# Patient Record
Sex: MALE | Race: OTHER | NOT HISPANIC OR LATINO | Employment: FULL TIME | URBAN - METROPOLITAN AREA
[De-identification: names, ages, dates, MRNs, and addresses within clinical notes are randomized per-mention and may not be internally consistent; named-entity substitution may affect disease eponyms.]

---
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
## 2022-01-13 ENCOUNTER — HOSPITAL ENCOUNTER (EMERGENCY)
Facility: HOSPITAL | Age: 22
Discharge: HOME | End: 2022-01-13
Attending: EMERGENCY MEDICINE | Admitting: EMERGENCY MEDICINE
Payer: COMMERCIAL

## 2022-01-13 VITALS
SYSTOLIC BLOOD PRESSURE: 136 MMHG | WEIGHT: 151.6 LBS | BODY MASS INDEX: 22.39 KG/M2 | OXYGEN SATURATION: 97 % | DIASTOLIC BLOOD PRESSURE: 84 MMHG | HEART RATE: 76 BPM | TEMPERATURE: 98.4 F | RESPIRATION RATE: 16 BRPM

## 2022-01-13 DIAGNOSIS — K64.9 HEMORRHOIDS: Primary | ICD-10-CM

## 2022-01-13 PROCEDURE — 99283 EMERGENCY DEPT VISIT LOW MDM: CPT

## 2022-01-13 PROCEDURE — 99284 EMERGENCY DEPT VISIT MOD MDM: CPT | Performed by: PHYSICIAN ASSISTANT

## 2022-01-13 PROCEDURE — 82272 OCCULT BLD FECES 1-3 TESTS: CPT

## 2022-01-13 RX ORDER — DOCUSATE SODIUM 100 MG/1
100 CAPSULE, LIQUID FILLED ORAL EVERY 12 HOURS
Qty: 10 CAPSULE | Refills: 0 | Status: SHIPPED | OUTPATIENT
Start: 2022-01-13

## 2022-01-13 NOTE — DISCHARGE INSTRUCTIONS
Call the doctor listed on her discharge instructions for follow-up    Please use the medications as directed on your discharge    Please return with any questions comments concerns or worsening symptoms

## 2022-01-13 NOTE — ED PROVIDER NOTES
History  Chief Complaint   Patient presents with    Rectal Pain     past 4 days has noted pain with passing BM, blood on tissue when wiping     This is a 26-year-old male patient who over the last 4 days his notice some blood on the toilet paper when he wipes and he states he has some discomfort when passing the BM  He denies diarrhea but states his stool is hard her no abdominal pain testicular pain penile pain or discharge  Nothing makes it better or worse  He is not dizzy there is no signs of acute blood loss  Denies any anal intercourse or trauma  No significant past medical history  He is nontoxic in no acute distress  Denies any fever chills headache blurred vision double vision cough congestion sore throat chest pain or shortness of breath  No nausea vomiting diarrhea abdominal pain  Nothing makes it better or worse she has tried nothing over-the-counter differential diagnosis includes not limited to hemorrhoid, fissure, GI bleed less likely          Prior to Admission Medications   Prescriptions Last Dose Informant Patient Reported? Taking?   azelastine (OPTIVAR) 0 05 % ophthalmic solution   No No   Sig: Administer 1 drop to both eyes 2 (two) times a day as needed (eye itching)   Patient not taking: Reported on 12/15/2021    diphenhydrAMINE (BENADRYL) 2 % cream   No No   Sig: Apply topically 3 (three) times a day as needed for itching   Patient not taking: Reported on 12/15/2021    lidocaine (XYLOCAINE) 5 % ointment   No No   Sig: Apply topically as needed for mild pain   Patient not taking: Reported on 12/15/2021    loratadine (CLARITIN) 10 mg tablet   No No   Sig: Take 1 tablet (10 mg total) by mouth daily   Patient not taking: Reported on 12/15/2021    sertraline (Zoloft) 50 mg tablet   No No   Sig: Take 1 tablet (50 mg total) by mouth daily For first 7 days, take 1/2 tablet (25mg)        Facility-Administered Medications: None       Past Medical History:   Diagnosis Date    No known health problems        Past Surgical History:   Procedure Laterality Date    NO PAST SURGERIES         History reviewed  No pertinent family history  I have reviewed and agree with the history as documented  E-Cigarette/Vaping    E-Cigarette Use Never User      E-Cigarette/Vaping Substances     Social History     Tobacco Use    Smoking status: Former Smoker     Types: Cigars    Smokeless tobacco: Never Used   Vaping Use    Vaping Use: Never used   Substance Use Topics    Alcohol use: No    Drug use: Not Currently     Types: Marijuana     Comment: once monthly        Review of Systems   Constitutional: Negative for diaphoresis, fatigue and fever  HENT: Negative for congestion, ear pain, nosebleeds and sore throat  Eyes: Negative for photophobia, pain, discharge and visual disturbance  Respiratory: Negative for cough, choking, chest tightness, shortness of breath and wheezing  Cardiovascular: Negative for chest pain and palpitations  Gastrointestinal: Positive for blood in stool  Negative for abdominal distention, abdominal pain, anal bleeding, constipation, diarrhea, nausea, rectal pain and vomiting  Genitourinary: Negative for dysuria, flank pain and frequency  Musculoskeletal: Negative for back pain, gait problem and joint swelling  Skin: Negative for color change and rash  Neurological: Negative for dizziness, syncope and headaches  Psychiatric/Behavioral: Negative for behavioral problems and confusion  The patient is not nervous/anxious  All other systems reviewed and are negative  Physical Exam  Physical Exam  Vitals and nursing note reviewed  Exam conducted with a chaperone present  Constitutional:       General: He is not in acute distress  Appearance: He is well-developed  He is not ill-appearing, toxic-appearing or diaphoretic  HENT:      Head: Normocephalic and atraumatic        Right Ear: Tympanic membrane, ear canal and external ear normal       Left Ear: Tympanic membrane, ear canal and external ear normal       Nose: Nose normal       Mouth/Throat:      Mouth: Mucous membranes are moist       Pharynx: Oropharynx is clear  No oropharyngeal exudate or posterior oropharyngeal erythema  Eyes:      General: No scleral icterus  Right eye: No discharge  Left eye: No discharge  Conjunctiva/sclera: Conjunctivae normal       Pupils: Pupils are equal, round, and reactive to light  Cardiovascular:      Rate and Rhythm: Normal rate and regular rhythm  Pulmonary:      Effort: Pulmonary effort is normal       Breath sounds: Normal breath sounds  Abdominal:      General: Bowel sounds are normal       Palpations: Abdomen is soft  Tenderness: There is no abdominal tenderness  Hernia: There is no hernia in the left inguinal area or right inguinal area  Genitourinary:     Penis: Normal        Testes: Normal       Epididymis:      Right: Normal       Left: Normal       Rectum: Guaiac result negative  External hemorrhoid present  No tenderness or anal fissure  Normal anal tone  Comments: Patient is very small hemorrhoid tissue at the 6 o'clock position  It is not thrombosed or inflamed at this time  Musculoskeletal:         General: Normal range of motion  Cervical back: Normal range of motion and neck supple  Right lower leg: No edema  Left lower leg: No edema  Lymphadenopathy:      Lower Body: No right inguinal adenopathy  No left inguinal adenopathy  Skin:     General: Skin is warm  Capillary Refill: Capillary refill takes less than 2 seconds  Neurological:      General: No focal deficit present  Mental Status: He is alert and oriented to person, place, and time  Mental status is at baseline     Psychiatric:         Mood and Affect: Mood normal          Behavior: Behavior normal          Vital Signs  ED Triage Vitals [01/13/22 0922]   Temperature Pulse Respirations Blood Pressure SpO2   98 4 °F (36 9 °C) 76 16 136/84 97 %      Temp Source Heart Rate Source Patient Position - Orthostatic VS BP Location FiO2 (%)   Oral Monitor Sitting Left arm --      Pain Score       No Pain           Vitals:    01/13/22 0922   BP: 136/84   Pulse: 76   Patient Position - Orthostatic VS: Sitting         Visual Acuity      ED Medications  Medications - No data to display    Diagnostic Studies  Results Reviewed     None                 No orders to display              Procedures  Procedures         ED Course                                             MDM    Disposition  Final diagnoses:   Hemorrhoids     Time reflects when diagnosis was documented in both MDM as applicable and the Disposition within this note     Time User Action Codes Description Comment    1/13/2022  9:52 AM Shivam Ellison Add [K64 9] Hemorrhoids       ED Disposition     ED Disposition Condition Date/Time Comment    Discharge Stable u Jan 13, 2022  9:52 AM Miller Gary discharge to home/self care              Follow-up Information     Follow up With Specialties Details Why Contact Info    Angelica Christine MD General Surgery Schedule an appointment as soon as possible for a visit   [address] [facility] [address] [facility]  [phone]            Discharge Medication List as of 1/13/2022  9:55 AM      START taking these medications    Details   docusate sodium (COLACE) 100 mg capsule Take 1 capsule (100 mg total) by mouth every 12 (twelve) hours, Starting Thu 1/13/2022, Normal      hydrocortisone-pramoxine (PROCTOFOAM-HC) 1-1 % FOAM rectal foam Insert 1 applicator into the rectum 2 (two) times a day, Starting Thu 1/13/2022, Normal         CONTINUE these medications which have NOT CHANGED    Details   azelastine (OPTIVAR) 0 05 % ophthalmic solution Administer 1 drop to both eyes 2 (two) times a day as needed (eye itching), Starting Wed 11/3/2021, Normal      diphenhydrAMINE (BENADRYL) 2 % cream Apply topically 3 (three) times a day as needed for itching, Starting Sun 10/17/2021, Print      lidocaine (XYLOCAINE) 5 % ointment Apply topically as needed for mild pain, Starting Wed 9/15/2021, Normal      loratadine (CLARITIN) 10 mg tablet Take 1 tablet (10 mg total) by mouth daily, Starting Wed 11/3/2021, Normal      sertraline (Zoloft) 50 mg tablet Take 1 tablet (50 mg total) by mouth daily For first 7 days, take 1/2 tablet (25mg)  , Starting Mon 12/20/2021, Normal             No discharge procedures on file      PDMP Review     None          ED Provider  Electronically Signed by           Sly Winston PA-C  01/13/22 0073

## 2022-01-17 ENCOUNTER — HOSPITAL ENCOUNTER (EMERGENCY)
Facility: HOSPITAL | Age: 22
Discharge: HOME | End: 2022-01-17
Attending: EMERGENCY MEDICINE
Payer: COMMERCIAL

## 2022-01-17 ENCOUNTER — PATIENT OUTREACH (OUTPATIENT)
Dept: FAMILY MEDICINE CLINIC | Facility: CLINIC | Age: 22
End: 2022-01-17

## 2022-01-17 VITALS
DIASTOLIC BLOOD PRESSURE: 61 MMHG | WEIGHT: 157.9 LBS | HEART RATE: 63 BPM | RESPIRATION RATE: 20 BRPM | TEMPERATURE: 97.8 F | OXYGEN SATURATION: 100 % | SYSTOLIC BLOOD PRESSURE: 113 MMHG | BODY MASS INDEX: 23.32 KG/M2

## 2022-01-17 DIAGNOSIS — M79.672 BILATERAL FOOT PAIN: Primary | ICD-10-CM

## 2022-01-17 DIAGNOSIS — M79.671 BILATERAL FOOT PAIN: Primary | ICD-10-CM

## 2022-01-17 PROCEDURE — 99282 EMERGENCY DEPT VISIT SF MDM: CPT | Performed by: PHYSICIAN ASSISTANT

## 2022-01-17 PROCEDURE — 99283 EMERGENCY DEPT VISIT LOW MDM: CPT
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
## 2022-01-17 NOTE — PROGRESS NOTES
GADIEL JAMIL received consult from Provider, Wilfrid Graham, regarding assist patient with [address] services  Per chart review, patient does not have insurance and utilize the ED often  GADIEL JAMIL placed call to patient to follow-up and further assess  No answer, phone is not accepting call at this time  GADIEL JAMIL placed call to patient's mother, no answer, left a details vm and ask for a return call  Will remains available and will continue to follow-up

## 2022-01-21 ENCOUNTER — PATIENT OUTREACH (OUTPATIENT)
Dept: FAMILY MEDICINE CLINIC | Facility: CLINIC | Age: 22
End: 2022-01-21

## 2022-01-21 NOTE — LETTER
[address] [facility] [address]  [phone]    Re: Unable to contact   1/21/2022       Dear Johnnie Rubio,    We tried to reach you by phone on multiples time and was unfortunately unable to reach you  It is important that you contact the [facility] as soon as possible at: [phone]      Sincerely,         [address]

## 2022-01-21 NOTE — PROGRESS NOTES
GADIEL JAMIL placed second call to patient to follow-up and further assess  No answer, phone is not accepting call at this time  GADIEL JAMIL placed call to patient's mother, no answer, left a details vm and ask for a return call  Sent unable to contact letter  Will continue closed this referral at this time due to lack of response but will remains available as needed

## 2022-03-22 ENCOUNTER — HOSPITAL ENCOUNTER (EMERGENCY)
Facility: HOSPITAL | Age: 22
Discharge: HOME | End: 2022-03-22
Attending: EMERGENCY MEDICINE | Admitting: EMERGENCY MEDICINE
Payer: COMMERCIAL

## 2022-03-22 VITALS
BODY MASS INDEX: 21.59 KG/M2 | SYSTOLIC BLOOD PRESSURE: 108 MMHG | HEART RATE: 75 BPM | DIASTOLIC BLOOD PRESSURE: 64 MMHG | WEIGHT: 145.8 LBS | HEIGHT: 69 IN | OXYGEN SATURATION: 100 % | RESPIRATION RATE: 18 BRPM | TEMPERATURE: 97.2 F

## 2022-03-22 DIAGNOSIS — N39.0 UTI (URINARY TRACT INFECTION): Primary | ICD-10-CM

## 2022-03-22 DIAGNOSIS — Z71.1 CONCERN ABOUT STD IN MALE WITHOUT DIAGNOSIS: ICD-10-CM

## 2022-03-22 LAB
BACTERIA UR QL AUTO: ABNORMAL /HPF
BILIRUB UR QL STRIP: NEGATIVE
CLARITY UR: ABNORMAL
COLOR UR: ABNORMAL
GLUCOSE UR STRIP-MCNC: NEGATIVE MG/DL
HGB UR QL STRIP.AUTO: NEGATIVE
KETONES UR STRIP-MCNC: ABNORMAL MG/DL
LEUKOCYTE ESTERASE UR QL STRIP: 100
MUCOUS THREADS UR QL AUTO: ABNORMAL
NITRITE UR QL STRIP: NEGATIVE
NON-SQ EPI CELLS URNS QL MICRO: ABNORMAL /HPF
PH UR STRIP.AUTO: 6 [PH]
PROT UR STRIP-MCNC: ABNORMAL MG/DL
RBC #/AREA URNS AUTO: ABNORMAL /HPF
SP GR UR STRIP.AUTO: 1.02 (ref 1–1.04)
UROBILINOGEN UA: 1 MG/DL
WBC #/AREA URNS AUTO: ABNORMAL /HPF

## 2022-03-22 PROCEDURE — 81003 URINALYSIS AUTO W/O SCOPE: CPT | Performed by: PHYSICIAN ASSISTANT

## 2022-03-22 PROCEDURE — 81001 URINALYSIS AUTO W/SCOPE: CPT | Performed by: PHYSICIAN ASSISTANT

## 2022-03-22 PROCEDURE — 87086 URINE CULTURE/COLONY COUNT: CPT | Performed by: PHYSICIAN ASSISTANT

## 2022-03-22 PROCEDURE — 99283 EMERGENCY DEPT VISIT LOW MDM: CPT

## 2022-03-22 PROCEDURE — 96372 THER/PROPH/DIAG INJ SC/IM: CPT

## 2022-03-22 PROCEDURE — 87491 CHLMYD TRACH DNA AMP PROBE: CPT | Performed by: PHYSICIAN ASSISTANT

## 2022-03-22 PROCEDURE — 99284 EMERGENCY DEPT VISIT MOD MDM: CPT | Performed by: PHYSICIAN ASSISTANT

## 2022-03-22 PROCEDURE — 87591 N.GONORRHOEAE DNA AMP PROB: CPT | Performed by: PHYSICIAN ASSISTANT

## 2022-03-22 RX ORDER — AZITHROMYCIN 250 MG/1
1000 TABLET, FILM COATED ORAL ONCE
Status: COMPLETED | OUTPATIENT
Start: 2022-03-22 | End: 2022-03-22

## 2022-03-22 RX ORDER — DOXYCYCLINE 100 MG/1
100 TABLET ORAL 2 TIMES DAILY
Qty: 20 TABLET | Refills: 0 | Status: SHIPPED | OUTPATIENT
Start: 2022-03-22 | End: 2022-04-01

## 2022-03-22 RX ADMIN — LIDOCAINE HYDROCHLORIDE 250 MG: 10 INJECTION, SOLUTION EPIDURAL; INFILTRATION; INTRACAUDAL at 12:56

## 2022-03-22 RX ADMIN — AZITHROMYCIN MONOHYDRATE 1000 MG: 250 TABLET ORAL at 12:55

## 2022-03-22 NOTE — ED PROVIDER NOTES
History  Chief Complaint   Patient presents with    Possible UTI     having burning sensation and drainage     Pt with burning with urine and penile d/c for several days       Difficulty Urinating  Presenting symptoms: dysuria and penile discharge    Relieved by:  Nothing  Worsened by:  Nothing  Ineffective treatments:  None tried  Associated symptoms: no abdominal pain    Risk factors: no bladder surgery        Prior to Admission Medications   Prescriptions Last Dose Informant Patient Reported? Taking?   azelastine (OPTIVAR) 0 05 % ophthalmic solution   No No   Sig: Administer 1 drop to both eyes 2 (two) times a day as needed (eye itching)   Patient not taking: Reported on 12/15/2021    diphenhydrAMINE (BENADRYL) 2 % cream   No No   Sig: Apply topically 3 (three) times a day as needed for itching   Patient not taking: Reported on 12/15/2021    docusate sodium (COLACE) 100 mg capsule   No No   Sig: Take 1 capsule (100 mg total) by mouth every 12 (twelve) hours   hydrocortisone-pramoxine (PROCTOFOAM-HC) 1-1 % FOAM rectal foam   No No   Sig: Insert 1 applicator into the rectum 2 (two) times a day   lidocaine (XYLOCAINE) 5 % ointment   No No   Sig: Apply topically as needed for mild pain   Patient not taking: Reported on 12/15/2021    loratadine (CLARITIN) 10 mg tablet   No No   Sig: Take 1 tablet (10 mg total) by mouth daily   Patient not taking: Reported on 12/15/2021    sertraline (Zoloft) 50 mg tablet   No No   Sig: Take 1 tablet (50 mg total) by mouth daily For first 7 days, take 1/2 tablet (25mg)  Facility-Administered Medications: None       Past Medical History:   Diagnosis Date    No known health problems        Past Surgical History:   Procedure Laterality Date    NO PAST SURGERIES         History reviewed  No pertinent family history  I have reviewed and agree with the history as documented      E-Cigarette/Vaping    E-Cigarette Use Never User      E-Cigarette/Vaping Substances     Social History Tobacco Use    Smoking status: Current Some Day Smoker     Types: Cigars    Smokeless tobacco: Never Used   Vaping Use    Vaping Use: Never used   Substance Use Topics    Alcohol use: Yes    Drug use: Yes     Types: Marijuana     Comment: once a day       Review of Systems   Constitutional: Negative  HENT: Negative  Eyes: Negative  Respiratory: Negative  Cardiovascular: Negative  Gastrointestinal: Negative  Negative for abdominal pain  Endocrine: Negative  Genitourinary: Positive for dysuria and penile discharge  Musculoskeletal: Negative  Skin: Negative  Allergic/Immunologic: Negative  Neurological: Negative  Hematological: Negative  Psychiatric/Behavioral: Negative  All other systems reviewed and are negative  Physical Exam  Physical Exam  Vitals and nursing note reviewed  Constitutional:       Appearance: Normal appearance  He is normal weight  HENT:      Head: Normocephalic and atraumatic  Right Ear: Tympanic membrane, ear canal and external ear normal       Left Ear: Tympanic membrane, ear canal and external ear normal       Nose: Nose normal       Mouth/Throat:      Mouth: Mucous membranes are moist       Pharynx: Oropharynx is clear  Eyes:      Extraocular Movements: Extraocular movements intact  Conjunctiva/sclera: Conjunctivae normal       Pupils: Pupils are equal, round, and reactive to light  Cardiovascular:      Rate and Rhythm: Normal rate and regular rhythm  Pulses: Normal pulses  Heart sounds: Normal heart sounds  Pulmonary:      Effort: Pulmonary effort is normal       Breath sounds: Normal breath sounds  Abdominal:      General: Abdomen is flat  Bowel sounds are normal       Palpations: Abdomen is soft  Musculoskeletal:         General: Normal range of motion  Cervical back: Normal range of motion and neck supple  Skin:     General: Skin is warm        Capillary Refill: Capillary refill takes less than 2 [Use of Plain Language] : use of plain language [Needs Reinforcement, Provided] : needs reinforcement, provided seconds  Neurological:      General: No focal deficit present  Mental Status: He is alert and oriented to person, place, and time  Psychiatric:         Mood and Affect: Mood normal          Behavior: Behavior normal          Vital Signs  ED Triage Vitals [03/22/22 1147]   Temperature Pulse Respirations Blood Pressure SpO2   (!) 97 2 °F (36 2 °C) 75 18 108/64 100 %      Temp Source Heart Rate Source Patient Position - Orthostatic VS BP Location FiO2 (%)   Tympanic Monitor Lying Right arm --      Pain Score       10 - Worst Possible Pain           Vitals:    03/22/22 1147   BP: 108/64   Pulse: 75   Patient Position - Orthostatic VS: Lying         Visual Acuity      ED Medications  Medications   cefTRIAXone (ROCEPHIN) 250 mg in lidocaine (PF) (XYLOCAINE-MPF) 1 % IM only syringe (250 mg Intramuscular Given 3/22/22 1256)   azithromycin (ZITHROMAX) tablet 1,000 mg (1,000 mg Oral Given 3/22/22 1255)       Diagnostic Studies  Results Reviewed     Procedure Component Value Units Date/Time    Urine Microscopic [[id number]]  (Abnormal) Collected: 03/22/22 1215    Lab Status: Final result Specimen: Urine, Other Updated: 03/22/22 1241     RBC, UA None Seen /hpf      WBC, UA 30-50 /hpf      Epithelial Cells Occasional /hpf      Bacteria, UA Moderate /hpf      MUCUS THREADS Moderate    Urine culture [[id number]] Collected: 03/22/22 1215    Lab Status:  In process Specimen: Urine, Other Updated: 03/22/22 1241    UA w Reflex to Microscopic w Reflex to Culture [[id number]]  (Abnormal) Collected: 03/22/22 1215    Lab Status: Final result Specimen: Urine, Other Updated: 03/22/22 1241     Color, UA Hali     Clarity, UA Slightly Cloudy     Specific Gravity, UA 1 025     pH, UA 6 0     Leukocytes,  0     Nitrite, UA Negative     Protein, UA 15 (Trace) mg/dl      Glucose, UA Negative mg/dl      Ketones, UA 5 (Trace) mg/dl      Bilirubin, UA Negative     Blood, UA Negative     UROBILINOGEN UA 1 0 mg/dL     Chlamydia/GC amplified DNA by PCR [[id number]] Collected: 03/22/22 1215    Lab Status: In process Specimen: Urine, Other Updated: 03/22/22 1217                 No orders to display              Procedures  Procedures         ED Course                                             MDM    Disposition  Final diagnoses:   UTI (urinary tract infection)   Concern about STD in male without diagnosis     Time reflects when diagnosis was documented in both MDM as applicable and the Disposition within this note     Time User Action Codes Description Comment    3/22/2022 12:48 PM Marilia Bogdan  Add [N39 0] UTI (urinary tract infection)     3/22/2022 12:49 PM Joaquin Chen  Add [Z71 1] Concern about STD in male without diagnosis       ED Disposition     ED Disposition Condition Date/Time Comment    Discharge Stable Tue Mar 22, 2022 12:48 PM Vinny Saucedo discharge to home/self care              Follow-up Information     Follow up With Specialties Details Why Contact Info    Eleuterio Ma MD Family Medicine  std testing [address]  [phone]            Discharge Medication List as of 3/22/2022 12:50 PM      START taking these medications    Details   doxycycline (ADOXA) 100 MG tablet Take 1 tablet (100 mg total) by mouth 2 (two) times a day for 10 days, Starting Tue 3/22/2022, Until Fri 4/1/2022, Print         CONTINUE these medications which have NOT CHANGED    Details   azelastine (OPTIVAR) 0 05 % ophthalmic solution Administer 1 drop to both eyes 2 (two) times a day as needed (eye itching), Starting Wed 11/3/2021, Normal      diphenhydrAMINE (BENADRYL) 2 % cream Apply topically 3 (three) times a day as needed for itching, Starting Sun 10/17/2021, Print      docusate sodium (COLACE) 100 mg capsule Take 1 capsule (100 mg total) by mouth every 12 (twelve) hours, Starting Thu 1/13/2022, Normal      hydrocortisone-pramoxine (PROCTOFOAM-HC) 1-1 % FOAM rectal foam Insert 1 applicator into the rectum 2 (two) times a day, [Health Literacy] : health literacy Starting Thu 1/13/2022, Normal      lidocaine (XYLOCAINE) 5 % ointment Apply topically as needed for mild pain, Starting Wed 9/15/2021, Normal      loratadine (CLARITIN) 10 mg tablet Take 1 tablet (10 mg total) by mouth daily, Starting Wed 11/3/2021, Normal      sertraline (Zoloft) 50 mg tablet Take 1 tablet (50 mg total) by mouth daily For first 7 days, take 1/2 tablet (25mg)  , Starting Mon 12/20/2021, Normal             No discharge procedures on file      PDMP Review     None          ED Provider  Electronically Signed by           Nacho Anderson PA-C  03/22/22 3496 [] : I have reviewed management goals with caretaker and provided a copy of care plan

## 2022-03-23 LAB
BACTERIA UR CULT: ABNORMAL
C TRACH DNA SPEC QL NAA+PROBE: NEGATIVE
N GONORRHOEA DNA SPEC QL NAA+PROBE: POSITIVE

## 2022-03-25 ENCOUNTER — TELEPHONE (OUTPATIENT)
Dept: EMERGENCY DEPT | Facility: HOSPITAL | Age: 22
End: 2022-03-25

## 2022-03-25 NOTE — TELEPHONE ENCOUNTER
Called pt to discuss test results and medication  , number not in service, letter sent asking pt to call er regarding meds and results  rocpephin 250mg was given not 500mg

## 2022-04-22 ENCOUNTER — APPOINTMENT (EMERGENCY)
Dept: RADIOLOGY | Facility: HOSPITAL | Age: 22
End: 2022-04-22
Payer: COMMERCIAL

## 2022-04-22 ENCOUNTER — HOSPITAL ENCOUNTER (EMERGENCY)
Facility: HOSPITAL | Age: 22
Discharge: HOME | End: 2022-04-22
Attending: EMERGENCY MEDICINE
Payer: COMMERCIAL

## 2022-04-22 VITALS
DIASTOLIC BLOOD PRESSURE: 57 MMHG | HEART RATE: 69 BPM | OXYGEN SATURATION: 99 % | RESPIRATION RATE: 18 BRPM | TEMPERATURE: 98.4 F | SYSTOLIC BLOOD PRESSURE: 105 MMHG

## 2022-04-22 DIAGNOSIS — R06.02 SHORTNESS OF BREATH: ICD-10-CM

## 2022-04-22 DIAGNOSIS — R07.89 ATYPICAL CHEST PAIN: Primary | ICD-10-CM

## 2022-04-22 LAB
ANION GAP SERPL CALCULATED.3IONS-SCNC: 5 MMOL/L (ref 4–13)
ATRIAL RATE: 187 BPM
BASOPHILS # BLD AUTO: 0.04 THOUSANDS/ΜL (ref 0–0.1)
BASOPHILS NFR BLD AUTO: 1 % (ref 0–1)
BUN SERPL-MCNC: 9 MG/DL (ref 5–25)
CALCIUM SERPL-MCNC: 8.3 MG/DL (ref 8.3–10.1)
CHLORIDE SERPL-SCNC: 108 MMOL/L (ref 100–108)
CO2 SERPL-SCNC: 32 MMOL/L (ref 21–32)
CREAT SERPL-MCNC: 1.13 MG/DL (ref 0.6–1.3)
EOSINOPHIL # BLD AUTO: 0.55 THOUSAND/ΜL (ref 0–0.61)
EOSINOPHIL NFR BLD AUTO: 7 % (ref 0–6)
ERYTHROCYTE [DISTWIDTH] IN BLOOD BY AUTOMATED COUNT: 11.9 % (ref 11.6–15.1)
GFR SERPL CREATININE-BSD FRML MDRD: 93 ML/MIN/1.73SQ M
GLUCOSE SERPL-MCNC: 88 MG/DL (ref 65–140)
HCT VFR BLD AUTO: 45.9 % (ref 36.5–49.3)
HGB BLD-MCNC: 15.8 G/DL (ref 12–17)
IMM GRANULOCYTES # BLD AUTO: 0.01 THOUSAND/UL (ref 0–0.2)
IMM GRANULOCYTES NFR BLD AUTO: 0 % (ref 0–2)
LYMPHOCYTES # BLD AUTO: 3.6 THOUSANDS/ΜL (ref 0.6–4.47)
LYMPHOCYTES NFR BLD AUTO: 46 % (ref 14–44)
MCH RBC QN AUTO: 31.5 PG (ref 26.8–34.3)
MCHC RBC AUTO-ENTMCNC: 34.4 G/DL (ref 31.4–37.4)
MCV RBC AUTO: 92 FL (ref 82–98)
MONOCYTES # BLD AUTO: 0.33 THOUSAND/ΜL (ref 0.17–1.22)
MONOCYTES NFR BLD AUTO: 4 % (ref 4–12)
NEUTROPHILS # BLD AUTO: 3.32 THOUSANDS/ΜL (ref 1.85–7.62)
NEUTS SEG NFR BLD AUTO: 42 % (ref 43–75)
NRBC BLD AUTO-RTO: 0 /100 WBCS
P AXIS: 80 DEGREES
PLATELET # BLD AUTO: 299 THOUSANDS/UL (ref 149–390)
PMV BLD AUTO: 8.9 FL (ref 8.9–12.7)
POTASSIUM SERPL-SCNC: 3.7 MMOL/L (ref 3.5–5.3)
QRS AXIS: 65 DEGREES
QRSD INTERVAL: 86 MS
QT INTERVAL: 358 MS
QTC INTERVAL: 405 MS
RBC # BLD AUTO: 5.01 MILLION/UL (ref 3.88–5.62)
SODIUM SERPL-SCNC: 145 MMOL/L (ref 136–145)
T WAVE AXIS: 66 DEGREES
VENTRICULAR RATE: 77 BPM
WBC # BLD AUTO: 7.85 THOUSAND/UL (ref 4.31–10.16)

## 2022-04-22 PROCEDURE — 99285 EMERGENCY DEPT VISIT HI MDM: CPT | Performed by: EMERGENCY MEDICINE

## 2022-04-22 PROCEDURE — 96374 THER/PROPH/DIAG INJ IV PUSH: CPT

## 2022-04-22 PROCEDURE — 36415 COLL VENOUS BLD VENIPUNCTURE: CPT

## 2022-04-22 PROCEDURE — 99285 EMERGENCY DEPT VISIT HI MDM: CPT

## 2022-04-22 PROCEDURE — 71045 X-RAY EXAM CHEST 1 VIEW: CPT

## 2022-04-22 PROCEDURE — 93010 ELECTROCARDIOGRAM REPORT: CPT | Performed by: INTERNAL MEDICINE

## 2022-04-22 PROCEDURE — 80048 BASIC METABOLIC PNL TOTAL CA: CPT

## 2022-04-22 PROCEDURE — 93005 ELECTROCARDIOGRAM TRACING: CPT

## 2022-04-22 PROCEDURE — 85025 COMPLETE CBC W/AUTO DIFF WBC: CPT

## 2022-04-22 RX ORDER — SODIUM CHLORIDE 9 MG/ML
3 INJECTION INTRAVENOUS
Status: DISCONTINUED | OUTPATIENT
Start: 2022-04-22 | End: 2022-04-22 | Stop reason: HOSPADM

## 2022-04-22 RX ORDER — KETOROLAC TROMETHAMINE 30 MG/ML
15 INJECTION, SOLUTION INTRAMUSCULAR; INTRAVENOUS ONCE
Status: COMPLETED | OUTPATIENT
Start: 2022-04-22 | End: 2022-04-22

## 2022-04-22 RX ADMIN — KETOROLAC TROMETHAMINE 15 MG: 30 INJECTION, SOLUTION INTRAMUSCULAR at 01:23

## 2022-04-22 NOTE — ED ATTENDING ATTESTATION
4/22/2022  I, Vincent Salians DO, saw and evaluated the patient  I have discussed the patient with the resident/non-physician practitioner and agree with the resident's/non-physician practitioner's findings, Plan of Care, and MDM as documented in the resident's/non-physician practitioner's note, except where noted  All available labs and Radiology studies were reviewed  I was present for key portions of any procedure(s) performed by the resident/non-physician practitioner and I was immediately available to provide assistance  At this point I agree with the current assessment done in the Emergency Department  I have conducted an independent evaluation of this patient a history and physical is as follows:    Patient is a 29-year-old male who says 2 days ago he began having some mild bilateral upper chest pain which is relatively constant, it is worse with nothing and better with nothing, not pleuritic in nature, not knife-like, ripping, or tearing, not positionally related  Also feeling a little bit of dyspnea on exertion and feeling just a little bit more tired  No palpitations, no cough, despite nurse's notes he says he has no headache  No fever, no chills, no myalgias or arthralgias, no dysuria or hematuria  Patient denies any prolonged travel history, no recent long travel, no immobilizations, or hospitalizations  Says his symptoms are not bothering him very much, presents today to the ED because his mother reportedly made him come to get evaluated      Family history is negative for DVT, negative for PE    General:  Patient is well-appearing  Head:  Atraumatic  Eyes:  Conjunctiva pink  ENT:  Mucous membranes are moist  Neck:  Supple  Cardiac:  S1-S2, without murmurs  Lungs:  Clear to auscultation bilaterally  Abdomen:  Soft, nontender, normal bowel sounds, no CVA tenderness, no tympany, no rigidity, no guarding  Extremities:  Normal range of motion, no pedal edema or calf asymmetry, radial pulses are equal and symmetric bilaterally  Neurologic:  Awake, fluent speech, normal comprehension, AAOx3  Skin:  Pink warm and dry  Psychiatric:  Alert, pleasant, cooperative      ED Course  ED Course as of 04/22/22 0151   Fri Apr 22, 2022   0123 ECG interpreted by me, sinus rhythm, rate of 77, normal axis, slight artifact in lead 2, no acute ischemic or infarct changes, please note I disagree with the computer interpretation of acute MI, no acute change from December 15, 2021     Labs Reviewed   CBC AND DIFFERENTIAL - Abnormal       Result Value Ref Range Status    WBC 7 85  4 31 - 10 16 Thousand/uL Final    RBC 5 01  3 88 - 5 62 Million/uL Final    Hemoglobin 15 8  12 0 - 17 0 g/dL Final    Hematocrit 45 9  36 5 - 49 3 % Final    MCV 92  82 - 98 fL Final    MCH 31 5  26 8 - 34 3 pg Final    MCHC 34 4  31 4 - 37 4 g/dL Final    RDW 11 9  11 6 - 15 1 % Final    MPV 8 9  8 9 - 12 7 fL Final    Platelets 302  579 - 390 Thousands/uL Final    nRBC 0  /100 WBCs Final    Neutrophils Relative 42 (*) 43 - 75 % Final    Immat GRANS % 0  0 - 2 % Final    Lymphocytes Relative 46 (*) 14 - 44 % Final    Monocytes Relative 4  4 - 12 % Final    Eosinophils Relative 7 (*) 0 - 6 % Final    Basophils Relative 1  0 - 1 % Final    Neutrophils Absolute 3 32  1 85 - 7 62 Thousands/µL Final    Immature Grans Absolute 0 01  0 00 - 0 20 Thousand/uL Final    Lymphocytes Absolute 3 60  0 60 - 4 47 Thousands/µL Final    Monocytes Absolute 0 33  0 17 - 1 22 Thousand/µL Final    Eosinophils Absolute 0 55  0 00 - 0 61 Thousand/µL Final    Basophils Absolute 0 04  0 00 - 0 10 Thousands/µL Final   BASIC METABOLIC PANEL    Sodium 267  136 - 145 mmol/L Final    Potassium 3 7  3 5 - 5 3 mmol/L Final    Chloride 108  100 - 108 mmol/L Final    CO2 32  21 - 32 mmol/L Final    ANION GAP 5  4 - 13 mmol/L Final    BUN 9  5 - 25 mg/dL Final    Creatinine 1 13  0 60 - 1 30 mg/dL Final    Comment: Standardized to IDMS reference method    Glucose 88  65 - 140 mg/dL Final Comment: If the patient is fasting, the ADA then defines impaired fasting glucose as > 100 mg/dL and diabetes as > or equal to 123 mg/dL  Specimen collection should occur prior to Sulfasalazine administration due to the potential for falsely depressed results  Specimen collection should occur prior to Sulfapyridine administration due to the potential for falsely elevated results  Calcium 8 3  8 3 - 10 1 mg/dL Final    eGFR 93  ml/min/1 73sq m Final    Narrative:     [place] guidelines for Chronic Kidney Disease (CKD):     Stage 1 with normal or high GFR (GFR > 90 mL/min/1 73 square meters)    Stage 2 Mild CKD (GFR = 60-89 mL/min/1 73 square meters)    Stage 3A Moderate CKD (GFR = 45-59 mL/min/1 73 square meters)    Stage 3B Moderate CKD (GFR = 30-44 mL/min/1 73 square meters)    Stage 4 Severe CKD (GFR = 15-29 mL/min/1 73 square meters)    Stage 5 End Stage CKD (GFR <15 mL/min/1 73 square meters)  Note: GFR calculation is accurate only with a steady state creatinine       X-ray chest 1 view portable   ED Interpretation   Chest x-ray interpreted me shows no acute cardiopulmonary disease, no change from December 15, 2021          Because the symptoms is unclear but unlikely to represent an acute emergency  Overall well-appearing, ECG unremarkable, chest x-ray and labs unremarkable  Benign physical examination  While the cause of the patient's complaints is most likely benign, it is possible that this is the early presentation of a more serious condition  This diagnostic uncertainty was discussed with the patient, as was the importance of follow up care, as well as the need to return to immediately return to the closest emergency department for the signs/symptoms in the discharge instruction sheets, or they were otherwise concerned about their medical condition   The patient stated they were aware of this diagnostic uncertainty, understood the importance of follow up and were comfortable being discharged  Supportive care, importance of follow-up and return precautions were discussed with the patient, who expressed understanding        Critical Care Time  Procedures

## 2022-04-22 NOTE — Clinical Note
Enzo Goldman was seen and treated in our emergency department on 4/22/2022  Diagnosis: Chest pain    Reji  may return to work on return date  He may return on this date: 04/24/2022         If you have any questions or concerns, please don't hesitate to call        Leandra Lopez MD    ______________________________           _______________          _______________  Hospital Representative                              Date                                Time

## 2022-04-22 NOTE — DISCHARGE INSTRUCTIONS
Please see your PCP in 1-2 days if your symptoms persist  Take motrin and tylenol every 6-8 hours as needed for pain  If you develop new or worsening symptoms, please return to the Emergency Department for further evaluation

## 2022-04-22 NOTE — ED PROVIDER NOTES
History  Chief Complaint   Patient presents with    Chest Pain     C/o chest pain starting two days ago  "My heart hurts " Notes SOB as well  Denies N/V/D  Patient is a 24 y/o M with no known medical problems presenting with chest pain  Patient states last night around 10pm while he was at work he had sudden onset stabbing chest pain with associated shortness of breath  He states pain and SOB kept him from breathing last night  Pain continued throughout the day today  He took motrin without relief  No associated nausea, vomiting, recent illness, fevers, chills, cough  States this happened to him once before a year ago and self-resolved  Prior to Admission Medications   Prescriptions Last Dose Informant Patient Reported? Taking?   azelastine (OPTIVAR) 0 05 % ophthalmic solution   No No   Sig: Administer 1 drop to both eyes 2 (two) times a day as needed (eye itching)   Patient not taking: Reported on 12/15/2021    diphenhydrAMINE (BENADRYL) 2 % cream   No No   Sig: Apply topically 3 (three) times a day as needed for itching   Patient not taking: Reported on 12/15/2021    docusate sodium (COLACE) 100 mg capsule   No No   Sig: Take 1 capsule (100 mg total) by mouth every 12 (twelve) hours   hydrocortisone-pramoxine (PROCTOFOAM-HC) 1-1 % FOAM rectal foam   No No   Sig: Insert 1 applicator into the rectum 2 (two) times a day   lidocaine (XYLOCAINE) 5 % ointment   No No   Sig: Apply topically as needed for mild pain   Patient not taking: Reported on 12/15/2021    loratadine (CLARITIN) 10 mg tablet   No No   Sig: Take 1 tablet (10 mg total) by mouth daily   Patient not taking: Reported on 12/15/2021    sertraline (Zoloft) 50 mg tablet   No No   Sig: Take 1 tablet (50 mg total) by mouth daily For first 7 days, take 1/2 tablet (25mg)        Facility-Administered Medications: None       Past Medical History:   Diagnosis Date    No known health problems        Past Surgical History:   Procedure Laterality Date  NO PAST SURGERIES         History reviewed  No pertinent family history  I have reviewed and agree with the history as documented  E-Cigarette/Vaping    E-Cigarette Use Never User      E-Cigarette/Vaping Substances     Social History     Tobacco Use    Smoking status: Current Some Day Smoker     Types: Cigars    Smokeless tobacco: Never Used   Vaping Use    Vaping Use: Never used   Substance Use Topics    Alcohol use: Yes    Drug use: Yes     Types: Marijuana     Comment: once a day        Review of Systems   Constitutional: Negative for chills and fever  HENT: Negative for ear pain and sore throat  Eyes: Negative for pain and visual disturbance  Respiratory: Positive for shortness of breath  Negative for cough  Cardiovascular: Positive for chest pain  Negative for palpitations  Gastrointestinal: Negative for abdominal pain and vomiting  Genitourinary: Negative for dysuria and hematuria  Musculoskeletal: Negative for arthralgias and back pain  Skin: Negative for color change and rash  Neurological: Negative for seizures and syncope  All other systems reviewed and are negative  Physical Exam  ED Triage Vitals   Temperature Pulse Respirations Blood Pressure SpO2   04/22/22 0042 04/22/22 0042 04/22/22 0042 04/22/22 0042 04/22/22 0042   98 4 °F (36 9 °C) 69 18 105/57 99 %      Temp Source Heart Rate Source Patient Position - Orthostatic VS BP Location FiO2 (%)   04/22/22 0042 04/22/22 0042 04/22/22 0042 04/22/22 0042 --   Oral Monitor Sitting Right arm       Pain Score       04/22/22 0123       7             Orthostatic Vital Signs  Vitals:    04/22/22 0042   BP: 105/57   Pulse: 69   Patient Position - Orthostatic VS: Sitting       Physical Exam  Vitals and nursing note reviewed  Constitutional:       General: He is not in acute distress  Appearance: He is well-developed  He is not toxic-appearing  HENT:      Head: Normocephalic and atraumatic        Right Ear: External ear normal       Left Ear: External ear normal       Nose: Nose normal       Mouth/Throat:      Pharynx: Oropharynx is clear  No oropharyngeal exudate or posterior oropharyngeal erythema  Eyes:      Extraocular Movements: Extraocular movements intact  Conjunctiva/sclera: Conjunctivae normal       Pupils: Pupils are equal, round, and reactive to light  Cardiovascular:      Rate and Rhythm: Normal rate and regular rhythm  Pulses: Normal pulses  Heart sounds: Normal heart sounds  No murmur heard  No friction rub  No gallop  Pulmonary:      Effort: Pulmonary effort is normal  No respiratory distress  Breath sounds: Normal breath sounds  No decreased breath sounds, wheezing, rhonchi or rales  Abdominal:      General: Abdomen is flat  Palpations: Abdomen is soft  Tenderness: There is no abdominal tenderness  There is no guarding or rebound  Musculoskeletal:         General: Normal range of motion  Cervical back: Normal range of motion  No rigidity  Right lower leg: No edema  Left lower leg: No edema  Skin:     General: Skin is warm and dry  Capillary Refill: Capillary refill takes less than 2 seconds  Neurological:      General: No focal deficit present  Mental Status: He is alert     Psychiatric:         Mood and Affect: Mood normal          ED Medications  Medications   sodium chloride (PF) 0 9 % injection 3 mL (has no administration in time range)   ketorolac (TORADOL) injection 15 mg (15 mg Intravenous Given 4/22/22 0123)       Diagnostic Studies  Results Reviewed     Procedure Component Value Units Date/Time    Basic metabolic panel [[id number]] Collected: 04/22/22 0125    Lab Status: Final result Specimen: Blood from Arm, Left Updated: 04/22/22 0139     Sodium 145 mmol/L      Potassium 3 7 mmol/L      Chloride 108 mmol/L      CO2 32 mmol/L      ANION GAP 5 mmol/L      BUN 9 mg/dL      Creatinine 1 13 mg/dL      Glucose 88 mg/dL      Calcium 8 3 mg/dL      eGFR 93 ml/min/1 73sq m     Narrative:      [place] guidelines for Chronic Kidney Disease (CKD):     Stage 1 with normal or high GFR (GFR > 90 mL/min/1 73 square meters)    Stage 2 Mild CKD (GFR = 60-89 mL/min/1 73 square meters)    Stage 3A Moderate CKD (GFR = 45-59 mL/min/1 73 square meters)    Stage 3B Moderate CKD (GFR = 30-44 mL/min/1 73 square meters)    Stage 4 Severe CKD (GFR = 15-29 mL/min/1 73 square meters)    Stage 5 End Stage CKD (GFR <15 mL/min/1 73 square meters)  Note: GFR calculation is accurate only with a steady state creatinine    CBC and differential [[id number]]  (Abnormal) Collected: 04/22/22 0125    Lab Status: Final result Specimen: Blood from Arm, Left Updated: 04/22/22 0130     WBC 7 85 Thousand/uL      RBC 5 01 Million/uL      Hemoglobin 15 8 g/dL      Hematocrit 45 9 %      MCV 92 fL      MCH 31 5 pg      MCHC 34 4 g/dL      RDW 11 9 %      MPV 8 9 fL      Platelets 868 Thousands/uL      nRBC 0 /100 WBCs      Neutrophils Relative 42 %      Immat GRANS % 0 %      Lymphocytes Relative 46 %      Monocytes Relative 4 %      Eosinophils Relative 7 %      Basophils Relative 1 %      Neutrophils Absolute 3 32 Thousands/µL      Immature Grans Absolute 0 01 Thousand/uL      Lymphocytes Absolute 3 60 Thousands/µL      Monocytes Absolute 0 33 Thousand/µL      Eosinophils Absolute 0 55 Thousand/µL      Basophils Absolute 0 04 Thousands/µL                  X-ray chest 1 view portable   ED Interpretation by Clearance DO Celina (04/22 0137)   Chest x-ray interpreted me shows no acute cardiopulmonary disease, no change from December 15, 2021            Procedures  Procedures      ED Course  ED Course as of 04/22/22 0211 Fri Apr 22, 2022   0130 ECG 12 lead  EKG per my interpretation NSR vent rate 77, narrow QRS, normal axis, normal intervals, no ST or T wave changes indicative of ischemia, disagree with printed reading of acute MI MDM  Number of Diagnoses or Management Options  Atypical chest pain  Shortness of breath  Diagnosis management comments: 26 y/o M presenting with sharp chest pain for 24 hours  VSS  EKG w/o ischemic changes, CXR WNL, basic labs WNL  D/w patient normal findings, recommendation of NSAIDs and PCP f/u  Pt understands and agrees with plan  Disposition  Final diagnoses:   Atypical chest pain   Shortness of breath     Time reflects when diagnosis was documented in both MDM as applicable and the Disposition within this note     Time User Action Codes Description Comment    4/22/2022  1:46 AM Julian Stephens Add [R07 89] Atypical chest pain     4/22/2022  1:48 AM Julian Stephens Add [R06 02] Shortness of breath       ED Disposition     ED Disposition Condition Date/Time Comment    Discharge Stable Fri Apr 22, 2022  1:47 AM Manuela Skiff discharge to home/self care              Follow-up Information     Follow up With Specialties Details Why Contact Info    Daniel Siegel Nurse Practitioner   [address] [place] [address]  [place] [address] [facility]  [phone]            Discharge Medication List as of 4/22/2022  1:48 AM      CONTINUE these medications which have NOT CHANGED    Details   azelastine (OPTIVAR) 0 05 % ophthalmic solution Administer 1 drop to both eyes 2 (two) times a day as needed (eye itching), Starting Wed 11/3/2021, Normal      diphenhydrAMINE (BENADRYL) 2 % cream Apply topically 3 (three) times a day as needed for itching, Starting Sun 10/17/2021, Print      docusate sodium (COLACE) 100 mg capsule Take 1 capsule (100 mg total) by mouth every 12 (twelve) hours, Starting Thu 1/13/2022, Normal      hydrocortisone-pramoxine (PROCTOFOAM-HC) 1-1 % FOAM rectal foam Insert 1 applicator into the rectum 2 (two) times a day, Starting Thu 1/13/2022, Normal      lidocaine (XYLOCAINE) 5 % ointment Apply topically as needed for mild pain, Starting Wed 9/15/2021, Normal      loratadine (CLARITIN) 10 mg tablet Take 1 tablet (10 mg total) by mouth daily, Starting Wed 11/3/2021, Normal      sertraline (Zoloft) 50 mg tablet Take 1 tablet (50 mg total) by mouth daily For first 7 days, take 1/2 tablet (25mg)  , Starting Mon 12/20/2021, Normal           No discharge procedures on file  PDMP Review     None           ED Provider  Attending physically available and evaluated Haseeb Sharp  JONAS managed the patient along with the ED Attending      Electronically Signed by         Eben Kim MD  04/22/22 0079
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
## 2022-05-12 ENCOUNTER — HOSPITAL ENCOUNTER (EMERGENCY)
Facility: HOSPITAL | Age: 22
Discharge: HOME | End: 2022-05-12
Attending: EMERGENCY MEDICINE | Admitting: EMERGENCY MEDICINE
Payer: COMMERCIAL

## 2022-05-12 VITALS
SYSTOLIC BLOOD PRESSURE: 115 MMHG | TEMPERATURE: 98.3 F | OXYGEN SATURATION: 99 % | RESPIRATION RATE: 16 BRPM | HEART RATE: 83 BPM | DIASTOLIC BLOOD PRESSURE: 60 MMHG | HEIGHT: 69 IN | BODY MASS INDEX: 21.42 KG/M2 | WEIGHT: 144.62 LBS

## 2022-05-12 DIAGNOSIS — N48.89 PENILE PAIN: Primary | ICD-10-CM

## 2022-05-12 LAB
BACTERIA UR QL AUTO: ABNORMAL /HPF
BILIRUB UR QL STRIP: NEGATIVE
CLARITY UR: ABNORMAL
COLOR UR: ABNORMAL
GLUCOSE UR STRIP-MCNC: NEGATIVE MG/DL
HGB UR QL STRIP.AUTO: 25
KETONES UR STRIP-MCNC: NEGATIVE MG/DL
LEUKOCYTE ESTERASE UR QL STRIP: 500
NITRITE UR QL STRIP: NEGATIVE
NON-SQ EPI CELLS URNS QL MICRO: ABNORMAL /HPF
PH UR STRIP.AUTO: 7 [PH]
PROT UR STRIP-MCNC: NEGATIVE MG/DL
RBC #/AREA URNS AUTO: ABNORMAL /HPF
SP GR UR STRIP.AUTO: 1.01 (ref 1–1.04)
UROBILINOGEN UA: NEGATIVE MG/DL
WBC #/AREA URNS AUTO: ABNORMAL /HPF

## 2022-05-12 PROCEDURE — 99282 EMERGENCY DEPT VISIT SF MDM: CPT | Performed by: EMERGENCY MEDICINE

## 2022-05-12 PROCEDURE — 87491 CHLMYD TRACH DNA AMP PROBE: CPT | Performed by: EMERGENCY MEDICINE

## 2022-05-12 PROCEDURE — 81001 URINALYSIS AUTO W/SCOPE: CPT | Performed by: EMERGENCY MEDICINE

## 2022-05-12 PROCEDURE — 87591 N.GONORRHOEAE DNA AMP PROB: CPT | Performed by: EMERGENCY MEDICINE

## 2022-05-12 PROCEDURE — 99283 EMERGENCY DEPT VISIT LOW MDM: CPT

## 2022-05-14 LAB
C TRACH DNA SPEC QL NAA+PROBE: NEGATIVE
N GONORRHOEA DNA SPEC QL NAA+PROBE: POSITIVE

## 2022-05-16 DIAGNOSIS — A54.9 GONORRHEA: Primary | ICD-10-CM

## 2022-05-16 RX ORDER — CEFTRIAXONE 500 MG/1
500 INJECTION, POWDER, FOR SOLUTION INTRAMUSCULAR; INTRAVENOUS ONCE
Status: SHIPPED | OUTPATIENT
Start: 2022-05-17

## 2022-05-17 NOTE — ED PROVIDER NOTES
History  Chief Complaint   Patient presents with    Penis Pain     Patient has had an erection that does not go down for the past 2 days  Denies vigorous sex or any trauma to the penis  Penis Pain  Location:  Patient presents with noted pain with erections, no evidence of erection at this point time  Severity:  Moderate  Onset quality:  Gradual  Timing:  Intermittent  Progression:  Waxing and waning  Chronicity:  New  Context:  Discomfort and pain only with erections no pain with ejaculation, no pain with urination or dysuria,  Associated symptoms: no abdominal pain, no chest pain, no cough, no diarrhea, no fever, no headaches, no myalgias, no nausea, no rash, no rhinorrhea, no shortness of breath, no sore throat and no wheezing        Prior to Admission Medications   Prescriptions Last Dose Informant Patient Reported? Taking?   azelastine (OPTIVAR) 0 05 % ophthalmic solution   No No   Sig: Administer 1 drop to both eyes 2 (two) times a day as needed (eye itching)   Patient not taking: Reported on 12/15/2021    diphenhydrAMINE (BENADRYL) 2 % cream   No No   Sig: Apply topically 3 (three) times a day as needed for itching   Patient not taking: Reported on 12/15/2021    docusate sodium (COLACE) 100 mg capsule   No No   Sig: Take 1 capsule (100 mg total) by mouth every 12 (twelve) hours   hydrocortisone-pramoxine (PROCTOFOAM-HC) 1-1 % FOAM rectal foam   No No   Sig: Insert 1 applicator into the rectum 2 (two) times a day   lidocaine (XYLOCAINE) 5 % ointment   No No   Sig: Apply topically as needed for mild pain   Patient not taking: Reported on 12/15/2021    loratadine (CLARITIN) 10 mg tablet   No No   Sig: Take 1 tablet (10 mg total) by mouth daily   Patient not taking: Reported on 12/15/2021    sertraline (Zoloft) 50 mg tablet   No No   Sig: Take 1 tablet (50 mg total) by mouth daily For first 7 days, take 1/2 tablet (25mg)        Facility-Administered Medications: None       Past Medical History:   Diagnosis Date    No known health problems        Past Surgical History:   Procedure Laterality Date    NO PAST SURGERIES         History reviewed  No pertinent family history  I have reviewed and agree with the history as documented  E-Cigarette/Vaping    E-Cigarette Use Never User      E-Cigarette/Vaping Substances     Social History     Tobacco Use    Smoking status: Current Some Day Smoker     Types: Cigars    Smokeless tobacco: Never Used   Vaping Use    Vaping Use: Never used   Substance Use Topics    Alcohol use: Yes    Drug use: Yes     Types: Marijuana     Comment: once a day       Review of Systems   Constitutional: Negative for chills and fever  HENT: Negative for rhinorrhea, sore throat and trouble swallowing  Eyes: Negative for pain  Respiratory: Negative for cough, shortness of breath, wheezing and stridor  Cardiovascular: Negative for chest pain and leg swelling  Gastrointestinal: Negative for abdominal pain, diarrhea and nausea  Endocrine: Negative for polyuria  Genitourinary: Positive for penile pain  Negative for dysuria, flank pain and urgency  Musculoskeletal: Negative for joint swelling, myalgias and neck stiffness  Skin: Negative for rash  Allergic/Immunologic: Negative for immunocompromised state  Neurological: Negative for dizziness, syncope, weakness, numbness and headaches  Psychiatric/Behavioral: Negative for confusion and suicidal ideas  All other systems reviewed and are negative  Physical Exam  Physical Exam  Vitals and nursing note reviewed  Constitutional:       Appearance: He is well-developed  HENT:      Head: Normocephalic and atraumatic  Eyes:      Pupils: Pupils are equal, round, and reactive to light  Cardiovascular:      Rate and Rhythm: Normal rate and regular rhythm  Heart sounds: Normal heart sounds  No murmur heard  No friction rub  Pulmonary:      Effort: Pulmonary effort is normal  No respiratory distress        Breath sounds: No wheezing or rales  Abdominal:      General: Bowel sounds are normal       Palpations: Abdomen is soft  There is no mass  Tenderness: There is no abdominal tenderness  There is no guarding  Genitourinary:     Penis: Normal        Testes: Normal    Musculoskeletal:      Cervical back: Normal range of motion and neck supple  Skin:     General: Skin is warm  Findings: No rash  Neurological:      Mental Status: He is alert and oriented to person, place, and time  Vital Signs  ED Triage Vitals [05/12/22 1858]   Temperature Pulse Respirations Blood Pressure SpO2   98 3 °F (36 8 °C) 83 16 115/60 99 %      Temp Source Heart Rate Source Patient Position - Orthostatic VS BP Location FiO2 (%)   Tympanic Monitor Sitting Left arm --      Pain Score       8           Vitals:    05/12/22 1858   BP: 115/60   Pulse: 83   Patient Position - Orthostatic VS: Sitting         Visual Acuity      ED Medications  Medications - No data to display    Diagnostic Studies  Results Reviewed     Procedure Component Value Units Date/Time    Chlamydia/GC amplified DNA by PCR [[id number]]  (Abnormal) Collected: 05/12/22 1931    Lab Status: Final result Specimen: Urine, Other Updated: 05/14/22 0444     N gonorrhoeae, DNA Probe Positive     Chlamydia trachomatis, DNA Probe Negative    Narrative:      Test performed using PCR amplification of target DNA  This test is intended as an aid in the diagnosis of Chlamydial and gonococcal disease  This test has not been evaluated in patients younger than 15years of age and is not recommended for evaluation of suspected sexual abuse  Additional testing is recommended when the results do not correlate with clinical signs and symptoms        Urine Microscopic [[id number]]  (Abnormal) Collected: 05/12/22 1931    Lab Status: Final result Specimen: Urine, Clean Catch Updated: 05/12/22 1956     RBC, UA 0-1 /hpf      WBC, UA Innumerable /hpf      Epithelial Cells None Seen /hpf Bacteria, UA Occasional /hpf     UA (URINE) with reflex to Scope [[id number]]  (Abnormal) Collected: 05/12/22 1931    Lab Status: Final result Specimen: Urine, Clean Catch Updated: 05/12/22 1944     Color, UA Straw     Clarity, UA Slightly Cloudy     Specific Gravity, UA 1 015     pH, UA 7 0     Leukocytes,  0     Nitrite, UA Negative     Protein, UA Negative mg/dl      Glucose, UA Negative mg/dl      Ketones, UA Negative mg/dl      Bilirubin, UA Negative     Blood, UA 25 0     UROBILINOGEN UA Negative mg/dL                  No orders to display              Procedures  Procedures         ED Course                                             MDM  Number of Diagnoses or Management Options  Penile pain: new and requires workup  Diagnosis management comments: This is a 60-year-old male who presents to the emergency department with noted pain with erections, no dysuria at this point time  Will test for gonorrhea and chlamydia looks like positive for gonorrhea treated as such in the emergency department continue with outpatient medications as well  Pt re-examined and evaluated after testing and treatment  Spoke with the patient and feeling improved and sxs have resolved  Will discharge home with close f/u with pcp and instructed to return to the ED if sxs worsen or continue  Pt agrees with the plan for discharge and feels comfortable to go home with proper f/u  Advised to return for worsening or additional problems  Diagnostic tests were reviewed and questions answered  Diagnosis, care plan and treatment options were discussed  The patient understand instructions and will follow up as directed  Counseling: I had a detailed discussion with the patient and/or guardian regarding: the historical points, exam findings, and any diagnostic results supporting the discharge diagnosis, lab results, radiology results, discharge instructions reviewed with patient and/or family/caregiver and understanding was verbalized  Instructions given to return to the emergency department if symptoms worsen or persist, or if there are any questions or concerns that arise at home  All labs reviewed and utilized in the medical decision making process    All radiology studies independently viewed by me and interpreted by the radiologist            Amount and/or Complexity of Data Reviewed  Clinical lab tests: ordered and reviewed  Review and summarize past medical records: yes  Independent visualization of images, tracings, or specimens: yes        Disposition  Final diagnoses:   Penile pain     Time reflects when diagnosis was documented in both MDM as applicable and the Disposition within this note     Time User Action Codes Description Comment    5/12/2022  7:34 PM Kenny Randolph Add [Q98 [id number]] Penile pain       ED Disposition     ED Disposition   Discharge    Condition   Stable    Date/Time   Thu May 12, 2022  7:34 PM    Comment   Yadira Mckeon discharge to home/self care                 Follow-up Information    None         Discharge Medication List as of 5/12/2022  7:35 PM      CONTINUE these medications which have NOT CHANGED    Details   azelastine (OPTIVAR) 0 05 % ophthalmic solution Administer 1 drop to both eyes 2 (two) times a day as needed (eye itching), Starting Wed 11/3/2021, Normal      diphenhydrAMINE (BENADRYL) 2 % cream Apply topically 3 (three) times a day as needed for itching, Starting Sun 10/17/2021, Print      docusate sodium (COLACE) 100 mg capsule Take 1 capsule (100 mg total) by mouth every 12 (twelve) hours, Starting u 1/13/2022, Normal      hydrocortisone-pramoxine (PROCTOFOAM-HC) 1-1 % FOAM rectal foam Insert 1 applicator into the rectum 2 (two) times a day, Starting Thu 1/13/2022, Normal      lidocaine (XYLOCAINE) 5 % ointment Apply topically as needed for mild pain, Starting Wed 9/15/2021, Normal      loratadine (CLARITIN) 10 mg tablet Take 1 tablet (10 mg total) by mouth daily, Starting Wed 11/3/2021, Normal sertraline (Zoloft) 50 mg tablet Take 1 tablet (50 mg total) by mouth daily For first 7 days, take 1/2 tablet (25mg)  , Starting Mon 12/20/2021, Normal                 PDMP Review     None          ED Provider  Electronically Signed by           Silke Toney DO  05/17/22 7107

## 2022-06-21 ENCOUNTER — HOSPITAL ENCOUNTER (EMERGENCY)
Facility: HOSPITAL | Age: 22
Discharge: HOME | End: 2022-06-21
Attending: EMERGENCY MEDICINE | Admitting: EMERGENCY MEDICINE
Payer: COMMERCIAL

## 2022-06-21 ENCOUNTER — HOSPITAL ENCOUNTER (EMERGENCY)
Facility: HOSPITAL | Age: 22
Discharge: HOME | End: 2022-06-22
Attending: EMERGENCY MEDICINE
Payer: COMMERCIAL

## 2022-06-21 VITALS
DIASTOLIC BLOOD PRESSURE: 65 MMHG | BODY MASS INDEX: 21.16 KG/M2 | RESPIRATION RATE: 18 BRPM | OXYGEN SATURATION: 98 % | WEIGHT: 143.3 LBS | HEART RATE: 70 BPM | SYSTOLIC BLOOD PRESSURE: 107 MMHG | TEMPERATURE: 97.8 F

## 2022-06-21 VITALS
DIASTOLIC BLOOD PRESSURE: 51 MMHG | OXYGEN SATURATION: 99 % | HEART RATE: 71 BPM | TEMPERATURE: 97.3 F | RESPIRATION RATE: 18 BRPM | SYSTOLIC BLOOD PRESSURE: 116 MMHG

## 2022-06-21 DIAGNOSIS — L50.9 URTICARIA: Primary | ICD-10-CM

## 2022-06-21 DIAGNOSIS — T78.2XXA ANAPHYLAXIS, INITIAL ENCOUNTER: Primary | ICD-10-CM

## 2022-06-21 DIAGNOSIS — R79.89 LFT ELEVATION: ICD-10-CM

## 2022-06-21 LAB
ALBUMIN SERPL BCP-MCNC: 4 G/DL (ref 3.5–5)
ALP SERPL-CCNC: 96 U/L (ref 46–484)
ALT SERPL W P-5'-P-CCNC: 68 U/L (ref 12–78)
ANION GAP SERPL CALCULATED.3IONS-SCNC: 9 MMOL/L (ref 4–13)
AST SERPL W P-5'-P-CCNC: 202 U/L (ref 5–45)
BASOPHILS # BLD AUTO: 0.02 THOUSANDS/ΜL (ref 0–0.1)
BASOPHILS NFR BLD AUTO: 0 % (ref 0–1)
BILIRUB SERPL-MCNC: 0.5 MG/DL (ref 0.2–1)
BUN SERPL-MCNC: 8 MG/DL (ref 5–25)
CALCIUM SERPL-MCNC: 9.3 MG/DL (ref 8.3–10.1)
CHLORIDE SERPL-SCNC: 104 MMOL/L (ref 100–108)
CO2 SERPL-SCNC: 28 MMOL/L (ref 21–32)
CREAT SERPL-MCNC: 1.2 MG/DL (ref 0.6–1.3)
EOSINOPHIL # BLD AUTO: 0.03 THOUSAND/ΜL (ref 0–0.61)
EOSINOPHIL NFR BLD AUTO: 0 % (ref 0–6)
ERYTHROCYTE [DISTWIDTH] IN BLOOD BY AUTOMATED COUNT: 12.2 % (ref 11.6–15.1)
GFR SERPL CREATININE-BSD FRML MDRD: 87 ML/MIN/1.73SQ M
GLUCOSE SERPL-MCNC: 122 MG/DL (ref 65–140)
HCT VFR BLD AUTO: 47.4 % (ref 36.5–49.3)
HGB BLD-MCNC: 16.2 G/DL (ref 12–17)
IMM GRANULOCYTES # BLD AUTO: 0.04 THOUSAND/UL (ref 0–0.2)
IMM GRANULOCYTES NFR BLD AUTO: 0 % (ref 0–2)
LYMPHOCYTES # BLD AUTO: 1.73 THOUSANDS/ΜL (ref 0.6–4.47)
LYMPHOCYTES NFR BLD AUTO: 18 % (ref 14–44)
MCH RBC QN AUTO: 31.1 PG (ref 26.8–34.3)
MCHC RBC AUTO-ENTMCNC: 34.2 G/DL (ref 31.4–37.4)
MCV RBC AUTO: 91 FL (ref 82–98)
MONOCYTES # BLD AUTO: 0.44 THOUSAND/ΜL (ref 0.17–1.22)
MONOCYTES NFR BLD AUTO: 5 % (ref 4–12)
NEUTROPHILS # BLD AUTO: 7.28 THOUSANDS/ΜL (ref 1.85–7.62)
NEUTS SEG NFR BLD AUTO: 77 % (ref 43–75)
NRBC BLD AUTO-RTO: 0 /100 WBCS
PLATELET # BLD AUTO: 458 THOUSANDS/UL (ref 149–390)
PMV BLD AUTO: 9 FL (ref 8.9–12.7)
POTASSIUM SERPL-SCNC: 3.8 MMOL/L (ref 3.5–5.3)
PROT SERPL-MCNC: 7.8 G/DL (ref 6.4–8.2)
RBC # BLD AUTO: 5.21 MILLION/UL (ref 3.88–5.62)
SODIUM SERPL-SCNC: 141 MMOL/L (ref 136–145)
WBC # BLD AUTO: 9.54 THOUSAND/UL (ref 4.31–10.16)

## 2022-06-21 PROCEDURE — 99285 EMERGENCY DEPT VISIT HI MDM: CPT

## 2022-06-21 PROCEDURE — 85025 COMPLETE CBC W/AUTO DIFF WBC: CPT

## 2022-06-21 PROCEDURE — 99283 EMERGENCY DEPT VISIT LOW MDM: CPT

## 2022-06-21 PROCEDURE — 80053 COMPREHEN METABOLIC PANEL: CPT

## 2022-06-21 PROCEDURE — 99282 EMERGENCY DEPT VISIT SF MDM: CPT

## 2022-06-21 PROCEDURE — 99284 EMERGENCY DEPT VISIT MOD MDM: CPT

## 2022-06-21 PROCEDURE — 96374 THER/PROPH/DIAG INJ IV PUSH: CPT

## 2022-06-21 PROCEDURE — 36415 COLL VENOUS BLD VENIPUNCTURE: CPT

## 2022-06-21 PROCEDURE — 96372 THER/PROPH/DIAG INJ SC/IM: CPT

## 2022-06-21 PROCEDURE — 96375 TX/PRO/DX INJ NEW DRUG ADDON: CPT

## 2022-06-21 RX ORDER — FAMOTIDINE 20 MG/1
20 TABLET, FILM COATED ORAL ONCE
Status: COMPLETED | OUTPATIENT
Start: 2022-06-21 | End: 2022-06-21

## 2022-06-21 RX ORDER — DIPHENHYDRAMINE HCL 25 MG
25 TABLET ORAL EVERY 6 HOURS
Qty: 20 TABLET | Refills: 0 | Status: SHIPPED | OUTPATIENT
Start: 2022-06-21

## 2022-06-21 RX ORDER — PREDNISONE 20 MG/1
40 TABLET ORAL DAILY
Qty: 8 TABLET | Refills: 0 | Status: SHIPPED | OUTPATIENT
Start: 2022-06-21 | End: 2022-06-25

## 2022-06-21 RX ORDER — FEXOFENADINE HYDROCHLORIDE 60 MG/1
60 TABLET, FILM COATED ORAL 2 TIMES DAILY
Qty: 20 TABLET | Refills: 0 | Status: SHIPPED | OUTPATIENT
Start: 2022-06-21

## 2022-06-21 RX ORDER — FAMOTIDINE 10 MG/ML
20 INJECTION, SOLUTION INTRAVENOUS ONCE
Status: COMPLETED | OUTPATIENT
Start: 2022-06-21 | End: 2022-06-21

## 2022-06-21 RX ORDER — EPINEPHRINE 0.3 MG/.3ML
0.3 INJECTION SUBCUTANEOUS ONCE
Qty: 0.6 ML | Refills: 0 | Status: SHIPPED | OUTPATIENT
Start: 2022-06-22 | End: 2022-06-22

## 2022-06-21 RX ORDER — FAMOTIDINE 20 MG/1
20 TABLET, FILM COATED ORAL 2 TIMES DAILY
Qty: 30 TABLET | Refills: 0 | Status: SHIPPED | OUTPATIENT
Start: 2022-06-21

## 2022-06-21 RX ORDER — EPINEPHRINE 1 MG/ML
0.5 INJECTION, SOLUTION, CONCENTRATE INTRAVENOUS ONCE
Status: COMPLETED | OUTPATIENT
Start: 2022-06-21 | End: 2022-06-21

## 2022-06-21 RX ORDER — DIPHENHYDRAMINE HYDROCHLORIDE 50 MG/ML
25 INJECTION INTRAMUSCULAR; INTRAVENOUS ONCE
Status: COMPLETED | OUTPATIENT
Start: 2022-06-21 | End: 2022-06-21

## 2022-06-21 RX ORDER — DIPHENHYDRAMINE HCL 25 MG
25 TABLET ORAL ONCE
Status: COMPLETED | OUTPATIENT
Start: 2022-06-21 | End: 2022-06-21

## 2022-06-21 RX ADMIN — EPINEPHRINE 0.5 MG: 1 INJECTION, SOLUTION, CONCENTRATE INTRAVENOUS at 21:17

## 2022-06-21 RX ADMIN — DIPHENHYDRAMINE HCL 25 MG: 25 TABLET, COATED ORAL at 04:18

## 2022-06-21 RX ADMIN — FAMOTIDINE 20 MG: 10 INJECTION, SOLUTION INTRAVENOUS at 21:25

## 2022-06-21 RX ADMIN — PREDNISONE 50 MG: 20 TABLET ORAL at 04:18

## 2022-06-21 RX ADMIN — FAMOTIDINE 20 MG: 20 TABLET, FILM COATED ORAL at 04:18

## 2022-06-21 RX ADMIN — DIPHENHYDRAMINE HYDROCHLORIDE 25 MG: 50 INJECTION, SOLUTION INTRAMUSCULAR; INTRAVENOUS at 21:23

## 2022-06-22 NOTE — ED PROVIDER NOTES
History  Chief Complaint   Patient presents with    Allergic Reaction     Pt has a full body rash after taking prednisolone     The patient is a 72-year-old male with no significant past medical history who presents to the ED for evaluation of a full-body pruritic rash with associated lip and tongue numbness/swelling, as well as dyspnea per patient  The patient was evaluated in this facility earlier this morning for urticaria which was only present on his face and resolved after receiving prednisone, Benadryl, and Pepcid  At that time, the patient had no lip or tongue swelling  The patient reports that the rash began immediately after he took his prescribed prednisone, which he has not taken since discharge  He reports that he has never taken prednisone before yesterday  He denies any other new medications, or any new food, shampoo, soaps, detergents, or any other new exposure that he can think of  He has not taken any medication prior to arrival other than the prednisone  He otherwise denies dysphagia, nausea, vomiting, abdominal pain, wheezing, fever, chills  Prior to Admission Medications   Prescriptions Last Dose Informant Patient Reported?  Taking?   azelastine (OPTIVAR) 0 05 % ophthalmic solution   No No   Sig: Administer 1 drop to both eyes 2 (two) times a day as needed (eye itching)   Patient not taking: Reported on 12/15/2021    diphenhydrAMINE (BENADRYL) 2 % cream   No No   Sig: Apply topically 3 (three) times a day as needed for itching   Patient not taking: Reported on 12/15/2021    diphenhydrAMINE (BENADRYL) 25 mg tablet   No No   Sig: Take 1 tablet (25 mg total) by mouth every 6 (six) hours   docusate sodium (COLACE) 100 mg capsule   No No   Sig: Take 1 capsule (100 mg total) by mouth every 12 (twelve) hours   hydrocortisone-pramoxine (PROCTOFOAM-HC) 1-1 % FOAM rectal foam   No No   Sig: Insert 1 applicator into the rectum 2 (two) times a day   lidocaine (XYLOCAINE) 5 % ointment   No No Sig: Apply topically as needed for mild pain   Patient not taking: Reported on 12/15/2021    loratadine (CLARITIN) 10 mg tablet   No No   Sig: Take 1 tablet (10 mg total) by mouth daily   Patient not taking: Reported on 12/15/2021    predniSONE 20 mg tablet   No No   Sig: Take 2 tablets (40 mg total) by mouth daily for 4 days   sertraline (Zoloft) 50 mg tablet   No No   Sig: Take 1 tablet (50 mg total) by mouth daily For first 7 days, take 1/2 tablet (25mg)  Facility-Administered Medications Last Administration Doses Remaining   cefTRIAXone (ROCEPHIN) injection 500 mg None recorded 1          Past Medical History:   Diagnosis Date    No known health problems        Past Surgical History:   Procedure Laterality Date    NO PAST SURGERIES         History reviewed  No pertinent family history  I have reviewed and agree with the history as documented  E-Cigarette/Vaping    E-Cigarette Use Never User      E-Cigarette/Vaping Substances     Social History     Tobacco Use    Smoking status: Current Some Day Smoker     Types: Cigars    Smokeless tobacco: Never Used   Vaping Use    Vaping Use: Never used   Substance Use Topics    Alcohol use: Yes    Drug use: Yes     Types: Marijuana     Comment: once a day       Review of Systems   Constitutional: Negative for chills and fever  HENT: Positive for facial swelling  Negative for congestion, rhinorrhea and trouble swallowing  Respiratory: Positive for shortness of breath  Negative for cough  Cardiovascular: Negative for chest pain and leg swelling  Gastrointestinal: Negative for abdominal pain, constipation, diarrhea, nausea and vomiting  Genitourinary: Negative for dysuria and flank pain  Musculoskeletal: Negative for arthralgias and myalgias  Skin: Positive for rash  Negative for wound  Neurological: Negative for dizziness, weakness, numbness and headaches  Psychiatric/Behavioral: Negative for behavioral problems         Physical Exam  Physical Exam  Vitals and nursing note reviewed  Constitutional:       General: He is in acute distress  Appearance: He is well-developed  He is not toxic-appearing  HENT:      Head: Normocephalic and atraumatic  Nose: Nose normal       Mouth/Throat:      Mouth: Mucous membranes are moist       Pharynx: Uvula midline  Comments: Questionable mild lip swelling  No facial edema  No tongue swelling  No uvular swelling  Eyes:      Conjunctiva/sclera: Conjunctivae normal    Cardiovascular:      Rate and Rhythm: Regular rhythm  Tachycardia present  Pulses: Normal pulses  Heart sounds: No murmur heard  Pulmonary:      Effort: Pulmonary effort is normal  No respiratory distress  Breath sounds: Normal breath sounds  No stridor  No wheezing, rhonchi or rales  Abdominal:      Palpations: Abdomen is soft  Tenderness: There is no abdominal tenderness  There is no guarding or rebound  Musculoskeletal:         General: Normal range of motion  Cervical back: Neck supple  Right lower leg: No edema  Left lower leg: No edema  Skin:     General: Skin is warm and dry  Capillary Refill: Capillary refill takes less than 2 seconds  Coloration: Skin is not pale  Findings: Rash present  No bruising or petechiae  Comments: Diffuse urticaria covering the entire body, no drainage   Neurological:      Mental Status: He is alert and oriented to person, place, and time  Sensory: Sensation is intact  Motor: Motor function is intact           Vital Signs  ED Triage Vitals   Temperature Pulse Respirations Blood Pressure SpO2   06/21/22 2053 06/21/22 2053 06/21/22 2053 06/21/22 2230 06/21/22 2053   97 8 °F (36 6 °C) (!) 108 18 97/54 98 %      Temp Source Heart Rate Source Patient Position - Orthostatic VS BP Location FiO2 (%)   06/21/22 2053 06/21/22 2230 06/21/22 2230 06/21/22 2230 --   Oral Monitor Lying Left arm       Pain Score       06/21/22 2053 10 - Worst Possible Pain           Vitals:    06/21/22 2053 06/21/22 2230 06/21/22 2330   BP:  97/54 107/65   Pulse: (!) 108 70    Patient Position - Orthostatic VS:  Lying Lying         Visual Acuity      ED Medications  Medications   EPINEPHrine PF (ADRENALIN) 1 mg/mL injection 0 5 mg (0 5 mg Intramuscular Given 6/21/22 2117)   Famotidine (PF) (PEPCID) injection 20 mg (20 mg Intravenous Given 6/21/22 2125)   diphenhydrAMINE (BENADRYL) injection 25 mg (25 mg Intravenous Given 6/21/22 2123)       Diagnostic Studies  Results Reviewed     Procedure Component Value Units Date/Time    Comprehensive metabolic panel [[id number]]  (Abnormal) Collected: 06/21/22 2127    Lab Status: Final result Specimen: Blood from Arm, Right Updated: 06/21/22 2204     Sodium 141 mmol/L      Potassium 3 8 mmol/L      Chloride 104 mmol/L      CO2 28 mmol/L      ANION GAP 9 mmol/L      BUN 8 mg/dL      Creatinine 1 20 mg/dL      Glucose 122 mg/dL      Calcium 9 3 mg/dL       U/L      ALT 68 U/L      Alkaline Phosphatase 96 U/L      Total Protein 7 8 g/dL      Albumin 4 0 g/dL      Total Bilirubin 0 50 mg/dL      eGFR 87 ml/min/1 73sq m     Narrative:      Trish guidelines for Chronic Kidney Disease (CKD):     Stage 1 with normal or high GFR (GFR > 90 mL/min/1 73 square meters)    Stage 2 Mild CKD (GFR = 60-89 mL/min/1 73 square meters)    Stage 3A Moderate CKD (GFR = 45-59 mL/min/1 73 square meters)    Stage 3B Moderate CKD (GFR = 30-44 mL/min/1 73 square meters)    Stage 4 Severe CKD (GFR = 15-29 mL/min/1 73 square meters)    Stage 5 End Stage CKD (GFR <15 mL/min/1 73 square meters)  Note: GFR calculation is accurate only with a steady state creatinine    CBC and differential [[id number]]  (Abnormal) Collected: 06/21/22 2127    Lab Status: Final result Specimen: Blood from Arm, Right Updated: 06/21/22 2146     WBC 9 54 Thousand/uL      RBC 5 21 Million/uL      Hemoglobin 16 2 g/dL      Hematocrit 47 4 %      MCV 91 fL      MCH 31 1 pg      MCHC 34 2 g/dL      RDW 12 2 %      MPV 9 0 fL      Platelets 769 Thousands/uL      nRBC 0 /100 WBCs      Neutrophils Relative 77 %      Immat GRANS % 0 %      Lymphocytes Relative 18 %      Monocytes Relative 5 %      Eosinophils Relative 0 %      Basophils Relative 0 %      Neutrophils Absolute 7 28 Thousands/µL      Immature Grans Absolute 0 04 Thousand/uL      Lymphocytes Absolute 1 73 Thousands/µL      Monocytes Absolute 0 44 Thousand/µL      Eosinophils Absolute 0 03 Thousand/µL      Basophils Absolute 0 02 Thousands/µL                  No orders to display              Procedures  Procedures         ED Course    The patient is a 24-year-old male with no significant past medical history who presents to the ED for evaluation of a full-body pruritic rash with associated lip and tongue numbness/swelling, as well as dyspnea per patient  Reports this began after taking Prednisone which was prescribed yesterday after patient came to ED with urticaria on his face with no other symptoms  On exam, patient has diffuse urticaria to entire body  Patient tachycardic, in acute distress  Appears extremely uncomfortable  Questionable lip swelling, per patient his lips are swollen  No wheezing, no respiratory distress  No stridor, no uvular edema  Airway patent  IM epinephrine given immediately  IV established; will give Pepcid and Benadryl  ED Course as of 06/22/22 0223 Tue Jun 21, 2022 2132 Case discussed with pharmacy  Prednisone and Decadron may have some cross-reactivity, there are not many studies to guide the decision in this clinical scenario whether not to give Decadron  Will avoid any steroids if possible  On reexamination, the patient appears significantly improved  Is no longer tachycardic, or in any distress  Urticaria has resolved  No uvular edema, angioedema  Patient reports sensation of lip and tongue swelling has resolved    Denies dyspnea, lungs remain clear to auscultation bilaterally  Vital signs stable  Will observe patient until 3 hours past epinephrine administration  At 3 hours past Epi administration, patient remains in no acute distress  Remains without urticaria, and or any other symptoms  Vital signs remained stable during ED stay  Patient denies nausea, vomiting, dyspnea, lip or tongue swelling, dysphagia, pruritus  Had a long discussion with the patient regarding possible causes of his allergic reaction as, it started before he took prednisone yesterday however worsened after taking prednisone today  The patient was given verbal and written instructions regarding allergist follow-up  I advised the patient to not take prednisone until seen by allergy  EpiPen prescription was sent to pharmacy; I  had a long discussion with the patient regarding when to use, how to use, and the importance of keeping EpiPen with him at all times  He verbalized understanding and agreement  I also instructed the patient to return to the ED with any signs or symptoms of allergic reaction  He verbalized agreement and understanding  At the time of discharge, the patient is in no acute distress  I discussed with the patient the diagnosis, treatment plan, follow-up, return precautions, and discharge instructions; they were given the opportunity to ask questions and verbalized understanding  LIANA    Flowsheet Row Most Recent Value   SBIRT (13-23 yo)    In order to provide better care to our patients, we are screening all of our patients for alcohol and drug use  Would it be okay to ask you these screening questions? Yes Filed at: 06/21/2022 2139   LIANA Initial Screen: During the past 12 months, did you:    1  Drink any alcohol (more than a few sips)? No Filed at: 06/21/2022 2139   2  Smoke any marijuana or hashish No Filed at: 06/21/2022 2139   3   Use anything else to get high? ("anything else" includes illegal drugs, over the counter and prescription drugs, and things that you sniff or 'johnson')? No Filed at: 06/21/2022 2139          [facility]  Number of Diagnoses or Management Options  Anaphylaxis, initial encounter: new and requires workup  LFT elevation: new and requires workup     Amount and/or Complexity of Data Reviewed  Clinical lab tests: ordered and reviewed  Tests in the medicine section of CPT®: ordered and reviewed  Decide to obtain previous medical records or to obtain history from someone other than the patient: yes  Review and summarize past medical records: yes    Risk of Complications, Morbidity, and/or Mortality  Presenting problems: high  Management options: low    Patient Progress  Patient progress: improved      Disposition  Final diagnoses:   Anaphylaxis, initial encounter   LFT elevation     Time reflects when diagnosis was documented in both MDM as applicable and the Disposition within this note     Time User Action Codes Description Comment    6/21/2022 11:49 PM Rod, [address] [place] [address]  2XXA] Anaphylaxis, initial encounter     6/21/2022 11:52 PM Yary Schaeffer Add [R79 89] LFT elevation       ED Disposition     ED Disposition   Discharge    Condition   Stable    Date/Time   Tue Jun 21, 2022 11:49 PM    Comment   Aaliyah Sherwood discharge to home/self care                 Follow-up Information     Follow up With Specialties Details Why Contact Info Additional Information    SHIRA Marie Nurse Practitioner   [address] [address]  [address] [facility]   [address]  [facility] [address] [facility] [address]  [phone] [facility] [address], [address], [address] [place], [place], [address], [phone]          Discharge Medication List as of 6/21/2022 11:56 PM      START taking these medications    Details   !! diphenhydrAMINE (BENADRYL) 25 mg tablet Take 1 tablet (25 mg total) by mouth every 6 (six) hours, Starting Tue 6/21/2022, Normal EPINEPHrine (EPIPEN) 0 3 mg/0 3 mL SOAJ Inject 0 3 mL (0 3 mg total) into a muscle once for 1 dose, Starting Wed 6/22/2022, Normal      famotidine (PEPCID) 20 mg tablet Take 1 tablet (20 mg total) by mouth 2 (two) times a day, Starting Tue 6/21/2022, Normal      fexofenadine (ALLEGRA) 60 MG tablet Take 1 tablet (60 mg total) by mouth 2 (two) times a day, Starting Tue 6/21/2022, Normal       !! - Potential duplicate medications found  Please discuss with provider  CONTINUE these medications which have NOT CHANGED    Details   azelastine (OPTIVAR) 0 05 % ophthalmic solution Administer 1 drop to both eyes 2 (two) times a day as needed (eye itching), Starting Wed 11/3/2021, Normal      diphenhydrAMINE (BENADRYL) 2 % cream Apply topically 3 (three) times a day as needed for itching, Starting Sun 10/17/2021, Print      !! diphenhydrAMINE (BENADRYL) 25 mg tablet Take 1 tablet (25 mg total) by mouth every 6 (six) hours, Starting Tue 6/21/2022, Normal      docusate sodium (COLACE) 100 mg capsule Take 1 capsule (100 mg total) by mouth every 12 (twelve) hours, Starting Thu 1/13/2022, Normal      hydrocortisone-pramoxine (PROCTOFOAM-HC) 1-1 % FOAM rectal foam Insert 1 applicator into the rectum 2 (two) times a day, Starting Thu 1/13/2022, Normal      lidocaine (XYLOCAINE) 5 % ointment Apply topically as needed for mild pain, Starting Wed 9/15/2021, Normal      loratadine (CLARITIN) 10 mg tablet Take 1 tablet (10 mg total) by mouth daily, Starting Wed 11/3/2021, Normal      predniSONE 20 mg tablet Take 2 tablets (40 mg total) by mouth daily for 4 days, Starting Tue 6/21/2022, Until Sat 6/25/2022, Normal      sertraline (Zoloft) 50 mg tablet Take 1 tablet (50 mg total) by mouth daily For first 7 days, take 1/2 tablet (25mg)  , Starting Mon 12/20/2021, Normal       !! - Potential duplicate medications found  Please discuss with provider                PDMP Review     None          ED Provider  Electronically Signed by Ritchie Raymon, [place]  06/22/22 0381

## 2022-06-22 NOTE — ED PROVIDER NOTES
History  Chief Complaint   Patient presents with    Rash     Pt reports itchy rash on face since one hour ago  The patient is a 45-year-old male with no significant past medical history who presents to the ED for evaluation of a pruritic rash on his face which has been present for the past hour  He denies history of similar rash the rest, denies any allergies  He denies any new medications, food, shampoo, soaps, detergents, or any other new exposure that he can think of  He has not taken any medication prior to arrival for the rash  He otherwise denies dyspnea, lip or tongue swelling, dysphagia, nausea, vomiting, abdominal pain, wheezing, any other rash, fever, chills  Prior to Admission Medications   Prescriptions Last Dose Informant Patient Reported? Taking?   azelastine (OPTIVAR) 0 05 % ophthalmic solution   No No   Sig: Administer 1 drop to both eyes 2 (two) times a day as needed (eye itching)   Patient not taking: Reported on 12/15/2021    diphenhydrAMINE (BENADRYL) 2 % cream   No No   Sig: Apply topically 3 (three) times a day as needed for itching   Patient not taking: Reported on 12/15/2021    docusate sodium (COLACE) 100 mg capsule   No No   Sig: Take 1 capsule (100 mg total) by mouth every 12 (twelve) hours   hydrocortisone-pramoxine (PROCTOFOAM-HC) 1-1 % FOAM rectal foam   No No   Sig: Insert 1 applicator into the rectum 2 (two) times a day   lidocaine (XYLOCAINE) 5 % ointment   No No   Sig: Apply topically as needed for mild pain   Patient not taking: Reported on 12/15/2021    loratadine (CLARITIN) 10 mg tablet   No No   Sig: Take 1 tablet (10 mg total) by mouth daily   Patient not taking: Reported on 12/15/2021    sertraline (Zoloft) 50 mg tablet   No No   Sig: Take 1 tablet (50 mg total) by mouth daily For first 7 days, take 1/2 tablet (25mg)        Facility-Administered Medications Last Administration Doses Remaining   cefTRIAXone (ROCEPHIN) injection 500 mg None recorded 1 Past Medical History:   Diagnosis Date    No known health problems        Past Surgical History:   Procedure Laterality Date    NO PAST SURGERIES         History reviewed  No pertinent family history  I have reviewed and agree with the history as documented  E-Cigarette/Vaping    E-Cigarette Use Never User      E-Cigarette/Vaping Substances     Social History     Tobacco Use    Smoking status: Current Some Day Smoker     Types: Cigars    Smokeless tobacco: Never Used   Vaping Use    Vaping Use: Never used   Substance Use Topics    Alcohol use: Yes    Drug use: Yes     Types: Marijuana     Comment: once a day       Review of Systems   Constitutional: Negative for chills and fever  HENT: Negative for congestion and rhinorrhea  Respiratory: Negative for cough and shortness of breath  Cardiovascular: Negative for chest pain and leg swelling  Gastrointestinal: Negative for abdominal pain, constipation, diarrhea, nausea and vomiting  Genitourinary: Negative for dysuria and flank pain  Musculoskeletal: Negative for arthralgias and myalgias  Skin: Positive for rash  Negative for wound  Neurological: Negative for dizziness, weakness, numbness and headaches  Psychiatric/Behavioral: Negative for behavioral problems  Physical Exam  Physical Exam  Vitals and nursing note reviewed  Constitutional:       Appearance: He is well-developed  HENT:      Head: Normocephalic and atraumatic  Mouth/Throat:      Mouth: Mucous membranes are moist  No angioedema  Tongue: No lesions  Palate: No lesions  Pharynx: Oropharynx is clear  Uvula midline  No pharyngeal swelling, oropharyngeal exudate, posterior oropharyngeal erythema or uvula swelling  Eyes:      Conjunctiva/sclera: Conjunctivae normal    Cardiovascular:      Rate and Rhythm: Normal rate and regular rhythm  Heart sounds: No murmur heard    Pulmonary:      Effort: Pulmonary effort is normal  No respiratory distress  Breath sounds: Normal breath sounds  No decreased air movement  No decreased breath sounds or wheezing  Abdominal:      Palpations: Abdomen is soft  Tenderness: There is no abdominal tenderness  Musculoskeletal:         General: Normal range of motion  Cervical back: Neck supple  Skin:     General: Skin is warm and dry  Capillary Refill: Capillary refill takes less than 2 seconds  Findings: Rash present  No abscess, bruising, ecchymosis or petechiae  Rash is urticarial       Comments: Scattered mild urticaria to the face  No rash to the trunk or extremities  No sloughing  No drainage  Neurological:      Mental Status: He is alert  Vital Signs  ED Triage Vitals [06/21/22 0253]   Temperature Pulse Respirations Blood Pressure SpO2   (!) 97 3 °F (36 3 °C) 71 18 116/51 99 %      Temp Source Heart Rate Source Patient Position - Orthostatic VS BP Location FiO2 (%)   Temporal Monitor -- Right arm --      Pain Score       --           Vitals:    06/21/22 0253   BP: 116/51   Pulse: 71         Visual Acuity      ED Medications  Medications   diphenhydrAMINE (BENADRYL) tablet 25 mg (25 mg Oral Given 6/21/22 0418)   famotidine (PEPCID) tablet 20 mg (20 mg Oral Given 6/21/22 0418)   predniSONE tablet 50 mg (50 mg Oral Given 6/21/22 0418)       Diagnostic Studies  Results Reviewed     None                 No orders to display              Procedures  Procedures         ED Course    The patient is a 61-year-old male with no significant past medical history who presents to the ED for evaluation of a pruritic rash on his face which has been present for the past hour  No hx of similar, no new known exposure  Denies dyspnea, lip or tongue swelling, dysphagia, nausea, vomiting, abdominal pain, wheezing, any other rash, fever, chills  On exam, patient with mild scattered urticaria to the face  No other rash  No drainage, no angioedema, no wheezing  Vital signs stable      Will give prednisone, Benadryl, Pepcid and re-evaluate  On re-examination, patient with significant improvement  No rash is present at this time  The patient remains in no acute distress  No dyspnea, no lip or tongue swelling  Will discharge with prescription for prednisone and Benadryl  At the time of discharge, the patient is in no acute distress  I discussed with the patient the diagnosis, treatment plan, follow-up, return precautions, and discharge instructions; they were given the opportunity to ask questions and verbalized understanding  MDM  Number of Diagnoses or Management Options  Urticaria: new and does not require workup     Amount and/or Complexity of Data Reviewed  Tests in the medicine section of CPT®: ordered and reviewed  Decide to obtain previous medical records or to obtain history from someone other than the patient: yes  Review and summarize past medical records: yes    Risk of Complications, Morbidity, and/or Mortality  Presenting problems: low  Management options: low    Patient Progress  Patient progress: improved      Disposition  Final diagnoses:   Urticaria     Time reflects when diagnosis was documented in both MDM as applicable and the Disposition within this note     Time User Action Codes Description Comment    6/21/2022  5:10 AM Yary Lynn [L50 9] Urticaria       ED Disposition     ED Disposition   Discharge    Condition   Stable    Date/Time   Tue Jun 21, 2022  5:10 AM    Comment   Aaliyah Sherwood discharge to home/self care                 Follow-up Information     Follow up With Specialties Details Why Contact Info Additional [address] [facility], [address] [place] [address] Nurse Practitioner   [address] [place] [address] ENT [address] Allergy   [address] [facility] [address] [facility] [address]  [phone] [facility] ENT [address], [address] [facility], [address] [place], [place], [address], [phone]          Discharge Medication List as of 6/21/2022  5:13 AM      START taking these medications    Details   diphenhydrAMINE (BENADRYL) 25 mg tablet Take 1 tablet (25 mg total) by mouth every 6 (six) hours, Starting Tue 6/21/2022, Normal      predniSONE 20 mg tablet Take 2 tablets (40 mg total) by mouth daily for 4 days, Starting Tue 6/21/2022, Until Sat 6/25/2022, Normal         CONTINUE these medications which have NOT CHANGED    Details   azelastine (OPTIVAR) 0 05 % ophthalmic solution Administer 1 drop to both eyes 2 (two) times a day as needed (eye itching), Starting Wed 11/3/2021, Normal      diphenhydrAMINE (BENADRYL) 2 % cream Apply topically 3 (three) times a day as needed for itching, Starting Sun 10/17/2021, Print      docusate sodium (COLACE) 100 mg capsule Take 1 capsule (100 mg total) by mouth every 12 (twelve) hours, Starting Thu 1/13/2022, Normal      hydrocortisone-pramoxine (PROCTOFOAM-HC) 1-1 % FOAM rectal foam Insert 1 applicator into the rectum 2 (two) times a day, Starting Thu 1/13/2022, Normal      lidocaine (XYLOCAINE) 5 % ointment Apply topically as needed for mild pain, Starting Wed 9/15/2021, Normal      loratadine (CLARITIN) 10 mg tablet Take 1 tablet (10 mg total) by mouth daily, Starting Wed 11/3/2021, Normal      sertraline (Zoloft) 50 mg tablet Take 1 tablet (50 mg total) by mouth daily For first 7 days, take 1/2 tablet (25mg)  , Starting Mon 12/20/2021, Normal             No discharge procedures on file      PDMP Review     None          ED Provider  Electronically Signed by           Lizy Greco PA-C  06/21/22 5332

## 2022-06-22 NOTE — DISCHARGE INSTRUCTIONS
Call your PCP tomorrow for follow up ASAP    Call Allergy to schedule an appointment ASAP    Take Allegra daily as prescribed     Epi Pen - use if you develop a rash with any lip or tongue swelling, trouble breathing, abdominal pain, nausea/vomiting/diarrhea, or any other concern for severe allergic reaction   Carry this with you at all times, especially until you see allergy     Take Benadryl and Pepcid as prescribed if you develop a rash or any other symptoms of an allergic reaction    Return to the emergency department for any severe rash, or for any trouble breathing, trouble swallowing, lip or tongue swelling, nausea, vomiting, diarrhea, abdominal pain, any concern for allergic reaction

## 2022-09-10 ENCOUNTER — HOSPITAL ENCOUNTER (EMERGENCY)
Facility: HOSPITAL | Age: 22
Discharge: HOME | End: 2022-09-10
Attending: STUDENT IN AN ORGANIZED HEALTH CARE EDUCATION/TRAINING PROGRAM
Payer: COMMERCIAL

## 2022-09-10 VITALS
OXYGEN SATURATION: 99 % | HEART RATE: 70 BPM | OXYGEN SATURATION: 99 % | RESPIRATION RATE: 19 BRPM | SYSTOLIC BLOOD PRESSURE: 123 MMHG | TEMPERATURE: 97.8 F | WEIGHT: 149.47 LBS | RESPIRATION RATE: 19 BRPM | WEIGHT: 149.47 LBS | HEART RATE: 70 BPM | DIASTOLIC BLOOD PRESSURE: 69 MMHG | TEMPERATURE: 97.8 F | SYSTOLIC BLOOD PRESSURE: 123 MMHG | DIASTOLIC BLOOD PRESSURE: 69 MMHG

## 2022-09-10 DIAGNOSIS — R11.10 VOMITING: ICD-10-CM

## 2022-09-10 DIAGNOSIS — A08.4 VIRAL GASTROENTERITIS: Primary | ICD-10-CM

## 2022-09-10 PROCEDURE — 99284 EMERGENCY DEPT VISIT MOD MDM: CPT

## 2022-09-10 RX ORDER — LIDOCAINE HYDROCHLORIDE 20 MG/ML
15 SOLUTION OROPHARYNGEAL ONCE
Status: COMPLETED | OUTPATIENT
Start: 2022-09-10 | End: 2022-09-10

## 2022-09-10 RX ORDER — IBUPROFEN 400 MG/1
400 TABLET ORAL ONCE
Status: COMPLETED | OUTPATIENT
Start: 2022-09-10 | End: 2022-09-10

## 2022-09-10 RX ORDER — MAGNESIUM HYDROXIDE/ALUMINUM HYDROXICE/SIMETHICONE 120; 1200; 1200 MG/30ML; MG/30ML; MG/30ML
30 SUSPENSION ORAL ONCE
Status: COMPLETED | OUTPATIENT
Start: 2022-09-10 | End: 2022-09-10

## 2022-09-10 RX ORDER — ONDANSETRON 4 MG/1
4 TABLET, ORALLY DISINTEGRATING ORAL ONCE
Status: COMPLETED | OUTPATIENT
Start: 2022-09-10 | End: 2022-09-10

## 2022-09-10 RX ORDER — ONDANSETRON 4 MG/1
4 TABLET, ORALLY DISINTEGRATING ORAL EVERY 6 HOURS PRN
Qty: 20 TABLET | Refills: 0 | Status: SHIPPED | OUTPATIENT
Start: 2022-09-10

## 2022-09-10 RX ORDER — FAMOTIDINE 20 MG/1
20 TABLET, FILM COATED ORAL ONCE
Status: COMPLETED | OUTPATIENT
Start: 2022-09-10 | End: 2022-09-10

## 2022-09-10 RX ADMIN — ALUMINUM HYDROXIDE, MAGNESIUM HYDROXIDE, AND SIMETHICONE 30 ML: 200; 200; 20 SUSPENSION ORAL at 15:22

## 2022-09-10 RX ADMIN — LIDOCAINE HYDROCHLORIDE 15 ML: 20 SOLUTION ORAL; TOPICAL at 15:22

## 2022-09-10 RX ADMIN — FAMOTIDINE 20 MG: 20 TABLET ORAL at 15:22

## 2022-09-10 RX ADMIN — IBUPROFEN 400 MG: 400 TABLET ORAL at 15:21

## 2022-09-10 RX ADMIN — ONDANSETRON 4 MG: 4 TABLET, ORALLY DISINTEGRATING ORAL at 15:22

## 2022-09-10 NOTE — Clinical Note
Black Martinez was seen and treated in our emergency department on 9/10/2022  No restrictions            Diagnosis:     Ahmed    He may return on this date: If you have any questions or concerns, please don't hesitate to call        Rosa Arnold PA-C    ______________________________           _______________          _______________  Hospital Representative                              Date                                Time

## 2022-09-10 NOTE — ED PROVIDER NOTES
History  Chief Complaint   Patient presents with    Abdominal Pain     Since yesterday +N/V  States unable to hold anything down, including water  Patient is a 29-year-old male coming in for abdominal pain, nausea, vomiting that started last night  Patient is in no acute distress at this time  Patient denies any other symptoms at this time  No history of abdominal surgery      History provided by:  Patient   used: No    Abdominal Pain  Pain location:  Periumbilical  Pain severity:  Mild  Duration:  2 days  Associated symptoms: nausea and vomiting    Associated symptoms: no chest pain, no constipation, no diarrhea, no dysuria, no fatigue, no fever, no flatus, no hematuria and no shortness of breath        None       History reviewed  No pertinent past medical history  History reviewed  No pertinent surgical history  History reviewed  No pertinent family history  I have reviewed and agree with the history as documented  E-Cigarette/Vaping     E-Cigarette/Vaping Substances     Social History     Tobacco Use    Smoking status: Never Smoker    Smokeless tobacco: Never Used   Substance Use Topics    Alcohol use: Never    Drug use: Never       Review of Systems   Constitutional: Negative  Negative for activity change, appetite change, fatigue and fever  HENT: Negative  Eyes: Negative  Respiratory: Negative  Negative for chest tightness and shortness of breath  Cardiovascular: Negative  Negative for chest pain  Gastrointestinal: Positive for abdominal pain, nausea and vomiting  Negative for constipation, diarrhea and flatus  Genitourinary: Negative  Negative for dysuria and hematuria  Musculoskeletal: Negative  Skin: Negative  Neurological: Negative  Psychiatric/Behavioral: Negative  Physical Exam  Physical Exam  Vitals reviewed  Constitutional:       Appearance: He is well-developed and normal weight     HENT:      Head: Normocephalic and atraumatic  Right Ear: External ear normal       Left Ear: External ear normal       Nose: Nose normal    Eyes:      Conjunctiva/sclera: Conjunctivae normal    Cardiovascular:      Rate and Rhythm: Normal rate  Pulmonary:      Effort: Pulmonary effort is normal    Abdominal:      Palpations: Abdomen is soft  Tenderness: There is abdominal tenderness in the periumbilical area  Negative signs include Ascencio's sign, McBurney's sign, psoas sign and obturator sign  Musculoskeletal:         General: Normal range of motion  Cervical back: Normal range of motion  Skin:     General: Skin is warm and dry  Neurological:      Mental Status: He is alert           Vital Signs  ED Triage Vitals [09/10/22 1502]   Temperature Pulse Respirations Blood Pressure SpO2   97 8 °F (36 6 °C) 70 19 123/69 99 %      Temp Source Heart Rate Source Patient Position - Orthostatic VS BP Location FiO2 (%)   Tympanic Monitor Sitting Left arm --      Pain Score       7           Vitals:    09/10/22 1502   BP: 123/69   Pulse: 70   Patient Position - Orthostatic VS: Sitting         Visual Acuity      ED Medications  Medications   ondansetron (ZOFRAN-ODT) dispersible tablet 4 mg (4 mg Oral Given 9/10/22 1522)   ibuprofen (MOTRIN) tablet 400 mg (400 mg Oral Given 9/10/22 1521)   aluminum-magnesium hydroxide-simethicone (MYLANTA) oral suspension 30 mL (30 mL Oral Given 9/10/22 1522)   Lidocaine Viscous HCl (XYLOCAINE) 2 % mucosal solution 15 mL (15 mL Swish & Swallow Given 9/10/22 1522)   famotidine (PEPCID) tablet 20 mg (20 mg Oral Given 9/10/22 1522)       Diagnostic Studies  Results Reviewed     None                 No orders to display              Procedures  Procedures         ED Course  ED Course as of 09/10/22 1601   Sat Sep 10, 2022   1555 Patient states he is feeling better, ready to go                                             MDM  Number of Diagnoses or Management Options  Viral gastroenteritis: new and does not require workup  Vomiting: new and does not require workup  Diagnosis management comments: Patient comes in for abdominal pain that started yesterday, no psoas or obturator's sign  Patient had no right lower quadrant abdominal pain  Patient felt better after Zofran, and was discharged home with prescription for Zofran    Counseling: I had a detailed discussion with the patient and/or guardian regarding: the historical points, exam findings, and any diagnostic results supporting the discharge diagnosis, lab results, radiology results, discharge instructions reviewed with patient and/or family/caregiver and understanding was verbalized  Instructions given to return to the emergency department if symptoms worsen or persist, or if there are any questions or concerns that arise at home       All labs reviewed and utilized in the medical decision making process     All radiology studies independently viewed by me and interpreted by the radiologist     Portions of the record may have been created with voice recognition software   Occasional wrong word or "sound a like" substitutions may have occurred due to the inherent limitations of voice recognition software   Read the chart carefully and recognize, using context, where substitutions have occurred        Risk of Complications, Morbidity, and/or Mortality  Presenting problems: minimal  Diagnostic procedures: minimal  Management options: minimal    Patient Progress  Patient progress: stable      Disposition  Final diagnoses:   Viral gastroenteritis   Vomiting     Time reflects when diagnosis was documented in both MDM as applicable and the Disposition within this note     Time User Action Codes Description Comment    9/10/2022  3:54 PM Xin Fields [A08 4] Viral gastroenteritis     9/10/2022  3:54 PM Xin Fields [R11 10] Vomiting       ED Disposition     ED Disposition   Discharge    Condition   Stable    Date/Time   Sat Sep 10, 2022  3:54 PM    Riri Mandujano Shade Harsh discharge to home/self care  Follow-up Information     Follow up With Specialties Details Why Contact Info Additional [address] [facility] Emergency Department Emergency Medicine  As needed, If symptoms worsen [address] [phone] [address] [facility] Emergency Department          Patient's Medications   Discharge Prescriptions    ONDANSETRON (ZOFRAN ODT) 4 MG DISINTEGRATING TABLET    Take 1 tablet (4 mg total) by mouth every 6 (six) hours as needed for nausea or vomiting       Start Date: 9/10/2022 End Date: --       Order Dose: 4 mg       Quantity: 20 tablet    Refills: 0       No discharge procedures on file      PDMP Review     None          ED Provider  Electronically Signed by           Reyes Porter, PA-C  09/10/22 0803

## 2022-09-28 ENCOUNTER — HOSPITAL ENCOUNTER (EMERGENCY)
Facility: HOSPITAL | Age: 22
Discharge: HOME | End: 2022-09-28
Attending: EMERGENCY MEDICINE
Payer: COMMERCIAL

## 2022-09-28 VITALS
BODY MASS INDEX: 21.65 KG/M2 | OXYGEN SATURATION: 97 % | HEART RATE: 73 BPM | WEIGHT: 146.61 LBS | TEMPERATURE: 98 F | SYSTOLIC BLOOD PRESSURE: 130 MMHG | RESPIRATION RATE: 18 BRPM | DIASTOLIC BLOOD PRESSURE: 64 MMHG

## 2022-09-28 DIAGNOSIS — K59.00 CONSTIPATION, UNSPECIFIED CONSTIPATION TYPE: Primary | ICD-10-CM

## 2022-09-28 PROCEDURE — 99283 EMERGENCY DEPT VISIT LOW MDM: CPT

## 2022-09-28 PROCEDURE — 99282 EMERGENCY DEPT VISIT SF MDM: CPT | Performed by: EMERGENCY MEDICINE

## 2022-09-28 RX ORDER — POLYETHYLENE GLYCOL 3350 17 G/17G
17 POWDER, FOR SOLUTION ORAL 2 TIMES DAILY
Qty: 1020 G | Refills: 0 | Status: SHIPPED | OUTPATIENT
Start: 2022-09-28 | End: 2022-10-28

## 2022-09-28 NOTE — ED PROVIDER NOTES
History  Chief Complaint   Patient presents with    Constipation     States he has not been able to poop for the past month  Patient states he has tried laxatives on 8/30 and states it was not effective  This is a 47-year-old male who presents today complaining of constipation for the past month  States that has not had any bowel movement since last month  Denies abdominal pain, nausea, vomiting, change in appetite, recent diet changes, or rectal bleeding  States that he tried Miralax once without improvement  He denies previous episodes of constipation or family hx of GI diseases  Prior to Admission Medications   Prescriptions Last Dose Informant Patient Reported? Taking?    EPINEPHrine (EPIPEN) 0 3 mg/0 3 mL SOAJ   No No   Sig: Inject 0 3 mL (0 3 mg total) into a muscle once for 1 dose   azelastine (OPTIVAR) 0 05 % ophthalmic solution   No No   Sig: Administer 1 drop to both eyes 2 (two) times a day as needed (eye itching)   Patient not taking: Reported on 12/15/2021    diphenhydrAMINE (BENADRYL) 2 % cream   No No   Sig: Apply topically 3 (three) times a day as needed for itching   Patient not taking: Reported on 12/15/2021    diphenhydrAMINE (BENADRYL) 25 mg tablet   No No   Sig: Take 1 tablet (25 mg total) by mouth every 6 (six) hours   diphenhydrAMINE (BENADRYL) 25 mg tablet   No No   Sig: Take 1 tablet (25 mg total) by mouth every 6 (six) hours   docusate sodium (COLACE) 100 mg capsule   No No   Sig: Take 1 capsule (100 mg total) by mouth every 12 (twelve) hours   famotidine (PEPCID) 20 mg tablet   No No   Sig: Take 1 tablet (20 mg total) by mouth 2 (two) times a day   fexofenadine (ALLEGRA) 60 MG tablet   No No   Sig: Take 1 tablet (60 mg total) by mouth 2 (two) times a day   hydrocortisone-pramoxine (PROCTOFOAM-HC) 1-1 % FOAM rectal foam   No No   Sig: Insert 1 applicator into the rectum 2 (two) times a day   lidocaine (XYLOCAINE) 5 % ointment   No No   Sig: Apply topically as needed for mild pain   Patient not taking: Reported on 12/15/2021    loratadine (CLARITIN) 10 mg tablet   No No   Sig: Take 1 tablet (10 mg total) by mouth daily   Patient not taking: Reported on 12/15/2021    sertraline (Zoloft) 50 mg tablet   No No   Sig: Take 1 tablet (50 mg total) by mouth daily For first 7 days, take 1/2 tablet (25mg)  Facility-Administered Medications Last Administration Doses Remaining   cefTRIAXone (ROCEPHIN) injection 500 mg None recorded 1          Past Medical History:   Diagnosis Date    No known health problems        Past Surgical History:   Procedure Laterality Date    NO PAST SURGERIES         History reviewed  No pertinent family history  I have reviewed and agree with the history as documented  E-Cigarette/Vaping    E-Cigarette Use Never User      E-Cigarette/Vaping Substances     Social History     Tobacco Use    Smoking status: Current Some Day Smoker     Types: Cigars    Smokeless tobacco: Never Used   Vaping Use    Vaping Use: Never used   Substance Use Topics    Alcohol use: Not Currently    Drug use: Yes     Types: Marijuana     Comment: once a day        Review of Systems   Constitutional: Negative for chills and fever  HENT: Negative for ear pain and sore throat  Respiratory: Negative for cough and shortness of breath  Cardiovascular: Negative for chest pain and palpitations  Gastrointestinal: Positive for constipation  Negative for abdominal pain, anal bleeding, blood in stool, nausea, rectal pain and vomiting  Genitourinary: Negative for dysuria and hematuria  Musculoskeletal: Negative for arthralgias and back pain  Skin: Negative for color change and rash  All other systems reviewed and are negative        Physical Exam  ED Triage Vitals [09/28/22 1237]   Temperature Pulse Respirations Blood Pressure SpO2   98 °F (36 7 °C) 73 18 130/64 97 %      Temp src Heart Rate Source Patient Position - Orthostatic VS BP Location FiO2 (%)   -- Monitor Sitting Left arm --      Pain Score       --             Orthostatic Vital Signs  Vitals:    09/28/22 1237   BP: 130/64   Pulse: 73   Patient Position - Orthostatic VS: Sitting       Physical Exam  Vitals and nursing note reviewed  Constitutional:       General: He is not in acute distress  Appearance: Normal appearance  He is well-developed  He is not ill-appearing  HENT:      Head: Normocephalic and atraumatic  Right Ear: External ear normal       Left Ear: External ear normal       Mouth/Throat:      Mouth: Mucous membranes are moist       Pharynx: Oropharynx is clear  Eyes:      General: No scleral icterus  Conjunctiva/sclera: Conjunctivae normal    Cardiovascular:      Rate and Rhythm: Normal rate and regular rhythm  Pulses: Normal pulses  Heart sounds: Normal heart sounds  No murmur heard  Pulmonary:      Effort: Pulmonary effort is normal  No respiratory distress  Breath sounds: Normal breath sounds  Abdominal:      General: Abdomen is flat  Bowel sounds are normal  There is no distension  Palpations: Abdomen is soft  Tenderness: There is no abdominal tenderness  There is no guarding  Musculoskeletal:         General: No swelling or tenderness  Cervical back: Neck supple  Skin:     General: Skin is warm and dry  Capillary Refill: Capillary refill takes less than 2 seconds  Neurological:      Mental Status: He is alert and oriented to person, place, and time  Psychiatric:         Mood and Affect: Mood normal          Behavior: Behavior normal          ED Medications  Medications - No data to display    Diagnostic Studies  Results Reviewed     None                 No orders to display         Procedures  Procedures      ED Course     Patient arrived to the ED in stable condition with c/o constipation for a month  Physical exam was benign  Patient was discharged home on a bowel regimen and with instructions to follow up as outpatient with PCP  MDM  Number of Diagnoses or Management Options  Constipation, unspecified constipation type  Diagnosis management comments: Patient with c/o constipation for a month with no abdominal pain, fever, nausea, vomiting or change in appetite  Physical exam was benign and VS were wnl  Patient tried Miralax once without improvement  Since he is stable and has no red flag symptoms, imaging is not indicated at this time  Patient agrees on starting a bowel regimen and following up as outpatient with PCP  Disposition  Final diagnoses:   None     ED Disposition     None      Follow-up Information    None         Patient's Medications   Discharge Prescriptions    No medications on file     No discharge procedures on file  PDMP Review     None           ED Provider  Attending physically available and evaluated Ryan Space  JONAS managed the patient along with the ED Attending      Electronically Signed by         Jackie Mayfield MD  09/28/22 9774

## 2022-09-28 NOTE — Clinical Note
Lili Haskins was seen and treated in our emergency department on 9/28/2022  Diagnosis:     Reji  may return to work on return date  He may return on this date: 10/01/2022         If you have any questions or concerns, please don't hesitate to call        Spenser Hook MD    ______________________________           _______________          _______________  [facility] Representative                              Date                                Time

## 2022-10-07 ENCOUNTER — HOSPITAL ENCOUNTER (EMERGENCY)
Facility: HOSPITAL | Age: 22
Discharge: HOME | End: 2022-10-07
Attending: EMERGENCY MEDICINE
Payer: COMMERCIAL

## 2022-10-07 ENCOUNTER — APPOINTMENT (OUTPATIENT)
Dept: RADIOLOGY | Facility: HOSPITAL | Age: 22
End: 2022-10-07
Payer: COMMERCIAL

## 2022-10-07 VITALS
TEMPERATURE: 98 F | SYSTOLIC BLOOD PRESSURE: 110 MMHG | OXYGEN SATURATION: 99 % | RESPIRATION RATE: 17 BRPM | DIASTOLIC BLOOD PRESSURE: 70 MMHG | HEART RATE: 87 BPM

## 2022-10-07 DIAGNOSIS — R00.2 PALPITATIONS: ICD-10-CM

## 2022-10-07 DIAGNOSIS — R07.9 CHEST PAIN: Primary | ICD-10-CM

## 2022-10-07 LAB
2HR DELTA HS TROPONIN: -1 NG/L
ALBUMIN SERPL BCP-MCNC: 4 G/DL (ref 3.5–5)
ALP SERPL-CCNC: 96 U/L (ref 46–116)
ALT SERPL W P-5'-P-CCNC: 23 U/L (ref 12–78)
ANION GAP SERPL CALCULATED.3IONS-SCNC: 3 MMOL/L (ref 4–13)
AST SERPL W P-5'-P-CCNC: 17 U/L (ref 5–45)
ATRIAL RATE: 60 BPM
ATRIAL RATE: 73 BPM
BASOPHILS # BLD AUTO: 0.02 THOUSANDS/ΜL (ref 0–0.1)
BASOPHILS NFR BLD AUTO: 0 % (ref 0–1)
BILIRUB SERPL-MCNC: 0.37 MG/DL (ref 0.2–1)
BUN SERPL-MCNC: 12 MG/DL (ref 5–25)
CALCIUM SERPL-MCNC: 9.5 MG/DL (ref 8.3–10.1)
CARDIAC TROPONIN I PNL SERPL HS: 13 NG/L
CARDIAC TROPONIN I PNL SERPL HS: 14 NG/L
CHLORIDE SERPL-SCNC: 103 MMOL/L (ref 96–108)
CO2 SERPL-SCNC: 32 MMOL/L (ref 21–32)
CREAT SERPL-MCNC: 0.97 MG/DL (ref 0.6–1.3)
EOSINOPHIL # BLD AUTO: 0.39 THOUSAND/ΜL (ref 0–0.61)
EOSINOPHIL NFR BLD AUTO: 5 % (ref 0–6)
ERYTHROCYTE [DISTWIDTH] IN BLOOD BY AUTOMATED COUNT: 11.9 % (ref 11.6–15.1)
GFR SERPL CREATININE-BSD FRML MDRD: 111 ML/MIN/1.73SQ M
GLUCOSE SERPL-MCNC: 93 MG/DL (ref 65–140)
HCT VFR BLD AUTO: 44.4 % (ref 36.5–49.3)
HGB BLD-MCNC: 15.5 G/DL (ref 12–17)
IMM GRANULOCYTES # BLD AUTO: 0.02 THOUSAND/UL (ref 0–0.2)
IMM GRANULOCYTES NFR BLD AUTO: 0 % (ref 0–2)
LYMPHOCYTES # BLD AUTO: 2.99 THOUSANDS/ΜL (ref 0.6–4.47)
LYMPHOCYTES NFR BLD AUTO: 42 % (ref 14–44)
MAGNESIUM SERPL-MCNC: 2.1 MG/DL (ref 1.6–2.6)
MCH RBC QN AUTO: 31.1 PG (ref 26.8–34.3)
MCHC RBC AUTO-ENTMCNC: 34.9 G/DL (ref 31.4–37.4)
MCV RBC AUTO: 89 FL (ref 82–98)
MONOCYTES # BLD AUTO: 0.36 THOUSAND/ΜL (ref 0.17–1.22)
MONOCYTES NFR BLD AUTO: 5 % (ref 4–12)
NEUTROPHILS # BLD AUTO: 3.39 THOUSANDS/ΜL (ref 1.85–7.62)
NEUTS SEG NFR BLD AUTO: 48 % (ref 43–75)
NRBC BLD AUTO-RTO: 0 /100 WBCS
P AXIS: 40 DEGREES
P AXIS: 47 DEGREES
PLATELET # BLD AUTO: 353 THOUSANDS/UL (ref 149–390)
PMV BLD AUTO: 8.5 FL (ref 8.9–12.7)
POTASSIUM SERPL-SCNC: 3.8 MMOL/L (ref 3.5–5.3)
PR INTERVAL: 162 MS
PR INTERVAL: 168 MS
PROT SERPL-MCNC: 8 G/DL (ref 6.4–8.4)
QRS AXIS: 69 DEGREES
QRS AXIS: 75 DEGREES
QRSD INTERVAL: 84 MS
QRSD INTERVAL: 86 MS
QT INTERVAL: 372 MS
QT INTERVAL: 388 MS
QTC INTERVAL: 388 MS
QTC INTERVAL: 409 MS
RBC # BLD AUTO: 4.98 MILLION/UL (ref 3.88–5.62)
SODIUM SERPL-SCNC: 138 MMOL/L (ref 135–147)
T WAVE AXIS: 60 DEGREES
T WAVE AXIS: 73 DEGREES
TSH SERPL DL<=0.05 MIU/L-ACNC: 1.86 UIU/ML (ref 0.45–4.5)
VENTRICULAR RATE: 60 BPM
VENTRICULAR RATE: 73 BPM
WBC # BLD AUTO: 7.17 THOUSAND/UL (ref 4.31–10.16)

## 2022-10-07 PROCEDURE — 71046 X-RAY EXAM CHEST 2 VIEWS: CPT

## 2022-10-07 PROCEDURE — 93005 ELECTROCARDIOGRAM TRACING: CPT

## 2022-10-07 PROCEDURE — 84443 ASSAY THYROID STIM HORMONE: CPT | Performed by: PHYSICIAN ASSISTANT

## 2022-10-07 PROCEDURE — 85025 COMPLETE CBC W/AUTO DIFF WBC: CPT | Performed by: PHYSICIAN ASSISTANT

## 2022-10-07 PROCEDURE — 99285 EMERGENCY DEPT VISIT HI MDM: CPT

## 2022-10-07 PROCEDURE — 96374 THER/PROPH/DIAG INJ IV PUSH: CPT

## 2022-10-07 PROCEDURE — 99285 EMERGENCY DEPT VISIT HI MDM: CPT | Performed by: PHYSICIAN ASSISTANT

## 2022-10-07 PROCEDURE — 80053 COMPREHEN METABOLIC PANEL: CPT | Performed by: PHYSICIAN ASSISTANT

## 2022-10-07 PROCEDURE — 36415 COLL VENOUS BLD VENIPUNCTURE: CPT | Performed by: PHYSICIAN ASSISTANT

## 2022-10-07 PROCEDURE — 93010 ELECTROCARDIOGRAM REPORT: CPT | Performed by: INTERNAL MEDICINE

## 2022-10-07 PROCEDURE — 83735 ASSAY OF MAGNESIUM: CPT | Performed by: PHYSICIAN ASSISTANT

## 2022-10-07 PROCEDURE — 84484 ASSAY OF TROPONIN QUANT: CPT | Performed by: PHYSICIAN ASSISTANT

## 2022-10-07 RX ORDER — LIDOCAINE 40 MG/G
CREAM TOPICAL ONCE
Status: COMPLETED | OUTPATIENT
Start: 2022-10-07 | End: 2022-10-07

## 2022-10-07 RX ORDER — KETOROLAC TROMETHAMINE 30 MG/ML
15 INJECTION, SOLUTION INTRAMUSCULAR; INTRAVENOUS ONCE
Status: COMPLETED | OUTPATIENT
Start: 2022-10-07 | End: 2022-10-07

## 2022-10-07 RX ADMIN — LIDOCAINE 4% 1 APPLICATION: 4 CREAM TOPICAL at 04:22

## 2022-10-07 RX ADMIN — KETOROLAC TROMETHAMINE 15 MG: 30 INJECTION, SOLUTION INTRAMUSCULAR at 04:26

## 2022-10-07 NOTE — ED CARE HANDOFF
Emergency Department Sign Out Note        Sign out and transfer of care from [place], [place]  See Separate Emergency Department note  The patient, Rosa Reilly, was evaluated by the previous provider for chest pain  Patient was a 28-year-old male with no significant past medical history who presented to the ER with complaint of chest pain  Was evaluated by the previous provider  At the time of sign-out delta trop was pending  Workup Completed:  CBC, CMP, Trop, Mag, TSH  CXR    ED Course / Workup Pending (followup): HEART Risk Score    Flowsheet Row Most Recent Value   Heart Score Risk Calculator    History 1 Filed at: 10/07/2022 0607   ECG 0 Filed at: 10/07/2022 7928   Age 0 Filed at: 10/07/2022 5121   Risk Factors 1 Filed at: 10/07/2022 9446   Troponin 1 Filed at: 10/07/2022 5815   HEART Score 3 Filed at: 10/07/2022 5767                PERC Rule for PE    Flowsheet Row Most Recent Value   PERC Rule for PE    Age >=50 0 Filed at: 10/07/2022 0549   HR >=100 0 Filed at: 10/07/2022 0549   O2 Sat on room air < 95% 0 Filed at: 10/07/2022 0549   History of PE or DVT 0 Filed at: 10/07/2022 2728   Recent trauma or surgery 0 Filed at: 10/07/2022 0549   Hemoptysis 0 Filed at: 10/07/2022 0549   Exogenous estrogen 0 Filed at: 10/07/2022 0549   Unilateral leg swelling 0 Filed at: 10/07/2022 0549   PERC Rule for PE Results 0 Filed at: 10/07/2022 0549                        ED Course as of 10/07/22 0715   Fri Oct 07, 2022   0609 S/o from RM: waiting on delta, can be d/c home if okay   0706 Delta 2hr hsTnI: -1     Procedures  MDM  Number of Diagnoses or Management Options  Chest pain: new and requires workup  Palpitations: new and requires workup  Diagnosis management comments: 28-year-old male presents with chest pain  Was evaluated by the previous provider  At the time of sign-out to me dealt trop was pending  Delta trop was -1  Will discharge home  I have discussed the plan to discharge pt from ED  The patient was discharged in stable condition   Patient ambulated off the department   Extensive return to emergency department precautions were discussed   Follow up with appropriate providers including primary care physician was discussed   Patient and/or their  primary decision maker expressed understanding  Lynnann Dakin remained stable during entire emergency department stay  Portions of the record may have been created with voice recognition software  Occasional wrong word or "sound a like" substitutions may have occurred due to the inherent limitations of voice recognition software  Read the chart carefully and recognize, using context, where substitutions have occurred  Amount and/or Complexity of Data Reviewed  Clinical lab tests: reviewed  Tests in the radiology section of CPT®: reviewed  Decide to obtain previous medical records or to obtain history from someone other than the patient: yes  Review and summarize past medical records: yes    Patient Progress  Patient progress: stable          Disposition  Final diagnoses:   Chest pain   Palpitations     Time reflects when diagnosis was documented in both MDM as applicable and the Disposition within this note     Time User Action Codes Description Comment    10/7/2022  6:07 AM Gene Amanda Add [R07 9] Chest pain     10/7/2022  6:07 AM Gene Amanda Add [R00 2] Palpitations       ED Disposition     ED Disposition   Discharge    Condition   Stable    Date/Time   Fri Oct 7, 2022  7:07 AM    Comment   Cristhian Haque discharge to home/self care                 Follow-up Information     Follow up With Specialties Details Why Contact Info Additional 823 [facility] Emergency Department Emergency Medicine Go to  As needed, If symptoms worsen [facility] [address] 303 [facility] [place] Emergency Department, [address] [place] [address] 200  Call  To schedule an appointment with a primary care physician [phone]       Idris Cruz, [address] Nurse Practitioner Schedule an appointment as soon as possible for a visit   [address] [address], [place]  [address] [place] [address]  [phone] 3255 [facility] Cardiology Schedule an appointment as soon as possible for a visit   [address] [facility] [address]  [address], [address] [place], [place], [id number] [phone]        Patient's Medications   Discharge Prescriptions    No medications on file            ED Provider  Electronically Signed by     Kimberlee Weber PA-C  10/07/22 0715

## 2022-10-07 NOTE — ED PROVIDER NOTES
History  Chief Complaint   Patient presents with    Chest Pain     Pt complains of generalized mid sternal chest discomfort "that's been going on for awhile" pt has failed to follow up with cardiology to wear a holter monitor  [facility] a 23 y o   male with no documented past medical history who presents to the emergency department with chest pain  Patient states that at approximately 12:30 p m  In the morning, approximately 3-1/2 hours prior to arrival patient awoke with chest pain  States pain is in the middle of his chest and does not seem to radiate  Patient states that the start had some palpitations however these have since resolved  Also admits to some mild shortness of breath which has also resolved  Patient states nothing he does seems to make it better  This has happened to him in the past and it was recommended that he get a Holter monitor however has not followed up  Symptoms are made worse with palpation of the chest   Patient denies any recent trauma  Patient denies any syncope, dizziness, lightheadedness, recent trauma, or GI symptoms  The patient denies any hemoptysis, palpitations, estrogen use, smoking, recent long-term travel, sedentary lifestyle, active cancer, family history of blood clots, personal history of blood clots, clotting disorder, or leg pain  Patient denies any drug use, alcohol use, or stimulant/caffeine use                  History provided by:  Patient   used: No    Chest Pain  Pain location:  Substernal area  Pain quality: sharp and stabbing    Pain radiates to:  Does not radiate  Pain radiates to the back: no    Pain severity:  Moderate  Onset quality:  Gradual  Duration:  3 hours  Timing:  Constant  Progression:  Unchanged  Chronicity:  New  Context: at rest    Context: not breathing, no drug use, not eating, no intercourse, not lifting, no movement, not raising an arm, no stress and no trauma Relieved by:  None tried  Exacerbated by: Palpation  Ineffective treatments:  None tried  Associated symptoms: palpitations and shortness of breath    Associated symptoms: no abdominal pain, no anorexia, no anxiety, no claudication, no cough, no diaphoresis, no dizziness, no dysphagia, no fatigue, no fever, no headache, no heartburn, no lower extremity edema, no nausea, no near-syncope, no numbness, no orthopnea, no syncope, not vomiting and no weakness    Risk factors: no birth control, no coronary artery disease, no diabetes mellitus, no high cholesterol, no hypertension, no immobilization, no Marfan's syndrome, not obese, not pregnant, no prior DVT/PE and no smoking        Prior to Admission Medications   Prescriptions Last Dose Informant Patient Reported? Taking?    EPINEPHrine (EPIPEN) 0 3 mg/0 3 mL SOAJ   No No   Sig: Inject 0 3 mL (0 3 mg total) into a muscle once for 1 dose   azelastine (OPTIVAR) 0 05 % ophthalmic solution   No No   Sig: Administer 1 drop to both eyes 2 (two) times a day as needed (eye itching)   Patient not taking: Reported on 12/15/2021    diphenhydrAMINE (BENADRYL) 2 % cream   No No   Sig: Apply topically 3 (three) times a day as needed for itching   Patient not taking: Reported on 12/15/2021    diphenhydrAMINE (BENADRYL) 25 mg tablet   No No   Sig: Take 1 tablet (25 mg total) by mouth every 6 (six) hours   diphenhydrAMINE (BENADRYL) 25 mg tablet   No No   Sig: Take 1 tablet (25 mg total) by mouth every 6 (six) hours   docusate sodium (COLACE) 100 mg capsule   No No   Sig: Take 1 capsule (100 mg total) by mouth every 12 (twelve) hours   famotidine (PEPCID) 20 mg tablet   No No   Sig: Take 1 tablet (20 mg total) by mouth 2 (two) times a day   fexofenadine (ALLEGRA) 60 MG tablet   No No   Sig: Take 1 tablet (60 mg total) by mouth 2 (two) times a day   hydrocortisone-pramoxine (PROCTOFOAM-HC) 1-1 % FOAM rectal foam   No No   Sig: Insert 1 applicator into the rectum 2 (two) times a day lidocaine (XYLOCAINE) 5 % ointment   No No   Sig: Apply topically as needed for mild pain   Patient not taking: Reported on 12/15/2021    loratadine (CLARITIN) 10 mg tablet   No No   Sig: Take 1 tablet (10 mg total) by mouth daily   Patient not taking: Reported on 12/15/2021    polyethylene glycol (MIRALAX) 17 g packet   No No   Sig: Take 17 g by mouth 2 (two) times a day   sertraline (Zoloft) 50 mg tablet   No No   Sig: Take 1 tablet (50 mg total) by mouth daily For first 7 days, take 1/2 tablet (25mg)  Facility-Administered Medications Last Administration Doses Remaining   cefTRIAXone (ROCEPHIN) injection 500 mg None recorded 1          Past Medical History:   Diagnosis Date    No known health problems        Past Surgical History:   Procedure Laterality Date    NO PAST SURGERIES         History reviewed  No pertinent family history  I have reviewed and agree with the history as documented  E-Cigarette/Vaping    E-Cigarette Use Never User      E-Cigarette/Vaping Substances     Social History     Tobacco Use    Smoking status: Current Some Day Smoker     Types: Cigars    Smokeless tobacco: Never Used   Vaping Use    Vaping Use: Never used   Substance Use Topics    Alcohol use: Not Currently    Drug use: Yes     Types: Marijuana     Comment: once a day       Review of Systems   Constitutional: Negative for activity change, appetite change, chills, diaphoresis, fatigue, fever and unexpected weight change  HENT: Negative for congestion, ear discharge, postnasal drip, rhinorrhea, sinus pressure, sinus pain, sore throat and trouble swallowing  Respiratory: Positive for shortness of breath  Negative for apnea, cough, choking, chest tightness, wheezing and stridor  Cardiovascular: Positive for chest pain and palpitations  Negative for orthopnea, claudication, leg swelling, syncope and near-syncope     Gastrointestinal: Negative for abdominal pain, anorexia, blood in stool, constipation, diarrhea, heartburn, nausea and vomiting  Genitourinary: Negative for dysuria, flank pain, frequency and urgency  Musculoskeletal: Negative for arthralgias, myalgias and neck stiffness  Skin: Negative for color change, pallor, rash and wound  Neurological: Negative for dizziness, tremors, seizures, syncope, weakness, light-headedness, numbness and headaches  All other systems reviewed and are negative  Physical Exam  Physical Exam  Vitals (Patient is well-appearing in no acute distress) and nursing note reviewed  Constitutional:       General: He is not in acute distress  Appearance: Normal appearance  He is normal weight  He is not ill-appearing or toxic-appearing  HENT:      Head: Normocephalic and atraumatic  Right Ear: Tympanic membrane, ear canal and external ear normal       Left Ear: Tympanic membrane, ear canal and external ear normal       Nose: Nose normal       Mouth/Throat:      Mouth: Mucous membranes are moist       Pharynx: Oropharynx is clear  No oropharyngeal exudate  Eyes:      Pupils: Pupils are equal, round, and reactive to light  Cardiovascular:      Rate and Rhythm: Normal rate and regular rhythm  Pulses: Normal pulses  Heart sounds: Normal heart sounds  No murmur heard  No systolic murmur is present  No diastolic murmur is present  No friction rub  No gallop  Pulmonary:      Effort: Pulmonary effort is normal  No respiratory distress  Breath sounds: Normal breath sounds  No stridor  No decreased breath sounds, wheezing, rhonchi or rales  Chest:      Chest wall: Tenderness present  No mass, deformity, crepitus or edema  There is no dullness to percussion  Abdominal:      General: Abdomen is flat  Bowel sounds are normal  There is no distension  Palpations: Abdomen is soft  There is no mass  Tenderness: There is no abdominal tenderness  There is no guarding or rebound  Hernia: No hernia is present     Musculoskeletal: General: No swelling, tenderness or deformity  Normal range of motion  Cervical back: Normal range of motion  No rigidity or tenderness  Right lower leg: No tenderness  No edema  Left lower leg: No tenderness  No edema  Skin:     General: Skin is warm and dry  Capillary Refill: Capillary refill takes less than 2 seconds  Neurological:      General: No focal deficit present  Mental Status: He is alert and oriented to person, place, and time  Mental status is at baseline  Cranial Nerves: No cranial nerve deficit  Sensory: No sensory deficit  Motor: No weakness           Vital Signs  ED Triage Vitals   Temperature Pulse Respirations Blood Pressure SpO2   10/07/22 0256 10/07/22 0256 10/07/22 0256 10/07/22 0256 10/07/22 0256   98 °F (36 7 °C) 69 16 116/73 99 %      Temp src Heart Rate Source Patient Position - Orthostatic VS BP Location FiO2 (%)   -- 10/07/22 0611 10/07/22 0611 10/07/22 0611 --    Monitor Lying Left arm       Pain Score       10/07/22 0611       No Pain           Vitals:    10/07/22 0256 10/07/22 0611   BP: 116/73 108/60   Pulse: 69 62   Patient Position - Orthostatic VS:  Lying         Visual Acuity      ED Medications  Medications   ketorolac (TORADOL) injection 15 mg (15 mg Intravenous Given 10/7/22 0426)   lidocaine (LMX) 4 % cream (1 application Topical Given 10/7/22 0422)       Diagnostic Studies  Results Reviewed     Procedure Component Value Units Date/Time    Comprehensive metabolic panel [[id number]]  (Abnormal) Collected: 10/07/22 0424    Lab Status: Final result Specimen: Blood from Arm, Left Updated: 10/07/22 0511     Sodium 138 mmol/L      Potassium 3 8 mmol/L      Chloride 103 mmol/L      CO2 32 mmol/L      ANION GAP 3 mmol/L      BUN 12 mg/dL      Creatinine 0 97 mg/dL      Glucose 93 mg/dL      Calcium 9 5 mg/dL      AST 17 U/L      ALT 23 U/L      Alkaline Phosphatase 96 U/L      Total Protein 8 0 g/dL      Albumin 4 0 g/dL      Total Bilirubin 0 37 mg/dL      eGFR 111 ml/min/1 73sq m     Narrative:      [place] guidelines for Chronic Kidney Disease (CKD):     Stage 1 with normal or high GFR (GFR > 90 mL/min/1 73 square meters)    Stage 2 Mild CKD (GFR = 60-89 mL/min/1 73 square meters)    Stage 3A Moderate CKD (GFR = 45-59 mL/min/1 73 square meters)    Stage 3B Moderate CKD (GFR = 30-44 mL/min/1 73 square meters)    Stage 4 Severe CKD (GFR = 15-29 mL/min/1 73 square meters)    Stage 5 End Stage CKD (GFR <15 mL/min/1 73 square meters)  Note: GFR calculation is accurate only with a steady state creatinine    TSH, 3rd generation with Free T4 reflex [[id number]]  (Normal) Collected: 10/07/22 0424    Lab Status: Final result Specimen: Blood from Arm, Left Updated: 10/07/22 0511     TSH 3RD GENERATON 1 856 uIU/mL     Narrative:      Patients undergoing fluorescein dye angiography may retain small amounts of fluorescein in the body for 48-72 hours post procedure  Samples containing fluorescein can produce falsely depressed TSH values  If the patient had this procedure,a specimen should be resubmitted post fluorescein clearance        Magnesium [[id number]]  (Normal) Collected: 10/07/22 0424    Lab Status: Final result Specimen: Blood from Arm, Left Updated: 10/07/22 0511     Magnesium 2 1 mg/dL     HS Troponin I 2hr [[id number]]     Lab Status: No result Specimen: Blood     HS Troponin 0hr (reflex protocol) [[id number]]  (Normal) Collected: 10/07/22 0424    Lab Status: Final result Specimen: Blood from Arm, Left Updated: 10/07/22 0452     hs TnI 0hr 14 ng/L     CBC and differential [[id number]]  (Abnormal) Collected: 10/07/22 0424    Lab Status: Final result Specimen: Blood from Arm, Left Updated: 10/07/22 0430     WBC 7 17 Thousand/uL      RBC 4 98 Million/uL      Hemoglobin 15 5 g/dL      Hematocrit 44 4 %      MCV 89 fL      MCH 31 1 pg      MCHC 34 9 g/dL      RDW 11 9 %      MPV 8 5 fL      Platelets 211 Thousands/uL      nRBC 0 /100 WBCs      Neutrophils Relative 48 %      Immat GRANS % 0 %      Lymphocytes Relative 42 %      Monocytes Relative 5 %      Eosinophils Relative 5 %      Basophils Relative 0 %      Neutrophils Absolute 3 39 Thousands/µL      Immature Grans Absolute 0 02 Thousand/uL      Lymphocytes Absolute 2 99 Thousands/µL      Monocytes Absolute 0 36 Thousand/µL      Eosinophils Absolute 0 39 Thousand/µL      Basophils Absolute 0 02 Thousands/µL                  XR chest 2 views   ED Interpretation by Jennie Lorenz PA-C (10/07 0519)   No infiltrate, cardiac silhouette normal, no pleural effusions or pulmonary edema                      Procedures  ECG 12 Lead Documentation Only    Date/Time: 10/7/2022 5:49 AM  Performed by: Jennie Lorenz PA-C  Authorized by: Jennie Lorenz PA-C     Indications / Diagnosis:  Chest pain  ECG reviewed by me, the ED Provider: yes    Patient location:  ED  Previous ECG:     Previous ECG:  Compared to current    Similarity:  No change    Comparison to cardiac monitor: No    Interpretation:     Interpretation: normal    Rate:     ECG rate:  73    ECG rate assessment: normal    Rhythm:     Rhythm: sinus rhythm    Ectopy:     Ectopy: none    QRS:     QRS axis:  Normal    QRS intervals:  Normal  Conduction:     Conduction: normal    ST segments:     ST segments:  Normal  T waves:     T waves: normal    Other findings:     Other findings: early repolarization               ED Course             HEART Risk Score    Flowsheet Row Most Recent Value   Heart Score Risk Calculator    History 1 Filed at: 10/07/2022 0607   ECG 0 Filed at: 10/07/2022 5420   Age 0 Filed at: 10/07/2022 7832   Risk Factors 1 Filed at: 10/07/2022 0607   Troponin 1 Filed at: 10/07/2022 7221   HEART Score 3 Filed at: 10/07/2022 0607                PERC Rule for PE    Flowsheet Row Most Recent Value   PERC Rule for PE    Age >=50 0 Filed at: 10/07/2022 0549   HR >=100 0 Filed at: 10/07/2022 0549   O2 Sat on room air < 95% 0 Filed at: 10/07/2022 0549   History of PE or DVT 0 Filed at: 10/07/2022 3388   Recent trauma or surgery 0 Filed at: 10/07/2022 0549   Hemoptysis 0 Filed at: 10/07/2022 0549   Exogenous estrogen 0 Filed at: 10/07/2022 0549   Unilateral leg swelling 0 Filed at: 10/07/2022 0549   PERC Rule for PE Results 0 Filed at: 10/07/2022 8665                            MDM  Number of Diagnoses or Management Options  Chest pain: new and requires workup  Palpitations: new and requires workup  Diagnosis management comments: Patient was seen and examined  in the emergency department for chief complaint of chest pain  The patient presented with chest pain for 3-1/2 hours  Patient's pain started in the sternum without radiation  Worse with palpation  Associated palpitations and shortness or breath  Has had this before and recommend Holter follow-up  No other acute complaints  On exam patient is in no acute distress well-appearing  Lungs are clear  Abdomen soft nontender nondistended  Regular rate rhythm, no murmurs rubs or gallops  Parasternal chest wall tenderness  No overlying rashes or lesions  DDX including but not limited to: chest wall pain, pleurisy, costochondritis, pericarditis, myocarditis, PTX, pneumonia, GI etiology; doubt ACS or MI or dissection or PE or rhabdomyolysis  Workup: Will check labs, EKG, chest x-ray  Will treat with Toradol as well as topical lidocaine for possible costochondritis  Reassessment  EKG without ischemic changes  Troponin of 14, will need delta  Perc negative  Signed out to the oncoming provider pending delta troponin EKG  Will need outpatient follow-up with Cardiology as long as within normal limits  Referral placed  Disposition:  Pending delta troponin    Signed out to oncoming provider             Amount and/or Complexity of Data Reviewed  Clinical lab tests: ordered and reviewed  Tests in the radiology section of CPT®: ordered and reviewed  Tests in the medicine section of CPT®: ordered and reviewed  Review and summarize past medical records: yes  Independent visualization of images, tracings, or specimens: yes    Risk of Complications, Morbidity, and/or Mortality  Presenting problems: moderate  Diagnostic procedures: moderate  Management options: moderate    Patient Progress  Patient progress: stable      Disposition  Final diagnoses:   Chest pain   Palpitations     Time reflects when diagnosis was documented in both MDM as applicable and the Disposition within this note     Time User Action Codes Description Comment    10/7/2022  6:07 AM Luz Samuels Add [R07 9] Chest pain     10/7/2022  6:07 AM Luz Samuels Add [R00 2] Palpitations       ED Disposition     None      Follow-up Information     Follow up With Specialties Details Why Contact Info Additional 823 [facility] Emergency Department Emergency Medicine Go to  As needed, If symptoms worsen [facility] [address]  112 [facility] Emergency Department, [address] [place] [address], [facility] 200  Call  To schedule an appointment with a primary care physician [phone]       SHIRA Arce Nurse Practitioner Schedule an appointment as soon as possible for a visit   [address] [place] [address], [facility]  [address] [place] [address] Cardiology Schedule an appointment as soon as possible for a visit   [facility] [address]  [address], [address] [place], [place], [address] [phone]          Patient's Medications   Discharge Prescriptions    No medications on file           PDMP Review     None          ED Provider  Electronically Signed by           Liberty Bhatia PA-C  10/07/22 8215

## 2022-10-07 NOTE — DISCHARGE INSTRUCTIONS
You were seen in the emergency department today for chest pain  Laboratory analysis, EKG, and Radiologic imaging did not reveal any acute abnormalities  Please follow-up with your primary care physician and Cardiology regarding your symptoms  Return to the Emergency Department sooner if increased pain, fever, vomiting, difficulty breathing, rash

## 2022-11-23 ENCOUNTER — HOSPITAL ENCOUNTER (EMERGENCY)
Facility: HOSPITAL | Age: 22
Discharge: HOME | End: 2022-11-23
Attending: EMERGENCY MEDICINE

## 2022-11-23 ENCOUNTER — APPOINTMENT (EMERGENCY)
Dept: CT IMAGING | Facility: HOSPITAL | Age: 22
End: 2022-11-23

## 2022-11-23 VITALS
DIASTOLIC BLOOD PRESSURE: 61 MMHG | OXYGEN SATURATION: 97 % | TEMPERATURE: 99 F | HEART RATE: 92 BPM | RESPIRATION RATE: 16 BRPM | SYSTOLIC BLOOD PRESSURE: 119 MMHG

## 2022-11-23 DIAGNOSIS — J10.1 INFLUENZA A: Primary | ICD-10-CM

## 2022-11-23 LAB
ALBUMIN SERPL BCP-MCNC: 3.5 G/DL (ref 3.5–5)
ALP SERPL-CCNC: 69 U/L (ref 46–116)
ALT SERPL W P-5'-P-CCNC: 17 U/L (ref 12–78)
ANION GAP SERPL CALCULATED.3IONS-SCNC: 7 MMOL/L (ref 4–13)
AST SERPL W P-5'-P-CCNC: 24 U/L (ref 5–45)
BASOPHILS # BLD AUTO: 0.02 THOUSANDS/ÂΜL (ref 0–0.1)
BASOPHILS NFR BLD AUTO: 0 % (ref 0–1)
BILIRUB SERPL-MCNC: 1.06 MG/DL (ref 0.2–1)
BUN SERPL-MCNC: 5 MG/DL (ref 5–25)
CALCIUM SERPL-MCNC: 8.3 MG/DL (ref 8.3–10.1)
CHLORIDE SERPL-SCNC: 105 MMOL/L (ref 96–108)
CO2 SERPL-SCNC: 29 MMOL/L (ref 21–32)
CREAT SERPL-MCNC: 0.91 MG/DL (ref 0.6–1.3)
EOSINOPHIL # BLD AUTO: 0.17 THOUSAND/ÂΜL (ref 0–0.61)
EOSINOPHIL NFR BLD AUTO: 2 % (ref 0–6)
ERYTHROCYTE [DISTWIDTH] IN BLOOD BY AUTOMATED COUNT: 11.8 % (ref 11.6–15.1)
FLUAV RNA RESP QL NAA+PROBE: POSITIVE
FLUBV RNA RESP QL NAA+PROBE: NEGATIVE
GFR SERPL CREATININE-BSD FRML MDRD: 120 ML/MIN/1.73SQ M
GLUCOSE SERPL-MCNC: 88 MG/DL (ref 65–140)
HCT VFR BLD AUTO: 43.4 % (ref 36.5–49.3)
HGB BLD-MCNC: 15.5 G/DL (ref 12–17)
IMM GRANULOCYTES # BLD AUTO: 0.04 THOUSAND/UL (ref 0–0.2)
IMM GRANULOCYTES NFR BLD AUTO: 0 % (ref 0–2)
LIPASE SERPL-CCNC: 49 U/L (ref 73–393)
LYMPHOCYTES # BLD AUTO: 0.74 THOUSANDS/ÂΜL (ref 0.6–4.47)
LYMPHOCYTES NFR BLD AUTO: 7 % (ref 14–44)
MCH RBC QN AUTO: 31.3 PG (ref 26.8–34.3)
MCHC RBC AUTO-ENTMCNC: 35.7 G/DL (ref 31.4–37.4)
MCV RBC AUTO: 88 FL (ref 82–98)
MONOCYTES # BLD AUTO: 0.6 THOUSAND/ÂΜL (ref 0.17–1.22)
MONOCYTES NFR BLD AUTO: 6 % (ref 4–12)
NEUTROPHILS # BLD AUTO: 9.42 THOUSANDS/ÂΜL (ref 1.85–7.62)
NEUTS SEG NFR BLD AUTO: 85 % (ref 43–75)
NRBC BLD AUTO-RTO: 0 /100 WBCS
PLATELET # BLD AUTO: 308 THOUSANDS/UL (ref 149–390)
PMV BLD AUTO: 9.2 FL (ref 8.9–12.7)
POTASSIUM SERPL-SCNC: 3.7 MMOL/L (ref 3.5–5.3)
PROT SERPL-MCNC: 6.4 G/DL (ref 6.4–8.4)
RBC # BLD AUTO: 4.95 MILLION/UL (ref 3.88–5.62)
RSV RNA RESP QL NAA+PROBE: NEGATIVE
SARS-COV-2 RNA RESP QL NAA+PROBE: NEGATIVE
SODIUM SERPL-SCNC: 141 MMOL/L (ref 135–147)
WBC # BLD AUTO: 10.99 THOUSAND/UL (ref 4.31–10.16)

## 2022-11-23 RX ORDER — ONDANSETRON 2 MG/ML
4 INJECTION INTRAMUSCULAR; INTRAVENOUS ONCE
Status: COMPLETED | OUTPATIENT
Start: 2022-11-23 | End: 2022-11-23

## 2022-11-23 RX ORDER — ONDANSETRON 4 MG/1
4 TABLET, ORALLY DISINTEGRATING ORAL EVERY 8 HOURS PRN
Qty: 10 TABLET | Refills: 0 | Status: SHIPPED | OUTPATIENT
Start: 2022-11-23

## 2022-11-23 RX ORDER — KETOROLAC TROMETHAMINE 30 MG/ML
15 INJECTION, SOLUTION INTRAMUSCULAR; INTRAVENOUS ONCE
Status: COMPLETED | OUTPATIENT
Start: 2022-11-23 | End: 2022-11-23

## 2022-11-23 RX ADMIN — KETOROLAC TROMETHAMINE 15 MG: 30 INJECTION, SOLUTION INTRAMUSCULAR; INTRAVENOUS at 20:27

## 2022-11-23 RX ADMIN — ONDANSETRON 4 MG: 2 INJECTION INTRAMUSCULAR; INTRAVENOUS at 20:27

## 2022-11-23 RX ADMIN — IOHEXOL 100 ML: 300 INJECTION, SOLUTION INTRAVENOUS at 21:42

## 2022-11-23 RX ADMIN — SODIUM CHLORIDE 1000 ML: 0.9 INJECTION, SOLUTION INTRAVENOUS at 20:28

## 2022-11-23 NOTE — Clinical Note
Claudia López was seen and treated in our emergency department on 11/23/2022  No restrictions            Diagnosis:     Reji  may return to work on return date  He may return on this date: 11/28/2022         If you have any questions or concerns, please don't hesitate to call        Areli Boyd DO    ______________________________           _______________          _______________  Hospital Representative                              Date                                Time

## 2022-11-24 NOTE — ED PROVIDER NOTES
History  Chief Complaint   Patient presents with   • Nausea     Pt c/o nausea, bilateral lower left back pain, and 2 episodes of vomiting "foam " Pt also states his body is cold  28-year-old male with no past medical history presents to the ED with right lower quadrant pain radiating to the right lower back, nausea and 1 episode of vomiting, feeling cold since earlier this afternoon  Also complains of lightheadedness and nonproductive cough  No documented fever  No diarrhea  No urinary complaints  No known ill contacts  History provided by:  Patient   used: No    Abdominal Pain  Pain location:  RLQ  Pain quality: shooting    Pain radiates to:  Back  Pain severity:  Moderate  Onset quality:  Gradual  Duration:  5 hours  Timing:  Constant  Progression:  Unchanged  Chronicity:  New  Context: not alcohol use, not awakening from sleep, not eating, not previous surgeries, not recent illness, not sick contacts, not suspicious food intake and not trauma    Relieved by:  None tried  Worsened by:  Nothing  Ineffective treatments:  None tried  Associated symptoms: chills, cough, nausea and vomiting    Associated symptoms: no anorexia, no belching, no chest pain, no constipation, no diarrhea, no dysuria, no fatigue, no fever, no flatus, no hematemesis, no hematochezia, no hematuria, no melena, no shortness of breath and no sore throat    Vomiting:     Quality:  Stomach contents    Number of occurrences:  1    Timing:  Intermittent    Progression:  Unchanged  Risk factors: no alcohol abuse, has not had multiple surgeries, no NSAID use, not obese, not pregnant and no recent hospitalization        Prior to Admission Medications   Prescriptions Last Dose Informant Patient Reported? Taking?    EPINEPHrine (EPIPEN) 0 3 mg/0 3 mL SOAJ   No No   Sig: Inject 0 3 mL (0 3 mg total) into a muscle once for 1 dose   azelastine (OPTIVAR) 0 05 % ophthalmic solution   No No   Sig: Administer 1 drop to both eyes 2 (two) times a day as needed (eye itching)   Patient not taking: Reported on 12/15/2021    diphenhydrAMINE (BENADRYL) 2 % cream   No No   Sig: Apply topically 3 (three) times a day as needed for itching   Patient not taking: Reported on 12/15/2021    diphenhydrAMINE (BENADRYL) 25 mg tablet   No No   Sig: Take 1 tablet (25 mg total) by mouth every 6 (six) hours   diphenhydrAMINE (BENADRYL) 25 mg tablet   No No   Sig: Take 1 tablet (25 mg total) by mouth every 6 (six) hours   docusate sodium (COLACE) 100 mg capsule   No No   Sig: Take 1 capsule (100 mg total) by mouth every 12 (twelve) hours   famotidine (PEPCID) 20 mg tablet   No No   Sig: Take 1 tablet (20 mg total) by mouth 2 (two) times a day   fexofenadine (ALLEGRA) 60 MG tablet   No No   Sig: Take 1 tablet (60 mg total) by mouth 2 (two) times a day   hydrocortisone-pramoxine (PROCTOFOAM-HC) 1-1 % FOAM rectal foam   No No   Sig: Insert 1 applicator into the rectum 2 (two) times a day   lidocaine (XYLOCAINE) 5 % ointment   No No   Sig: Apply topically as needed for mild pain   Patient not taking: Reported on 12/15/2021    loratadine (CLARITIN) 10 mg tablet   No No   Sig: Take 1 tablet (10 mg total) by mouth daily   Patient not taking: Reported on 12/15/2021    ondansetron (Zofran ODT) 4 mg disintegrating tablet   No No   Sig: Take 1 tablet (4 mg total) by mouth every 6 (six) hours as needed for nausea or vomiting   polyethylene glycol (MIRALAX) 17 g packet   No No   Sig: Take 17 g by mouth 2 (two) times a day   sertraline (Zoloft) 50 mg tablet   No No   Sig: Take 1 tablet (50 mg total) by mouth daily For first 7 days, take 1/2 tablet (25mg)  Facility-Administered Medications Last Administration Doses Remaining   cefTRIAXone (ROCEPHIN) injection 500 mg None recorded 1          Past Medical History:   Diagnosis Date   • No known health problems        Past Surgical History:   Procedure Laterality Date   • NO PAST SURGERIES         History reviewed   No pertinent family history  I have reviewed and agree with the history as documented  E-Cigarette/Vaping   • E-Cigarette Use Never User      E-Cigarette/Vaping Substances     Social History     Tobacco Use   • Smoking status: Never   • Smokeless tobacco: Never   Vaping Use   • Vaping Use: Never used   Substance Use Topics   • Alcohol use: Not Currently   • Drug use: Yes     Types: Marijuana     Comment: once a day       Review of Systems   Constitutional: Positive for activity change, appetite change and chills  Negative for diaphoresis, fatigue and fever  HENT: Negative  Negative for sore throat  Eyes: Negative  Respiratory: Positive for cough  Negative for shortness of breath  Cardiovascular: Negative  Negative for chest pain, palpitations and leg swelling  Gastrointestinal: Positive for abdominal pain, nausea and vomiting  Negative for abdominal distention, anorexia, blood in stool, constipation, diarrhea, flatus, hematemesis, hematochezia and melena  Genitourinary: Negative  Negative for dysuria and hematuria  Musculoskeletal: Negative for neck pain  Skin: Negative  Allergic/Immunologic: Negative  Neurological: Negative  Negative for weakness, numbness and headaches  Hematological: Negative  Psychiatric/Behavioral: Negative  All other systems reviewed and are negative  Physical Exam  Physical Exam  Vitals and nursing note reviewed  Constitutional:       General: He is awake  He is not in acute distress  Appearance: Normal appearance  He is well-developed, normal weight and well-nourished  He is not ill-appearing, toxic-appearing or diaphoretic  HENT:      Head: Normocephalic and atraumatic  Right Ear: External ear normal       Left Ear: External ear normal       Nose: Nose normal       Mouth/Throat:      Mouth: Oropharynx is clear and moist  Mucous membranes are moist       Pharynx: No oropharyngeal exudate or posterior oropharyngeal erythema     Eyes: General: No scleral icterus  Extraocular Movements: EOM normal       Conjunctiva/sclera: Conjunctivae normal    Neck:      Thyroid: No thyromegaly  Vascular: No JVD  Cardiovascular:      Rate and Rhythm: Regular rhythm  Tachycardia present  Heart sounds: Normal heart sounds  No murmur heard  No gallop  Pulmonary:      Effort: Pulmonary effort is normal  No respiratory distress  Breath sounds: Normal breath sounds  No stridor  No wheezing, rhonchi or rales  Chest:      Chest wall: No tenderness  Abdominal:      General: Bowel sounds are normal  There is no distension  Palpations: Abdomen is soft  There is no mass  Tenderness: There is abdominal tenderness in the right lower quadrant  There is guarding  Hernia: No hernia is present  Musculoskeletal:         General: No edema  Skin:     General: Skin is warm and dry  Coloration: Skin is not jaundiced or pale  Findings: No bruising, erythema, lesion or rash  Neurological:      General: No focal deficit present  Mental Status: He is alert and oriented to person, place, and time  Motor: Motor strength is normal  No weakness  Deep Tendon Reflexes: Reflexes are normal and symmetric  Psychiatric:         Mood and Affect: Mood and affect normal          Behavior: Behavior is cooperative           Vital Signs  ED Triage Vitals   Temperature Pulse Respirations Blood Pressure SpO2   11/23/22 1953 11/23/22 1951 11/23/22 1951 11/23/22 1951 11/23/22 1951   99 °F (37 2 °C) (!) 138 16 119/61 98 %      Temp src Heart Rate Source Patient Position - Orthostatic VS BP Location FiO2 (%)   -- -- -- -- --             Pain Score       --                  Vitals:    11/23/22 1951   BP: 119/61   Pulse: (!) 138         Visual Acuity      ED Medications  Medications   sodium chloride 0 9 % bolus 1,000 mL (has no administration in time range)   ondansetron (ZOFRAN) injection 4 mg (has no administration in time range) ketorolac (TORADOL) injection 15 mg (has no administration in time range)       Diagnostic Studies  Results Reviewed     Procedure Component Value Units Date/Time    CBC and differential [[id number]]     Lab Status: No result Specimen: Blood     Comprehensive metabolic panel [[id number]]     Lab Status: No result Specimen: Blood     Lipase [[id number]]     Lab Status: No result Specimen: Blood     FLU/RSV/COVID - if FLU/RSV clinically relevant [[id number]]     Lab Status: No result Specimen: Nares from Nose                  CT abdomen pelvis with contrast    (Results Pending)              Procedures  Procedures         ED Course                                             MDM  Number of Diagnoses or Management Options  Diagnosis management comments: 24-year-old male presents to the ED with a several hour history of feeling lightheaded, chills, cough, nausea, vomiting and abdominal pain  On exam he is alert in no acute distress  He does have tenderness in the right lower quadrant with voluntary guarding  Suspect this is most likely a viral illness, however with the point tenderness will obtain CT abdomen pelvis to rule out appendicitis  Will also rule out COVID/flu  Will check basic labs and give IV fluids as he is tachycardic  Will control nausea and pain  Amount and/or Complexity of Data Reviewed  Clinical lab tests: ordered and reviewed  Tests in the radiology section of CPT®: reviewed and ordered  Independent visualization of images, tracings, or specimens: yes        Disposition  Final diagnoses:   None     ED Disposition     None      Follow-up Information    None         Patient's Medications   Discharge Prescriptions    No medications on file       No discharge procedures on file      PDMP Review     None          ED Provider  Electronically Signed by           Maryam Hill DO  11/23/22 6780

## 2023-06-03 ENCOUNTER — APPOINTMENT (EMERGENCY)
Dept: RADIOLOGY | Facility: HOSPITAL | Age: 23
End: 2023-06-03
Payer: COMMERCIAL

## 2023-06-03 ENCOUNTER — HOSPITAL ENCOUNTER (EMERGENCY)
Facility: HOSPITAL | Age: 23
Discharge: HOME | End: 2023-06-03
Attending: EMERGENCY MEDICINE
Payer: COMMERCIAL

## 2023-06-03 VITALS
HEART RATE: 92 BPM | DIASTOLIC BLOOD PRESSURE: 59 MMHG | TEMPERATURE: 98.1 F | RESPIRATION RATE: 16 BRPM | SYSTOLIC BLOOD PRESSURE: 114 MMHG | OXYGEN SATURATION: 98 %

## 2023-06-03 DIAGNOSIS — G56.02 CARPAL TUNNEL SYNDROME OF LEFT WRIST: Primary | ICD-10-CM

## 2023-06-03 PROCEDURE — 99284 EMERGENCY DEPT VISIT MOD MDM: CPT | Performed by: PHYSICIAN ASSISTANT

## 2023-06-03 PROCEDURE — 99283 EMERGENCY DEPT VISIT LOW MDM: CPT

## 2023-06-03 PROCEDURE — 73110 X-RAY EXAM OF WRIST: CPT

## 2023-06-03 RX ORDER — IBUPROFEN 600 MG/1
600 TABLET ORAL ONCE
Status: COMPLETED | OUTPATIENT
Start: 2023-06-03 | End: 2023-06-03

## 2023-06-03 RX ORDER — IBUPROFEN 600 MG/1
600 TABLET ORAL EVERY 6 HOURS PRN
Qty: 30 TABLET | Refills: 0 | Status: SHIPPED | OUTPATIENT
Start: 2023-06-03 | End: 2023-06-13

## 2023-06-03 RX ADMIN — IBUPROFEN 600 MG: 600 TABLET ORAL at 15:00

## 2023-06-03 NOTE — ED PROVIDER NOTES
History  Chief Complaint   Patient presents with   • Wrist Pain     Intermittent left Wrist pain started a week ago  No injury  Unsure of why he has pain     Pt presents to the ED with left wrist pain x 1 week, iced it with some relief, concerned its swollen and sore  No meds  Works in [employer] - does a lot of lifting and repetative movements  No focal injury or trauma  Gets numbness and tinlging into his thumb  Prior to Admission Medications   Prescriptions Last Dose Informant Patient Reported? Taking?    EPINEPHrine (EPIPEN) 0 3 mg/0 3 mL SOAJ   No No   Sig: Inject 0 3 mL (0 3 mg total) into a muscle once for 1 dose   azelastine (OPTIVAR) 0 05 % ophthalmic solution   No No   Sig: Administer 1 drop to both eyes 2 (two) times a day as needed (eye itching)   Patient not taking: Reported on 12/15/2021   diphenhydrAMINE (BENADRYL) 2 % cream   No No   Sig: Apply topically 3 (three) times a day as needed for itching   Patient not taking: Reported on 12/15/2021   diphenhydrAMINE (BENADRYL) 25 mg tablet   No No   Sig: Take 1 tablet (25 mg total) by mouth every 6 (six) hours   Patient not taking: Reported on 11/23/2022   diphenhydrAMINE (BENADRYL) 25 mg tablet   No No   Sig: Take 1 tablet (25 mg total) by mouth every 6 (six) hours   Patient not taking: Reported on 11/23/2022   docusate sodium (COLACE) 100 mg capsule   No No   Sig: Take 1 capsule (100 mg total) by mouth every 12 (twelve) hours   Patient not taking: Reported on 11/23/2022   famotidine (PEPCID) 20 mg tablet   No No   Sig: Take 1 tablet (20 mg total) by mouth 2 (two) times a day   Patient not taking: Reported on 11/23/2022   fexofenadine (ALLEGRA) 60 MG tablet   No No   Sig: Take 1 tablet (60 mg total) by mouth 2 (two) times a day   Patient not taking: Reported on 11/23/2022   hydrocortisone-pramoxine (PROCTOFOAM-HC) 1-1 % FOAM rectal foam   No No   Sig: Insert 1 applicator into the rectum 2 (two) times a day   Patient not taking: Reported on 11/23/2022   lidocaine (XYLOCAINE) 5 % ointment   No No   Sig: Apply topically as needed for mild pain   Patient not taking: Reported on 12/15/2021   loratadine (CLARITIN) 10 mg tablet   No No   Sig: Take 1 tablet (10 mg total) by mouth daily   Patient not taking: Reported on 12/15/2021   ondansetron (ZOFRAN-ODT) 4 mg disintegrating tablet   No No   Sig: Take 1 tablet (4 mg total) by mouth every 8 (eight) hours as needed for nausea or vomiting   ondansetron (Zofran ODT) 4 mg disintegrating tablet   No No   Sig: Take 1 tablet (4 mg total) by mouth every 6 (six) hours as needed for nausea or vomiting   Patient not taking: Reported on 11/23/2022   polyethylene glycol (MIRALAX) 17 g packet   No No   Sig: Take 17 g by mouth 2 (two) times a day   sertraline (Zoloft) 50 mg tablet   No No   Sig: Take 1 tablet (50 mg total) by mouth daily For first 7 days, take 1/2 tablet (25mg)  Facility-Administered Medications Last Administration Doses Remaining   cefTRIAXone (ROCEPHIN) injection 500 mg None recorded 1          Past Medical History:   Diagnosis Date   • No known health problems        Past Surgical History:   Procedure Laterality Date   • NO PAST SURGERIES         History reviewed  No pertinent family history  I have reviewed and agree with the history as documented  E-Cigarette/Vaping   • E-Cigarette Use Never User      E-Cigarette/Vaping Substances     Social History     Tobacco Use   • Smoking status: Never   • Smokeless tobacco: Never   Vaping Use   • Vaping Use: Never used   Substance Use Topics   • Alcohol use: Not Currently   • Drug use: Yes     Types: Marijuana     Comment: once a day       Review of Systems   Constitutional: Negative for fever  Respiratory: Negative  Cardiovascular: Negative  Gastrointestinal: Negative  Musculoskeletal: Positive for arthralgias and joint swelling  All other systems reviewed and are negative        Physical Exam  Physical Exam  Vitals and nursing note reviewed  Constitutional:       Appearance: He is well-developed  HENT:      Head: Normocephalic and atraumatic  Right Ear: Ear canal and external ear normal       Left Ear: Ear canal and external ear normal    Eyes:      Conjunctiva/sclera: Conjunctivae normal    Cardiovascular:      Rate and Rhythm: Normal rate and regular rhythm  Heart sounds: Normal heart sounds  Pulmonary:      Effort: Pulmonary effort is normal       Breath sounds: Normal breath sounds  Abdominal:      General: Bowel sounds are normal       Palpations: Abdomen is soft  Musculoskeletal:         General: Normal range of motion  Left wrist: Tenderness and bony tenderness present  No swelling, deformity or effusion  Normal range of motion  Hands:       Cervical back: Normal range of motion and neck supple  Comments: Positive Phalen's test consistent with carpal tunnel   Lymphadenopathy:      Cervical: No cervical adenopathy  Skin:     General: Skin is warm  Findings: No rash  Neurological:      Mental Status: He is alert and oriented to person, place, and time  Motor: No abnormal muscle tone        Coordination: Coordination normal    Psychiatric:         Behavior: Behavior normal          Vital Signs  ED Triage Vitals [06/03/23 1437]   Temperature Pulse Respirations Blood Pressure SpO2   98 1 °F (36 7 °C) 92 16 114/59 98 %      Temp Source Heart Rate Source Patient Position - Orthostatic VS BP Location FiO2 (%)   Oral Monitor Sitting Left arm --      Pain Score       8           Vitals:    06/03/23 1437   BP: 114/59   Pulse: 92   Patient Position - Orthostatic VS: Sitting         Visual Acuity      ED Medications  Medications   ibuprofen (MOTRIN) tablet 600 mg (600 mg Oral Given 6/3/23 1500)       Diagnostic Studies  Results Reviewed     None                 XR wrist 3+ views LEFT   ED Interpretation by Horace Cervantes PA-C (06/03 1543)   No acute fx       Final Result by Lawrence Frye MD (06/04 1102)      No acute osseous abnormality  Workstation performed: [id number]                    Procedures  Splint application    Date/Time: 6/4/2023 3:46 PM    Performed by: Gale Bhakta PA-C  Authorized by: Gale Bhakta PA-C  Universal Protocol:  Consent given by: patient  Patient identity confirmed: verbally with patient      Pre-procedure details:     Sensation:  Normal  Procedure details:     Laterality:  Left    Location:  Wrist    Wrist:  L wrist  Post-procedure details:     Pain:  Improved    Sensation:  Normal    Patient tolerance of procedure: Tolerated well, no immediate complications  Comments:      Applied a black wrist brace - provided pt with suport and improved pain              ED Course                                             Medical Decision Making  Patient has a positive Phalen's test consistent with carpal tunnel  We will get x-ray to evaluate for possible occult fracture  Patient accepts ibuprofen declines Toradol  Carpal tunnel syndrome of left wrist: acute illness or injury  Amount and/or Complexity of Data Reviewed  Radiology: ordered and independent interpretation performed  Risk  OTC drugs  Prescription drug management  Disposition  Final diagnoses:   Carpal tunnel syndrome of left wrist     Time reflects when diagnosis was documented in both MDM as applicable and the Disposition within this note     Time User Action Codes Description Comment    6/3/2023  3:37 PM Krista Gross Add [G56 02] Carpal tunnel syndrome of left wrist       ED Disposition     ED Disposition   Discharge    Condition   Stable    Date/Time   Sat Bharathi 3, 2023  3:37 PM    Comment   Reji Lacey discharge to home/self care                 Follow-up Information     Follow up With Specialties Details Why Contact Info    SHIRA Monahan Nurse Practitioner   [address] [place] [id number]  [phone]            Discharge Medication List as of 6/3/2023  3:39 PM      START taking these medications    Details   ibuprofen (MOTRIN) 600 mg tablet Take 1 tablet (600 mg total) by mouth every 6 (six) hours as needed for mild pain for up to 10 days, Starting Sat 6/3/2023, Until Tue 6/13/2023 at 2359, Normal         CONTINUE these medications which have NOT CHANGED    Details   azelastine (OPTIVAR) 0 05 % ophthalmic solution Administer 1 drop to both eyes 2 (two) times a day as needed (eye itching), Starting Wed 11/3/2021, Normal      diphenhydrAMINE (BENADRYL) 2 % cream Apply topically 3 (three) times a day as needed for itching, Starting Sun 10/17/2021, Print      !! diphenhydrAMINE (BENADRYL) 25 mg tablet Take 1 tablet (25 mg total) by mouth every 6 (six) hours, Starting Tue 6/21/2022, Normal      !! diphenhydrAMINE (BENADRYL) 25 mg tablet Take 1 tablet (25 mg total) by mouth every 6 (six) hours, Starting Tue 6/21/2022, Normal      docusate sodium (COLACE) 100 mg capsule Take 1 capsule (100 mg total) by mouth every 12 (twelve) hours, Starting Thu 1/13/2022, Normal      EPINEPHrine (EPIPEN) 0 3 mg/0 3 mL SOAJ Inject 0 3 mL (0 3 mg total) into a muscle once for 1 dose, Starting Wed 6/22/2022, Normal      famotidine (PEPCID) 20 mg tablet Take 1 tablet (20 mg total) by mouth 2 (two) times a day, Starting Tue 6/21/2022, Normal      fexofenadine (ALLEGRA) 60 MG tablet Take 1 tablet (60 mg total) by mouth 2 (two) times a day, Starting Tue 6/21/2022, Normal      hydrocortisone-pramoxine (PROCTOFOAM-HC) 1-1 % FOAM rectal foam Insert 1 applicator into the rectum 2 (two) times a day, Starting Thu 1/13/2022, Normal      lidocaine (XYLOCAINE) 5 % ointment Apply topically as needed for mild pain, Starting Wed 9/15/2021, Normal      loratadine (CLARITIN) 10 mg tablet Take 1 tablet (10 mg total) by mouth daily, Starting Wed 11/3/2021, Normal      !! ondansetron (Zofran ODT) 4 mg disintegrating tablet Take 1 tablet (4 mg total) by mouth every 6 (six) hours as needed for nausea or vomiting, Starting Sat 9/10/2022, Print      !! ondansetron (ZOFRAN-ODT) 4 mg disintegrating tablet Take 1 tablet (4 mg total) by mouth every 8 (eight) hours as needed for nausea or vomiting, Starting Wed 11/23/2022, Normal      polyethylene glycol (MIRALAX) 17 g packet Take 17 g by mouth 2 (two) times a day, Starting Wed 9/28/2022, Until Fri 10/28/2022, Normal      sertraline (Zoloft) 50 mg tablet Take 1 tablet (50 mg total) by mouth daily For first 7 days, take 1/2 tablet (25mg)  , Starting Mon 12/20/2021, Normal       !! - Potential duplicate medications found  Please discuss with provider  No discharge procedures on file      PDMP Review     None          ED Provider  Electronically Signed by           Dinah Vidal PA-C  06/04/23 7935

## 2023-06-17 NOTE — ED PROVIDER NOTES
[facility] - PROCEDURE RESPONSE NOTE    Based on the query that was sent to you, please document below any procedures that were performed    Black volar wrist brace was applied by myself      Krista Gross PA-C  06/17/23 1222       Krista Gross PA-C  06/17/23 1225

## 2023-06-18 NOTE — ED PROCEDURE NOTE
VERIFY PROCEDURE DATE RESPONSE NOTE     Based on the query that was sent to you, please document the correct procedure date in this document    6/3/23 - volar wrist brace applied to right wrist by myself - nv intact     Krista Gross PA-C  06/18/23 6736

## 2023-08-01 ENCOUNTER — APPOINTMENT (EMERGENCY)
Dept: CT IMAGING | Facility: HOSPITAL | Age: 23
End: 2023-08-01
Payer: COMMERCIAL

## 2023-08-01 ENCOUNTER — HOSPITAL ENCOUNTER (EMERGENCY)
Facility: HOSPITAL | Age: 23
Discharge: HOME | End: 2023-08-01
Attending: EMERGENCY MEDICINE | Admitting: EMERGENCY MEDICINE
Payer: COMMERCIAL

## 2023-08-01 VITALS
OXYGEN SATURATION: 99 % | DIASTOLIC BLOOD PRESSURE: 74 MMHG | RESPIRATION RATE: 18 BRPM | HEART RATE: 61 BPM | TEMPERATURE: 98.9 F | SYSTOLIC BLOOD PRESSURE: 134 MMHG

## 2023-08-01 DIAGNOSIS — R10.31 RIGHT LOWER QUADRANT ABDOMINAL PAIN: Primary | ICD-10-CM

## 2023-08-01 LAB
ALBUMIN SERPL BCP-MCNC: 4.8 G/DL (ref 3.5–5)
ALP SERPL-CCNC: 79 U/L (ref 34–104)
ALT SERPL W P-5'-P-CCNC: 13 U/L (ref 7–52)
ANION GAP SERPL CALCULATED.3IONS-SCNC: 6 MMOL/L
AST SERPL W P-5'-P-CCNC: 19 U/L (ref 13–39)
BASOPHILS # BLD AUTO: 0.05 THOUSANDS/ÂΜL (ref 0–0.1)
BASOPHILS NFR BLD AUTO: 1 % (ref 0–1)
BILIRUB DIRECT SERPL-MCNC: 0.05 MG/DL (ref 0–0.2)
BILIRUB SERPL-MCNC: 0.42 MG/DL (ref 0.2–1)
BILIRUB UR QL STRIP: NEGATIVE
BUN SERPL-MCNC: 9 MG/DL (ref 5–25)
CALCIUM SERPL-MCNC: 9.9 MG/DL (ref 8.4–10.2)
CHLORIDE SERPL-SCNC: 104 MMOL/L (ref 96–108)
CLARITY UR: CLEAR
CO2 SERPL-SCNC: 31 MMOL/L (ref 21–32)
COLOR UR: NORMAL
CREAT SERPL-MCNC: 1.06 MG/DL (ref 0.6–1.3)
EOSINOPHIL # BLD AUTO: 0.44 THOUSAND/ÂΜL (ref 0–0.61)
EOSINOPHIL NFR BLD AUTO: 8 % (ref 0–6)
ERYTHROCYTE [DISTWIDTH] IN BLOOD BY AUTOMATED COUNT: 11.9 % (ref 11.6–15.1)
GFR SERPL CREATININE-BSD FRML MDRD: 100 ML/MIN/1.73SQ M
GLUCOSE SERPL-MCNC: 91 MG/DL (ref 65–140)
GLUCOSE UR STRIP-MCNC: NEGATIVE MG/DL
HCT VFR BLD AUTO: 46.5 % (ref 36.5–49.3)
HGB BLD-MCNC: 16.2 G/DL (ref 12–17)
HGB UR QL STRIP.AUTO: NEGATIVE
IMM GRANULOCYTES # BLD AUTO: 0.01 THOUSAND/UL (ref 0–0.2)
IMM GRANULOCYTES NFR BLD AUTO: 0 % (ref 0–2)
KETONES UR STRIP-MCNC: NEGATIVE MG/DL
LEUKOCYTE ESTERASE UR QL STRIP: NEGATIVE
LIPASE SERPL-CCNC: 14 U/L (ref 11–82)
LYMPHOCYTES # BLD AUTO: 2.17 THOUSANDS/ÂΜL (ref 0.6–4.47)
LYMPHOCYTES NFR BLD AUTO: 37 % (ref 14–44)
MCH RBC QN AUTO: 31.6 PG (ref 26.8–34.3)
MCHC RBC AUTO-ENTMCNC: 34.8 G/DL (ref 31.4–37.4)
MCV RBC AUTO: 91 FL (ref 82–98)
MONOCYTES # BLD AUTO: 0.3 THOUSAND/ÂΜL (ref 0.17–1.22)
MONOCYTES NFR BLD AUTO: 5 % (ref 4–12)
NEUTROPHILS # BLD AUTO: 2.9 THOUSANDS/ÂΜL (ref 1.85–7.62)
NEUTS SEG NFR BLD AUTO: 49 % (ref 43–75)
NITRITE UR QL STRIP: NEGATIVE
NRBC BLD AUTO-RTO: 0 /100 WBCS
PH UR STRIP.AUTO: 8 [PH]
PLATELET # BLD AUTO: 325 THOUSANDS/UL (ref 149–390)
PMV BLD AUTO: 9.3 FL (ref 8.9–12.7)
POTASSIUM SERPL-SCNC: 3.9 MMOL/L (ref 3.5–5.3)
PROT SERPL-MCNC: 7.8 G/DL (ref 6.4–8.4)
PROT UR STRIP-MCNC: NEGATIVE MG/DL
RBC # BLD AUTO: 5.13 MILLION/UL (ref 3.88–5.62)
SODIUM SERPL-SCNC: 141 MMOL/L (ref 135–147)
SP GR UR STRIP.AUTO: 1.01 (ref 1–1.04)
UROBILINOGEN UA: NEGATIVE MG/DL
WBC # BLD AUTO: 5.87 THOUSAND/UL (ref 4.31–10.16)

## 2023-08-01 PROCEDURE — 99284 EMERGENCY DEPT VISIT MOD MDM: CPT | Performed by: EMERGENCY MEDICINE

## 2023-08-01 PROCEDURE — G1004 CDSM NDSC: HCPCS

## 2023-08-01 PROCEDURE — 80076 HEPATIC FUNCTION PANEL: CPT | Performed by: EMERGENCY MEDICINE

## 2023-08-01 PROCEDURE — 85025 COMPLETE CBC W/AUTO DIFF WBC: CPT | Performed by: EMERGENCY MEDICINE

## 2023-08-01 PROCEDURE — 80048 BASIC METABOLIC PNL TOTAL CA: CPT | Performed by: EMERGENCY MEDICINE

## 2023-08-01 PROCEDURE — 83690 ASSAY OF LIPASE: CPT | Performed by: EMERGENCY MEDICINE

## 2023-08-01 PROCEDURE — 36415 COLL VENOUS BLD VENIPUNCTURE: CPT | Performed by: EMERGENCY MEDICINE

## 2023-08-01 PROCEDURE — 74177 CT ABD & PELVIS W/CONTRAST: CPT

## 2023-08-01 RX ADMIN — IOHEXOL 100 ML: 350 INJECTION, SOLUTION INTRAVENOUS at 13:50

## 2023-08-01 NOTE — ED PROVIDER NOTES
History  Chief Complaint   Patient presents with   • Abdominal Pain     Sharp lower abdominal pain x 1 week denies N/V/D. Normal BM this morning     Is a 82-year-old male presents emergency department lower abdominal pain. Seems most pronounced on the right-hand side. He has had this for 10 days. Feels like it might be slightly worse. Some nausea on occasion but no vomiting. No black or bloody stool. Denies pain in the scrotum, no penile pain or discharge. Denies fevers or chills. No dysuria frequency urgency. No hematuria. No flank pain. No radiation. No aggravating relieving factors. Differential diagnosis includes appendicitis, ureterolithiasis, UTI, constipation, cholecystitis, pancreatitis. Prior to Admission Medications   Prescriptions Last Dose Informant Patient Reported? Taking?   ibuprofen (MOTRIN) 600 mg tablet   No Yes   Sig: Take 1 tablet (600 mg total) by mouth every 6 (six) hours as needed for mild pain for up to 10 days   ondansetron (ZOFRAN-ODT) 4 mg disintegrating tablet   No No   Sig: Take 1 tablet (4 mg total) by mouth every 8 (eight) hours as needed for nausea or vomiting   polyethylene glycol (MIRALAX) 17 g packet   No No   Sig: Take 17 g by mouth 2 (two) times a day      Facility-Administered Medications Last Administration Doses Remaining   cefTRIAXone (ROCEPHIN) injection 500 mg None recorded 1          Past Medical History:   Diagnosis Date   • No known health problems        Past Surgical History:   Procedure Laterality Date   • NO PAST SURGERIES         History reviewed. No pertinent family history. I have reviewed and agree with the history as documented.     E-Cigarette/Vaping   • E-Cigarette Use Never User      E-Cigarette/Vaping Substances     Social History     Tobacco Use   • Smoking status: Never   • Smokeless tobacco: Never   Vaping Use   • Vaping Use: Never used   Substance Use Topics   • Alcohol use: Not Currently   • Drug use: Yes     Types: Marijuana Comment: once a day       Review of Systems   Constitutional: Negative for activity change, appetite change and fever. HENT: Negative for congestion, ear pain, rhinorrhea and sore throat. Eyes: Negative for pain, redness and visual disturbance. Respiratory: Negative for cough, shortness of breath and wheezing. Cardiovascular: Negative for chest pain and palpitations. Gastrointestinal: Positive for abdominal pain and nausea. Negative for diarrhea and vomiting. Endocrine: Negative for polyuria. Genitourinary: Negative for difficulty urinating, dysuria, frequency and urgency. Musculoskeletal: Negative for arthralgias and myalgias. Skin: Negative for color change and rash. Allergic/Immunologic: Negative for immunocompromised state. Neurological: Negative for dizziness, syncope and light-headedness. Hematological: Does not bruise/bleed easily. Psychiatric/Behavioral: Negative for confusion. Physical Exam  Physical Exam  Vitals and nursing note reviewed. Constitutional:       General: He is not in acute distress. Appearance: He is well-developed. HENT:      Head: Normocephalic and atraumatic. Nose: Nose normal.   Eyes:      General: No scleral icterus. Conjunctiva/sclera: Conjunctivae normal.   Cardiovascular:      Rate and Rhythm: Normal rate and regular rhythm. Heart sounds: Normal heart sounds. Pulmonary:      Effort: Pulmonary effort is normal. No respiratory distress. Breath sounds: Normal breath sounds. No stridor. No wheezing. Abdominal:      General: There is no distension. Palpations: Abdomen is soft. Tenderness: There is abdominal tenderness in the suprapubic area. There is no right CVA tenderness, left CVA tenderness, guarding or rebound. Positive signs include McBurney's sign. Negative signs include Walker's sign and Rovsing's sign. Hernia: No hernia is present. Musculoskeletal:         General: No deformity.       Cervical back: Normal range of motion and neck supple. Skin:     General: Skin is warm and dry. Findings: No rash. Neurological:      Mental Status: He is alert and oriented to person, place, and time. Psychiatric:         Thought Content:  Thought content normal.         Vital Signs  ED Triage Vitals   Temperature Pulse Respirations Blood Pressure SpO2   08/01/23 1144 08/01/23 1144 08/01/23 1144 08/01/23 1144 08/01/23 1144   98.9 °F (37.2 °C) 91 18 117/64 99 %      Temp Source Heart Rate Source Patient Position - Orthostatic VS BP Location FiO2 (%)   08/01/23 1144 08/01/23 1144 08/01/23 1144 08/01/23 1144 --   Tympanic Monitor Sitting Left arm       Pain Score       08/01/23 1248       7           Vitals:    08/01/23 1144 08/01/23 1248   BP: 117/64 134/74   Pulse: 91 61   Patient Position - Orthostatic VS: Sitting Lying         Visual Acuity      ED Medications  Medications   iohexol (OMNIPAQUE) 350 MG/ML injection (SINGLE-DOSE) 100 mL (100 mL Intravenous Given 8/1/23 1350)       Diagnostic Studies  Results Reviewed     Procedure Component Value Units Date/Time    Basic metabolic panel [[id number]] Collected: 08/01/23 1249    Lab Status: Final result Specimen: Blood from Arm, Left Updated: 08/01/23 1333     Sodium 141 mmol/L      Potassium 3.9 mmol/L      Chloride 104 mmol/L      CO2 31 mmol/L      ANION GAP 6 mmol/L      BUN 9 mg/dL      Creatinine 1.06 mg/dL      Glucose 91 mg/dL      Calcium 9.9 mg/dL      eGFR 100 ml/min/1.73sq m     Narrative:      [place] guidelines for Chronic Kidney Disease (CKD):   •  Stage 1 with normal or high GFR (GFR > 90 mL/min/1.73 square meters)  •  Stage 2 Mild CKD (GFR = 60-89 mL/min/1.73 square meters)  •  Stage 3A Moderate CKD (GFR = 45-59 mL/min/1.73 square meters)  •  Stage 3B Moderate CKD (GFR = 30-44 mL/min/1.73 square meters)  •  Stage 4 Severe CKD (GFR = 15-29 mL/min/1.73 square meters)  •  Stage 5 End Stage CKD (GFR <15 mL/min/1.73 square meters)  Note: GFR calculation is accurate only with a steady state creatinine    Hepatic function panel [[id number]]  (Normal) Collected: 08/01/23 1249    Lab Status: Final result Specimen: Blood from Arm, Left Updated: 08/01/23 1333     Total Bilirubin 0.42 mg/dL      Bilirubin, Direct 0.05 mg/dL      Alkaline Phosphatase 79 U/L      AST 19 U/L      ALT 13 U/L      Total Protein 7.8 g/dL      Albumin 4.8 g/dL     Lipase [[id number]]  (Normal) Collected: 08/01/23 1249    Lab Status: Final result Specimen: Blood from Arm, Left Updated: 08/01/23 1333     Lipase 14 u/L     UA (URINE) with reflex to Scope [[id number]]  (Normal) Collected: 08/01/23 1249    Lab Status: Final result Specimen: Urine, Clean Catch Updated: 08/01/23 1303     Color, UA Straw     Clarity, UA Clear     Specific Gravity, UA 1.015     pH, UA 8.0     Leukocytes, UA Negative     Nitrite, UA Negative     Protein, UA Negative mg/dl      Glucose, UA Negative mg/dl      Ketones, UA Negative mg/dl      Bilirubin, UA Negative     Occult Blood, UA Negative     UROBILINOGEN UA Negative mg/dL     CBC and differential [[id number]]  (Abnormal) Collected: 08/01/23 1249    Lab Status: Final result Specimen: Blood from Arm, Left Updated: 08/01/23 1258     WBC 5.87 Thousand/uL      RBC 5.13 Million/uL      Hemoglobin 16.2 g/dL      Hematocrit 46.5 %      MCV 91 fL      MCH 31.6 pg      MCHC 34.8 g/dL      RDW 11.9 %      MPV 9.3 fL      Platelets 971 Thousands/uL      nRBC 0 /100 WBCs      Neutrophils Relative 49 %      Immat GRANS % 0 %      Lymphocytes Relative 37 %      Monocytes Relative 5 %      Eosinophils Relative 8 %      Basophils Relative 1 %      Neutrophils Absolute 2.90 Thousands/µL      Immature Grans Absolute 0.01 Thousand/uL      Lymphocytes Absolute 2.17 Thousands/µL      Monocytes Absolute 0.30 Thousand/µL      Eosinophils Absolute 0.44 Thousand/µL      Basophils Absolute 0.05 Thousands/µL                  CT abdomen pelvis with contrast   Final Result by Tai Romero MD (08/01 1429)      No acute inflammatory process identified in the abdomen or pelvis. Workstation performed: [id number]                    Procedures  Procedures         ED Course         CRAFFT    Flowsheet Row Most Recent Value   RIMAFFMARQUITA Initial Screen: During the past 12 months, did you:    1. Drink any alcohol (more than a few sips)? No Filed at: 08/01/2023 1259   2. Smoke any marijuana or hashish No Filed at: 08/01/2023 1259   3. Use anything else to get high? ("anything else" includes illegal drugs, over the counter and prescription drugs, and things that you sniff or 'johnson')? No Filed at: 08/01/2023 1259                                          Medical Decision Making  Patient with abdominal pain right lower quadrant greater than suprapubic. No dysuria frequency urgency. Denies any penile discharge. Lab work was negative, negative urinalysis, normal CAT scan. Amount and/or Complexity of Data Reviewed  External Data Reviewed: notes. Details: Review of external notes shows patient has multiple visits to emergency departments. No recent outpatient primary care visits. Labs: ordered. Radiology: ordered. Risk  Prescription drug management. 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                Follow-up Information     Follow up With Specialties Details Why Contact Info    SHIRA Benson Nurse Practitioner In 1 week For re-evaluation and follow-up for this visit [address] [facility] [address]            Patient's Medications   Discharge Prescriptions    No medications on file       No discharge procedures on file.     PDMP Review     None          ED Provider  Electronically Signed by           Jania Markham MD  08/01/23 2309

## 2023-08-01 NOTE — Clinical Note
Reuben Albert was seen and treated in our emergency department on 8/1/2023. Diagnosis:     Reji  may return to work on return date. He may return on this date: 08/02/2023         If you have any questions or concerns, please don't hesitate to call.       Kevin Bah MD    ______________________________           _______________          _______________  Hospital Representative                              Date                                Time

## 2023-11-15 ENCOUNTER — HOSPITAL ENCOUNTER (EMERGENCY)
Facility: HOSPITAL | Age: 23
Discharge: HOME | End: 2023-11-15
Attending: EMERGENCY MEDICINE
Payer: COMMERCIAL

## 2023-11-15 ENCOUNTER — APPOINTMENT (EMERGENCY)
Dept: CT IMAGING | Facility: HOSPITAL | Age: 23
End: 2023-11-15
Payer: COMMERCIAL

## 2023-11-15 VITALS
HEART RATE: 81 BPM | RESPIRATION RATE: 16 BRPM | SYSTOLIC BLOOD PRESSURE: 128 MMHG | WEIGHT: 142.42 LBS | BODY MASS INDEX: 21.03 KG/M2 | OXYGEN SATURATION: 99 % | DIASTOLIC BLOOD PRESSURE: 61 MMHG | TEMPERATURE: 97.9 F

## 2023-11-15 DIAGNOSIS — K59.00 CONSTIPATION: ICD-10-CM

## 2023-11-15 DIAGNOSIS — K62.5 RECTAL BLEEDING: Primary | ICD-10-CM

## 2023-11-15 LAB
ALBUMIN SERPL BCP-MCNC: 4.9 G/DL (ref 3.5–5)
ALP SERPL-CCNC: 83 U/L (ref 34–104)
ALT SERPL W P-5'-P-CCNC: 12 U/L (ref 7–52)
ANION GAP SERPL CALCULATED.3IONS-SCNC: 4 MMOL/L
AST SERPL W P-5'-P-CCNC: 15 U/L (ref 13–39)
BACTERIA UR QL AUTO: ABNORMAL /HPF
BASOPHILS # BLD AUTO: 0.04 THOUSANDS/ÂΜL (ref 0–0.1)
BASOPHILS NFR BLD AUTO: 1 % (ref 0–1)
BILIRUB SERPL-MCNC: 0.74 MG/DL (ref 0.2–1)
BILIRUB UR QL STRIP: NEGATIVE
BUN SERPL-MCNC: 12 MG/DL (ref 5–25)
CALCIUM SERPL-MCNC: 9.9 MG/DL (ref 8.4–10.2)
CHLORIDE SERPL-SCNC: 105 MMOL/L (ref 96–108)
CLARITY UR: CLEAR
CO2 SERPL-SCNC: 30 MMOL/L (ref 21–32)
COLOR UR: YELLOW
CREAT SERPL-MCNC: 0.93 MG/DL (ref 0.6–1.3)
EOSINOPHIL # BLD AUTO: 0.32 THOUSAND/ÂΜL (ref 0–0.61)
EOSINOPHIL NFR BLD AUTO: 5 % (ref 0–6)
ERYTHROCYTE [DISTWIDTH] IN BLOOD BY AUTOMATED COUNT: 11.8 % (ref 11.6–15.1)
GFR SERPL CREATININE-BSD FRML MDRD: 116 ML/MIN/1.73SQ M
GLUCOSE SERPL-MCNC: 86 MG/DL (ref 65–140)
GLUCOSE UR STRIP-MCNC: NEGATIVE MG/DL
HCT VFR BLD AUTO: 45.7 % (ref 36.5–49.3)
HGB BLD-MCNC: 16 G/DL (ref 12–17)
HGB UR QL STRIP.AUTO: NEGATIVE
IMM GRANULOCYTES # BLD AUTO: 0.01 THOUSAND/UL (ref 0–0.2)
IMM GRANULOCYTES NFR BLD AUTO: 0 % (ref 0–2)
KETONES UR STRIP-MCNC: NEGATIVE MG/DL
LEUKOCYTE ESTERASE UR QL STRIP: ABNORMAL
LIPASE SERPL-CCNC: 8 U/L (ref 11–82)
LYMPHOCYTES # BLD AUTO: 2.33 THOUSANDS/ÂΜL (ref 0.6–4.47)
LYMPHOCYTES NFR BLD AUTO: 39 % (ref 14–44)
MCH RBC QN AUTO: 31.3 PG (ref 26.8–34.3)
MCHC RBC AUTO-ENTMCNC: 35 G/DL (ref 31.4–37.4)
MCV RBC AUTO: 89 FL (ref 82–98)
MONOCYTES # BLD AUTO: 0.24 THOUSAND/ÂΜL (ref 0.17–1.22)
MONOCYTES NFR BLD AUTO: 4 % (ref 4–12)
MUCOUS THREADS UR QL AUTO: ABNORMAL
NEUTROPHILS # BLD AUTO: 3.05 THOUSANDS/ÂΜL (ref 1.85–7.62)
NEUTS SEG NFR BLD AUTO: 51 % (ref 43–75)
NITRITE UR QL STRIP: NEGATIVE
NON-SQ EPI CELLS URNS QL MICRO: ABNORMAL /HPF
NRBC BLD AUTO-RTO: 0 /100 WBCS
PH UR STRIP.AUTO: 7.5 [PH] (ref 4.5–8)
PLATELET # BLD AUTO: 360 THOUSANDS/UL (ref 149–390)
PMV BLD AUTO: 8.9 FL (ref 8.9–12.7)
POTASSIUM SERPL-SCNC: 3.6 MMOL/L (ref 3.5–5.3)
PROT SERPL-MCNC: 7.8 G/DL (ref 6.4–8.4)
PROT UR STRIP-MCNC: NEGATIVE MG/DL
RBC # BLD AUTO: 5.12 MILLION/UL (ref 3.88–5.62)
RBC #/AREA URNS AUTO: ABNORMAL /HPF
SODIUM SERPL-SCNC: 139 MMOL/L (ref 135–147)
SP GR UR STRIP.AUTO: 1.02 (ref 1–1.03)
UROBILINOGEN UR QL STRIP.AUTO: 1 E.U./DL
WBC # BLD AUTO: 5.99 THOUSAND/UL (ref 4.31–10.16)
WBC #/AREA URNS AUTO: ABNORMAL /HPF

## 2023-11-15 PROCEDURE — 80053 COMPREHEN METABOLIC PANEL: CPT | Performed by: EMERGENCY MEDICINE

## 2023-11-15 PROCEDURE — 99285 EMERGENCY DEPT VISIT HI MDM: CPT

## 2023-11-15 PROCEDURE — 81001 URINALYSIS AUTO W/SCOPE: CPT

## 2023-11-15 PROCEDURE — 85025 COMPLETE CBC W/AUTO DIFF WBC: CPT | Performed by: EMERGENCY MEDICINE

## 2023-11-15 PROCEDURE — 82272 OCCULT BLD FECES 1-3 TESTS: CPT

## 2023-11-15 PROCEDURE — 99285 EMERGENCY DEPT VISIT HI MDM: CPT | Performed by: EMERGENCY MEDICINE

## 2023-11-15 PROCEDURE — 74177 CT ABD & PELVIS W/CONTRAST: CPT

## 2023-11-15 PROCEDURE — 36415 COLL VENOUS BLD VENIPUNCTURE: CPT | Performed by: EMERGENCY MEDICINE

## 2023-11-15 PROCEDURE — 83690 ASSAY OF LIPASE: CPT | Performed by: EMERGENCY MEDICINE

## 2023-11-15 RX ORDER — POLYETHYLENE GLYCOL 3350 17 G/17G
17 POWDER, FOR SOLUTION ORAL DAILY
Qty: 1 EACH | Refills: 0 | Status: SHIPPED | OUTPATIENT
Start: 2023-11-15

## 2023-11-15 RX ADMIN — IOHEXOL 85 ML: 350 INJECTION, SOLUTION INTRAVENOUS at 18:26

## 2023-11-15 NOTE — ED PROVIDER NOTES
History  Chief Complaint   Patient presents with    Rectal Bleeding     Rectal bleeding starting x6 days. Bleeding started after having intercourse. History provided by:  Patient   used: No    Rectal Bleeding - Minor  Quality:  Bright red  Amount: Moderate  Duration:  4 days  Timing:  Intermittent  Chronicity:  New  Context: spontaneously    Similar prior episodes: no    Relieved by:  Nothing  Worsened by:  Nothing  Ineffective treatments:  None tried  Associated symptoms: no abdominal pain, no dizziness, no fever, no loss of consciousness, no recent illness and no vomiting    Associated symptoms comment:  Constipation  Risk factors: no anticoagulant use        Prior to Admission Medications   Prescriptions Last Dose Informant Patient Reported? Taking?   ibuprofen (MOTRIN) 600 mg tablet   No No   Sig: Take 1 tablet (600 mg total) by mouth every 6 (six) hours as needed for mild pain for up to 10 days   ondansetron (ZOFRAN-ODT) 4 mg disintegrating tablet   No No   Sig: Take 1 tablet (4 mg total) by mouth every 8 (eight) hours as needed for nausea or vomiting   polyethylene glycol (MIRALAX) 17 g packet   No No   Sig: Take 17 g by mouth 2 (two) times a day      Facility-Administered Medications Last Administration Doses Remaining   cefTRIAXone (ROCEPHIN) injection 500 mg None recorded 1          Past Medical History:   Diagnosis Date    No known health problems        Past Surgical History:   Procedure Laterality Date    NO PAST SURGERIES         History reviewed. No pertinent family history. I have reviewed and agree with the history as documented.     E-Cigarette/Vaping    E-Cigarette Use Never User      E-Cigarette/Vaping Substances     Social History     Tobacco Use    Smoking status: Never    Smokeless tobacco: Never   Vaping Use    Vaping Use: Never used   Substance Use Topics    Alcohol use: Not Currently    Drug use: Yes     Types: Marijuana     Comment: once a day       Review of Systems   Constitutional:  Negative for chills and fever. HENT:  Negative for facial swelling, sore throat and trouble swallowing. Eyes:  Negative for pain and visual disturbance. Respiratory:  Negative for cough and shortness of breath. Cardiovascular:  Negative for chest pain and leg swelling. Gastrointestinal:  Positive for hematochezia. Negative for abdominal pain, diarrhea, nausea and vomiting. Genitourinary:  Negative for dysuria and flank pain. Musculoskeletal:  Negative for back pain, neck pain and neck stiffness. Skin:  Negative for pallor and rash. Allergic/Immunologic: Negative for environmental allergies and immunocompromised state. Neurological:  Negative for dizziness, loss of consciousness and headaches. Hematological:  Negative for adenopathy. Does not bruise/bleed easily. Psychiatric/Behavioral:  Negative for agitation and behavioral problems. All other systems reviewed and are negative. Physical Exam  Physical Exam  Vitals and nursing note reviewed. Exam conducted with a chaperone present (NATHALIA Munoz present for Rectal exam). Constitutional:       General: He is not in acute distress. Appearance: He is well-developed. HENT:      Head: Normocephalic and atraumatic. Eyes:      Extraocular Movements: Extraocular movements intact. Cardiovascular:      Rate and Rhythm: Normal rate and regular rhythm. Pulmonary:      Effort: Pulmonary effort is normal. No respiratory distress. Breath sounds: Normal breath sounds. Abdominal:      Palpations: Abdomen is soft. Tenderness: There is no abdominal tenderness. There is no guarding or rebound. Genitourinary:     Rectum: Guaiac result negative. Comments: No external bleeding, no wounds noted, no internal swelling or fluctuance noted  Musculoskeletal:         General: Normal range of motion. Cervical back: Normal range of motion and neck supple. Skin:     General: Skin is warm and dry. Neurological:      General: No focal deficit present. Mental Status: He is alert and oriented to person, place, and time.    Psychiatric:         Mood and Affect: Mood normal.         Behavior: Behavior normal.         Vital Signs  ED Triage Vitals   Temperature Pulse Respirations Blood Pressure SpO2   11/15/23 1443 11/15/23 1443 11/15/23 1443 11/15/23 1443 11/15/23 1443   97.9 °F (36.6 °C) 62 18 114/58 99 %      Temp src Heart Rate Source Patient Position - Orthostatic VS BP Location FiO2 (%)   -- 11/15/23 1703 11/15/23 1703 11/15/23 1703 --    Monitor Lying Right arm       Pain Score       11/15/23 1551       10 - Worst Possible Pain           Vitals:    11/15/23 1443 11/15/23 1703 11/15/23 1918 11/15/23 2010   BP: 114/58 116/58 121/58 128/61   Pulse: 62 58 70 81   Patient Position - Orthostatic VS:  Lying Lying Lying         Visual Acuity      ED Medications  Medications   iohexol (OMNIPAQUE) 350 MG/ML injection (MULTI-DOSE) 85 mL (85 mL Intravenous Given 11/15/23 1826)       Diagnostic Studies  Results Reviewed       Procedure Component Value Units Date/Time    Urine Microscopic [[id number]]  (Abnormal) Collected: 11/15/23 1653    Lab Status: Final result Specimen: Urine, Other Updated: 11/15/23 1734     RBC, UA 2-4 /hpf      WBC, UA 4-10 /hpf      Epithelial Cells None Seen /hpf      Bacteria, UA Occasional /hpf      MUCUS THREADS Occasional    Comprehensive metabolic panel [[id number]] Collected: 11/15/23 1658    Lab Status: Final result Specimen: Blood from Arm, Left Updated: 11/15/23 1728     Sodium 139 mmol/L      Potassium 3.6 mmol/L      Chloride 105 mmol/L      CO2 30 mmol/L      ANION GAP 4 mmol/L      BUN 12 mg/dL      Creatinine 0.93 mg/dL      Glucose 86 mg/dL      Calcium 9.9 mg/dL      AST 15 U/L      ALT 12 U/L      Alkaline Phosphatase 83 U/L      Total Protein 7.8 g/dL      Albumin 4.9 g/dL      Total Bilirubin 0.74 mg/dL      eGFR 116 ml/min/1.73sq m     Narrative:      [employer] guidelines for Chronic Kidney Disease (CKD):     Stage 1 with normal or high GFR (GFR > 90 mL/min/1.73 square meters)    Stage 2 Mild CKD (GFR = 60-89 mL/min/1.73 square meters)    Stage 3A Moderate CKD (GFR = 45-59 mL/min/1.73 square meters)    Stage 3B Moderate CKD (GFR = 30-44 mL/min/1.73 square meters)    Stage 4 Severe CKD (GFR = 15-29 mL/min/1.73 square meters)    Stage 5 End Stage CKD (GFR <15 mL/min/1.73 square meters)  Note: GFR calculation is accurate only with a steady state creatinine    Lipase [[id number]]  (Abnormal) Collected: 11/15/23 1658    Lab Status: Final result Specimen: Blood from Arm, Left Updated: 11/15/23 1728     Lipase 8 u/L     CBC and differential [[id number]] Collected: 11/15/23 1658    Lab Status: Final result Specimen: Blood from Arm, Left Updated: 11/15/23 1708     WBC 5.99 Thousand/uL      RBC 5.12 Million/uL      Hemoglobin 16.0 g/dL      Hematocrit 45.7 %      MCV 89 fL      MCH 31.3 pg      MCHC 35.0 g/dL      RDW 11.8 %      MPV 8.9 fL      Platelets 249 Thousands/uL      nRBC 0 /100 WBCs      Neutrophils Relative 51 %      Immat GRANS % 0 %      Lymphocytes Relative 39 %      Monocytes Relative 4 %      Eosinophils Relative 5 %      Basophils Relative 1 %      Neutrophils Absolute 3.05 Thousands/µL      Immature Grans Absolute 0.01 Thousand/uL      Lymphocytes Absolute 2.33 Thousands/µL      Monocytes Absolute 0.24 Thousand/µL      Eosinophils Absolute 0.32 Thousand/µL      Basophils Absolute 0.04 Thousands/µL     Urine Macroscopic, POC [[id number]]  (Abnormal) Collected: 11/15/23 1653    Lab Status: Final result Specimen: Urine Updated: 11/15/23 1654     Color, UA Yellow     Clarity, UA Clear     pH, UA 7.5     Leukocytes, UA Trace     Nitrite, UA Negative     Protein, UA Negative mg/dl      Glucose, UA Negative mg/dl      Ketones, UA Negative mg/dl      Urobilinogen, UA 1.0 E.U./dl      Bilirubin, UA Negative     Occult Blood, UA Negative     Specific Gravity, UA 1.020    Narrative:      CLINITEK RESULT                   CT abdomen pelvis with contrast   Final Result by Stephanie Bernardo MD (11/15 1930)      No acute findings in the abdomen or pelvis. Workstation performed: [id number]                    Procedures  Procedures         ED Course  ED Course as of 11/16/23 0132   Wed Nov 15, 2023   1723 WBC: 5.99   1723 Hemoglobin: 16.0   1723 Platelet Count: 955  CBC wnl.   1730 Sodium: 139   1731 Potassium: 3.6   1731 BUN: 12   1731 Creatinine: 0.93   1731 Glucose, Random: 86   1731 Lipase(!): 8  CMP wnl, lipase non-acute. 1959 CT abdomen pelvis with contrast  IMPRESSION:     No acute findings in the abdomen or pelvis. 4337 Patient informed about workup results, no acute findings, likely constipation as the cause, we will advise Miralax, advised follow up with GI. Medical Decision Making  Patient is 68-year-old male, comes in with complaints of rectal bleeding, for the past 4 days, he states started after he had anal intercourse, patient also reports constipation, last BM few days back, denies fever, abd pain,  vomiting. On exam, conscious, alert, VSS, Abd soft, mild tenderness LLQ, rectal exam as above. D/D: Proctitis,colitis, constipation, we will check labs, get CT A/P. Problems Addressed:  Constipation: acute illness or injury  Rectal bleeding: acute illness or injury    Amount and/or Complexity of Data Reviewed  Labs: ordered. Decision-making details documented in ED Course. Radiology: ordered. Decision-making details documented in ED Course. Risk  Prescription drug management.              Disposition  Final diagnoses:   Rectal bleeding   Constipation     Time reflects when diagnosis was documented in both MDM as applicable and the Disposition within this note       Time User Action Codes Description Comment    11/15/2023  5:36 PM Evone Room Add [K62.5] Rectal bleeding 11/15/2023  8:01 PM Dea Stoll Add [K59.00] Constipation           ED Disposition       ED Disposition   Discharge    Condition   Stable    Date/Time   Wed Nov 15, 2023  8:01 PM    Comment   Juju Jones discharge to home/self care. Follow-up Information       Follow up With Specialties Details Why Contact Info Additional Information    SHIRA Morris Nurse Practitioner Schedule an appointment as soon as possible for a visit   [address] [facility] [address] [place] [address] [facility] Schedule an appointment as soon as possible for a visit   [address]  [facility] [address] [facility],[address] [id number] [address] [facility], [address] [facility] [address], [place], [address] [phone]            Discharge Medication List as of 11/15/2023  8:01 PM        CONTINUE these medications which have CHANGED    Details   polyethylene glycol (MIRALAX) 17 g packet Take 17 g by mouth daily, Starting Wed 11/15/2023, Normal           CONTINUE these medications which have NOT CHANGED    Details   ibuprofen (MOTRIN) 600 mg tablet Take 1 tablet (600 mg total) by mouth every 6 (six) hours as needed for mild pain for up to 10 days, Starting Sat 6/3/2023, Until Tue 8/1/2023 at 2359, Normal      ondansetron (ZOFRAN-ODT) 4 mg disintegrating tablet Take 1 tablet (4 mg total) by mouth every 8 (eight) hours as needed for nausea or vomiting, Starting Wed 11/23/2022, Normal             No discharge procedures on file.     PDMP Review       None            ED Provider  Electronically Signed by             Dea Stoll MD  11/16/23 7586

## 2023-11-19 ENCOUNTER — HOSPITAL ENCOUNTER (EMERGENCY)
Facility: HOSPITAL | Age: 23
Discharge: HOME | End: 2023-11-19
Attending: EMERGENCY MEDICINE | Admitting: EMERGENCY MEDICINE
Payer: COMMERCIAL

## 2023-11-19 VITALS
BODY MASS INDEX: 22.07 KG/M2 | OXYGEN SATURATION: 99 % | RESPIRATION RATE: 16 BRPM | TEMPERATURE: 98.5 F | WEIGHT: 149.47 LBS | HEART RATE: 83 BPM | SYSTOLIC BLOOD PRESSURE: 111 MMHG | DIASTOLIC BLOOD PRESSURE: 57 MMHG

## 2023-11-19 DIAGNOSIS — K62.5 RECTAL BLEEDING: Primary | ICD-10-CM

## 2023-11-19 DIAGNOSIS — K59.00 CONSTIPATION: ICD-10-CM

## 2023-11-19 LAB
ANION GAP SERPL CALCULATED.3IONS-SCNC: 6 MMOL/L
BASOPHILS # BLD AUTO: 0.03 THOUSANDS/ÂΜL (ref 0–0.1)
BASOPHILS NFR BLD AUTO: 1 % (ref 0–1)
BUN SERPL-MCNC: 9 MG/DL (ref 5–25)
CALCIUM SERPL-MCNC: 9 MG/DL (ref 8.4–10.2)
CHLORIDE SERPL-SCNC: 105 MMOL/L (ref 96–108)
CO2 SERPL-SCNC: 28 MMOL/L (ref 21–32)
CREAT SERPL-MCNC: 1.14 MG/DL (ref 0.6–1.3)
EOSINOPHIL # BLD AUTO: 0.42 THOUSAND/ÂΜL (ref 0–0.61)
EOSINOPHIL NFR BLD AUTO: 9 % (ref 0–6)
ERYTHROCYTE [DISTWIDTH] IN BLOOD BY AUTOMATED COUNT: 11.9 % (ref 11.6–15.1)
GFR SERPL CREATININE-BSD FRML MDRD: 91 ML/MIN/1.73SQ M
GLUCOSE SERPL-MCNC: 88 MG/DL (ref 65–140)
HCT VFR BLD AUTO: 40.2 % (ref 36.5–49.3)
HGB BLD-MCNC: 14 G/DL (ref 12–17)
IMM GRANULOCYTES # BLD AUTO: 0.01 THOUSAND/UL (ref 0–0.2)
IMM GRANULOCYTES NFR BLD AUTO: 0 % (ref 0–2)
LYMPHOCYTES # BLD AUTO: 1.97 THOUSANDS/ÂΜL (ref 0.6–4.47)
LYMPHOCYTES NFR BLD AUTO: 40 % (ref 14–44)
MCH RBC QN AUTO: 31 PG (ref 26.8–34.3)
MCHC RBC AUTO-ENTMCNC: 34.8 G/DL (ref 31.4–37.4)
MCV RBC AUTO: 89 FL (ref 82–98)
MONOCYTES # BLD AUTO: 0.22 THOUSAND/ÂΜL (ref 0.17–1.22)
MONOCYTES NFR BLD AUTO: 5 % (ref 4–12)
NEUTROPHILS # BLD AUTO: 2.27 THOUSANDS/ÂΜL (ref 1.85–7.62)
NEUTS SEG NFR BLD AUTO: 45 % (ref 43–75)
NRBC BLD AUTO-RTO: 0 /100 WBCS
PLATELET # BLD AUTO: 322 THOUSANDS/UL (ref 149–390)
PMV BLD AUTO: 9 FL (ref 8.9–12.7)
POTASSIUM SERPL-SCNC: 3.6 MMOL/L (ref 3.5–5.3)
RBC # BLD AUTO: 4.52 MILLION/UL (ref 3.88–5.62)
SODIUM SERPL-SCNC: 139 MMOL/L (ref 135–147)
WBC # BLD AUTO: 4.92 THOUSAND/UL (ref 4.31–10.16)

## 2023-11-19 PROCEDURE — 80048 BASIC METABOLIC PNL TOTAL CA: CPT | Performed by: EMERGENCY MEDICINE

## 2023-11-19 PROCEDURE — 85025 COMPLETE CBC W/AUTO DIFF WBC: CPT | Performed by: EMERGENCY MEDICINE

## 2023-11-19 PROCEDURE — 36415 COLL VENOUS BLD VENIPUNCTURE: CPT | Performed by: EMERGENCY MEDICINE

## 2023-11-19 PROCEDURE — 99284 EMERGENCY DEPT VISIT MOD MDM: CPT | Performed by: EMERGENCY MEDICINE

## 2023-11-19 PROCEDURE — 99285 EMERGENCY DEPT VISIT HI MDM: CPT

## 2023-11-19 RX ORDER — DOCUSATE SODIUM 100 MG/1
100 CAPSULE, LIQUID FILLED ORAL EVERY 12 HOURS
Qty: 60 CAPSULE | Refills: 0 | Status: SHIPPED | OUTPATIENT
Start: 2023-11-19

## 2023-11-19 RX ORDER — SODIUM, POTASSIUM,MAG SULFATES 17.5-3.13G
177 SOLUTION, RECONSTITUTED, ORAL ORAL DAILY
Qty: 354 ML | Refills: 0 | Status: SHIPPED | OUTPATIENT
Start: 2023-11-19 | End: 2023-11-28

## 2023-11-19 RX ORDER — LIDOCAINE 40 MG/G
CREAM TOPICAL AS NEEDED
Qty: 15 G | Refills: 0 | Status: SHIPPED | OUTPATIENT
Start: 2023-11-19

## 2023-11-19 NOTE — Clinical Note
Sunitha Alexander was seen and treated in our emergency department on 11/19/2023. No restrictions            Diagnosis:     Reji  may return to work on return date. He may return on this date: 11/21/2023         If you have any questions or concerns, please don't hesitate to call.       Dhiraj Parkinson MD    ______________________________           _______________          _______________  Hospital Representative                              Date                                Time

## 2023-11-20 NOTE — ED PROVIDER NOTES
History  Chief Complaint   Patient presents with    Rectal Bleeding     Pt reports continued rectal bleeding and pain when he stands too long     27 YO male presents with continuing rectal bleeding. Patient states he has been having issues with constipation, increased straining. States he has rectal discomfort and has had bright red bleeding from the rectum with only small stool production. He notes history of constipation in the past and has had hemorrhoids. Patient was evaluated for this 4 days prior, at that time noting the bleeding had begun after anal intercourse. He states he has been now feeling lightheaded when on his feet for a long time. Patient has no known bleeding disorders and does not take anticoagulation. Pt denies CP/SOB/F/C/N/V/D/C, no dysuria, burning on urination or blood in urine. History provided by:  Patient   used: No        Prior to Admission Medications   Prescriptions Last Dose Informant Patient Reported? Taking?   ibuprofen (MOTRIN) 600 mg tablet   No No   Sig: Take 1 tablet (600 mg total) by mouth every 6 (six) hours as needed for mild pain for up to 10 days   ondansetron (ZOFRAN-ODT) 4 mg disintegrating tablet   No No   Sig: Take 1 tablet (4 mg total) by mouth every 8 (eight) hours as needed for nausea or vomiting   polyethylene glycol (MIRALAX) 17 g packet 11/19/2023  No Yes   Sig: Take 17 g by mouth daily      Facility-Administered Medications Last Administration Doses Remaining   cefTRIAXone (ROCEPHIN) injection 500 mg None recorded 1          Past Medical History:   Diagnosis Date    No known health problems        Past Surgical History:   Procedure Laterality Date    NO PAST SURGERIES         History reviewed. No pertinent family history. I have reviewed and agree with the history as documented.     E-Cigarette/Vaping    E-Cigarette Use Never User      E-Cigarette/Vaping Substances     Social History     Tobacco Use    Smoking status: Every Day     Types: Cigars    Smokeless tobacco: Never   Vaping Use    Vaping Use: Never used   Substance Use Topics    Alcohol use: Not Currently    Drug use: Yes     Types: Marijuana     Comment: once a month       Review of Systems   Constitutional:  Negative for fever. HENT:  Negative for dental problem. Eyes:  Negative for visual disturbance. Respiratory:  Negative for shortness of breath. Cardiovascular:  Negative for chest pain. Gastrointestinal:  Positive for anal bleeding and rectal pain. Negative for abdominal pain, nausea and vomiting. Genitourinary:  Negative for dysuria and frequency. Musculoskeletal:  Negative for neck pain and neck stiffness. Skin:  Negative for rash. Neurological:  Negative for dizziness, weakness and light-headedness. Psychiatric/Behavioral:  Negative for agitation, behavioral problems and confusion. All other systems reviewed and are negative. Physical Exam  Physical Exam  Vitals and nursing note reviewed. Constitutional:       Appearance: He is well-developed. HENT:      Head: Normocephalic and atraumatic. Eyes:      Extraocular Movements: Extraocular movements intact. Cardiovascular:      Rate and Rhythm: Normal rate. Pulmonary:      Effort: Pulmonary effort is normal.   Abdominal:      General: There is no distension. Musculoskeletal:         General: Normal range of motion. Cervical back: Normal range of motion. Skin:     Findings: No rash. Neurological:      Mental Status: He is alert and oriented to person, place, and time.    Psychiatric:         Behavior: Behavior normal.         Vital Signs  ED Triage Vitals [11/19/23 2133]   Temperature Pulse Respirations Blood Pressure SpO2   98.5 °F (36.9 °C) 83 16 111/57 99 %      Temp Source Heart Rate Source Patient Position - Orthostatic VS BP Location FiO2 (%)   Oral Monitor Sitting Right arm --      Pain Score       10 - Worst Possible Pain           Vitals:    11/19/23 2133   BP: 111/57   Pulse: 83 Patient Position - Orthostatic VS: Sitting         Visual Acuity      ED Medications  Medications - No data to display    Diagnostic Studies  Results Reviewed       Procedure Component Value Units Date/Time    Basic metabolic panel [[id number]] Collected: 11/19/23 2209    Lab Status: Final result Specimen: Blood from Arm, Left Updated: 11/19/23 2240     Sodium 139 mmol/L      Potassium 3.6 mmol/L      Chloride 105 mmol/L      CO2 28 mmol/L      ANION GAP 6 mmol/L      BUN 9 mg/dL      Creatinine 1.14 mg/dL      Glucose 88 mg/dL      Calcium 9.0 mg/dL      eGFR 91 ml/min/1.73sq m     Narrative:      [facility] guidelines for Chronic Kidney Disease (CKD):     Stage 1 with normal or high GFR (GFR > 90 mL/min/1.73 square meters)    Stage 2 Mild CKD (GFR = 60-89 mL/min/1.73 square meters)    Stage 3A Moderate CKD (GFR = 45-59 mL/min/1.73 square meters)    Stage 3B Moderate CKD (GFR = 30-44 mL/min/1.73 square meters)    Stage 4 Severe CKD (GFR = 15-29 mL/min/1.73 square meters)    Stage 5 End Stage CKD (GFR <15 mL/min/1.73 square meters)  Note: GFR calculation is accurate only with a steady state creatinine    CBC and differential [[id number]]  (Abnormal) Collected: 11/19/23 2209    Lab Status: Final result Specimen: Blood from Arm, Left Updated: 11/19/23 2217     WBC 4.92 Thousand/uL      RBC 4.52 Million/uL      Hemoglobin 14.0 g/dL      Hematocrit 40.2 %      MCV 89 fL      MCH 31.0 pg      MCHC 34.8 g/dL      RDW 11.9 %      MPV 9.0 fL      Platelets 035 Thousands/uL      nRBC 0 /100 WBCs      Neutrophils Relative 45 %      Immat GRANS % 0 %      Lymphocytes Relative 40 %      Monocytes Relative 5 %      Eosinophils Relative 9 %      Basophils Relative 1 %      Neutrophils Absolute 2.27 Thousands/µL      Immature Grans Absolute 0.01 Thousand/uL      Lymphocytes Absolute 1.97 Thousands/µL      Monocytes Absolute 0.22 Thousand/µL      Eosinophils Absolute 0.42 Thousand/µL      Basophils Absolute 0.03 Thousands/µL                    No orders to display              Procedures  Procedures         ED Course  ED Course as of 11/20/23 0036   Sun Nov 19, 2023 2217 Hemoglobin: 14.0  WNL, 16.0 two days ago. SBIRT 20yo+      Flowsheet Row Most Recent Value   Initial Alcohol Screen: US AUDIT-C     1. How often do you have a drink containing alcohol? 0 Filed at: 11/19/2023 2136   2. How many drinks containing alcohol do you have on a typical day you are drinking? 0 Filed at: 11/19/2023 2136   3a. Male UNDER 65: How often do you have five or more drinks on one occasion? 0 Filed at: 11/19/2023 2136   3b. FEMALE Any Age, or MALE 65+: How often do you have 4 or more drinks on one occassion? 0 Filed at: 11/19/2023 2136   Audit-C Score 0 Filed at: 11/19/2023 2136   [place]: How many times in the past year have you. .. Used an illegal drug or used a prescription medication for non-medical reasons? Weekly Filed at: 11/19/2023 2136   DAST-10: In the past 12 months. ..    1. Have you used drugs other than those required for medical reasons? 0 Filed at: 11/19/2023 2136   2. Do you use more than one drug at a time? 0 Filed at: 11/19/2023 2136   3. Have you had medical problems as a result of your drug use (e.g., memory loss, hepatitis, convulsions, bleeding, etc.)? 0 Filed at: 11/19/2023 2136   4. Have you had "blackouts" or "flashbacks" as a result of drug use? YesNo 0 Filed at: 11/19/2023 2136   5. Do you ever feel bad or guilty about your drug use? 0 Filed at: 11/19/2023 2136   6. Does your spouse (or parent) ever complain about your involvement with drugs? 0 Filed at: 11/19/2023 2136   7. Have you neglected your family because of your use of drugs? 0 Filed at: 11/19/2023 2136   8. Have you engaged in illegal activities in order to obtain drugs? 0 Filed at: 11/19/2023 2136   9. Have you ever experienced withdrawal symptoms (felt sick) when you stopped taking drugs?  0 Filed at: 11/19/2023 2136 10. Are you always able to stop using drugs when you want to? 0 Filed at: 11/19/2023 2136   DAST-10 Score 0 Filed at: 11/19/2023 2136                      Medical Decision Making  1. Rectal bleeding - Patient with continuing bleeding, similar for the last 4 days. Patient was evaluated for this 4 days prior, had reassuring labs and no pathologic process found on CT imaging. Symptoms likely hemorrhoidal bleeding with constipation. Will check CBC to make sure patient is not anemia, metabolic panel for electrolyte abnormalities and dehydration. Will likely refer to colorectal surgery. Problems Addressed:  Constipation: chronic illness or injury  Rectal bleeding: acute illness or injury    Amount and/or Complexity of Data Reviewed  External Data Reviewed: labs, radiology and notes. Labs: ordered. Decision-making details documented in ED Course. Risk  OTC drugs. Prescription drug management. Disposition  Final diagnoses:   Rectal bleeding   Constipation     Time reflects when diagnosis was documented in both MDM as applicable and the Disposition within this note       Time User Action Codes Description Comment    11/19/2023 10:41 PM Oleg MARTE Add [K62.5] Rectal bleeding     11/19/2023 10:41 PM Oleg MARTE Add [K59.00] Constipation           ED Disposition       ED Disposition   Discharge    Condition   Stable    Date/Time   Sun Nov 19, 2023 2241    8 [facility] discharge to home/self care. Follow-up Information       Follow up With Specialties Details Why Contact Info    Vick Richardson MD Colon and Rectal Surgery   [address] [place] [address].   [address] [place] [address]  [phone]              Discharge Medication List as of 11/19/2023 10:45 PM        START taking these medications    Details   docusate sodium (COLACE) 100 mg capsule Take 1 capsule (100 mg total) by mouth every 12 (twelve) hours, Starting Sun 11/19/2023, Normal      Na Sulfate-K Sulfate-Mg Sulf 17.5-3.13-1.6 GM/177ML SOLN Take 177 mL by mouth in the morning for 2 days, Starting Sun 11/19/2023, Until Tue 11/21/2023, Normal           CONTINUE these medications which have NOT CHANGED    Details   polyethylene glycol (MIRALAX) 17 g packet Take 17 g by mouth daily, Starting Wed 11/15/2023, Normal      ibuprofen (MOTRIN) 600 mg tablet Take 1 tablet (600 mg total) by mouth every 6 (six) hours as needed for mild pain for up to 10 days, Starting Sat 6/3/2023, Until Tue 8/1/2023 at 2359, Normal      ondansetron (ZOFRAN-ODT) 4 mg disintegrating tablet Take 1 tablet (4 mg total) by mouth every 8 (eight) hours as needed for nausea or vomiting, Starting Wed 11/23/2022, Normal             No discharge procedures on file.     PDMP Review       None            ED Provider  Electronically Signed by             Indiana Juan MD  11/20/23 4317

## 2023-11-20 NOTE — DISCHARGE INSTRUCTIONS
Start taking the Colace twice daily. Use the Suprep tomorrow and the following day to assist with your constipation. The number for a colorectal surgeion is included in these discharge instructions, call to be seen in the office for further evaluation and management.

## 2023-11-26 ENCOUNTER — HOSPITAL ENCOUNTER (OUTPATIENT)
Facility: HOSPITAL | Age: 23
Setting detail: OUTPATIENT SURGERY
Discharge: HOME | End: 2023-11-28
Attending: EMERGENCY MEDICINE | Admitting: SURGERY
Payer: COMMERCIAL

## 2023-11-26 ENCOUNTER — APPOINTMENT (EMERGENCY)
Dept: CT IMAGING | Facility: HOSPITAL | Age: 23
End: 2023-11-26
Payer: COMMERCIAL

## 2023-11-26 DIAGNOSIS — R10.9 ABDOMINAL PAIN: ICD-10-CM

## 2023-11-26 DIAGNOSIS — K59.00 CONSTIPATION: ICD-10-CM

## 2023-11-26 DIAGNOSIS — K56.1 INTUSSUSCEPTION OF SMALL INTESTINE (HCC): Primary | ICD-10-CM

## 2023-11-26 LAB
ALBUMIN SERPL BCP-MCNC: 4.7 G/DL (ref 3.5–5)
ALP SERPL-CCNC: 74 U/L (ref 34–104)
ALT SERPL W P-5'-P-CCNC: 19 U/L (ref 7–52)
ANION GAP SERPL CALCULATED.3IONS-SCNC: 7 MMOL/L
AST SERPL W P-5'-P-CCNC: 16 U/L (ref 13–39)
BACTERIA UR QL AUTO: ABNORMAL /HPF
BASOPHILS # BLD AUTO: 0.05 THOUSANDS/ÂΜL (ref 0–0.1)
BASOPHILS NFR BLD AUTO: 1 % (ref 0–1)
BILIRUB SERPL-MCNC: 0.35 MG/DL (ref 0.2–1)
BILIRUB UR QL STRIP: NEGATIVE
BUN SERPL-MCNC: 10 MG/DL (ref 5–25)
CALCIUM SERPL-MCNC: 9.5 MG/DL (ref 8.4–10.2)
CHLORIDE SERPL-SCNC: 104 MMOL/L (ref 96–108)
CLARITY UR: CLEAR
CO2 SERPL-SCNC: 27 MMOL/L (ref 21–32)
COLOR UR: YELLOW
CREAT SERPL-MCNC: 0.99 MG/DL (ref 0.6–1.3)
EOSINOPHIL # BLD AUTO: 0.51 THOUSAND/ÂΜL (ref 0–0.61)
EOSINOPHIL NFR BLD AUTO: 7 % (ref 0–6)
ERYTHROCYTE [DISTWIDTH] IN BLOOD BY AUTOMATED COUNT: 11.9 % (ref 11.6–15.1)
GFR SERPL CREATININE-BSD FRML MDRD: 108 ML/MIN/1.73SQ M
GLUCOSE SERPL-MCNC: 110 MG/DL (ref 65–140)
GLUCOSE UR STRIP-MCNC: NEGATIVE MG/DL
HCT VFR BLD AUTO: 43.3 % (ref 36.5–49.3)
HGB BLD-MCNC: 15.2 G/DL (ref 12–17)
HGB UR QL STRIP.AUTO: ABNORMAL
IMM GRANULOCYTES # BLD AUTO: 0.02 THOUSAND/UL (ref 0–0.2)
IMM GRANULOCYTES NFR BLD AUTO: 0 % (ref 0–2)
KETONES UR STRIP-MCNC: NEGATIVE MG/DL
LEUKOCYTE ESTERASE UR QL STRIP: ABNORMAL
LIPASE SERPL-CCNC: 13 U/L (ref 11–82)
LYMPHOCYTES # BLD AUTO: 3.25 THOUSANDS/ÂΜL (ref 0.6–4.47)
LYMPHOCYTES NFR BLD AUTO: 41 % (ref 14–44)
MCH RBC QN AUTO: 31 PG (ref 26.8–34.3)
MCHC RBC AUTO-ENTMCNC: 35.1 G/DL (ref 31.4–37.4)
MCV RBC AUTO: 88 FL (ref 82–98)
MONOCYTES # BLD AUTO: 0.28 THOUSAND/ÂΜL (ref 0.17–1.22)
MONOCYTES NFR BLD AUTO: 4 % (ref 4–12)
NEUTROPHILS # BLD AUTO: 3.79 THOUSANDS/ÂΜL (ref 1.85–7.62)
NEUTS SEG NFR BLD AUTO: 47 % (ref 43–75)
NITRITE UR QL STRIP: NEGATIVE
NON-SQ EPI CELLS URNS QL MICRO: ABNORMAL /HPF
NRBC BLD AUTO-RTO: 0 /100 WBCS
PH UR STRIP.AUTO: 6 [PH] (ref 4.5–8)
PLATELET # BLD AUTO: 351 THOUSANDS/UL (ref 149–390)
PMV BLD AUTO: 9.1 FL (ref 8.9–12.7)
POTASSIUM SERPL-SCNC: 3.9 MMOL/L (ref 3.5–5.3)
PROT SERPL-MCNC: 7.5 G/DL (ref 6.4–8.4)
PROT UR STRIP-MCNC: NEGATIVE MG/DL
RBC # BLD AUTO: 4.9 MILLION/UL (ref 3.88–5.62)
RBC #/AREA URNS AUTO: ABNORMAL /HPF
SODIUM SERPL-SCNC: 138 MMOL/L (ref 135–147)
SP GR UR STRIP.AUTO: 1.02 (ref 1–1.03)
UROBILINOGEN UR QL STRIP.AUTO: 0.2 E.U./DL
WBC # BLD AUTO: 7.9 THOUSAND/UL (ref 4.31–10.16)
WBC #/AREA URNS AUTO: ABNORMAL /HPF

## 2023-11-26 PROCEDURE — 74177 CT ABD & PELVIS W/CONTRAST: CPT

## 2023-11-26 PROCEDURE — 99284 EMERGENCY DEPT VISIT MOD MDM: CPT

## 2023-11-26 PROCEDURE — 96374 THER/PROPH/DIAG INJ IV PUSH: CPT

## 2023-11-26 PROCEDURE — 80053 COMPREHEN METABOLIC PANEL: CPT

## 2023-11-26 PROCEDURE — G1004 CDSM NDSC: HCPCS

## 2023-11-26 PROCEDURE — 36415 COLL VENOUS BLD VENIPUNCTURE: CPT

## 2023-11-26 PROCEDURE — 81001 URINALYSIS AUTO W/SCOPE: CPT

## 2023-11-26 PROCEDURE — 85025 COMPLETE CBC W/AUTO DIFF WBC: CPT

## 2023-11-26 PROCEDURE — 99285 EMERGENCY DEPT VISIT HI MDM: CPT

## 2023-11-26 PROCEDURE — 83690 ASSAY OF LIPASE: CPT

## 2023-11-26 PROCEDURE — 96361 HYDRATE IV INFUSION ADD-ON: CPT

## 2023-11-26 RX ORDER — KETOROLAC TROMETHAMINE 30 MG/ML
15 INJECTION, SOLUTION INTRAMUSCULAR; INTRAVENOUS ONCE
Status: COMPLETED | OUTPATIENT
Start: 2023-11-26 | End: 2023-11-26

## 2023-11-26 RX ADMIN — KETOROLAC TROMETHAMINE 15 MG: 30 INJECTION, SOLUTION INTRAMUSCULAR; INTRAVENOUS at 22:38

## 2023-11-26 RX ADMIN — SODIUM CHLORIDE 1000 ML: 0.9 INJECTION, SOLUTION INTRAVENOUS at 22:38

## 2023-11-26 NOTE — LETTER
[facility]  [address] [facility]  Dept: [phone]    November 28, 2023     Patient: Mickey Nelson   YOB: 2000   Date of Visit: 11/26/2023       To Whom it May Concern:    Makenna Cedeno is under my professional care. He was seen in the hospital from 11/26/2023 to 11/28/23. He may return to work on 12/4/23 or sooner if he feels able with the following limitations: no heavy lifting, nothing greater than 15 lbs for two weeks post-operatively. If you have any questions or concerns, please don't hesitate to call.          Sincerely,        Dr. Mariam Fierro  Surgical Resident to Dr. Lynsey Capellan   [facility], [address] [place] [address]  [phone]

## 2023-11-27 ENCOUNTER — ANESTHESIA EVENT (OUTPATIENT)
Dept: PERIOP | Facility: HOSPITAL | Age: 23
End: 2023-11-27
Payer: COMMERCIAL

## 2023-11-27 ENCOUNTER — ANESTHESIA (OUTPATIENT)
Dept: PERIOP | Facility: HOSPITAL | Age: 23
End: 2023-11-27
Payer: COMMERCIAL

## 2023-11-27 ENCOUNTER — PATIENT OUTREACH (OUTPATIENT)
Dept: FAMILY MEDICINE CLINIC | Facility: CLINIC | Age: 23
End: 2023-11-27

## 2023-11-27 ENCOUNTER — APPOINTMENT (EMERGENCY)
Dept: CT IMAGING | Facility: HOSPITAL | Age: 23
End: 2023-11-27
Payer: COMMERCIAL

## 2023-11-27 DIAGNOSIS — Z71.89 COMPLEX CARE COORDINATION: Primary | ICD-10-CM

## 2023-11-27 PROBLEM — K56.1 INTUSSUSCEPTION (HCC): Status: ACTIVE | Noted: 2023-11-27

## 2023-11-27 LAB
ABO GROUP BLD: NORMAL
ABO GROUP BLD: NORMAL
ANION GAP SERPL CALCULATED.3IONS-SCNC: 3 MMOL/L
BASOPHILS # BLD MANUAL: 0 THOUSAND/UL (ref 0–0.1)
BASOPHILS NFR MAR MANUAL: 0 % (ref 0–1)
BLD GP AB SCN SERPL QL: NEGATIVE
BUN SERPL-MCNC: 8 MG/DL (ref 5–25)
CALCIUM SERPL-MCNC: 8.8 MG/DL (ref 8.4–10.2)
CHLORIDE SERPL-SCNC: 106 MMOL/L (ref 96–108)
CO2 SERPL-SCNC: 29 MMOL/L (ref 21–32)
CREAT SERPL-MCNC: 0.91 MG/DL (ref 0.6–1.3)
EOSINOPHIL # BLD MANUAL: 0.66 THOUSAND/UL (ref 0–0.4)
EOSINOPHIL NFR BLD MANUAL: 12 % (ref 0–6)
ERYTHROCYTE [DISTWIDTH] IN BLOOD BY AUTOMATED COUNT: 12 % (ref 11.6–15.1)
GFR SERPL CREATININE-BSD FRML MDRD: 120 ML/MIN/1.73SQ M
GLUCOSE SERPL-MCNC: 90 MG/DL (ref 65–140)
HCT VFR BLD AUTO: 39.6 % (ref 36.5–49.3)
HGB BLD-MCNC: 13.9 G/DL (ref 12–17)
LACTATE SERPL-SCNC: 0.7 MMOL/L (ref 0.5–2)
LYMPHOCYTES # BLD AUTO: 2.68 THOUSAND/UL (ref 0.6–4.47)
LYMPHOCYTES # BLD AUTO: 43 % (ref 14–44)
MCH RBC QN AUTO: 31.4 PG (ref 26.8–34.3)
MCHC RBC AUTO-ENTMCNC: 35.1 G/DL (ref 31.4–37.4)
MCV RBC AUTO: 89 FL (ref 82–98)
MONOCYTES # BLD AUTO: 0.11 THOUSAND/UL (ref 0–1.22)
MONOCYTES NFR BLD: 2 % (ref 4–12)
NEUTROPHILS # BLD MANUAL: 2.02 THOUSAND/UL (ref 1.85–7.62)
NEUTS SEG NFR BLD AUTO: 37 % (ref 43–75)
PLATELET # BLD AUTO: 292 THOUSANDS/UL (ref 149–390)
PLATELET BLD QL SMEAR: ADEQUATE
PMV BLD AUTO: 8.8 FL (ref 8.9–12.7)
POTASSIUM SERPL-SCNC: 3.5 MMOL/L (ref 3.5–5.3)
RBC # BLD AUTO: 4.43 MILLION/UL (ref 3.88–5.62)
RBC MORPH BLD: NORMAL
RH BLD: POSITIVE
RH BLD: POSITIVE
SODIUM SERPL-SCNC: 138 MMOL/L (ref 135–147)
SPECIMEN EXPIRATION DATE: NORMAL
VARIANT LYMPHS # BLD AUTO: 6 %
WBC # BLD AUTO: 5.47 THOUSAND/UL (ref 4.31–10.16)

## 2023-11-27 PROCEDURE — 85027 COMPLETE CBC AUTOMATED: CPT | Performed by: STUDENT IN AN ORGANIZED HEALTH CARE EDUCATION/TRAINING PROGRAM

## 2023-11-27 PROCEDURE — 36415 COLL VENOUS BLD VENIPUNCTURE: CPT | Performed by: STUDENT IN AN ORGANIZED HEALTH CARE EDUCATION/TRAINING PROGRAM

## 2023-11-27 PROCEDURE — 74176 CT ABD & PELVIS W/O CONTRAST: CPT

## 2023-11-27 PROCEDURE — 49320 DIAG LAPARO SEPARATE PROC: CPT | Performed by: SURGERY

## 2023-11-27 PROCEDURE — 99222 1ST HOSP IP/OBS MODERATE 55: CPT | Performed by: PHYSICIAN ASSISTANT

## 2023-11-27 PROCEDURE — 83605 ASSAY OF LACTIC ACID: CPT | Performed by: STUDENT IN AN ORGANIZED HEALTH CARE EDUCATION/TRAINING PROGRAM

## 2023-11-27 PROCEDURE — 80048 BASIC METABOLIC PNL TOTAL CA: CPT | Performed by: STUDENT IN AN ORGANIZED HEALTH CARE EDUCATION/TRAINING PROGRAM

## 2023-11-27 PROCEDURE — G1004 CDSM NDSC: HCPCS

## 2023-11-27 PROCEDURE — 85007 BL SMEAR W/DIFF WBC COUNT: CPT | Performed by: STUDENT IN AN ORGANIZED HEALTH CARE EDUCATION/TRAINING PROGRAM

## 2023-11-27 PROCEDURE — 86900 BLOOD TYPING SEROLOGIC ABO: CPT | Performed by: STUDENT IN AN ORGANIZED HEALTH CARE EDUCATION/TRAINING PROGRAM

## 2023-11-27 PROCEDURE — 86901 BLOOD TYPING SEROLOGIC RH(D): CPT | Performed by: STUDENT IN AN ORGANIZED HEALTH CARE EDUCATION/TRAINING PROGRAM

## 2023-11-27 PROCEDURE — 86850 RBC ANTIBODY SCREEN: CPT | Performed by: STUDENT IN AN ORGANIZED HEALTH CARE EDUCATION/TRAINING PROGRAM

## 2023-11-27 RX ORDER — PROPOFOL 10 MG/ML
INJECTION, EMULSION INTRAVENOUS AS NEEDED
Status: DISCONTINUED | OUTPATIENT
Start: 2023-11-27 | End: 2023-11-27

## 2023-11-27 RX ORDER — ONDANSETRON 2 MG/ML
INJECTION INTRAMUSCULAR; INTRAVENOUS AS NEEDED
Status: DISCONTINUED | OUTPATIENT
Start: 2023-11-27 | End: 2023-11-27

## 2023-11-27 RX ORDER — ACETAMINOPHEN 325 MG/1
650 TABLET ORAL EVERY 6 HOURS SCHEDULED
Status: DISCONTINUED | OUTPATIENT
Start: 2023-11-27 | End: 2023-11-28 | Stop reason: HOSPADM

## 2023-11-27 RX ORDER — OXYCODONE HYDROCHLORIDE 10 MG/1
10 TABLET ORAL EVERY 4 HOURS PRN
Status: DISCONTINUED | OUTPATIENT
Start: 2023-11-27 | End: 2023-11-28

## 2023-11-27 RX ORDER — FENTANYL CITRATE 50 UG/ML
INJECTION, SOLUTION INTRAMUSCULAR; INTRAVENOUS AS NEEDED
Status: DISCONTINUED | OUTPATIENT
Start: 2023-11-27 | End: 2023-11-27

## 2023-11-27 RX ORDER — METRONIDAZOLE 500 MG/100ML
500 INJECTION, SOLUTION INTRAVENOUS ONCE
Status: COMPLETED | OUTPATIENT
Start: 2023-11-27 | End: 2023-11-27

## 2023-11-27 RX ORDER — DEXAMETHASONE SODIUM PHOSPHATE 4 MG/ML
INJECTION, SOLUTION INTRA-ARTICULAR; INTRALESIONAL; INTRAMUSCULAR; INTRAVENOUS; SOFT TISSUE AS NEEDED
Status: DISCONTINUED | OUTPATIENT
Start: 2023-11-27 | End: 2023-11-27

## 2023-11-27 RX ORDER — CEFAZOLIN SODIUM 2 G/50ML
2000 SOLUTION INTRAVENOUS ONCE
Status: COMPLETED | OUTPATIENT
Start: 2023-11-27 | End: 2023-11-27

## 2023-11-27 RX ORDER — ROCURONIUM BROMIDE 10 MG/ML
INJECTION, SOLUTION INTRAVENOUS AS NEEDED
Status: DISCONTINUED | OUTPATIENT
Start: 2023-11-27 | End: 2023-11-27

## 2023-11-27 RX ORDER — ONDANSETRON 2 MG/ML
4 INJECTION INTRAMUSCULAR; INTRAVENOUS ONCE AS NEEDED
Status: DISCONTINUED | OUTPATIENT
Start: 2023-11-27 | End: 2023-11-27

## 2023-11-27 RX ORDER — KETOROLAC TROMETHAMINE 30 MG/ML
15 INJECTION, SOLUTION INTRAMUSCULAR; INTRAVENOUS EVERY 6 HOURS SCHEDULED
Status: DISCONTINUED | OUTPATIENT
Start: 2023-11-27 | End: 2023-11-28 | Stop reason: HOSPADM

## 2023-11-27 RX ORDER — HYDROMORPHONE HCL/PF 1 MG/ML
0.5 SYRINGE (ML) INJECTION
Status: DISCONTINUED | OUTPATIENT
Start: 2023-11-27 | End: 2023-11-27

## 2023-11-27 RX ORDER — SODIUM CHLORIDE, SODIUM LACTATE, POTASSIUM CHLORIDE, CALCIUM CHLORIDE 600; 310; 30; 20 MG/100ML; MG/100ML; MG/100ML; MG/100ML
INJECTION, SOLUTION INTRAVENOUS CONTINUOUS PRN
Status: DISCONTINUED | OUTPATIENT
Start: 2023-11-27 | End: 2023-11-27

## 2023-11-27 RX ORDER — CEFAZOLIN SODIUM 1 G/50ML
1000 SOLUTION INTRAVENOUS ONCE
Status: CANCELLED | OUTPATIENT
Start: 2023-11-27 | End: 2023-11-27

## 2023-11-27 RX ORDER — ONDANSETRON 2 MG/ML
4 INJECTION INTRAMUSCULAR; INTRAVENOUS EVERY 4 HOURS PRN
Status: DISCONTINUED | OUTPATIENT
Start: 2023-11-27 | End: 2023-11-28 | Stop reason: HOSPADM

## 2023-11-27 RX ORDER — OXYCODONE HYDROCHLORIDE 5 MG/1
5 TABLET ORAL EVERY 4 HOURS PRN
Status: DISCONTINUED | OUTPATIENT
Start: 2023-11-27 | End: 2023-11-28 | Stop reason: HOSPADM

## 2023-11-27 RX ORDER — MAGNESIUM HYDROXIDE 1200 MG/15ML
LIQUID ORAL AS NEEDED
Status: DISCONTINUED | OUTPATIENT
Start: 2023-11-27 | End: 2023-11-27 | Stop reason: HOSPADM

## 2023-11-27 RX ORDER — ACETAMINOPHEN 325 MG/1
650 TABLET ORAL EVERY 6 HOURS PRN
Status: DISCONTINUED | OUTPATIENT
Start: 2023-11-27 | End: 2023-11-27

## 2023-11-27 RX ORDER — MIDAZOLAM HYDROCHLORIDE 2 MG/2ML
INJECTION, SOLUTION INTRAMUSCULAR; INTRAVENOUS AS NEEDED
Status: DISCONTINUED | OUTPATIENT
Start: 2023-11-27 | End: 2023-11-27

## 2023-11-27 RX ORDER — HEPARIN SODIUM 5000 [USP'U]/ML
5000 INJECTION, SOLUTION INTRAVENOUS; SUBCUTANEOUS EVERY 8 HOURS SCHEDULED
Status: DISCONTINUED | OUTPATIENT
Start: 2023-11-27 | End: 2023-11-28 | Stop reason: HOSPADM

## 2023-11-27 RX ORDER — LORAZEPAM 2 MG/ML
1 INJECTION INTRAMUSCULAR ONCE
Status: COMPLETED | OUTPATIENT
Start: 2023-11-27 | End: 2023-11-27

## 2023-11-27 RX ORDER — HYDROMORPHONE HCL/PF 1 MG/ML
0.5 SYRINGE (ML) INJECTION EVERY 4 HOURS PRN
Status: DISCONTINUED | OUTPATIENT
Start: 2023-11-27 | End: 2023-11-28 | Stop reason: HOSPADM

## 2023-11-27 RX ORDER — LIDOCAINE HYDROCHLORIDE 10 MG/ML
INJECTION, SOLUTION EPIDURAL; INFILTRATION; INTRACAUDAL; PERINEURAL AS NEEDED
Status: DISCONTINUED | OUTPATIENT
Start: 2023-11-27 | End: 2023-11-27

## 2023-11-27 RX ORDER — ACETAMINOPHEN 325 MG/1
650 TABLET ORAL ONCE
Status: COMPLETED | OUTPATIENT
Start: 2023-11-27 | End: 2023-11-27

## 2023-11-27 RX ORDER — SODIUM CHLORIDE 9 MG/ML
75 INJECTION, SOLUTION INTRAVENOUS CONTINUOUS
Status: DISCONTINUED | OUTPATIENT
Start: 2023-11-27 | End: 2023-11-28

## 2023-11-27 RX ORDER — DICYCLOMINE HCL 20 MG
20 TABLET ORAL ONCE
Status: COMPLETED | OUTPATIENT
Start: 2023-11-27 | End: 2023-11-27

## 2023-11-27 RX ORDER — ONDANSETRON 2 MG/ML
4 INJECTION INTRAMUSCULAR; INTRAVENOUS ONCE AS NEEDED
Status: DISCONTINUED | OUTPATIENT
Start: 2023-11-27 | End: 2023-11-27 | Stop reason: HOSPADM

## 2023-11-27 RX ORDER — HYDROMORPHONE HCL/PF 1 MG/ML
0.5 SYRINGE (ML) INJECTION
Status: DISCONTINUED | OUTPATIENT
Start: 2023-11-27 | End: 2023-11-27 | Stop reason: HOSPADM

## 2023-11-27 RX ORDER — BUPIVACAINE HYDROCHLORIDE 5 MG/ML
INJECTION, SOLUTION EPIDURAL; INTRACAUDAL AS NEEDED
Status: DISCONTINUED | OUTPATIENT
Start: 2023-11-27 | End: 2023-11-27 | Stop reason: HOSPADM

## 2023-11-27 RX ORDER — FENTANYL CITRATE/PF 50 MCG/ML
25 SYRINGE (ML) INJECTION
Status: DISCONTINUED | OUTPATIENT
Start: 2023-11-27 | End: 2023-11-27 | Stop reason: HOSPADM

## 2023-11-27 RX ORDER — CEFAZOLIN SODIUM 1 G/50ML
SOLUTION INTRAVENOUS AS NEEDED
Status: DISCONTINUED | OUTPATIENT
Start: 2023-11-27 | End: 2023-11-27

## 2023-11-27 RX ORDER — SUCCINYLCHOLINE/SOD CL,ISO/PF 100 MG/5ML
SYRINGE (ML) INTRAVENOUS AS NEEDED
Status: DISCONTINUED | OUTPATIENT
Start: 2023-11-27 | End: 2023-11-27

## 2023-11-27 RX ADMIN — FENTANYL CITRATE 25 MCG: 50 INJECTION INTRAMUSCULAR; INTRAVENOUS at 16:50

## 2023-11-27 RX ADMIN — SODIUM CHLORIDE 125 ML/HR: 0.9 INJECTION, SOLUTION INTRAVENOUS at 10:42

## 2023-11-27 RX ADMIN — METRONIDAZOLE 500 MG: 500 INJECTION, SOLUTION INTRAVENOUS at 09:55

## 2023-11-27 RX ADMIN — OXYCODONE HYDROCHLORIDE 10 MG: 10 TABLET ORAL at 20:17

## 2023-11-27 RX ADMIN — PROPOFOL 200 MG: 10 INJECTION, EMULSION INTRAVENOUS at 15:31

## 2023-11-27 RX ADMIN — CEFAZOLIN SODIUM 1000 MG: 1 SOLUTION INTRAVENOUS at 15:36

## 2023-11-27 RX ADMIN — FENTANYL CITRATE 50 MCG: 50 INJECTION INTRAMUSCULAR; INTRAVENOUS at 15:31

## 2023-11-27 RX ADMIN — ROCURONIUM BROMIDE 30 MG: 10 INJECTION, SOLUTION INTRAVENOUS at 15:37

## 2023-11-27 RX ADMIN — ACETAMINOPHEN 325MG 650 MG: 325 TABLET ORAL at 03:54

## 2023-11-27 RX ADMIN — SODIUM CHLORIDE, SODIUM LACTATE, POTASSIUM CHLORIDE, AND CALCIUM CHLORIDE: .6; .31; .03; .02 INJECTION, SOLUTION INTRAVENOUS at 15:37

## 2023-11-27 RX ADMIN — LORAZEPAM 1 MG: 2 INJECTION INTRAMUSCULAR; INTRAVENOUS at 17:16

## 2023-11-27 RX ADMIN — Medication 100 MG: at 15:32

## 2023-11-27 RX ADMIN — KETOROLAC TROMETHAMINE 15 MG: 30 INJECTION, SOLUTION INTRAMUSCULAR; INTRAVENOUS at 18:05

## 2023-11-27 RX ADMIN — CEFAZOLIN SODIUM 2000 MG: 2 SOLUTION INTRAVENOUS at 09:27

## 2023-11-27 RX ADMIN — ONDANSETRON 4 MG: 2 INJECTION INTRAMUSCULAR; INTRAVENOUS at 21:58

## 2023-11-27 RX ADMIN — DEXAMETHASONE SODIUM PHOSPHATE 10 MG: 4 INJECTION INTRA-ARTICULAR; INTRALESIONAL; INTRAMUSCULAR; INTRAVENOUS; SOFT TISSUE at 15:56

## 2023-11-27 RX ADMIN — ACETAMINOPHEN 325MG 650 MG: 325 TABLET ORAL at 18:06

## 2023-11-27 RX ADMIN — SUGAMMADEX 200 MG: 100 INJECTION, SOLUTION INTRAVENOUS at 16:22

## 2023-11-27 RX ADMIN — DICYCLOMINE HYDROCHLORIDE 20 MG: 20 TABLET ORAL at 03:54

## 2023-11-27 RX ADMIN — MIDAZOLAM 2 MG: 1 INJECTION INTRAMUSCULAR; INTRAVENOUS at 15:28

## 2023-11-27 RX ADMIN — FENTANYL CITRATE 25 MCG: 50 INJECTION INTRAMUSCULAR; INTRAVENOUS at 16:45

## 2023-11-27 RX ADMIN — ONDANSETRON 4 MG: 2 INJECTION INTRAMUSCULAR; INTRAVENOUS at 16:17

## 2023-11-27 RX ADMIN — HYDROMORPHONE HYDROCHLORIDE 0.5 MG: 1 INJECTION, SOLUTION INTRAMUSCULAR; INTRAVENOUS; SUBCUTANEOUS at 17:10

## 2023-11-27 RX ADMIN — LIDOCAINE HYDROCHLORIDE 100 MG: 10 INJECTION, SOLUTION EPIDURAL; INFILTRATION; INTRACAUDAL; PERINEURAL at 15:31

## 2023-11-27 RX ADMIN — HYDROMORPHONE HYDROCHLORIDE 0.5 MG: 1 INJECTION, SOLUTION INTRAMUSCULAR; INTRAVENOUS; SUBCUTANEOUS at 16:54

## 2023-11-27 RX ADMIN — IOHEXOL 100 ML: 350 INJECTION, SOLUTION INTRAVENOUS at 00:00

## 2023-11-27 RX ADMIN — METRONIDAZOLE: 500 INJECTION, SOLUTION INTRAVENOUS at 15:59
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
## 2023-11-27 NOTE — PROGRESS NOTES
Email received for referral to [facility]. Patient will be reviewed for a care plan. Patient is referred to complex care management as a [employer]. In basket sent to OP RN CM regarding the same.

## 2023-11-27 NOTE — ANESTHESIA PREPROCEDURE EVALUATION
Procedure:  LAPAROSCOPY DIAGNOSTIC (Abdomen)    Relevant Problems   NEURO/PSYCH   (+) Depression        Physical Exam    Airway    Mallampati score: II         Dental       Cardiovascular  Rhythm: regular    Pulmonary   Breath sounds clear to auscultation    Other Findings        Anesthesia Plan  ASA Score- 2     Anesthesia Type- general with ASA Monitors. Additional Monitors:     Airway Plan:            Plan Factors-Exercise tolerance (METS): >4 METS. Chart reviewed. Existing labs reviewed. Patient summary reviewed. Patient is a current smoker. Patient not instructed to abstain from smoking on day of procedure. Patient did not smoke on day of surgery. Obstructive sleep apnea risk education given perioperatively. Induction- intravenous. Postoperative Plan-     Informed Consent- Anesthetic plan and risks discussed with patient.
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
## 2023-11-27 NOTE — OP NOTE
OPERATIVE REPORT  PATIENT NAME: Barb Hernández    :  2000  MRN: [id number]  Pt Location: [facility] OR ROOM 07    SURGERY DATE: 2023    Surgeon(s) and Role:     * Kayley Dillard MD - Primary     * Kristan Cabot, DO - Assisting    Preop Diagnosis:  Intussusception of small intestine ([address]) [K56.1]    Post-Op Diagnosis Codes:     * Intussusception of small intestine ([address]) [K56.1]    Procedure(s):  LAPAROSCOPY DIAGNOSTIC    Specimen(s):  * No specimens in log *    Estimated Blood Loss:   Minimal    Drains:  * No LDAs found *    Anesthesia Type:   General    Operative Indications:  Intussusception of small intestine ([address]) [K56.1]      Operative Findings:  Approximately 15 cm segment of small bowel small bowel intussusception present in the mid jejunum. Lead point was a mobile intra-luminal soft bezoar. This was able to be milked distally. No fixed mass was present. No lymphadenopathy, or Meckles diverticulum were found. No resection was performed. Complications:   None    Procedure and Technique:  The patient was brought to the operating room and identified verbally and via wristband. He was transferred to the operating room table and positioned supine. General endotracheal anesthesia was induced successfully. The patient's abdomen was prepped and draped in the usual sterile fashion. Pre-operative antibiotics were administered. A time out was performed confirming the patient, procedure, and site. All parties were in agreement. Attention was turned to the abdomen where an infraumbilical curvilinear incision was made with an 11 blade scalpel. The Veress needle was inserted and CO2 insufflation was initiated. The patient tolerated this well. The laparoscope was inserted and the surrounding intra-abdominal contents were examined for injury upon entry, none were appreciated. The patient was positioned appropriately.   A left upper quadrant 5 mm trocar followed by a left lower quadrant 5 mm trocar were both placed under laparoscopic visualization. 2 bowel graspers were inserted and the small bowel was run from the ligament of Treitz to the terminal ileum. An area of small bowel to small bowel intussusception was present in the mid jejunum. This was easily reduced. No fixed mass was appreciated. No lymphadenopathy was present. A mobile, intraluminal, soft likely bezoar was present within the portion of the small bowel intussusceptum. This was milked distally. The proximal small bowel was minimally dilated and there was no evidence of obstruction. No resection was performed. The remainder of the small bowel was run to the terminal ileum, and there were no remarkable findings. The laparoscope was removed and CO2 insufflation was ceased. The trocars were removed. The skin of each incision was closed with 4 Monocryl suture and covered with Exofin skin glue. The patient was then allowed to awaken, extubated, and transferred to the PACU having tolerated the procedure well. All instrument, needle, and sponge counts were correct at the end of the case. Radiofrequency detection was negative.     Dr. Doreen Lombard was present for the entire procedure      Patient Disposition:  PACU     This procedure was not performed to treat colon cancer through resection      SIGNATURE: Sushila Acuña,   DATE: November 27, 2023  TIME: 4:19 PM

## 2023-11-27 NOTE — ANESTHESIA POSTPROCEDURE EVALUATION
Post-Op Assessment Note    CV Status:  Stable  Pain Score: 1    Pain management: adequate       Mental Status:  Alert and awake   Hydration Status:  Euvolemic   PONV Controlled:  Controlled   Airway Patency:  Patent     Post Op Vitals Reviewed: Yes    No anethesia notable event occurred.     Staff: Anesthesiologist               /77 (11/27/23 1700)    Temp      Pulse 88 (11/27/23 1700)   Resp 22 (11/27/23 1700)    SpO2 98 % (11/27/23 1700)

## 2023-11-27 NOTE — ED NOTES
6 IV attempts done by multiple nurses on pt without successful placement     Josiah Mcdaniel RN  11/26/23 0387

## 2023-11-27 NOTE — ED PROVIDER NOTES
History  Chief Complaint   Patient presents with    Abdominal Pain     Patient reports bilateral lower quadrant, sharp, constant abdominal pain x2 days. Denies n/v/d. Reports dizziness when "I stand up too quickly". Patient is a 21-year-old male without significant past medical history presenting for evaluation of abdominal pain for 3 days. Pain is in the bilateral lower abdomen and he describes it as sharp and radiating into the low back. He reports constipation which she is struggled with in the past.  Occasional lightheadedness when he stands up too fast. No vertigo. No nausea, vomiting, diarrhea, dysuria, hematuria. Otherwise denies fevers, URI symptoms, chest pain, shortness of breath. Took Motrin 2 days ago without relief. No other medications prior to arrival.  No prior abdominal surgeries. Abdominal Pain  Associated symptoms: constipation    Associated symptoms: no chest pain, no chills, no cough, no diarrhea, no dysuria, no fever, no hematuria, no nausea, no shortness of breath, no sore throat and no vomiting        Prior to Admission Medications   Prescriptions Last Dose Informant Patient Reported? Taking?    Na Sulfate-K Sulfate-Mg Sulf 17.5-3.13-1.6 GM/177ML SOLN   No Yes   Sig: Take 177 mL by mouth in the morning for 2 days   docusate sodium (COLACE) 100 mg capsule 11/25/2023  No Yes   Sig: Take 1 capsule (100 mg total) by mouth every 12 (twelve) hours   ibuprofen (MOTRIN) 600 mg tablet   No No   Sig: Take 1 tablet (600 mg total) by mouth every 6 (six) hours as needed for mild pain for up to 10 days   lidocaine (LMX) 4 % cream 11/25/2023  No Yes   Sig: Apply topically as needed for mild pain   ondansetron (ZOFRAN-ODT) 4 mg disintegrating tablet Not Taking  No No   Sig: Take 1 tablet (4 mg total) by mouth every 8 (eight) hours as needed for nausea or vomiting   Patient not taking: Reported on 11/26/2023   polyethylene glycol (MIRALAX) 17 g packet   No Yes   Sig: Take 17 g by mouth daily Facility-Administered Medications Last Administration Doses Remaining   cefTRIAXone (ROCEPHIN) injection 500 mg None recorded 1          Past Medical History:   Diagnosis Date    No known health problems        Past Surgical History:   Procedure Laterality Date    NO PAST SURGERIES         History reviewed. No pertinent family history. I have reviewed and agree with the history as documented. E-Cigarette/Vaping    E-Cigarette Use Never User      E-Cigarette/Vaping Substances    Nicotine No     THC No     CBD No     Flavoring No     Other No     Unknown No      Social History     Tobacco Use    Smoking status: Every Day     Types: Cigars    Smokeless tobacco: Never   Vaping Use    Vaping Use: Never used   Substance Use Topics    Alcohol use: Not Currently    Drug use: Yes     Types: Marijuana     Comment: last use ~1month       Review of Systems   Constitutional:  Negative for chills and fever. HENT:  Negative for congestion, ear pain and sore throat. Eyes:  Negative for pain and visual disturbance. Respiratory:  Negative for cough and shortness of breath. Cardiovascular:  Negative for chest pain and palpitations. Gastrointestinal:  Positive for abdominal pain and constipation. Negative for diarrhea, nausea and vomiting. Genitourinary:  Negative for dysuria and hematuria. Musculoskeletal:  Negative for arthralgias and back pain. Skin:  Negative for color change and rash. Neurological:  Negative for seizures and syncope. All other systems reviewed and are negative. Physical Exam  Physical Exam  Vitals and nursing note reviewed. Constitutional:       General: He is not in acute distress. Appearance: Normal appearance. He is not ill-appearing. HENT:      Head: Normocephalic and atraumatic.       Right Ear: External ear normal.      Left Ear: External ear normal.      Nose: Nose normal.      Mouth/Throat:      Mouth: Mucous membranes are moist.   Eyes:      General: No scleral icterus. Right eye: No discharge. Left eye: No discharge. Extraocular Movements: Extraocular movements intact. Cardiovascular:      Rate and Rhythm: Normal rate and regular rhythm. Pulses: Normal pulses. Heart sounds: Normal heart sounds. Pulmonary:      Effort: Pulmonary effort is normal. No respiratory distress. Breath sounds: Normal breath sounds. Abdominal:      Palpations: Abdomen is soft. Tenderness: There is abdominal tenderness in the right lower quadrant, suprapubic area and left lower quadrant. There is no right CVA tenderness, left CVA tenderness, guarding or rebound. Musculoskeletal:         General: No tenderness, deformity or signs of injury. Cervical back: Normal range of motion and neck supple. Skin:     General: Skin is dry. Coloration: Skin is not jaundiced. Findings: No erythema or rash. Neurological:      General: No focal deficit present. Mental Status: He is alert and oriented to person, place, and time. Mental status is at baseline. Motor: No weakness. Gait: Gait normal.   Psychiatric:         Mood and Affect: Mood normal.         Behavior: Behavior normal.         Thought Content:  Thought content normal.         Vital Signs  ED Triage Vitals [11/26/23 2142]   Temperature Pulse Respirations Blood Pressure SpO2   98 °F (36.7 °C) 73 16 131/59 98 %      Temp Source Heart Rate Source Patient Position - Orthostatic VS BP Location FiO2 (%)   Oral Monitor Sitting Right arm --      Pain Score       10 - Worst Possible Pain           Vitals:    11/26/23 2142 11/26/23 2329   BP: 131/59 115/63   Pulse: 73 56   Patient Position - Orthostatic VS: Sitting Sitting         Visual Acuity      ED Medications  Medications   sodium chloride 0.9 % bolus 1,000 mL (0 mL Intravenous Stopped 11/26/23 2339)   ketorolac (TORADOL) injection 15 mg (15 mg Intravenous Given 11/26/23 2238)   iohexol (OMNIPAQUE) 350 MG/ML injection (MULTI-DOSE) 100 mL (100 mL Intravenous Given 11/27/23 0000)       Diagnostic Studies  Results Reviewed       Procedure Component Value Units Date/Time    Lipase [[id number]]  (Normal) Collected: 11/26/23 2239    Lab Status: Final result Specimen: Blood from Arm, Left Updated: 11/26/23 2308     Lipase 13 u/L     Comprehensive metabolic panel [[id number]] Collected: 11/26/23 2239    Lab Status: Final result Specimen: Blood from Arm, Left Updated: 11/26/23 2308     Sodium 138 mmol/L      Potassium 3.9 mmol/L      Chloride 104 mmol/L      CO2 27 mmol/L      ANION GAP 7 mmol/L      BUN 10 mg/dL      Creatinine 0.99 mg/dL      Glucose 110 mg/dL      Calcium 9.5 mg/dL      AST 16 U/L      ALT 19 U/L      Alkaline Phosphatase 74 U/L      Total Protein 7.5 g/dL      Albumin 4.7 g/dL      Total Bilirubin 0.35 mg/dL      eGFR 108 ml/min/1.73sq m     Narrative:      [facility] guidelines for Chronic Kidney Disease (CKD):     Stage 1 with normal or high GFR (GFR > 90 mL/min/1.73 square meters)    Stage 2 Mild CKD (GFR = 60-89 mL/min/1.73 square meters)    Stage 3A Moderate CKD (GFR = 45-59 mL/min/1.73 square meters)    Stage 3B Moderate CKD (GFR = 30-44 mL/min/1.73 square meters)    Stage 4 Severe CKD (GFR = 15-29 mL/min/1.73 square meters)    Stage 5 End Stage CKD (GFR <15 mL/min/1.73 square meters)  Note: GFR calculation is accurate only with a steady state creatinine    Urine Microscopic [[id number]]  (Abnormal) Collected: 11/26/23 2244    Lab Status: Final result Specimen: Urine, Clean Catch Updated: 11/26/23 2304     RBC, UA 1-2 /hpf      WBC, UA 4-10 /hpf      Epithelial Cells None Seen /hpf      Bacteria, UA None Seen /hpf     CBC and differential [[id number]]  (Abnormal) Collected: 11/26/23 2239    Lab Status: Final result Specimen: Blood from Arm, Left Updated: 11/26/23 2254     WBC 7.90 Thousand/uL      RBC 4.90 Million/uL      Hemoglobin 15.2 g/dL      Hematocrit 43.3 %      MCV 88 fL      MCH 31.0 pg MCHC 35.1 g/dL      RDW 11.9 %      MPV 9.1 fL      Platelets 726 Thousands/uL      nRBC 0 /100 WBCs      Neutrophils Relative 47 %      Immat GRANS % 0 %      Lymphocytes Relative 41 %      Monocytes Relative 4 %      Eosinophils Relative 7 %      Basophils Relative 1 %      Neutrophils Absolute 3.79 Thousands/µL      Immature Grans Absolute 0.02 Thousand/uL      Lymphocytes Absolute 3.25 Thousands/µL      Monocytes Absolute 0.28 Thousand/µL      Eosinophils Absolute 0.51 Thousand/µL      Basophils Absolute 0.05 Thousands/µL     Urine Macroscopic, POC [[id number]]  (Abnormal) Collected: 11/26/23 2244    Lab Status: Final result Specimen: Urine Updated: 11/26/23 2246     Color, UA Yellow     Clarity, UA Clear     pH, UA 6.0     Leukocytes, UA Trace     Nitrite, UA Negative     Protein, UA Negative mg/dl      Glucose, UA Negative mg/dl      Ketones, UA Negative mg/dl      Urobilinogen, UA 0.2 E.U./dl      Bilirubin, UA Negative     Occult Blood, UA Trace     Specific Gravity, UA 1.025    Narrative:      [facility] RESULT                   CT abdomen pelvis w contrast    (Results Pending)              Procedures  Procedures         ED Course  ED Course as of 11/27/23 0017   Sun Nov 26, 2023 2250 Urine Macroscopic, POC(!)  Trace leukocytes and blood. No nitrites. 2257 CBC and differential(!)  No leukocytosis, anemia. 2316 Comprehensive metabolic panel  No electrolyte abnormalities, ALF, elevated LFTs. 2317 Lipase: 13                               SBIRT 22yo+      Flowsheet Row Most Recent Value   Initial Alcohol Screen: US AUDIT-C     1. How often do you have a drink containing alcohol? 0 Filed at: 11/26/2023 2317   2. How many drinks containing alcohol do you have on a typical day you are drinking? 0 Filed at: 11/26/2023 2317   3a. Male UNDER 65: How often do you have five or more drinks on one occasion?  0 Filed at: 11/26/2023 2317   Audit-C Score 0 Filed at: 11/26/2023 2317   [place]: How many times in the past year have you. .. Used an illegal drug or used a prescription medication for non-medical reasons? Never Filed at: 11/26/2023 7627                      Medical Decision Making  Patient is a 71-year-old male presenting for bilateral lower abdominal pain and constipation for 3 days. All vitals stable on arrival.  Physical exam remarkable for tenderness to bilateral abdomen with pain more significant in the left lower quadrant. DDx including not limited to: Diverticulitis, colitis, constipation, cystitis, nephrolithiasis, pyelonephritis, pancreatitis. Plan: CBC, CMP, lipase, UA, CT abdomen pelvis with contrast.  Will treat symptomatically with Toradol and fluids. Labs negative for leukocytosis, anemia, electrolyte abnormalities, ALF, LFT elevations. Lipase normal.  UA has trace leukocytes and occult blood, no nitrates or bacteria seen. In absence of urinary symptoms, will not treat at this time and will send for urine culture. Signed out to Stephanie Nakul BATISTA pending CT read. Patient stable at time of signout. Amount and/or Complexity of Data Reviewed  Labs: ordered. Decision-making details documented in ED Course. Radiology: ordered. Risk  Prescription drug management. Disposition  Final diagnoses:   None     ED Disposition       None          Follow-up Information    None         Patient's Medications   Discharge Prescriptions    No medications on file       No discharge procedures on file.     PDMP Review       None            ED Provider  Electronically Signed by             Юлия Beckham PA-C  11/27/23 0017

## 2023-11-27 NOTE — ED NOTES
Ambulating steadily to the bathroom and back without difficulty     Javier Gitelman, RN  11/27/23 7221

## 2023-11-27 NOTE — CASE MANAGEMENT
Case Management Discharge Planning Note    Patient name Mimi [facility]  Location [address] [facility] Daria Durham* MRN [id number]  : 2000 Date 2023       Current Admission Date: 2023  Current Admission Diagnosis:Abdominal pain, Constipation, Intussusception of small intestine [facility])   Patient Active Problem List    Diagnosis Date Noted    Palpitations 2021    Depression 2021      LOS (days): 0  Geometric Mean LOS (GMLOS) (days):   Days to GMLOS:     OBJECTIVE:            Current admission status: Observation   Preferred Pharmacy:   [address] [place] [address] [id number] [facility] [address] [address]  Phone: [phone] Fax: [phone]    PATIENT/FAMILY REPORTS NO PREFERRED PHARMACY  No address on file      Primary Care Provider: SHIRA Abbott    Primary Insurance: [employer]  Secondary Insurance:     DISCHARGE DETAILS:     Additional Comments: CM consulted for social issues. After chart review, CM referred pt to high utalizer team to review for high utalizer care plan given pt has presented to the ED 5 times Since . In the year prior, he was in our ED about 15 times.

## 2023-11-28 VITALS
OXYGEN SATURATION: 98 % | HEART RATE: 76 BPM | DIASTOLIC BLOOD PRESSURE: 55 MMHG | BODY MASS INDEX: 22.33 KG/M2 | SYSTOLIC BLOOD PRESSURE: 93 MMHG | TEMPERATURE: 97.7 F | RESPIRATION RATE: 16 BRPM | HEIGHT: 69 IN | WEIGHT: 150.79 LBS

## 2023-11-28 PROBLEM — K56.1 INTUSSUSCEPTION (HCC): Status: RESOLVED | Noted: 2023-11-27 | Resolved: 2023-11-28

## 2023-11-28 LAB
ANION GAP SERPL CALCULATED.3IONS-SCNC: 7 MMOL/L
BUN SERPL-MCNC: 8 MG/DL (ref 5–25)
CALCIUM SERPL-MCNC: 9.1 MG/DL (ref 8.4–10.2)
CHLORIDE SERPL-SCNC: 106 MMOL/L (ref 96–108)
CO2 SERPL-SCNC: 25 MMOL/L (ref 21–32)
CREAT SERPL-MCNC: 0.91 MG/DL (ref 0.6–1.3)
ERYTHROCYTE [DISTWIDTH] IN BLOOD BY AUTOMATED COUNT: 11.8 % (ref 11.6–15.1)
GFR SERPL CREATININE-BSD FRML MDRD: 120 ML/MIN/1.73SQ M
GLUCOSE P FAST SERPL-MCNC: 115 MG/DL (ref 65–99)
GLUCOSE SERPL-MCNC: 115 MG/DL (ref 65–140)
HCT VFR BLD AUTO: 40.3 % (ref 36.5–49.3)
HGB BLD-MCNC: 14.3 G/DL (ref 12–17)
MCH RBC QN AUTO: 31.3 PG (ref 26.8–34.3)
MCHC RBC AUTO-ENTMCNC: 35.5 G/DL (ref 31.4–37.4)
MCV RBC AUTO: 88 FL (ref 82–98)
PLATELET # BLD AUTO: 325 THOUSANDS/UL (ref 149–390)
PMV BLD AUTO: 9 FL (ref 8.9–12.7)
POTASSIUM SERPL-SCNC: 4.2 MMOL/L (ref 3.5–5.3)
RBC # BLD AUTO: 4.57 MILLION/UL (ref 3.88–5.62)
SODIUM SERPL-SCNC: 138 MMOL/L (ref 135–147)
WBC # BLD AUTO: 9.87 THOUSAND/UL (ref 4.31–10.16)

## 2023-11-28 PROCEDURE — NC001 PR NO CHARGE: Performed by: SURGERY

## 2023-11-28 PROCEDURE — 99024 POSTOP FOLLOW-UP VISIT: CPT | Performed by: SURGERY

## 2023-11-28 PROCEDURE — 80048 BASIC METABOLIC PNL TOTAL CA: CPT | Performed by: STUDENT IN AN ORGANIZED HEALTH CARE EDUCATION/TRAINING PROGRAM

## 2023-11-28 PROCEDURE — 85027 COMPLETE CBC AUTOMATED: CPT | Performed by: STUDENT IN AN ORGANIZED HEALTH CARE EDUCATION/TRAINING PROGRAM

## 2023-11-28 RX ORDER — ACETAMINOPHEN 325 MG/1
650 TABLET ORAL EVERY 6 HOURS PRN
Refills: 0
Start: 2023-11-28

## 2023-11-28 RX ADMIN — ACETAMINOPHEN 325MG 650 MG: 325 TABLET ORAL at 05:00

## 2023-11-28 RX ADMIN — KETOROLAC TROMETHAMINE 15 MG: 30 INJECTION, SOLUTION INTRAMUSCULAR; INTRAVENOUS at 00:02

## 2023-11-28 RX ADMIN — HEPARIN SODIUM 5000 UNITS: 5000 INJECTION INTRAVENOUS; SUBCUTANEOUS at 05:01

## 2023-11-28 RX ADMIN — SODIUM CHLORIDE 75 ML/HR: 0.9 INJECTION, SOLUTION INTRAVENOUS at 06:01

## 2023-11-28 RX ADMIN — KETOROLAC TROMETHAMINE 15 MG: 30 INJECTION, SOLUTION INTRAMUSCULAR; INTRAVENOUS at 05:01

## 2023-11-28 RX ADMIN — ACETAMINOPHEN 325MG 650 MG: 325 TABLET ORAL at 00:01

## 2023-11-28 NOTE — DISCHARGE SUMMARY
Discharge Summary    Ahmed Rico Shone 26 y.o. male MRN: [id number]    Unit/Bed#: [facility] -01 Encounter: [id number]    Admission Date: 11/26/2023     Discharge Date:/11/28/2023    Attending: Dr. Rohan Shields  Operative Surgeon: Dr. Rohan Shields    Consultants: None    Admitting Diagnosis: Abdominal pain [R10.9]  Constipation [K59.00]  Intussusception of small intestine ([address]) [K56.1]    Principle Diagnosis: Intussusception of the small bowel    Secondary Diagnosis:  Past Medical History:   Diagnosis Date    No known health problems      Past Surgical History:   Procedure Laterality Date    NO PAST SURGERIES      CA LAPS ABD PRTM&OMENTUM DX W/WO SPEC BR/WA SPX N/A 11/27/2023    Procedure: LAPAROSCOPY DIAGNOSTIC;  Surgeon: Pamela Riojas MD;  Location: [facility];  Service: General        Procedures Performed: Procedure(s):  LAPAROSCOPY DIAGNOSTIC    Imaging:Procedure: CT abdomen pelvis wo contrast    Result Date: 11/27/2023  Narrative: CT ABDOMEN AND PELVIS WITHOUT IV CONTRAST INDICATION: Abdominal pain  repeat w/ PO contrast suggested by reading radiologist. COMPARISON: CT abdomen and pelvis dated 11/26/2023 TECHNIQUE:  CT examination of the abdomen and pelvis was performed without intravenous contrast. Multiplanar 2D reformatted images were created from the source data. This examination, like all CT scans performed in the [facility], was performed utilizing techniques to minimize radiation dose exposure, including the use of iterative reconstruction and automated exposure control. Radiation dose length product (DLP) for this visit:  419.36 mGy-cm Enteric contrast was administered. FINDINGS: ABDOMEN LOWER CHEST:  No clinically significant abnormality identified in the visualized lower chest. LIVER/BILIARY TREE:  Unremarkable. GALLBLADDER: Vicarious excretion of contrast, otherwise grossly unremarkable. SPLEEN:  Unremarkable. PANCREAS:  Unremarkable. ADRENAL GLANDS:  Unremarkable. KIDNEYS/URETERS:  Unremarkable. No hydronephrosis. STOMACH AND BOWEL: Small bowel intussusception in the left upper quadrant is redemonstrated (axial image 60, series 2), of unknown clinical significance. No evidence of bowel obstruction. Otherwise grossly unremarkable. APPENDIX:  No findings to suggest appendicitis. ABDOMINOPELVIC CAVITY:  No ascites. No pneumoperitoneum. No lymphadenopathy. VESSELS:  Unremarkable for patient's age. PELVIS REPRODUCTIVE ORGANS:  Unremarkable for patient's age. URINARY BLADDER:  Unremarkable. ABDOMINAL WALL/INGUINAL REGIONS:  Unremarkable. OSSEOUS STRUCTURES:  No acute fracture or destructive osseous lesion. Impression: Small bowel intussusception in the left upper quadrant is redemonstrated (axial image 60, series 2), of unknown clinical significance. No evidence of bowel obstruction. Abdominal pain otherwise grossly unremarkable. Workstation performed: [id number]     Procedure: CT abdomen pelvis w contrast    Result Date: 11/27/2023  Narrative: CT ABDOMEN AND PELVIS WITH IV CONTRAST INDICATION:   LLQ abdominal pain BL lower abd pain, worse in LLQ. COMPARISON:  None. TECHNIQUE:  CT examination of the abdomen and pelvis was performed. Multiplanar 2D reformatted images were created from the source data. This examination, like all CT scans performed in the [facility], was performed utilizing techniques to minimize radiation dose exposure, including the use of iterative reconstruction and automated exposure control. Radiation dose length product (DLP) for this visit:  415.32 mGy-cm IV Contrast:  100 mL of iohexol (OMNIPAQUE) Enteric Contrast:  Enteric contrast was not administered. FINDINGS: ABDOMEN LOWER CHEST:  No clinically significant abnormality identified in the visualized lower chest. LIVER/BILIARY TREE:  Unremarkable. GALLBLADDER:  No calcified gallstones. No pericholecystic inflammatory change. SPLEEN:  Unremarkable. PANCREAS:  Unremarkable.  ADRENAL GLANDS:  Unremarkable. KIDNEYS/URETERS:  Unremarkable. No hydronephrosis. STOMACH AND BOWEL: Stomach is mildly distended with air and fluid. No intraluminal mass seen. There is a short segment of small bowel intussusception in the left upper abdomen involving proximal jejunal loops noted without focal lead point mass identified. This may be transient in nature or related to focal abnormal peristalsis/dysmotility. If clinically indicated, consider follow-up examination with oral contrast to ensure resolution. The small and large bowel loops are otherwise normal in course and caliber without obstruction or inflammation. Terminal ileum and appendix appear grossly unremarkable. APPENDIX:  No findings to suggest appendicitis. ABDOMINOPELVIC CAVITY:  No ascites. No pneumoperitoneum. No lymphadenopathy. VESSELS:  Unremarkable for patient's age. PELVIS REPRODUCTIVE ORGANS:  Unremarkable for patient's age. URINARY BLADDER: Mildly distended and grossly unremarkable. ABDOMINAL WALL/INGUINAL REGIONS:  Unremarkable. OSSEOUS STRUCTURES:  No acute fracture or destructive osseous lesion. Impression: Short segment of small bowel intussusception in the left upper abdomen involving proximal jejunal loops noted without focal lead point mass identified. This may be transient in nature or related to focal abnormal peristalsis/dysmotility. If clinically indicated, consider follow-up examination with oral contrast to ensure resolution. No evidence for bowel obstruction, inflammation, appendicitis, obstructive uropathy, free air, free fluid, or loculated fluid collections in the abdomen/pelvis. Workstation performed: [id number]       Discharge Medications:  See after visit summary for reconciled discharge medications provided to patient and family. Brief HPI (per H/P): Per H&P "Aurelia Ogden is a 26 y.o. male who presents with complaint of lower abdominal pain which has been intermittent over the last few days.  Pt reports being seen in the ED previously 1-2 weeks ago for rectal bleeding after sexual intercourse. Pt reports being prescribed Miralax (which he has been drinking since last night) and was advised to follow-up with GI. Has an appointment for a consult at 3pm today. Pt reports that the pain began Friday and has been intermittent though has worsened since then. Still admits to having small amount of bright red blood per rectum. He thinks the abdominal pain may be worse after eating. Feels better when he lays on his right side. Denies nausea, vomiting or history of similar abdominal pain. Last BM yesterday. Denies personal or family history of gastrointestinal problems or surgeries."    Hospital Course: Fernando Brown is a 26 y.o. male who presented 11/26/2023 per HPI and was taken to the OR 11/27 for exploratory laparoscopy. Intraoperative findings included the following from operative report:  Approximately 15 cm segment of small bowel small bowel intussusception present in the mid jejunum. Lead point was a mobile intra-luminal soft bezoar. This was able to be milked distally. No fixed mass was present. No lymphadenopathy, or Meckles diverticulum were found. No resection was performed. Patient did well postoperatively and his diet was advanced on postoperative day 1. Patient tolerated diet and had bowel function. For additional details of this hospitalization, please see daily progress notes. Pt hospital course was uncomplicated. Patient was discharged on POD 1. On the day of discharge, the patient was voiding spontaneously, ambulating at baseline, and pain was well controlled. He understood all instructions for discharge. He was also given the names and numbers of the providers as well as instructions for follow up appointments. Condition at Discharge: good     Provisions for Follow-Up Care:  See after visit summary for information related to follow-up care and any pertinent home health orders. Disposition: See After Visit Summary for discharge disposition information. Discharge instructions/Information to patient and family:   Home    Planned Readmission: With what was wrong withNo    Discharge Statement   I spent 25 minutes discharging the patient. This time was spent on the day of discharge. I had direct contact with the patient on the day of discharge. Additional documentation is required if more than 30 minutes were spent on discharge.

## 2023-11-28 NOTE — PLAN OF CARE
Problem: PAIN - ADULT  Goal: Verbalizes/displays adequate comfort level or baseline comfort level  Description: Interventions:  - Encourage patient to monitor pain and request assistance  - Assess pain using appropriate pain scale  - Administer analgesics based on type and severity of pain and evaluate response  - Implement non-pharmacological measures as appropriate and evaluate response  - Consider cultural and social influences on pain and pain management  - Notify physician/advanced practitioner if interventions unsuccessful or patient reports new pain  Outcome: Progressing     Problem: INFECTION - ADULT  Goal: Absence or prevention of progression during hospitalization  Description: INTERVENTIONS:  - Assess and monitor for signs and symptoms of infection  - Monitor lab/diagnostic results  - Monitor all insertion sites, i.e. indwelling lines, tubes, and drains  - Monitor endotracheal if appropriate and nasal secretions for changes in amount and color  - [place] appropriate cooling/warming therapies per order  - Administer medications as ordered  - Instruct and encourage patient and family to use good hand hygiene technique  - Identify and instruct in appropriate isolation precautions for identified infection/condition  Outcome: Progressing  Goal: Absence of fever/infection during neutropenic period  Description: INTERVENTIONS:  - Monitor WBC    Outcome: Progressing     Problem: SAFETY ADULT  Goal: Patient will remain free of falls  Description: INTERVENTIONS:  - Educate patient/family on patient safety including physical limitations  - Instruct patient to call for assistance with activity   - Consult OT/PT to assist with strengthening/mobility   - Keep Call bell within reach  - Keep bed low and locked with side rails adjusted as appropriate  - Keep care items and personal belongings within reach  - Initiate and maintain comfort rounds  - Make Fall Risk Sign visible to staff  - Offer Toileting every 2 Hours, in advance of need  - Initiate/Maintain bed alarm  - Obtain necessary fall risk management equipment. - Apply yellow socks and bracelet for high fall risk patients  - Consider moving patient to room near nurses station  Outcome: Progressing  Goal: Maintain or return to baseline ADL function  Description: INTERVENTIONS:  -  Assess patient's ability to carry out ADLs; assess patient's baseline for ADL function and identify physical deficits which impact ability to perform ADLs (bathing, care of mouth/teeth, toileting, grooming, dressing, etc.)  - Assess/evaluate cause of self-care deficits   - Assess range of motion  - Assess patient's mobility; develop plan if impaired  - Assess patient's need for assistive devices and provide as appropriate  - Encourage maximum independence but intervene and supervise when necessary  - Involve family in performance of ADLs  - Assess for home care needs following discharge   - Consider OT consult to assist with ADL evaluation and planning for discharge  - Provide patient education as appropriate  Outcome: Progressing  Goal: Maintains/Returns to pre admission functional level  Description: INTERVENTIONS:  - Perform AM-PAC 6 Click Basic Mobility/ Daily Activity assessment daily.  - Set and communicate daily mobility goal to care team and patient/family/caregiver. - Collaborate with rehabilitation services on mobility goals if consulted  - Perform Range of Motion 2 times a day. - Reposition patient every 2 hours.   - Dangle patient 2 times a day  - Stand patient 2 times a day  - Ambulate patient 2 times a day  - Out of bed to chair 2 times a day   - Out of bed for meals 2 times a day  - Out of bed for toileting  - Record patient progress and toleration of activity level   Outcome: Progressing     Problem: DISCHARGE PLANNING  Goal: Discharge to home or other facility with appropriate resources  Description: INTERVENTIONS:  - Identify barriers to discharge w/patient and caregiver  - Arrange for needed discharge resources and transportation as appropriate  - Identify discharge learning needs (meds, wound care, etc.)  - Arrange for interpretive services to assist at discharge as needed  - Refer to Case Management Department for coordinating discharge planning if the patient needs post-hospital services based on physician/advanced practitioner order or complex needs related to functional status, cognitive ability, or social support system  Outcome: Progressing     Problem: Knowledge Deficit  Goal: Patient/family/caregiver demonstrates understanding of disease process, treatment plan, medications, and discharge instructions  Description: Complete learning assessment and assess knowledge base.   Interventions:  - Provide teaching at level of understanding  - Provide teaching via preferred learning methods  Outcome: Progressing     Problem: GASTROINTESTINAL - ADULT  Goal: Minimal or absence of nausea and/or vomiting  Description: INTERVENTIONS:  - Administer IV fluids if ordered to ensure adequate hydration  - Maintain NPO status until nausea and vomiting are resolved  - Nasogastric tube if ordered  - Administer ordered antiemetic medications as needed  - Provide nonpharmacologic comfort measures as appropriate  - Advance diet as tolerated, if ordered  - Consider nutrition services referral to assist patient with adequate nutrition and appropriate food choices  Outcome: Progressing  Goal: Maintains or returns to baseline bowel function  Description: INTERVENTIONS:  - Assess bowel function  - Encourage oral fluids to ensure adequate hydration  - Administer IV fluids if ordered to ensure adequate hydration  - Administer ordered medications as needed  - Encourage mobilization and activity  - Consider nutritional services referral to assist patient with adequate nutrition and appropriate food choices  Outcome: Progressing  Goal: Maintains adequate nutritional intake  Description: INTERVENTIONS:  - Monitor percentage of each meal consumed  - Identify factors contributing to decreased intake, treat as appropriate  - Assist with meals as needed  - Monitor I&O, weight, and lab values if indicated  - Obtain nutrition services referral as needed  Outcome: Progressing  Goal: Establish and maintain optimal ostomy function  Description: INTERVENTIONS:  - Assess bowel function  - Encourage oral fluids to ensure adequate hydration  - Administer IV fluids if ordered to ensure adequate hydration   - Administer ordered medications as needed  - Encourage mobilization and activity  - Nutrition services referral to assist patient with appropriate food choices  - Assess stoma site  - Consider wound care consult   Outcome: Progressing  Goal: Oral mucous membranes remain intact  Description: INTERVENTIONS  - Assess oral mucosa and hygiene practices  - Implement preventative oral hygiene regimen  - Implement oral medicated treatments as ordered  - Initiate Nutrition services referral as needed  Outcome: Progressing Negative

## 2023-11-28 NOTE — PROGRESS NOTES
Progress Note -General surgery  : General surgery Resident on [facility]     Ahmed Rico Shone 26 y.o. male MRN: [id number]  Unit/Bed#: [facility] -01 Encounter: [id number]    Assessment:  26 y.o. male presenting with abdominal pain and blood per rectum found to have small bowel intussusception in the left upper quadrant, status post diagnostic laparoscopy without resection 11/28    Vitals signs within normal limits and stable on room air    WBC 9.9 from 5.5  Hemoglobin 14.3 from 13.9  Creatinine 0.9 from 0.9    11/27 CT A/P Small bowel intussusception in the left upper quadrant     Plan:  Advance to surgical soft diet  Monitor bowel function  Encourage ambulation/out of bed, 3 times daily  Encourage incentive spirometer use, 10 times per hour  Discontinue IV fluids  Pain control as needed  Antiemetic as needed  Anticoagulation Plan-SQ  Infection Plan -none  Disposition Plan -possibly today, pending bowel function    Subjective/Objective     Subjective: Patient seen and examined at bedside. Patient reports he still experiencing some belly pain. Patient denies bowel function, last bowel movement was yesterday. Patient is ambulating and urinating appropriately. Patient denies nausea vomiting fevers chills and shortness of breath. Objective:     Physical Exam:  GEN: NAD  HEENT: MMM  CV: Well-perfused regular rate  Lung: Normal effort on room air  Ab: Left lower quadrant appropriately tender, nontympanic, nondistended  Extrem: No lower extremity edema bilaterally  Neuro: A+Ox3     Vitals: Temp:  [97.2 °F (36.2 °C)-98.2 °F (36.8 °C)] 97.7 °F (36.5 °C)  HR:  [61-92] 76  Resp:  [16-22] 16  BP: ()/(55-80) 93/55  Body mass index is 22.27 kg/m².     I/O         11/26 0701  11/27 0700 11/27 0701  11/28 0700 11/28 0701  11/29 0700    I.V. (mL/kg)  1500 (21.9)     IV Piggyback 1000      Total Intake(mL/kg) 1000 (14.6) 1500 (21.9)     Net +1000 +1500                      Lab, Imaging and other studies: I have personally reviewed pertinent reports.   , CBC with diff:   Lab Results   Component Value Date    WBC 9.87 11/28/2023    HGB 14.3 11/28/2023    HCT 40.3 11/28/2023    MCV 88 11/28/2023     11/28/2023    RBC 4.57 11/28/2023    MCH 31.3 11/28/2023    MCHC 35.5 11/28/2023    RDW 11.8 11/28/2023    MPV 9.0 11/28/2023   , BMP/CMP:   Lab Results   Component Value Date    SODIUM 138 11/28/2023    K 4.2 11/28/2023     11/28/2023    CO2 25 11/28/2023    BUN 8 11/28/2023    CREATININE 0.91 11/28/2023    CALCIUM 9.1 11/28/2023    EGFR 120 11/28/2023     VTE Pharmacologic Prophylaxis: Heparin  VTE Mechanical Prophylaxis: sequential compression device    Faustine Baumgarten, MD  11/28/2023 10:02 AM

## 2023-11-28 NOTE — DISCHARGE INSTR - AVS FIRST PAGE
Hina Wolfe MD  [facility]  [address] [facility], [address] [place] [address]  Phone: [phone]  Fax: [phone]    Surgical Discharge Instructions        1. General: You will feel pulling sensations around the wound or funny aches and pains around the incisions. You may have some bruising or mild redness. This is normal. Even minor surgery is a change in your body and this is your body’s reaction to it. If you have had abdominal surgery, it may help to support the incision with a small pillow or blanket for comfort when moving or coughing. There may be some hardness surrounding your incision which is your body's normal reaction to surgery. This typically softens up over time. A heating pad or ice pack can also be beneficial in the post-op period. 2. Wound care: Make sure to remove the overlying dressing/bandage in about 24 hours, unless instructed otherwise. You usually don't have to redress the wound after 24-48 hours, unless for comfort. Keep the incision clean and dry. Let air get to it. If this Steri-Strips fall off, just keep the wound clean. If there is surgical glue in place this will usually start to soften in around 2 weeks and will eventually dissolve or fall off with gentle scrubbing in the shower. 3. Water: You may shower over the wound, unless there are drain tubes left in place. Do not bathe or use a pool or hot tub until cleared by the physician. Do not swim in a lake or ocean until cleared by your physician. You may shower right over the staples or Steri-Strips and packing dry when you are done. 4. Activity: You may go up and down stairs, walk as much as you are comfortable, but walk at least 3 times each day. If you have had abdominal surgery, do not lift anything heavier than 15 pounds for at least 2-4 weeks, unless cleared by the doctor.  Notes and paperwork for your job are typically filled out at your post-op appointment but if there is a time constraint please call the office for assistance if this is needed prior to your post-op check. 5. Diet: You may resume a regular diet. If you had a same-day surgery or overnight stay surgery, you may wish to eat lightly for a few days: soups, crackers, and sandwiches. You may resume a regular diet when ready. 6. Medications: Resume all of your previous medications, unless told otherwise by the doctor. Ibuprofen (Advil, Motrin, etc.) is usually ok unless specifically discouraged by your surgeon. 400-600 mg of ibuprofen every 6 hours for post-surgical pain is an acceptable regimen with a maximum dosage of 2400 mg per day. Avoid ibuprofen if you are taking aspirin. If you have a bleeding disorder or have stomach issues, talk to your surgeon prior to use. Tylenol is ok, unless you are taking any narcotic pain medication containing Tylenol (such as Percocet, Darvocet, Vicodin, or anything containing acetaminophen). Be cautious taking additional Tylenol if you're taking these medications as there is a maximum dosage of 4,000 mg of Tylenol per day. You do not need to take the narcotic pain medications unless you are having significant pain and discomfort. 7. Driving: You will need someone to drive you home on the day of surgery. Do not drive or make any important decisions while on narcotic pain medication or 24 hours and after anesthesia or sedation for surgery. Generally, you may drive when you are off all narcotic pain medications. 8. Upset Stomach: You may take Maalox, Tums, or similar items for an upset stomach. If your narcotic pain medication causes an upset stomach, do not take it on an empty stomach. Try taking it with at least some crackers or toast.      9. Constipation: Patients often experience constipation after surgery due to anesthesia and pain medication. This is especially common after abdominal surgery.  You may take over-the-counter medication for this, such as Metamucil, Senokot, Dulcolax, milk of magnesia, etc. You may take a suppository unless you have had anorectal surgery such as a procedure on your hemorrhoids. If you experience significant nausea or vomiting after abdominal surgery, call the office before trying any of these medications. 10. Pain: You may be given a prescription for pain. This will be prescribed the day of surgery and sent to your pharmacy of choice. Take the pain medication as prescribed, only if you need it. Narcotic pain medications (such as anything containing hydrocodone or oxycodone) have many side effects such as nausea/vomiting, constipation, dizziness and respiratory depression. Do not drive when taking the pain medication. Do not take more than prescribed in the 4-6 hour time period. 11. Sexual Activity: You may resume sexual activity when you feel ready and comfortable and your incision is sealed and healed without apparent infection risk. 12. Urination: If you haven't urinated in 6 hours and are unable to urinate please call the office. If you are having pain and can not urinate you need to be evaluated by a physician and should go to the emergency room. Call the office: If you are experiencing any of the following, fevers above 101.5°, significant nausea or vomiting, if the wound develops drainage and/or has excessive redness around the wound, or if you have significant diarrhea or other worsening symptoms.

## 2023-11-28 NOTE — CASE MANAGEMENT
Case Management Assessment & Discharge Planning Note    Patient name Iliana Patrick  Location [address] Chau Wyatt* MRN [id number]  : 2000 Date 2023       Current Admission Date: 2023  Current Admission Diagnosis:Abdominal pain, Constipation, Intussusception of small intestine [facility])   Patient Active Problem List    Diagnosis Date Noted    Palpitations 2021    Depression 2021      LOS (days): 0  Geometric Mean LOS (GMLOS) (days):   Days to GMLOS:     OBJECTIVE:              Current admission status: Outpatient Surgery       [facility]erred Pharmacy:   [facility] [address] [place] [id number]  Phone: [phone] Fax: [phone]    PATIENT/FAMILY REPORTS NO PREFERRED PHARMACY  No address on file      Primary Care Provider: SHIRA Herrera    Primary Insurance: [employer]  Secondary Insurance:     ASSESSMENT:  [address] [place] [address]   Primary Phone: [phone] (Mobile)  Home Phone: [phone]                 Readmission Root Cause  30 Day Readmission: No    Patient Information  Admitted from[de-identified] Home  Mental Status: Alert  During Assessment patient was accompanied by: Other-Comment (Aunt by telephone)  Assessment information provided by[de-identified]  (Aunt)  Primary Caregiver: Family  Caregiver's Name[de-identified] Jessica Garza ([facility]) [phone] Chau Wyatt)  Caregiver's Relationship to Patient[de-identified] Family Member  Caregiver's Telephone Number[de-identified] [facility] [phone] (L)  Support Systems: Family members, [facility] [place] of Residence: [address] [place] [address] do you live in?: Nathanael lantigua entry access options.  Select all that apply.: Stairs  Number of steps to enter home.: 5  Do the steps have railings?: Yes  Type of Current Residence: Apartment  Floor Level: 1  Upon entering residence, is there a bedroom on the main floor (no further steps)?: Yes  Upon entering residence, is there a bathroom on the main floor (no further steps)?: Yes  Living Arrangements: Lives w/ Extended Family (Aunt and her children (Pt's cousins))    Activities of Daily Living Prior to Admission  Functional Status: Independent  Completes ADLs independently?: Yes  Ambulates independently?: Yes  Does patient use assisted devices?: No  Does patient currently own DME?: No  Does patient have a history of Outpatient Therapy (PT/OT)?: No  Does the patient have a history of Short-Term Rehab?: No  Does patient have a history of HHC?: No  Does patient currently have [address]?: No    Patient Information Continued  Income Source: Employed  Does patient have prescription coverage?: Yes  Does patient receive dialysis treatments?: No  Does patient have a history of substance abuse?: No  Does patient have a history of Mental Health Diagnosis?: Yes (Depression noted in Pts EMR)  Has patient received inpatient treatment related to mental health in the last 2 years?: No    Means of Transportation  Means of Transport to Appts[de-identified] Family transport    Housing Stability: [address]  (11/28/2023)    Housing Stability Vital Sign     Unable to Pay for Housing in the Last Year: No     Number of [address] in the Last Year: 2     Unstable Housing in the Last Year: No   Food Insecurity: No Food Insecurity (11/28/2023)    Hunger Vital Sign     Worried About Running Out of Food in the Last Year: Never true     [address] in the Last Year: Never true   Transportation Needs: No Transportation Needs (11/28/2023)    PRAPARE - Transportation     Lack of Transportation (Medical): No     Lack of Transportation (Non-Medical):  No   Utilities: Not At Risk (11/28/2023)    [facility] Utilities     Threatened with loss of utilities: No       DISCHARGE DETAILS:    Discharge planning discussed with[de-identified] Corwin De La [facility][1] FOR [facility]) [phone] (M) and Patient  Freedom of Choice: Yes  Comments - Freedom of Choice: Pt will discharge back to where he now lives with his Aunt and her children (Pt's Cousins)  CM contacted family/caregiver?: Yes  Were Treatment Team discharge recommendations reviewed with patient/caregiver?: Yes  Did patient/caregiver verbalize understanding of patient care needs?: Yes  Were patient/caregiver advised of the risks associated with not following Treatment Team discharge recommendations?: Yes    Contacts  Patient Contacts: Alicia Garcia ([facility]) [phone] (M)  Relationship to Patient[de-identified] Family  Contact Method: Phone  Phone Number: Alicia Garcia [facility]) [phone] Natividad Flaherty)  Reason/Outcome: Continuity of Care, Emergency Contact, Discharge [address]         Is the patient interested in [facility] at discharge?: No  DME Referral Provided  Referral made for DME?: No    Treatment Team Recommendation: Home  Discharge Destination Plan[de-identified] Home (w/ op follow up)  Transport at Discharge : Automobile  Accompanied by: Family member    Additional Comments: CM consult for "social issues". CM met with Pt and spoke with Pts aunt to complete assessment and determine social needs as Pt had stated he does "not feel safe" at home. THis was cleared by Pt and his aunt who informed CM that Pt has left his prior living situation and now lives with his Aunt full time. Pt will resume prior activities and return to living with his Aunt and her three children- Pts cousins. Aunt will provide Pts transport home. CM remains available as needed. CM consult addressed and closed.

## 2023-11-29 ENCOUNTER — PATIENT OUTREACH (OUTPATIENT)
Dept: FAMILY MEDICINE CLINIC | Facility: CLINIC | Age: 23
End: 2023-11-29

## 2023-11-29 NOTE — PROGRESS NOTES
Problem: Self Care Deficit  Goal: # Functional status is maintained or returned to baseline - REHAB ONLY  Outcome: Outcome Met, Continue evaluating goal progress toward completion  Goal: Functional status is maintained or returned to baseline  Outcome: Outcome Met, Continue evaluating goal progress toward completion  Goal: # Tolerates activity for d/c setting with no clinical problems  Outcome: Outcome Met, Continue evaluating goal progress toward completion     Problem: Pain  Goal: #Acceptable pain level achieved/maintained at rest using NRS/Faces  Description: This goal is used for patients who can self-report.  Acceptable means the level is at or below the identified comfort/function goal.  Outcome: Outcome Met, Continue evaluating goal progress toward completion  Goal: # Acceptable pain level achieved/maintained at rest using NRS/Faces without oversedation (opioid naive or PCA/Epidural infusion)  Description: This goal is used if Opioid-naïve or on PCA/Epidural Infusion.  Outcome: Outcome Met, Continue evaluating goal progress toward completion  Goal: # Acceptable pain level achieved/maintained with activity using NRS/Faces  Description: This goal is used for patients who can self-report and are not achieving acceptable pain control during activity.  Outcome: Outcome Met, Continue evaluating goal progress toward completion  Goal: # Verbalizes understanding of pain management  Description: Documented in Patient Education Activity  Outcome: Outcome Met, Continue evaluating goal progress toward completion     Problem: At Risk for Falls  Goal: # Patient does not fall  Outcome: Outcome Met, Continue evaluating goal progress toward completion  Goal: # Takes action to control fall-related risks  Outcome: Outcome Met, Continue evaluating goal progress toward completion  Goal: # Verbalizes understanding of fall risk/precautions  Description: Document education using the patient education activity  Outcome: Outcome Met,  RU.    I called the patient but received voicemail. Message was left asking for a return call. I will continue further outreach. Note sent to clerical to schedule TCM/re-establish. Chart reviewed. Continue evaluating goal progress toward completion     Problem: At Risk for Injury Due to Fall  Goal: # Patient does not fall  Outcome: Outcome Met, Continue evaluating goal progress toward completion  Goal: # Takes action to control condition specific risks  Outcome: Outcome Met, Continue evaluating goal progress toward completion  Goal: # Verbalizes understanding of fall-related injury personal risks  Description: Document education using the patient education activity  Outcome: Outcome Met, Continue evaluating goal progress toward completion

## 2023-12-01 ENCOUNTER — PATIENT OUTREACH (OUTPATIENT)
Dept: FAMILY MEDICINE CLINIC | Facility: CLINIC | Age: 23
End: 2023-12-01

## 2023-12-01 NOTE — PROGRESS NOTES
RU.    I called the patient but received voicemail. Message was left asking for a return call. I am sending the 'unable to reach' letter and await response.

## 2023-12-01 NOTE — LETTER
Date: 12/01/23    Dear Cj Rodriguez,   My name is Alisha Graham; I am a registered nurse care manager working with [facility].   I have not been able to reach you and would like to set a time that I can talk with you over the phone. My work is to help patients that have complex medical conditions get the care they need. This includes patients who may have been in the hospital or emergency room. Please call me with any questions you may have.     Sincerely,  Alisha Graham  [phone]  Outpatient Care Manager

## 2023-12-02 ENCOUNTER — APPOINTMENT (EMERGENCY)
Dept: CT IMAGING | Facility: HOSPITAL | Age: 23
End: 2023-12-02
Payer: COMMERCIAL

## 2023-12-02 ENCOUNTER — HOSPITAL ENCOUNTER (EMERGENCY)
Facility: HOSPITAL | Age: 23
Discharge: HOME | End: 2023-12-02
Attending: EMERGENCY MEDICINE | Admitting: EMERGENCY MEDICINE
Payer: COMMERCIAL

## 2023-12-02 VITALS
RESPIRATION RATE: 18 BRPM | HEART RATE: 76 BPM | SYSTOLIC BLOOD PRESSURE: 132 MMHG | OXYGEN SATURATION: 100 % | TEMPERATURE: 98.3 F | DIASTOLIC BLOOD PRESSURE: 60 MMHG

## 2023-12-02 DIAGNOSIS — Z98.890 S/P EXPLORATORY LAPAROTOMY: ICD-10-CM

## 2023-12-02 DIAGNOSIS — R10.84 GENERALIZED ABDOMINAL PAIN: Primary | ICD-10-CM

## 2023-12-02 LAB
ANION GAP SERPL CALCULATED.3IONS-SCNC: 5 MMOL/L
BASOPHILS # BLD AUTO: 0.02 THOUSANDS/ÂΜL (ref 0–0.1)
BASOPHILS NFR BLD AUTO: 0 % (ref 0–1)
BUN SERPL-MCNC: 12 MG/DL (ref 5–25)
CALCIUM SERPL-MCNC: 9.6 MG/DL (ref 8.4–10.2)
CHLORIDE SERPL-SCNC: 105 MMOL/L (ref 96–108)
CO2 SERPL-SCNC: 28 MMOL/L (ref 21–32)
CREAT SERPL-MCNC: 0.93 MG/DL (ref 0.6–1.3)
EOSINOPHIL # BLD AUTO: 0.41 THOUSAND/ÂΜL (ref 0–0.61)
EOSINOPHIL NFR BLD AUTO: 7 % (ref 0–6)
ERYTHROCYTE [DISTWIDTH] IN BLOOD BY AUTOMATED COUNT: 11.8 % (ref 11.6–15.1)
GFR SERPL CREATININE-BSD FRML MDRD: 116 ML/MIN/1.73SQ M
GLUCOSE SERPL-MCNC: 83 MG/DL (ref 65–140)
HCT VFR BLD AUTO: 42.7 % (ref 36.5–49.3)
HGB BLD-MCNC: 14.8 G/DL (ref 12–17)
IMM GRANULOCYTES # BLD AUTO: 0.01 THOUSAND/UL (ref 0–0.2)
IMM GRANULOCYTES NFR BLD AUTO: 0 % (ref 0–2)
LYMPHOCYTES # BLD AUTO: 1.96 THOUSANDS/ÂΜL (ref 0.6–4.47)
LYMPHOCYTES NFR BLD AUTO: 35 % (ref 14–44)
MCH RBC QN AUTO: 31 PG (ref 26.8–34.3)
MCHC RBC AUTO-ENTMCNC: 34.7 G/DL (ref 31.4–37.4)
MCV RBC AUTO: 90 FL (ref 82–98)
MONOCYTES # BLD AUTO: 0.22 THOUSAND/ÂΜL (ref 0.17–1.22)
MONOCYTES NFR BLD AUTO: 4 % (ref 4–12)
NEUTROPHILS # BLD AUTO: 2.94 THOUSANDS/ÂΜL (ref 1.85–7.62)
NEUTS SEG NFR BLD AUTO: 54 % (ref 43–75)
NRBC BLD AUTO-RTO: 0 /100 WBCS
PLATELET # BLD AUTO: 337 THOUSANDS/UL (ref 149–390)
PMV BLD AUTO: 8.9 FL (ref 8.9–12.7)
POTASSIUM SERPL-SCNC: 3.9 MMOL/L (ref 3.5–5.3)
RBC # BLD AUTO: 4.77 MILLION/UL (ref 3.88–5.62)
SODIUM SERPL-SCNC: 138 MMOL/L (ref 135–147)
WBC # BLD AUTO: 5.56 THOUSAND/UL (ref 4.31–10.16)

## 2023-12-02 PROCEDURE — NC001 PR NO CHARGE: Performed by: EMERGENCY MEDICINE

## 2023-12-02 PROCEDURE — 99284 EMERGENCY DEPT VISIT MOD MDM: CPT

## 2023-12-02 PROCEDURE — 85025 COMPLETE CBC W/AUTO DIFF WBC: CPT | Performed by: EMERGENCY MEDICINE

## 2023-12-02 PROCEDURE — 99285 EMERGENCY DEPT VISIT HI MDM: CPT | Performed by: EMERGENCY MEDICINE

## 2023-12-02 PROCEDURE — 74177 CT ABD & PELVIS W/CONTRAST: CPT

## 2023-12-02 PROCEDURE — 96374 THER/PROPH/DIAG INJ IV PUSH: CPT

## 2023-12-02 PROCEDURE — 36415 COLL VENOUS BLD VENIPUNCTURE: CPT | Performed by: EMERGENCY MEDICINE

## 2023-12-02 PROCEDURE — 80048 BASIC METABOLIC PNL TOTAL CA: CPT | Performed by: EMERGENCY MEDICINE

## 2023-12-02 RX ORDER — METHOCARBAMOL 500 MG/1
500 TABLET, FILM COATED ORAL ONCE
Status: COMPLETED | OUTPATIENT
Start: 2023-12-02 | End: 2023-12-02

## 2023-12-02 RX ORDER — OXYCODONE HYDROCHLORIDE 5 MG/1
5 TABLET ORAL ONCE
Status: COMPLETED | OUTPATIENT
Start: 2023-12-02 | End: 2023-12-02

## 2023-12-02 RX ORDER — METHOCARBAMOL 500 MG/1
500 TABLET, FILM COATED ORAL 3 TIMES DAILY PRN
Qty: 15 TABLET | Refills: 0 | Status: SHIPPED | OUTPATIENT
Start: 2023-12-02 | End: 2023-12-07

## 2023-12-02 RX ORDER — KETOROLAC TROMETHAMINE 30 MG/ML
15 INJECTION, SOLUTION INTRAMUSCULAR; INTRAVENOUS ONCE
Status: COMPLETED | OUTPATIENT
Start: 2023-12-02 | End: 2023-12-02

## 2023-12-02 RX ADMIN — METHOCARBAMOL 500 MG: 500 TABLET ORAL at 16:52

## 2023-12-02 RX ADMIN — IOHEXOL 100 ML: 350 INJECTION, SOLUTION INTRAVENOUS at 13:35

## 2023-12-02 RX ADMIN — KETOROLAC TROMETHAMINE 15 MG: 30 INJECTION, SOLUTION INTRAMUSCULAR; INTRAVENOUS at 12:39

## 2023-12-02 RX ADMIN — OXYCODONE HYDROCHLORIDE 5 MG: 5 TABLET ORAL at 14:30

## 2023-12-02 NOTE — ED PROVIDER NOTES
History  Chief Complaint   Patient presents with    Post-op Problem     Pt c/o sharp abd pain since yesterday, rpeorts had surgery on 11/26 for intussusception of small intestine. Denies N/V/D     A 25 yo male with no significant pmhx; presents with left sided abdominal pain since yesterday. Pain has been constant since onset, progressively worsening. Pt denies associated N/V/D. Pt has taken iburprofen and tylenol without relief. Pt was recently admitted from 11/26-28 undergoing diagnostic laparoscopy for a small bowel intussusception, which was secondary to an intra-luminal soft bezoar. Pt states he was discharged home feeling well. He has not had a bowel movement since discharge. Pt otherwise denies fever, chills, chest pain, SOB, N/V/D, peripheral edema and rashes. History provided by:  Patient and medical records      Prior to Admission Medications   Prescriptions Last Dose Informant Patient Reported? Taking?   acetaminophen (TYLENOL) 325 mg tablet   No No   Sig: Take 2 tablets (650 mg total) by mouth every 6 (six) hours as needed for mild pain or moderate pain   docusate sodium (COLACE) 100 mg capsule   No No   Sig: Take 1 capsule (100 mg total) by mouth every 12 (twelve) hours   ibuprofen (MOTRIN) 600 mg tablet   No No   Sig: Take 1 tablet (600 mg total) by mouth every 6 (six) hours as needed for mild pain for up to 10 days   lidocaine (LMX) 4 % cream   No No   Sig: Apply topically as needed for mild pain   polyethylene glycol (MIRALAX) 17 g packet   No No   Sig: Take 17 g by mouth daily      Facility-Administered Medications: None       Past Medical History:   Diagnosis Date    No known health problems        Past Surgical History:   Procedure Laterality Date    NO PAST SURGERIES      MD LAPS ABD PRTM&OMENTUM DX W/WO SPEC BR/WA SPX N/A 11/27/2023    Procedure: LAPAROSCOPY DIAGNOSTIC;  Surgeon: Ginny Mclean MD;  Location: [facility];  Service: General       History reviewed.  No pertinent family history. I have reviewed and agree with the history as documented. E-Cigarette/Vaping    E-Cigarette Use Never User      E-Cigarette/Vaping Substances    Nicotine No     THC No     CBD No     Flavoring No     Other No     Unknown No      Social History     Tobacco Use    Smoking status: Former     Types: Cigars    Smokeless tobacco: Never   Vaping Use    Vaping Use: Never used   Substance Use Topics    Alcohol use: Not Currently    Drug use: Yes     Types: Marijuana     Comment: last use ~1month       Review of Systems   Gastrointestinal:  Positive for abdominal pain and constipation. All other systems reviewed and are negative. Physical Exam  Physical Exam  General Appearance: alert and oriented, nad, non toxic appearing  Skin:  Warm, dry, intact. No cyanosis  HEENT: Atraumatic, normocephalic. No eye drainage. Normal hearing. Moist mucous membranes. Neck: Supple, trachea midline  Cardiac: RRR; no murmurs, rub, gallops. No pedal edema, 2+ pulses  Pulmonary: lungs CTAB; no wheezes, rales, rhonchi  Gastrointestinal: abdomen soft. Mild tenderness to left abdomen, predominantly around incisions. Distended; no guarding or rebound tenderness; good bowel sounds, no mass or bruits. Incisions C/D/I. Extremities:  No deformities.   No calf tenderness, no clubbing  Neuro:  no focal motor or sensory deficits, CN 2-12 grossly intact  Psych:  Normal mood and affect, normal judgement and insight     Vital Signs  ED Triage Vitals   Temperature Pulse Respirations Blood Pressure SpO2   12/02/23 1140 12/02/23 1140 12/02/23 1140 12/02/23 1142 12/02/23 1140   98.3 °F (36.8 °C) 72 16 119/61 98 %      Temp Source Heart Rate Source Patient Position - Orthostatic VS BP Location FiO2 (%)   12/02/23 1140 12/02/23 1140 12/02/23 1140 12/02/23 1140 --   Oral Monitor Sitting Left arm       Pain Score       12/02/23 1142       8           Vitals:    12/02/23 1142 12/02/23 1354 12/02/23 1624 12/02/23 1857   BP: 119/61 105/54 112/60 132/60   Pulse:  57 67 76   Patient Position - Orthostatic VS:  Lying Sitting Lying         Visual Acuity      ED Medications  Medications   ketorolac (TORADOL) injection 15 mg (15 mg Intravenous Given 12/2/23 1239)   iohexol (OMNIPAQUE) 350 MG/ML injection (MULTI-DOSE) 100 mL (100 mL Intravenous Given 12/2/23 1335)   oxyCODONE (ROXICODONE) IR tablet 5 mg (5 mg Oral Given 12/2/23 1430)   methocarbamol (ROBAXIN) tablet 500 mg (500 mg Oral Given 12/2/23 1652)       Diagnostic Studies  Results Reviewed       Procedure Component Value Units Date/Time    Basic metabolic panel [[id number]] Collected: 12/02/23 1238    Lab Status: Final result Specimen: Blood from Arm, Left Updated: 12/02/23 1321     Sodium 138 mmol/L      Potassium 3.9 mmol/L      Chloride 105 mmol/L      CO2 28 mmol/L      ANION GAP 5 mmol/L      BUN 12 mg/dL      Creatinine 0.93 mg/dL      Glucose 83 mg/dL      Calcium 9.6 mg/dL      eGFR 116 ml/min/1.73sq m     Narrative:      [place] guidelines for Chronic Kidney Disease (CKD):     Stage 1 with normal or high GFR (GFR > 90 mL/min/1.73 square meters)    Stage 2 Mild CKD (GFR = 60-89 mL/min/1.73 square meters)    Stage 3A Moderate CKD (GFR = 45-59 mL/min/1.73 square meters)    Stage 3B Moderate CKD (GFR = 30-44 mL/min/1.73 square meters)    Stage 4 Severe CKD (GFR = 15-29 mL/min/1.73 square meters)    Stage 5 End Stage CKD (GFR <15 mL/min/1.73 square meters)  Note: GFR calculation is accurate only with a steady state creatinine    CBC and differential [[id number]]  (Abnormal) Collected: 12/02/23 1238    Lab Status: Final result Specimen: Blood from Arm, Left Updated: 12/02/23 1307     WBC 5.56 Thousand/uL      RBC 4.77 Million/uL      Hemoglobin 14.8 g/dL      Hematocrit 42.7 %      MCV 90 fL      MCH 31.0 pg      MCHC 34.7 g/dL      RDW 11.8 %      MPV 8.9 fL      Platelets 246 Thousands/uL      nRBC 0 /100 WBCs      Neutrophils Relative 54 %      Immat GRANS % 0 % Lymphocytes Relative 35 %      Monocytes Relative 4 %      Eosinophils Relative 7 %      Basophils Relative 0 %      Neutrophils Absolute 2.94 Thousands/µL      Immature Grans Absolute 0.01 Thousand/uL      Lymphocytes Absolute 1.96 Thousands/µL      Monocytes Absolute 0.22 Thousand/µL      Eosinophils Absolute 0.41 Thousand/µL      Basophils Absolute 0.02 Thousands/µL                    CT abdomen pelvis with contrast   Final Result by Salvador Cheung MD (12/02 3140)      1. Small amount of pneumoperitoneum in the upper abdomen could be residual postsurgical, although may be slightly greater than typical for persistence of postoperative gas. Suggest reevaluation by the surgical team. No bowel obstruction. 2. Small volume of pelvic ascites, nonspecific. The study was marked in [facility] for immediate notification. Workstation performed: [id number]                    Procedures  Procedures         ED Course  ED Course as of 12/03/23 0805   Sat Dec 02, 2023   1323 Labs WNL   1551 CT abdomen pelvis with contrast  1.) Small amount of pneumoperitoneum in the upper abdomen could be residual postsurgical, although may be slightly greater than typical for persistence of postoperative gas. Suggest reevaluation by the surgical team. No bowel obstruction. 2.) Small volume of pelvic ascites, nonspecific. Will ask surgery to evaluate in consult   67 Spoke with surgery resident, will see in consult. Pt signed out to Dr. Chelle Eldridge, pending surgical consult. Dispo accordingly. SBIRT 22yo+      Flowsheet Row Most Recent Value   Initial Alcohol Screen: US AUDIT-C     1. How often do you have a drink containing alcohol? 0 Filed at: 12/02/2023 1241   2. How many drinks containing alcohol do you have on a typical day you are drinking? 0 Filed at: 12/02/2023 1241   3a. Male UNDER 65: How often do you have five or more drinks on one occasion? 0 Filed at: 12/02/2023 1241   3b. FEMALE Any Age, or MALE 65+: How often do you have 4 or more drinks on one occassion? 0 Filed at: 12/02/2023 1241   Audit-C Score 0 Filed at: 12/02/2023 1241   [place]: How many times in the past year have you. .. Used an illegal drug or used a prescription medication for non-medical reasons? Never Filed at: 12/02/2023 1241                      Medical Decision Making  A 27 yo male who is POD #6 diagnostic lap secondary to small bowel intussusception, presents with left sided abdominal pain. Abdomen is soft with mild tenderness, predominantly around incisions. Suspect pain may be due to constipation vs ileus, less likely postop complications. Will check labs and CT scan for further evaluation. Will provide pain control. Amount and/or Complexity of Data Reviewed  Labs: ordered. Radiology: ordered. Decision-making details documented in ED Course. Risk  Prescription drug management. Disposition  Final diagnoses:   Generalized abdominal pain   S/P exploratory laparotomy     Time reflects when diagnosis was documented in both MDM as applicable and the Disposition within this note       Time User Action Codes Description Comment    12/2/2023  4:07 PM Trini Rowley Add [R10.84] Generalized abdominal pain     12/2/2023  4:07 PM Trini Rowley Add [L56.408] S/P exploratory laparotomy           ED Disposition       ED Disposition   Discharge    Condition   Stable    Date/Time   Sat Dec 2, 2023 1904    Comment   Reji Weeks discharge to home/self care.                    Follow-up Information       Follow up With Specialties Details Why Contact Info    SHIRA Avila Nurse Practitioner Schedule an appointment as soon as possible for a visit in 1 week  [address]  [phone]              Discharge Medication List as of 12/2/2023  7:07 PM        START taking these medications    Details   methocarbamol (ROBAXIN) 500 mg tablet Take 1 tablet (500 mg total) by mouth 3 (three) times a day as needed for muscle spasms for up to 5 days, Starting Sat 12/2/2023, Until Thu 12/7/2023 at 2359, Normal           CONTINUE these medications which have NOT CHANGED    Details   acetaminophen (TYLENOL) 325 mg tablet Take 2 tablets (650 mg total) by mouth every 6 (six) hours as needed for mild pain or moderate pain, Starting Tue 11/28/2023, No Print      docusate sodium (COLACE) 100 mg capsule Take 1 capsule (100 mg total) by mouth every 12 (twelve) hours, Starting Sun 11/19/2023, Normal      ibuprofen (MOTRIN) 600 mg tablet Take 1 tablet (600 mg total) by mouth every 6 (six) hours as needed for mild pain for up to 10 days, Starting Sat 6/3/2023, Until Tue 8/1/2023 at 2359, Normal      lidocaine (LMX) 4 % cream Apply topically as needed for mild pain, Starting Sun 11/19/2023, Normal      polyethylene glycol (MIRALAX) 17 g packet Take 17 g by mouth daily, Starting Wed 11/15/2023, Normal             No discharge procedures on file.     PDMP Review       None            ED Provider  Electronically Signed by             Joe Silva DO  12/03/23 2566

## 2023-12-02 NOTE — ED PROVIDER NOTES
Patient signed out to me by Dr Aida Mirza. Patient is a 53-year-old male with abdominal pain since yesterday. Refer to her note for full details. Patient had labs as well as CT. Pending general surgery evaluation    4:33 PM  TT sent to general surgery . 4:52 PM  Dr Marylen Roys from general surgery evaluated patient. He did place Robaxin. He states no indication for OR at this time based on history, physical as well as CT findings. Will reevaluate in 1 to 2 hours. If patient with persistent pain will reach out to general surgery for possible observation    6:00 PM  Patient resting in bed. Tolerating liquids without discomfort or pain. Abdomen is soft tenderness predominantly around incision sites. Liver no rebound, guarding or peritoneal signs. He is maintaining airway and secretions and no respiratory distress. He states he feels better after the regimen medications given for him. He would like something to eat. I did reach out to general surgery and they agree to trial eating and walking. 6:25 PM  ambulated well without any difficulty or pain. Tolerated crackers but pending diet    7:04 PM  Patient tolerated p.o. well. He has no worsening pain, nausea, vomiting. Did discuss with Dr Marylen Roys and agreeable for DC home. Will DC home with Robaxin. Patient updated on this and agreeable. Patient is moving around in bed in no distress. Patient's abdomen remains soft. Return to ER instructions given. Answered questions at bedside    Counseling: I had a detailed discussion with the patient and/or guardian regarding: the historical points, exam findings, and any diagnostic results supporting the discharge diagnosis, lab results, radiology results, discharge instructions reviewed with patient and/or family/caregiver and understanding was verbalized. Instructions given to return to the emergency department if symptoms worsen or persist, or if there are any questions or concerns that arise at home. All imaging and/or lab testing discussed with patient, strict return to ED precautions discussed. Patient recommended to follow up promptly with appropriate outpatient provider. Patient and/or family members verbalizes understanding and agrees with plan. Patient and/or family members were given opportunity to ask questions, all questions were answered at this time. Patient is stable for discharge      Disclosure: Voice to text software was used in the preparation of this document and could have resulted in translational errors. Occasional wrong word or "sound a like" substitutions may have occurred due to the inherent limitations of voice recognition software. Read the chart carefully and recognize, using context, where substitutions have occurred. I have independently reviewed external records are available to me to the level of detail possible within the time constraints of my patient care responsibilities in the ED.          Lindsey Brown DO  12/02/23 1916

## 2023-12-02 NOTE — ED NOTES
Ordered meal tray for pt. Pt ambulated up and down long hallway. Pt reported minimal discomfort in the abdomen when making abrupt movements, but otherwise reports he is more comfortable.      Lucy Fitzgerald RN  12/02/23 8844

## 2023-12-03 NOTE — DISCHARGE INSTRUCTIONS
Please make sure you have a close follow-up appointment with Dr. Maria E Mendieta within the next 2 weeks

## 2023-12-05 NOTE — PROGRESS NOTES
[facility] - Outpatient Consultation New Patient  Yossi Whitmore 26 y.o. male MRN: [id number]  Encounter: [id number]          ASSESSMENT AND PLAN:    1. Rectal bleeding  2. History of small bowel intussusception s/p diagnostic laparoscopy  Patient presented to the ED 11/15/2023 for rectal bleeding, with unremarkable CT abdomen pelvis at that time. Patient then presented again to the ED on 11/26/2023 with lower abdominal pain. CT abdomen pelvis at that time showed jejunal intussusception. Taken to the OR on 11/27/2023 for exploratory laparoscopy which showed a 15 cm segment of small bowel intussusception with a mobile intraluminal soft bezoar which was milked distally with no bowel resection was performed. CBC and BMP 12/2/2023 normal, other than relative eosinophils 7%. Patient now presents for ongoing rectal bleeding over the last month, blood is described as dark in color and moderate in volume. Blood is seen in his undergarments and worsens with bowel movements. Has some associated rectal discomfort/pain.    -Schedule colonoscopy. Discussed bowel preparation, what to expect in procedure, and risks/benefits, including risk of bleeding, infection, and bowel perforation. Patient understands and agrees with plan for procedure. No further questions/concerns at present. 3. Constipation, unspecified  Patient reports new onset of constipation since November. Previously had normal bowel habits, about 1 bowel movement per day with no straining. He reports his last bowel movement was 11/25/2023. He is passing gas daily and is tolerating foods and beverages. Drinking about a gallon of water a day. Suspect constipation onset was originally due to intussusception, and now may be complication of surgery/anesthesia. -Start MiraLAX 17 g twice to 3 times daily. If no bowel movement by Friday, 12/8/2023, instructed patient to send a message on [employer].   Can consider enema at that time if necessary.  -Continue excellent water intake.  -Advised patient of red flag symptoms for ileus/obstruction including significant abdominal pain, distention, nausea/vomiting, inability to eat/drink, not passing gas. Instructed patient to go to the ED if having any of the symptoms. He verbalized understanding. Follow up 3 months.    ________________________________________________________    HPI:  Sandra Vasquez is a 45-year-old male with pertinent PMH including depression who presents with rectal bleeding. Labs reviewed. CBC and BMP normal 12/2/2023, other than elevated eosinophils. Patient reports at baseline he had normal bowel habits, 1 BM per day and no abdominal pain. In November he had new onset of abdominal pain, constipation, rectal bleeding. ED visits and hospital course described below. Patient reports his last bowel movement was 11/25/2023. He is passing gas daily and denies any nausea/vomiting and endorses a good appetite. Not currently using any medications for constipation. He endorses ongoing rectal bleeding, dark in color and did moderate volume. Blood is seen in undergarments even when not having a bowel movement, but does worsen with bowel movements and appears to be intermixed with stool. Also with some rectal pain/discomfort. He thinks his father may have some GI history but is unsure of the diagnosis. Patient was seen in the ED on 11/15/2023 for rectal bleeding and pain after anal intercourse. CT abdomen pelvis unremarkable with no acute findings. CBC unremarkable at that time. Patient then presented again to [address] [place] [address] ED on 11/26/2023 CT abdomen pelvis with lower abdominal pain. Showed distended stomach, small bowel intussusception. Patient was admitted to the hospital and taken to the OR 11/27/2023 for exploratory laparoscopy which showed approximately 15 cm segment of small bowel intussusception present in the mid jejunum.   Lead point was a mobile intraluminal soft bezoar which was milked distally. No bowel resection was performed. He did well postoperatively and was discharged on POD 1, 11/28/2023. Patient presented to the ED again on 12/2/2023 with sharp abdominal pain and constipation. Surgery team was consulted during this ED visit, no indication for surgery at that time. Treated with Robaxin and discharged home with referral to GI. REVIEW OF SYSTEMS:  10 point ROS reviewed and negative, except as above    Historical Information   Past Medical History:   Diagnosis Date    No known health problems      Past Surgical History:   Procedure Laterality Date    NO PAST SURGERIES      AL LAPS ABD PRTM&OMENTUM DX W/WO SPEC BR/WA SPX N/A 11/27/2023    Procedure: LAPAROSCOPY DIAGNOSTIC;  Surgeon: Jonna Bell MD;  [facility]: [facility] OR;  Service: General     Social History   Social History     Substance and Sexual Activity   Alcohol Use Not Currently     Social History     Substance and Sexual Activity   Drug Use Yes    Types: Marijuana    Comment: last use ~1month     Social History     Tobacco Use   Smoking Status Former    Types: Cigars   Smokeless Tobacco Never     No family history on file.     Meds/Allergies       Current Outpatient Medications:     acetaminophen (TYLENOL) 325 mg tablet    docusate sodium (COLACE) 100 mg capsule    ibuprofen (MOTRIN) 600 mg tablet    lidocaine (LMX) 4 % cream    methocarbamol (ROBAXIN) 500 mg tablet    polyethylene glycol (MIRALAX) 17 g packet    No Known Allergies        Objective   Wt Readings from Last 3 Encounters:   11/27/23 68.4 kg (150 lb 12.7 oz)   11/19/23 67.8 kg (149 lb 7.6 oz)   11/15/23 64.6 kg (142 lb 6.7 oz)     Temp Readings from Last 3 Encounters:   12/02/23 98.3 °F (36.8 °C) (Oral)   11/28/23 97.7 °F (36.5 °C) (Oral)   11/19/23 98.5 °F (36.9 °C) (Oral)     BP Readings from Last 3 Encounters:   12/02/23 132/60   11/28/23 93/55   11/19/23 111/57     Pulse Readings from Last 3 Encounters:   12/02/23 76 11/28/23 76   11/19/23 83        PHYSICAL EXAM:     Physical Exam  Vitals reviewed. Constitutional:       General: He is not in acute distress. Appearance: He is well-developed. HENT:      Head: Normocephalic and atraumatic. Eyes:      Conjunctiva/sclera: Conjunctivae normal.   Cardiovascular:      Rate and Rhythm: Normal rate and regular rhythm. Heart sounds: No murmur heard. Pulmonary:      Effort: Pulmonary effort is normal. No respiratory distress. Breath sounds: Normal breath sounds. Abdominal:      General: A surgical scar is present. Bowel sounds are normal. There is no distension. Palpations: Abdomen is soft. Tenderness: There is abdominal tenderness. There is no guarding. Comments: Laparoscopic surgical scars clean dry intact with no erythema   Musculoskeletal:         General: No swelling. Cervical back: Neck supple. Skin:     General: Skin is warm and dry. Neurological:      Mental Status: He is alert. Psychiatric:         Mood and Affect: Mood normal.             Lab Results:   No visits with results within 1 Day(s) from this visit.    Latest known visit with results is:   Admission on 12/02/2023, Discharged on 12/02/2023   Component Date Value    WBC 12/02/2023 5.56     RBC 12/02/2023 4.77     Hemoglobin 12/02/2023 14.8     Hematocrit 12/02/2023 42.7     MCV 12/02/2023 90     MCH 12/02/2023 31.0     MCHC 12/02/2023 34.7     RDW 12/02/2023 11.8     MPV 12/02/2023 8.9     Platelets 33/45/3191 337     nRBC 12/02/2023 0     Neutrophils Relative 12/02/2023 54     Immat GRANS % 12/02/2023 0     Lymphocytes Relative 12/02/2023 35     Monocytes Relative 12/02/2023 4     Eosinophils Relative 12/02/2023 7 (H)     Basophils Relative 12/02/2023 0     Neutrophils Absolute 12/02/2023 2.94     Immature Grans Absolute 12/02/2023 0.01     Lymphocytes Absolute 12/02/2023 1.96     Monocytes Absolute 12/02/2023 0.22     Eosinophils Absolute 12/02/2023 0.41     Basophils Absolute 12/02/2023 0.02     Sodium 12/02/2023 138     Potassium 12/02/2023 3.9     Chloride 12/02/2023 105     CO2 12/02/2023 28     ANION GAP 12/02/2023 5     BUN 12/02/2023 12     Creatinine 12/02/2023 0.93     Glucose 12/02/2023 83     Calcium 12/02/2023 9.6     eGFR 12/02/2023 116        Lab Results   Component Value Date    WBC 5.56 12/02/2023    HGB 14.8 12/02/2023    HCT 42.7 12/02/2023    MCV 90 12/02/2023     12/02/2023       Lab Results   Component Value Date    SODIUM 138 12/02/2023    K 3.9 12/02/2023     12/02/2023    CO2 28 12/02/2023    AGAP 5 12/02/2023    BUN 12 12/02/2023    CREATININE 0.93 12/02/2023    GLUC 83 12/02/2023    GLUF 115 (H) 11/28/2023    CALCIUM 9.6 12/02/2023    AST 16 11/26/2023    ALT 19 11/26/2023    ALKPHOS 74 11/26/2023    TP 7.5 11/26/2023    TBILI 0.35 11/26/2023    EGFR 116 12/02/2023         Radiology Results:   CT abdomen pelvis with contrast    Result Date: 12/2/2023  Narrative: CT ABDOMEN AND PELVIS WITH IV CONTRAST INDICATION:   left sided abdominal pain, recent surgery for intussusception. COMPARISON: CT abdomen pelvis 11/27/2023 TECHNIQUE:  CT examination of the abdomen and pelvis was performed. Multiplanar 2D reformatted images were created from the source data. This examination, like all CT scans performed in the [facility], was performed utilizing techniques to minimize radiation dose exposure, including the use of iterative reconstruction and automated exposure control. Radiation dose length product (DLP) for this visit:  407 mGy-cm IV Contrast:  100 mL of iohexol (OMNIPAQUE) Enteric Contrast:  Enteric contrast was not administered. FINDINGS: ABDOMEN LOWER CHEST:  No clinically significant abnormality identified in the visualized lower chest. LIVER/BILIARY TREE:  Unremarkable. GALLBLADDER:  No calcified gallstones. No pericholecystic inflammatory change. SPLEEN:  Unremarkable. PANCREAS:  Unremarkable. ADRENAL GLANDS:  Unremarkable. KIDNEYS/URETERS:  Unremarkable. No hydronephrosis. STOMACH AND BOWEL:  Unremarkable. No bowel obstruction. APPENDIX:  No findings to suggest appendicitis. ABDOMINOPELVIC CAVITY: Small volume of pelvic ascites. Small amount of pneumoperitoneum in the upper abdomen. VESSELS:  Unremarkable for patient's age. PELVIS REPRODUCTIVE ORGANS:  Unremarkable for patient's age. URINARY BLADDER:  Unremarkable. ABDOMINAL WALL/INGUINAL REGIONS:  Unremarkable. OSSEOUS STRUCTURES:  No acute fracture or destructive osseous lesion. Impression: 1. Small amount of pneumoperitoneum in the upper abdomen could be residual postsurgical, although may be slightly greater than typical for persistence of postoperative gas. Suggest reevaluation by the surgical team. No bowel obstruction. 2. Small volume of pelvic ascites, nonspecific. The study was marked in [facility] for immediate notification. Workstation performed: [id number]     CT abdomen pelvis wo contrast    Result Date: 11/27/2023  Narrative: CT ABDOMEN AND PELVIS WITHOUT IV CONTRAST INDICATION: Abdominal pain  repeat w/ PO contrast suggested by reading radiologist. COMPARISON: CT abdomen and pelvis dated 11/26/2023 TECHNIQUE:  CT examination of the abdomen and pelvis was performed without intravenous contrast. Multiplanar 2D reformatted images were created from the source data. This examination, like all CT scans performed in the [facility], was performed utilizing techniques to minimize radiation dose exposure, including the use of iterative reconstruction and automated exposure control. Radiation dose length product (DLP) for this visit:  419.36 mGy-cm Enteric contrast was administered. FINDINGS: ABDOMEN LOWER CHEST:  No clinically significant abnormality identified in the visualized lower chest. LIVER/BILIARY TREE:  Unremarkable. GALLBLADDER: Vicarious excretion of contrast, otherwise grossly unremarkable. SPLEEN:  Unremarkable. PANCREAS:  Unremarkable.  ADRENAL GLANDS: Unremarkable. KIDNEYS/URETERS:  Unremarkable. No hydronephrosis. STOMACH AND BOWEL: Small bowel intussusception in the left upper quadrant is redemonstrated (axial image 60, series 2), of unknown clinical significance. No evidence of bowel obstruction. Otherwise grossly unremarkable. APPENDIX:  No findings to suggest appendicitis. ABDOMINOPELVIC CAVITY:  No ascites. No pneumoperitoneum. No lymphadenopathy. VESSELS:  Unremarkable for patient's age. PELVIS REPRODUCTIVE ORGANS:  Unremarkable for patient's age. URINARY BLADDER:  Unremarkable. ABDOMINAL WALL/INGUINAL REGIONS:  Unremarkable. OSSEOUS STRUCTURES:  No acute fracture or destructive osseous lesion. Impression: Small bowel intussusception in the left upper quadrant is redemonstrated (axial image 60, series 2), of unknown clinical significance. No evidence of bowel obstruction. Abdominal pain otherwise grossly unremarkable. Workstation performed: [id number]     CT abdomen pelvis w contrast    Result Date: 11/27/2023  Narrative: CT ABDOMEN AND PELVIS WITH IV CONTRAST INDICATION:   LLQ abdominal pain BL lower abd pain, worse in LLQ. COMPARISON:  None. TECHNIQUE:  CT examination of the abdomen and pelvis was performed. Multiplanar 2D reformatted images were created from the source data. This examination, like all CT scans performed in the [facility], was performed utilizing techniques to minimize radiation dose exposure, including the use of iterative reconstruction and automated exposure control. Radiation dose length product (DLP) for this visit:  415.32 mGy-cm IV Contrast:  100 mL of iohexol (OMNIPAQUE) Enteric Contrast:  Enteric contrast was not administered. FINDINGS: ABDOMEN LOWER CHEST:  No clinically significant abnormality identified in the visualized lower chest. LIVER/BILIARY TREE:  Unremarkable. GALLBLADDER:  No calcified gallstones. No pericholecystic inflammatory change. SPLEEN:  Unremarkable. PANCREAS:  Unremarkable.  ADRENAL GLANDS:  Unremarkable. KIDNEYS/URETERS:  Unremarkable. No hydronephrosis. STOMACH AND BOWEL: Stomach is mildly distended with air and fluid. No intraluminal mass seen. There is a short segment of small bowel intussusception in the left upper abdomen involving proximal jejunal loops noted without focal lead point mass identified. This may be transient in nature or related to focal abnormal peristalsis/dysmotility. If clinically indicated, consider follow-up examination with oral contrast to ensure resolution. The small and large bowel loops are otherwise normal in course and caliber without obstruction or inflammation. Terminal ileum and appendix appear grossly unremarkable. APPENDIX:  No findings to suggest appendicitis. ABDOMINOPELVIC CAVITY:  No ascites. No pneumoperitoneum. No lymphadenopathy. VESSELS:  Unremarkable for patient's age. PELVIS REPRODUCTIVE ORGANS:  Unremarkable for patient's age. URINARY BLADDER: Mildly distended and grossly unremarkable. ABDOMINAL WALL/INGUINAL REGIONS:  Unremarkable. OSSEOUS STRUCTURES:  No acute fracture or destructive osseous lesion. Impression: Short segment of small bowel intussusception in the left upper abdomen involving proximal jejunal loops noted without focal lead point mass identified. This may be transient in nature or related to focal abnormal peristalsis/dysmotility. If clinically indicated, consider follow-up examination with oral contrast to ensure resolution. No evidence for bowel obstruction, inflammation, appendicitis, obstructive uropathy, free air, free fluid, or loculated fluid collections in the abdomen/pelvis. Workstation performed: [id number]     CT abdomen pelvis with contrast    Result Date: 11/15/2023  Narrative: CT ABDOMEN AND PELVIS WITH IV CONTRAST INDICATION:   Rectal bleeding and pain after anal intercourse, constipation. COMPARISON: CT of the abdomen and pelvis 8/1/2023. TECHNIQUE:  CT examination of the abdomen and pelvis was performed. Multiplanar 2D reformatted images were created from the source data. This examination, like all CT scans performed in the [facility], was performed utilizing techniques to minimize radiation dose exposure, including the use of iterative reconstruction and automated exposure control. Radiation dose length product (DLP) for this visit:  435 mGy-cm IV Contrast:  85 mL of iohexol (OMNIPAQUE) Enteric Contrast:  Enteric contrast was not administered. FINDINGS: ABDOMEN LOWER CHEST:  No clinically significant abnormality identified in the visualized lower chest. LIVER/BILIARY TREE:  Unremarkable. GALLBLADDER:  No calcified gallstones. No pericholecystic inflammatory change. SPLEEN:  Unremarkable. PANCREAS:  Unremarkable. ADRENAL GLANDS:  Unremarkable. KIDNEYS/URETERS:  Unremarkable. No hydronephrosis. STOMACH AND BOWEL: No disproportionate dilation of the small or large bowel. APPENDIX:  No findings to suggest appendicitis. ABDOMINOPELVIC CAVITY:  No ascites. No pneumoperitoneum. No lymphadenopathy. VESSELS:  Unremarkable for patient's age. PELVIS REPRODUCTIVE ORGANS:  Unremarkable for patient's age. URINARY BLADDER:  Unremarkable. ABDOMINAL WALL/INGUINAL REGIONS:  Unremarkable. OSSEOUS STRUCTURES:  No acute fracture or destructive osseous lesion. Impression: No acute findings in the abdomen or pelvis.  Workstation performed: [id number]       Prior Procedure Results/Reports   No prior colonoscopy      Shakila Turner PA-C   Available on [employer]

## 2023-12-06 ENCOUNTER — ANESTHESIA (OUTPATIENT)
Dept: ANESTHESIOLOGY | Facility: HOSPITAL | Age: 23
End: 2023-12-06

## 2023-12-06 ENCOUNTER — ANESTHESIA EVENT (OUTPATIENT)
Dept: ANESTHESIOLOGY | Facility: HOSPITAL | Age: 23
End: 2023-12-06

## 2023-12-06 ENCOUNTER — TELEPHONE (OUTPATIENT)
Dept: GASTROENTEROLOGY | Facility: CLINIC | Age: 23
End: 2023-12-06

## 2023-12-06 ENCOUNTER — OFFICE VISIT (OUTPATIENT)
Dept: GASTROENTEROLOGY | Facility: CLINIC | Age: 23
End: 2023-12-06
Payer: COMMERCIAL

## 2023-12-06 VITALS
HEIGHT: 69 IN | BODY MASS INDEX: 21.18 KG/M2 | HEART RATE: 83 BPM | OXYGEN SATURATION: 99 % | SYSTOLIC BLOOD PRESSURE: 105 MMHG | TEMPERATURE: 97 F | WEIGHT: 143 LBS | DIASTOLIC BLOOD PRESSURE: 63 MMHG

## 2023-12-06 DIAGNOSIS — K56.1 INTUSSUSCEPTION OF SMALL INTESTINE (HCC): ICD-10-CM

## 2023-12-06 DIAGNOSIS — K59.00 CONSTIPATION: ICD-10-CM

## 2023-12-06 DIAGNOSIS — K62.5 RECTAL BLEEDING: Primary | ICD-10-CM

## 2023-12-06 DIAGNOSIS — R10.9 ABDOMINAL PAIN: ICD-10-CM

## 2023-12-06 PROCEDURE — 99243 OFF/OP CNSLTJ NEW/EST LOW 30: CPT | Performed by: DIETITIAN, REGISTERED

## 2023-12-06 RX ORDER — POLYETHYLENE GLYCOL 3350 17 G/17G
17 POWDER, FOR SOLUTION ORAL 2 TIMES DAILY
Qty: 60 EACH | Refills: 0 | Status: SHIPPED | OUTPATIENT
Start: 2023-12-06 | End: 2024-01-05

## 2023-12-06 NOTE — TELEPHONE ENCOUNTER
ASC Screening    ASC Screening  BMI > than 45: No  Are you currently pregnant?: No  Do you rely on a wheelchair for mobility?: No  Do you need oxygen during the day?: No  Have you ever been informed by anesthesia that you have a difficult airway?: No  Have you been diagnosed with End Stage Renal Disease (ESRD)?: No  Are you actively on dialysis?: No  Have you been diagnosed with Pulmonary Hypertension?: No  Do you have a pacemaker or an Automatic Implantable Cardioverter Defibrillator (AICD)?: No  Have you ever had an organ transplant?: No  Have you had a stroke, heart attack, myocardial infarction (MI) within the last 6 months?: No  Have you ever been diagnosed with Aortic Stenosis?: No  Have you ever been diagnosed  with Congestive Heart Failure?: No  Have you ever been diagnosed with a heart valve disease?: No  Are you Diabetic?: No  If you are Diabetic, has your A1C been greater than 12 within the last six months?: N/A       Scheduled date of colon (as of today) Dec 14  Physician performing: Dr. James Foley  Location of procedure:   [facility]  Instructions given to patient: Miralax  Clearances: n/a

## 2023-12-12 ENCOUNTER — OFFICE VISIT (OUTPATIENT)
Dept: SURGERY | Facility: CLINIC | Age: 23
End: 2023-12-12

## 2023-12-12 VITALS
OXYGEN SATURATION: 98 % | HEART RATE: 88 BPM | TEMPERATURE: 97 F | WEIGHT: 143 LBS | HEIGHT: 68 IN | BODY MASS INDEX: 21.67 KG/M2

## 2023-12-12 DIAGNOSIS — K59.00 CONSTIPATION, UNSPECIFIED CONSTIPATION TYPE: Primary | ICD-10-CM

## 2023-12-12 PROCEDURE — 99024 POSTOP FOLLOW-UP VISIT: CPT | Performed by: SURGERY

## 2023-12-12 RX ORDER — POLYETHYLENE GLYCOL 3350 17 G/17G
238 POWDER, FOR SOLUTION ORAL SEE ADMIN INSTRUCTIONS
Qty: 238 G | Refills: 0 | Status: SHIPPED | OUTPATIENT
Start: 2023-12-12 | End: 2023-12-14 | Stop reason: HOSPADM

## 2023-12-12 RX ORDER — BISACODYL 5 MG/1
5 TABLET, DELAYED RELEASE ORAL SEE ADMIN INSTRUCTIONS
Qty: 4 TABLET | Refills: 0 | Status: SHIPPED | OUTPATIENT
Start: 2023-12-12 | End: 2023-12-14 | Stop reason: HOSPADM

## 2023-12-12 NOTE — PROGRESS NOTES
Assessment/Plan:  Status post diagnostic laparoscopy for CT findings of intussusception. He reports significant constipation though is passing gas. Due to have colonoscopy on 12/14, has not received any prescription for prep or instructions. Will send prescription for MiraLAX and Dulcolax to the pharmacy for him and colonoscopy prep instructions were given. No problem-specific Assessment & Plan notes found for this encounter. Diagnoses and all orders for this visit:    Constipation, unspecified constipation type  -     polyethylene glycol (GLYCOLAX) 17 GM/SCOOP powder; Take 238 g by mouth see administration instructions Mix 238 g with 64 oz of clear liquid. Drink half starting at noon on the day prior to colonoscopy. Drink remaining half 6 hours prior to procedure on day of colonoscopy. -     bisacodyl (DULCOLAX) 5 mg EC tablet; Take 1 tablet (5 mg total) by mouth see administration instructions Take 2 tablets at 12:00 p.m. on day prior to colonoscopy. Take 2 tablets at 4:00 p.m. on day prior to colonoscopy          Subjective:      Patient ID: Santo Briggs is a 26 y.o. male. Presents for postop status post diagnostic laparoscopy. He reports constipation since surgery says he has not had a bowel movement since that time. He is passing gas. He takes MiraLAX twice a day. He is scheduled to have a colonoscopy on 12/14 however he does not know anything about the prep or any preoperative instructions. He does report having intermittent anal/rectal bleeding that he says is every other morning and notices it when he attempts to have a bowel movement and he will feel the blood on his anus, does not have any blood in his underwear.         The following portions of the patient's history were reviewed and updated as appropriate: allergies, current medications, past family history, past medical history, past social history, past surgical history, and problem list.    Review of Systems Gastrointestinal:  Positive for anal bleeding and constipation. Negative for abdominal pain, blood in stool, nausea, rectal pain and vomiting. All other systems reviewed and are negative. Objective:      Pulse 88   Temp (!) 97 °F (36.1 °C) (Tympanic)   Ht 5' 8" (1.727 m)   Wt 64.9 kg (143 lb)   SpO2 98%   BMI 21.74 kg/m²          Physical Exam  Vitals reviewed. Constitutional:       General: He is not in acute distress. Appearance: Normal appearance. He is normal weight. Abdominal:      General: Abdomen is flat. There is no distension. Palpations: Abdomen is soft. Tenderness: There is no abdominal tenderness. Neurological:      Mental Status: He is alert.

## 2023-12-13 RX ORDER — SODIUM CHLORIDE 9 MG/ML
125 INJECTION, SOLUTION INTRAVENOUS CONTINUOUS
Status: CANCELLED | OUTPATIENT
Start: 2023-12-13

## 2023-12-14 ENCOUNTER — ANESTHESIA (OUTPATIENT)
Dept: GASTROENTEROLOGY | Facility: MEDICAL CENTER | Age: 23
End: 2023-12-14

## 2023-12-14 ENCOUNTER — ANESTHESIA EVENT (OUTPATIENT)
Dept: GASTROENTEROLOGY | Facility: MEDICAL CENTER | Age: 23
End: 2023-12-14

## 2023-12-14 ENCOUNTER — TELEPHONE (OUTPATIENT)
Age: 23
End: 2023-12-14

## 2023-12-14 ENCOUNTER — HOSPITAL ENCOUNTER (OUTPATIENT)
Dept: GASTROENTEROLOGY | Facility: MEDICAL CENTER | Age: 23
Setting detail: OUTPATIENT SURGERY
End: 2023-12-14
Payer: COMMERCIAL

## 2023-12-14 ENCOUNTER — PREP FOR PROCEDURE (OUTPATIENT)
Age: 23
End: 2023-12-14

## 2023-12-14 VITALS
HEIGHT: 68 IN | DIASTOLIC BLOOD PRESSURE: 59 MMHG | TEMPERATURE: 96.6 F | OXYGEN SATURATION: 100 % | WEIGHT: 143 LBS | HEART RATE: 57 BPM | BODY MASS INDEX: 21.67 KG/M2 | SYSTOLIC BLOOD PRESSURE: 102 MMHG | RESPIRATION RATE: 17 BRPM

## 2023-12-14 DIAGNOSIS — R10.84 GENERALIZED ABDOMINAL PAIN: Primary | ICD-10-CM

## 2023-12-14 DIAGNOSIS — K62.5 RECTAL BLEEDING: ICD-10-CM

## 2023-12-14 DIAGNOSIS — K59.00 CONSTIPATION, UNSPECIFIED CONSTIPATION TYPE: Primary | ICD-10-CM

## 2023-12-14 DIAGNOSIS — R10.9 ABDOMINAL PAIN: ICD-10-CM

## 2023-12-14 DIAGNOSIS — K59.00 CONSTIPATION, UNSPECIFIED CONSTIPATION TYPE: ICD-10-CM

## 2023-12-14 PROBLEM — F12.90 MARIJUANA SMOKER, EPISODIC: Status: ACTIVE | Noted: 2023-12-14

## 2023-12-14 PROCEDURE — 88342 IMHCHEM/IMCYTCHM 1ST ANTB: CPT | Performed by: PATHOLOGY

## 2023-12-14 PROCEDURE — 88305 TISSUE EXAM BY PATHOLOGIST: CPT | Performed by: PATHOLOGY

## 2023-12-14 PROCEDURE — 88341 IMHCHEM/IMCYTCHM EA ADD ANTB: CPT | Performed by: PATHOLOGY

## 2023-12-14 PROCEDURE — 45380 COLONOSCOPY AND BIOPSY: CPT | Performed by: STUDENT IN AN ORGANIZED HEALTH CARE EDUCATION/TRAINING PROGRAM

## 2023-12-14 RX ORDER — PROPOFOL 10 MG/ML
INJECTION, EMULSION INTRAVENOUS AS NEEDED
Status: DISCONTINUED | OUTPATIENT
Start: 2023-12-14 | End: 2023-12-14

## 2023-12-14 RX ORDER — SODIUM CHLORIDE 9 MG/ML
125 INJECTION, SOLUTION INTRAVENOUS CONTINUOUS
Status: DISCONTINUED | OUTPATIENT
Start: 2023-12-14 | End: 2023-12-18 | Stop reason: HOSPADM

## 2023-12-14 RX ORDER — LIDOCAINE HYDROCHLORIDE 20 MG/ML
INJECTION, SOLUTION EPIDURAL; INFILTRATION; INTRACAUDAL; PERINEURAL AS NEEDED
Status: DISCONTINUED | OUTPATIENT
Start: 2023-12-14 | End: 2023-12-14

## 2023-12-14 RX ORDER — POLYETHYLENE GLYCOL 3350 17 G/17G
17 POWDER, FOR SOLUTION ORAL DAILY
Qty: 30 EACH | Refills: 11 | Status: SHIPPED | OUTPATIENT
Start: 2023-12-14

## 2023-12-14 RX ADMIN — PROPOFOL 150 MG: 10 INJECTION, EMULSION INTRAVENOUS at 12:28

## 2023-12-14 RX ADMIN — SODIUM CHLORIDE 125 ML/HR: 0.9 INJECTION, SOLUTION INTRAVENOUS at 12:23

## 2023-12-14 RX ADMIN — LIDOCAINE HYDROCHLORIDE 60 MG: 20 INJECTION, SOLUTION EPIDURAL; INFILTRATION; INTRACAUDAL at 12:28

## 2023-12-14 RX ADMIN — PROPOFOL 20 MG: 10 INJECTION, EMULSION INTRAVENOUS at 12:32

## 2023-12-14 RX ADMIN — PROPOFOL 50 MG: 10 INJECTION, EMULSION INTRAVENOUS at 12:31

## 2023-12-14 NOTE — TELEPHONE ENCOUNTER
Scheduled date of colonoscopy (as of today):03/05/24  Physician performing colonoscopy:Danny  [facility] of colonoscopy[facility]  Bowel prep reviewed with patient: 2 day Golytely sent to pt My Chart  Instructions reviewed with patient by:FANNY  Clearances: N/A    Pt past ASC on 12/06/23 Pt had office visit on 12/06/2023    : Zenia Mayers  [phone]

## 2023-12-14 NOTE — ANESTHESIA PREPROCEDURE EVALUATION
Procedure:  COLONOSCOPY    Relevant Problems   NEURO/PSYCH   (+) Depression      Other   (+) Marijuana smoker, episodic   (+) Palpitations        Physical Exam    Airway    Mallampati score: II         Dental   No notable dental hx     Cardiovascular  Rhythm: regular, Rate: normal    Pulmonary   Breath sounds clear to auscultation    Other Findings        Anesthesia Plan  ASA Score- 2     Anesthesia Type- IV sedation with anesthesia with ASA Monitors. Additional Monitors:     Airway Plan:            Plan Factors-Exercise tolerance (METS): >4 METS. Chart reviewed. Existing labs reviewed. Patient is not a current smoker. Patient instructed to abstain from smoking on day of procedure. Patient did not smoke on day of surgery (last vaped 2 days ago). Obstructive sleep apnea risk education given perioperatively. Induction- intravenous. Postoperative Plan-     Informed Consent- Anesthetic plan and risks discussed with patient.

## 2023-12-14 NOTE — ANESTHESIA POSTPROCEDURE EVALUATION
Post-Op Assessment Note    CV Status:  Stable    Pain management: adequate       Mental Status:  Awake   Hydration Status:  Stable   PONV Controlled:  Controlled   Airway Patency:  Patent     Post Op Vitals Reviewed: Yes      Staff: Anesthesiologist               BP 94/55 (12/14/23 1250)    Temp      Pulse (!) 54 (12/14/23 1250)   Resp 16 (12/14/23 1250)    SpO2 100 % (12/14/23 1250)

## 2023-12-15 ENCOUNTER — PATIENT OUTREACH (OUTPATIENT)
Dept: FAMILY MEDICINE CLINIC | Facility: CLINIC | Age: 23
End: 2023-12-15

## 2023-12-15 ENCOUNTER — TELEPHONE (OUTPATIENT)
Age: 23
End: 2023-12-15

## 2023-12-19 PROCEDURE — 88342 IMHCHEM/IMCYTCHM 1ST ANTB: CPT | Performed by: PATHOLOGY

## 2023-12-19 PROCEDURE — 88305 TISSUE EXAM BY PATHOLOGIST: CPT | Performed by: PATHOLOGY

## 2023-12-19 PROCEDURE — 88341 IMHCHEM/IMCYTCHM EA ADD ANTB: CPT | Performed by: PATHOLOGY

## 2024-02-20 ENCOUNTER — TELEPHONE (OUTPATIENT)
Dept: GASTROENTEROLOGY | Facility: MEDICAL CENTER | Age: 24
End: 2024-02-20

## 2024-02-20 NOTE — TELEPHONE ENCOUNTER
Confirming Upcoming Procedure: Colonoscopy on 03/05/2024  Physician performing: Dr. Delgado  Location of procedure:  [facility]  Prep: Toi.     Confirmed

## 2024-02-23 ENCOUNTER — ANESTHESIA (OUTPATIENT)
Dept: ANESTHESIOLOGY | Facility: HOSPITAL | Age: 24
End: 2024-02-23

## 2024-02-23 ENCOUNTER — ANESTHESIA EVENT (OUTPATIENT)
Dept: ANESTHESIOLOGY | Facility: HOSPITAL | Age: 24
End: 2024-02-23

## 2024-03-01 ENCOUNTER — TELEPHONE (OUTPATIENT)
Dept: GASTROENTEROLOGY | Facility: MEDICAL CENTER | Age: 24
End: 2024-03-01

## 2024-03-01 NOTE — TELEPHONE ENCOUNTER
----- Message from Nessa Simpson sent at 3/1/2024  1:00 PM EST -----  Regarding: Reschedule  Please r/s patient.  His insurance is temporarily inactive.   Patient is aware and is waiting for a call to R/S.  Thank you!

## 2024-03-01 NOTE — TELEPHONE ENCOUNTER
Called patient to cancel/reschedule procedure due to insurance being inactive.  Patient did not want to reschedule until insurance is reinstated.     Patient will call us back to reschedule once insurance is back.

## 2024-07-23 ENCOUNTER — APPOINTMENT (EMERGENCY)
Dept: RADIOLOGY | Facility: HOSPITAL | Age: 24
End: 2024-07-23
Payer: COMMERCIAL

## 2024-07-23 ENCOUNTER — HOSPITAL ENCOUNTER (EMERGENCY)
Facility: HOSPITAL | Age: 24
Discharge: HOME | End: 2024-07-23
Attending: EMERGENCY MEDICINE
Payer: COMMERCIAL

## 2024-07-23 VITALS
SYSTOLIC BLOOD PRESSURE: 125 MMHG | DIASTOLIC BLOOD PRESSURE: 60 MMHG | TEMPERATURE: 98.6 F | BODY MASS INDEX: 21.69 KG/M2 | WEIGHT: 142.64 LBS | HEART RATE: 95 BPM | OXYGEN SATURATION: 98 % | RESPIRATION RATE: 16 BRPM

## 2024-07-23 DIAGNOSIS — M25.561 RIGHT KNEE PAIN: Primary | ICD-10-CM

## 2024-07-23 DIAGNOSIS — M79.661 PAIN OF RIGHT LOWER LEG: ICD-10-CM

## 2024-07-23 PROCEDURE — 73630 X-RAY EXAM OF FOOT: CPT

## 2024-07-23 PROCEDURE — 99283 EMERGENCY DEPT VISIT LOW MDM: CPT

## 2024-07-23 PROCEDURE — 99284 EMERGENCY DEPT VISIT MOD MDM: CPT | Performed by: EMERGENCY MEDICINE

## 2024-07-23 PROCEDURE — 73562 X-RAY EXAM OF KNEE 3: CPT

## 2024-07-23 PROCEDURE — 96372 THER/PROPH/DIAG INJ SC/IM: CPT

## 2024-07-23 PROCEDURE — 73590 X-RAY EXAM OF LOWER LEG: CPT

## 2024-07-23 RX ORDER — KETOROLAC TROMETHAMINE 30 MG/ML
15 INJECTION, SOLUTION INTRAMUSCULAR; INTRAVENOUS ONCE
Status: COMPLETED | OUTPATIENT
Start: 2024-07-23 | End: 2024-07-23

## 2024-07-23 RX ADMIN — KETOROLAC TROMETHAMINE 15 MG: 30 INJECTION, SOLUTION INTRAMUSCULAR; INTRAVENOUS at 13:10

## 2024-07-23 NOTE — ED PROVIDER NOTES
History  Chief Complaint   Patient presents with    Knee Pain     Rt knee pain radiating to foot since yesterday.  Sharp stabbing pain.  Denies injury     23 y.o. M p/w right knee pain x yesterday.  Denies injury. Reports entire anterior portion of right knee has a constant sharp pain that radiates down his anterior lower leg into this 5th toe.  Denies F/C, redness, swelling, focal deficit, or injury.      History provided by:  Patient   used: No    Knee Pain  Associated symptoms: no fever        Prior to Admission Medications   Prescriptions Last Dose Informant Patient Reported? Taking?   acetaminophen (TYLENOL) 325 mg tablet  Self No No   Sig: Take 2 tablets (650 mg total) by mouth every 6 (six) hours as needed for mild pain or moderate pain   docusate sodium (COLACE) 100 mg capsule  Self No No   Sig: Take 1 capsule (100 mg total) by mouth every 12 (twelve) hours   Patient not taking: Reported on 12/12/2023   ibuprofen (MOTRIN) 600 mg tablet   No No   Sig: Take 1 tablet (600 mg total) by mouth every 6 (six) hours as needed for mild pain for up to 10 days   lidocaine (LMX) 4 % cream  Self No No   Sig: Apply topically as needed for mild pain   methocarbamol (ROBAXIN) 500 mg tablet  Self No No   Sig: Take 1 tablet (500 mg total) by mouth 3 (three) times a day as needed for muscle spasms for up to 5 days   polyethylene glycol (MIRALAX) 17 g packet   No No   Sig: Take 17 g by mouth daily      Facility-Administered Medications: None       Past Medical History:   Diagnosis Date    No known health problems        Past Surgical History:   Procedure Laterality Date    [place] LAPS ABD PRTM&OMENTUM DX W/WO SPEC BR/WA SPX N/A 11/27/2023    Procedure: LAPAROSCOPY DIAGNOSTIC;  Surgeon: Em Yeboah MD;  Location: [facility];  Service: General       History reviewed. No pertinent family history.  I have reviewed and agree with the history as documented.    E-Cigarette/Vaping    E-Cigarette Use Current Some  Day User      E-Cigarette/Vaping Substances    Nicotine Yes     THC No     CBD No     Flavoring No     Other No     Unknown No      Social History     Tobacco Use    Smoking status: Former     Types: Cigars    Smokeless tobacco: Never   Vaping Use    Vaping status: Some Days    Substances: Nicotine   Substance Use Topics    Alcohol use: Not Currently    Drug use: Yes     Types: Marijuana     Comment: last use ~1month       Review of Systems   Constitutional:  Negative for chills and fever.   Musculoskeletal:  Negative for joint swelling.        Right knee pain   Skin:  Negative for rash and wound.   Neurological:  Negative for weakness and numbness.       Physical Exam  Physical Exam  Vitals and nursing note reviewed.   Constitutional:       General: He is not in acute distress.     Appearance: He is well-developed. He is not ill-appearing, toxic-appearing or diaphoretic.   HENT:      Head: Normocephalic and atraumatic.   Cardiovascular:      Pulses: Normal pulses.   Pulmonary:      Effort: Pulmonary effort is normal.      Breath sounds: No stridor.   Musculoskeletal:         General: No deformity. Normal range of motion.      Right hip: No tenderness or bony tenderness. Normal range of motion.      Right knee: No swelling, deformity, erythema, ecchymosis or crepitus. Normal range of motion. Tenderness (Diffuse) present.      Right lower leg: Tenderness present. No swelling. No edema.      Right ankle: No tenderness. Normal range of motion. Normal pulse.      Right foot: Normal range of motion. Tenderness present. No swelling, bony tenderness or crepitus. Normal pulse.      Comments: No gross deformity. No obvious swelling.  No signs of septic joint.     Skin:     General: Skin is warm and dry.      Findings: No abrasion, bruising, ecchymosis, erythema, laceration or rash.      Comments: No signs of septic joint.  No crepitus.  No discoloration.  No excessive warmth.   Neurological:      Mental Status: He is alert.       GCS: GCS eye subscore is 4. GCS verbal subscore is 5. GCS motor subscore is 6.      Sensory: No sensory deficit.      Motor: Motor function is intact.   Psychiatric:         Behavior: Behavior normal.         Vital Signs  ED Triage Vitals [07/23/24 1236]   Temperature Pulse Respirations Blood Pressure SpO2   98.6 °F (37 °C) 95 16 125/60 98 %      Temp src Heart Rate Source Patient Position - Orthostatic VS BP Location FiO2 (%)   -- -- -- -- --      Pain Score       9           Vitals:    07/23/24 1236   BP: 125/60   Pulse: 95         Visual Acuity      ED Medications  Medications   ketorolac (TORADOL) injection 15 mg (15 mg Intramuscular Given 7/23/24 1310)       Diagnostic Studies  Results Reviewed       None                   XR knee 3 views right non injury   ED Interpretation by Julianna Aguilar DO (07/23 1326)   Interpreted by me as no fracture      XR tibia fibula 2 views RIGHT   ED Interpretation by Julianna Aguilar DO (07/23 1326)   Interpreted by me as no fracture      XR foot 3+ views RIGHT   ED Interpretation by Julianna Aguilar DO (07/23 1326)   Interpreted by me as no fracture                 Procedures  Procedures         ED Course  ED Course as of 07/23/24 1348   Tue Jul 23, 2024   1331 Updated pt on xray results and instructed him to use NSAIDs and f/u with PCP if pain persists.                                 SBIRT 20yo+      Flowsheet Row Most Recent Value   Initial Alcohol Screen: US AUDIT-C     1. How often do you have a drink containing alcohol? 0 Filed at: 07/23/2024 1243   2. How many drinks containing alcohol do you have on a typical day you are drinking?  0 Filed at: 07/23/2024 1243   3a. Male UNDER 65: How often do you have five or more drinks on one occasion? 0 Filed at: 07/23/2024 1243   3b. FEMALE Any Age, or MALE 65+: How often do you have 4 or more drinks on one occassion? 0 Filed at: 07/23/2024 1243   Audit-C Score 0 Filed at: 07/23/2024 1243   [place]: How many times in the  past year have you...    Used an illegal drug or used a prescription medication for non-medical reasons? Never Filed at: 07/23/2024 1243                      Medical Decision Making  Right LE pain - Will xray knee/tib/fib/foot to r/o occult fx/lesion and have pt f/u with PCP.    Amount and/or Complexity of Data Reviewed  Radiology: ordered and independent interpretation performed.    Risk  Prescription drug management.                 Disposition  Final diagnoses:   Right knee pain   Pain of right lower leg     Time reflects when diagnosis was documented in both MDM as applicable and the Disposition within this note       Time User Action Codes Description Comment    7/23/2024  1:26 PM Julianna Aguilar [M25.561] Right knee pain     7/23/2024  1:27 PM Julianna Aguilar [M79.661] Pain of right lower leg           ED Disposition       ED Disposition   Discharge    Condition   Stable    Date/Time   Tue Jul 23, 2024 1327    Comment   Reji Shi discharge to home/self care.                   Follow-up Information       Follow up With Specialties Details Why Contact Info    SHIRA Price Nurse Practitioner Schedule an appointment as soon as possible for a visit  For follow up [address] [place] [id number]  [phone]              Patient's Medications   Discharge Prescriptions    No medications on file       No discharge procedures on file.    PDMP Review       None            ED Provider  Electronically Signed by             Julianna Aguilar DO  07/23/24 4514

## 2024-07-25 ENCOUNTER — APPOINTMENT (EMERGENCY)
Dept: RADIOLOGY | Facility: HOSPITAL | Age: 24
End: 2024-07-25
Payer: COMMERCIAL

## 2024-07-25 ENCOUNTER — HOSPITAL ENCOUNTER (EMERGENCY)
Facility: HOSPITAL | Age: 24
Discharge: HOME | End: 2024-07-25
Attending: EMERGENCY MEDICINE
Payer: COMMERCIAL

## 2024-07-25 VITALS
DIASTOLIC BLOOD PRESSURE: 68 MMHG | WEIGHT: 140.6 LBS | OXYGEN SATURATION: 96 % | HEIGHT: 69 IN | RESPIRATION RATE: 18 BRPM | BODY MASS INDEX: 20.83 KG/M2 | TEMPERATURE: 98.7 F | SYSTOLIC BLOOD PRESSURE: 101 MMHG | HEART RATE: 63 BPM

## 2024-07-25 DIAGNOSIS — S60.419A ABRASION OF LEFT HAND AND FINGERS, INITIAL ENCOUNTER: ICD-10-CM

## 2024-07-25 DIAGNOSIS — R07.81 RIB PAIN: ICD-10-CM
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
## 2024-07-25 DIAGNOSIS — S60.512A ABRASION OF LEFT HAND AND FINGERS, INITIAL ENCOUNTER: ICD-10-CM

## 2024-07-25 PROCEDURE — 73130 X-RAY EXAM OF HAND: CPT

## 2024-07-25 PROCEDURE — 96372 THER/PROPH/DIAG INJ SC/IM: CPT

## 2024-07-25 PROCEDURE — 99284 EMERGENCY DEPT VISIT MOD MDM: CPT | Performed by: PHYSICIAN ASSISTANT

## 2024-07-25 PROCEDURE — 99284 EMERGENCY DEPT VISIT MOD MDM: CPT

## 2024-07-25 PROCEDURE — 71046 X-RAY EXAM CHEST 2 VIEWS: CPT

## 2024-07-25 RX ORDER — KETOROLAC TROMETHAMINE 30 MG/ML
15 INJECTION, SOLUTION INTRAMUSCULAR; INTRAVENOUS ONCE
Status: COMPLETED | OUTPATIENT
Start: 2024-07-25 | End: 2024-07-25

## 2024-07-25 RX ORDER — ACETAMINOPHEN 325 MG/1
650 TABLET ORAL ONCE
Status: COMPLETED | OUTPATIENT
Start: 2024-07-25 | End: 2024-07-25

## 2024-07-25 RX ORDER — IBUPROFEN 400 MG/1
400 TABLET ORAL ONCE
Status: COMPLETED | OUTPATIENT
Start: 2024-07-25 | End: 2024-07-25

## 2024-07-25 RX ORDER — ACETAMINOPHEN 500 MG
500 TABLET ORAL EVERY 6 HOURS PRN
Qty: 30 TABLET | Refills: 0 | Status: SHIPPED | OUTPATIENT
Start: 2024-07-25

## 2024-07-25 RX ORDER — IBUPROFEN 400 MG/1
400 TABLET ORAL EVERY 6 HOURS PRN
Qty: 12 TABLET | Refills: 0 | Status: SHIPPED | OUTPATIENT
Start: 2024-07-25 | End: 2024-08-09

## 2024-07-25 RX ADMIN — IBUPROFEN 400 MG: 400 TABLET, FILM COATED ORAL at 18:21

## 2024-07-25 RX ADMIN — ACETAMINOPHEN 650 MG: 325 TABLET ORAL at 18:21

## 2024-07-25 RX ADMIN — KETOROLAC TROMETHAMINE 15 MG: 30 INJECTION, SOLUTION INTRAMUSCULAR; INTRAVENOUS at 18:22
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
## 2024-07-27 ENCOUNTER — HOSPITAL ENCOUNTER (EMERGENCY)
Facility: HOSPITAL | Age: 24
Discharge: HOME | End: 2024-07-27
Attending: EMERGENCY MEDICINE
Payer: COMMERCIAL

## 2024-07-27 ENCOUNTER — APPOINTMENT (EMERGENCY)
Dept: RADIOLOGY | Facility: HOSPITAL | Age: 24
End: 2024-07-27
Payer: COMMERCIAL

## 2024-07-27 VITALS
OXYGEN SATURATION: 99 % | BODY MASS INDEX: 20.87 KG/M2 | HEART RATE: 70 BPM | DIASTOLIC BLOOD PRESSURE: 69 MMHG | WEIGHT: 141.31 LBS | SYSTOLIC BLOOD PRESSURE: 121 MMHG | RESPIRATION RATE: 16 BRPM | TEMPERATURE: 98 F

## 2024-07-27 DIAGNOSIS — L84 CALLUS OF TOE: Primary | ICD-10-CM

## 2024-07-27 PROCEDURE — 73660 X-RAY EXAM OF TOE(S): CPT

## 2024-07-27 PROCEDURE — 99283 EMERGENCY DEPT VISIT LOW MDM: CPT

## 2024-07-27 PROCEDURE — 99284 EMERGENCY DEPT VISIT MOD MDM: CPT | Performed by: EMERGENCY MEDICINE

## 2024-07-28 NOTE — ED PROVIDER NOTES
History  Chief Complaint   Patient presents with    Toe Pain     Pt reports purple/black discoloration to L pinky toe for approx 1 month. Denies pain, reports numbness/tingling and pressure while ambulating. Reports swelling. Denies known injury.     23 year old male who presents with right 5th toe swelling.  Ongoing for the past month.  No known injury.  States that it is swollen.  Denies any pain.  Admits to decreases sensation in the toe.        Prior to Admission Medications   Prescriptions Last Dose Informant Patient Reported? Taking?   acetaminophen (TYLENOL) 325 mg tablet  Self No No   Sig: Take 2 tablets (650 mg total) by mouth every 6 (six) hours as needed for mild pain or moderate pain   acetaminophen (TYLENOL) 500 mg tablet   No No   Sig: Take 1 tablet (500 mg total) by mouth every 6 (six) hours as needed for mild pain   docusate sodium (COLACE) 100 mg capsule  Self No No   Sig: Take 1 capsule (100 mg total) by mouth every 12 (twelve) hours   Patient not taking: Reported on 12/12/2023   ibuprofen (MOTRIN) 400 mg tablet   No No   Sig: Take 1 tablet (400 mg total) by mouth every 6 (six) hours as needed for mild pain   ibuprofen (MOTRIN) 600 mg tablet   No No   Sig: Take 1 tablet (600 mg total) by mouth every 6 (six) hours as needed for mild pain for up to 10 days   lidocaine (LMX) 4 % cream  Self No No   Sig: Apply topically as needed for mild pain   methocarbamol (ROBAXIN) 500 mg tablet  Self No No   Sig: Take 1 tablet (500 mg total) by mouth 3 (three) times a day as needed for muscle spasms for up to 5 days   polyethylene glycol (MIRALAX) 17 g packet   No No   Sig: Take 17 g by mouth daily      Facility-Administered Medications: None       Past Medical History:   Diagnosis Date    No known health problems        Past Surgical History:   Procedure Laterality Date    [place] LAPS ABD PRTM&OMENTUM DX W/WO SPEC BR/WA SPX N/A 11/27/2023    Procedure: LAPAROSCOPY DIAGNOSTIC;  Surgeon: Em Yeboah MD;   Location: [facility] OR;  Service: General       History reviewed. No pertinent family history.  I have reviewed and agree with the history as documented.    E-Cigarette/Vaping    E-Cigarette Use Current Some Day User      E-Cigarette/Vaping Substances    Nicotine Yes     THC No     CBD No     Flavoring No     Other No     Unknown No      Social History     Tobacco Use    Smoking status: Former     Types: Cigars    Smokeless tobacco: Never   Vaping Use    Vaping status: Some Days    Substances: Nicotine   Substance Use Topics    Alcohol use: Not Currently    Drug use: Yes     Types: Marijuana     Comment: last use ~1month       Review of Systems   Constitutional:  Negative for chills and fever.   HENT:  Negative for congestion, rhinorrhea, sore throat and trouble swallowing.    Respiratory:  Negative for cough, chest tightness, shortness of breath and wheezing.    Cardiovascular:  Negative for chest pain and palpitations.   Gastrointestinal:  Negative for abdominal pain, blood in stool, diarrhea, nausea and vomiting.   Musculoskeletal:  Negative for back pain and neck pain.   Skin:  Positive for wound.   All other systems reviewed and are negative.      Physical Exam  Physical Exam  Vitals and nursing note reviewed.   Constitutional:       General: He is not in acute distress.     Appearance: He is well-developed.   HENT:      Head: Normocephalic and atraumatic.      Mouth/Throat:      Lips: Pink.      Mouth: Mucous membranes are moist.   Eyes:      General: Lids are normal.      Extraocular Movements: Extraocular movements intact.      Conjunctiva/sclera: Conjunctivae normal.      Pupils: Pupils are equal, round, and reactive to light.   Cardiovascular:      Rate and Rhythm: Normal rate and regular rhythm.   Pulmonary:      Effort: Pulmonary effort is normal.   Musculoskeletal:         General: No swelling.      Cervical back: Full passive range of motion without pain, normal range of motion and neck supple.       Comments: Very small, healing scab over PIP of 5th toe on right.  No swelling, erythema, warmth, deformity.  No tenderness. No pain with passive range of motion.  Right foot is pink, warm, perfusing well.   Skin:     General: Skin is warm.      Capillary Refill: Capillary refill takes less than 2 seconds.      Findings: No rash.   Neurological:      General: No focal deficit present.      Mental Status: He is alert.   Psychiatric:         Mood and Affect: Mood normal.         Speech: Speech normal.         Behavior: Behavior normal.         Vital Signs  ED Triage Vitals [07/27/24 2059]   Temperature Pulse Respirations Blood Pressure SpO2   98 °F (36.7 °C) 70 16 121/69 99 %      Temp Source Heart Rate Source Patient Position - Orthostatic VS BP Location FiO2 (%)   Oral Monitor Sitting Right arm --      Pain Score       No Pain           Vitals:    07/27/24 2059   BP: 121/69   Pulse: 70   Patient Position - Orthostatic VS: Sitting         Visual Acuity      ED Medications  Medications - No data to display    Diagnostic Studies  Results Reviewed       None                   XR toe fifth min 2 views RIGHT   ED Interpretation by Kieran Yoo MD (07/27 2235)   No acute osseous abnormality as interpreted by myself.                 Procedures  Procedures         ED Course                                               Medical Decision Making  Unremarkable exam of toe.  No obvious color change or trauma.  Worried well?  Doubt fracture but check xray.    Problems Addressed:  Callus of toe: self-limited or minor problem    Amount and/or Complexity of Data Reviewed  Radiology: ordered and independent interpretation performed.                 Disposition  Final diagnoses:   Callus of toe     Time reflects when diagnosis was documented in both MDM as applicable and the Disposition within this note       Time User Action Codes Description Comment    7/27/2024  9:29 PM Kieran Yoo Add [L84] Callus of toe           ED  Disposition       ED Disposition   Discharge    Condition   Stable    Date/Time   Sat Jul 27, 2024  9:27 PM    Comment   Reij Shi discharge to home/self care.                   Follow-up Information       Follow up With Specialties Details Why Contact Info Additional Information    SHIRA Price Nurse Practitioner Schedule an appointment as soon as possible for a visit   [address]  [place] [address]  [phone]       [facility] Emergency Medicine Go to  If symptoms worsen [address] [place] [id number]  [phone] [facility], [address] [place] [address], [phone]            Patient's Medications   Discharge Prescriptions    No medications on file       No discharge procedures on file.    PDMP Review       None            ED Provider  Electronically Signed by             Kieran Yoo MD  07/27/24 0417

## 2024-08-04 ENCOUNTER — HOSPITAL ENCOUNTER (INPATIENT)
Facility: HOSPITAL | Age: 24
LOS: 3 days | Discharge: HOME | DRG: 247 | End: 2024-08-09
Attending: EMERGENCY MEDICINE | Admitting: INTERNAL MEDICINE
Payer: COMMERCIAL

## 2024-08-04 ENCOUNTER — APPOINTMENT (EMERGENCY)
Dept: CT IMAGING | Facility: HOSPITAL | Age: 24
DRG: 247 | End: 2024-08-04
Payer: COMMERCIAL

## 2024-08-04 DIAGNOSIS — K56.1 INTUSSUSCEPTION (HCC): Primary | ICD-10-CM

## 2024-08-04 DIAGNOSIS — R10.9 LEFT SIDED ABDOMINAL PAIN: ICD-10-CM

## 2024-08-04 DIAGNOSIS — R59.0 INGUINAL LYMPHADENOPATHY: ICD-10-CM

## 2024-08-04 LAB
ALBUMIN SERPL BCG-MCNC: 4.1 G/DL (ref 3.5–5)
ALP SERPL-CCNC: 68 U/L (ref 34–104)
ALT SERPL W P-5'-P-CCNC: 9 U/L (ref 7–52)
ANION GAP SERPL CALCULATED.3IONS-SCNC: 4 MMOL/L (ref 4–13)
AST SERPL W P-5'-P-CCNC: 14 U/L (ref 13–39)
BASOPHILS # BLD AUTO: 0.04 THOUSANDS/ÂΜL (ref 0–0.1)
BASOPHILS NFR BLD AUTO: 0 % (ref 0–1)
BILIRUB SERPL-MCNC: 0.38 MG/DL (ref 0.2–1)
BUN SERPL-MCNC: 7 MG/DL (ref 5–25)
CALCIUM SERPL-MCNC: 9.2 MG/DL (ref 8.4–10.2)
CHLORIDE SERPL-SCNC: 107 MMOL/L (ref 96–108)
CO2 SERPL-SCNC: 29 MMOL/L (ref 21–32)
CREAT SERPL-MCNC: 1.07 MG/DL (ref 0.6–1.3)
EOSINOPHIL # BLD AUTO: 0.53 THOUSAND/ÂΜL (ref 0–0.61)
EOSINOPHIL NFR BLD AUTO: 5 % (ref 0–6)
ERYTHROCYTE [DISTWIDTH] IN BLOOD BY AUTOMATED COUNT: 12 % (ref 11.6–15.1)
GFR SERPL CREATININE-BSD FRML MDRD: 98 ML/MIN/1.73SQ M
GLUCOSE SERPL-MCNC: 94 MG/DL (ref 65–140)
HCT VFR BLD AUTO: 44.6 % (ref 36.5–49.3)
HGB BLD-MCNC: 15.5 G/DL (ref 12–17)
IMM GRANULOCYTES # BLD AUTO: 0.03 THOUSAND/UL (ref 0–0.2)
IMM GRANULOCYTES NFR BLD AUTO: 0 % (ref 0–2)
LACTATE SERPL-SCNC: 1.4 MMOL/L (ref 0.5–2)
LIPASE SERPL-CCNC: 16 U/L (ref 11–82)
LYMPHOCYTES # BLD AUTO: 1.75 THOUSANDS/ÂΜL (ref 0.6–4.47)
LYMPHOCYTES NFR BLD AUTO: 16 % (ref 14–44)
MCH RBC QN AUTO: 31.3 PG (ref 26.8–34.3)
MCHC RBC AUTO-ENTMCNC: 34.8 G/DL (ref 31.4–37.4)
MCV RBC AUTO: 90 FL (ref 82–98)
MONOCYTES # BLD AUTO: 0.44 THOUSAND/ÂΜL (ref 0.17–1.22)
MONOCYTES NFR BLD AUTO: 4 % (ref 4–12)
NEUTROPHILS # BLD AUTO: 8.01 THOUSANDS/ÂΜL (ref 1.85–7.62)
NEUTS SEG NFR BLD AUTO: 75 % (ref 43–75)
NRBC BLD AUTO-RTO: 0 /100 WBCS
PLATELET # BLD AUTO: 289 THOUSANDS/UL (ref 149–390)
PMV BLD AUTO: 9.3 FL (ref 8.9–12.7)
POTASSIUM SERPL-SCNC: 3.8 MMOL/L (ref 3.5–5.3)
PROT SERPL-MCNC: 6.4 G/DL (ref 6.4–8.4)
RBC # BLD AUTO: 4.96 MILLION/UL (ref 3.88–5.62)
SODIUM SERPL-SCNC: 140 MMOL/L (ref 135–147)
WBC # BLD AUTO: 10.8 THOUSAND/UL (ref 4.31–10.16)

## 2024-08-04 PROCEDURE — 83605 ASSAY OF LACTIC ACID: CPT | Performed by: EMERGENCY MEDICINE

## 2024-08-04 PROCEDURE — 80053 COMPREHEN METABOLIC PANEL: CPT | Performed by: EMERGENCY MEDICINE

## 2024-08-04 PROCEDURE — 85025 COMPLETE CBC W/AUTO DIFF WBC: CPT | Performed by: EMERGENCY MEDICINE

## 2024-08-04 PROCEDURE — 83690 ASSAY OF LIPASE: CPT | Performed by: EMERGENCY MEDICINE

## 2024-08-04 PROCEDURE — 99285 EMERGENCY DEPT VISIT HI MDM: CPT | Performed by: EMERGENCY MEDICINE

## 2024-08-04 PROCEDURE — 74177 CT ABD & PELVIS W/CONTRAST: CPT

## 2024-08-04 PROCEDURE — 96361 HYDRATE IV INFUSION ADD-ON: CPT

## 2024-08-04 PROCEDURE — 96374 THER/PROPH/DIAG INJ IV PUSH: CPT

## 2024-08-04 PROCEDURE — 36415 COLL VENOUS BLD VENIPUNCTURE: CPT | Performed by: EMERGENCY MEDICINE

## 2024-08-04 PROCEDURE — 99284 EMERGENCY DEPT VISIT MOD MDM: CPT

## 2024-08-04 RX ORDER — KETOROLAC TROMETHAMINE 30 MG/ML
15 INJECTION, SOLUTION INTRAMUSCULAR; INTRAVENOUS ONCE
Status: COMPLETED | OUTPATIENT
Start: 2024-08-04 | End: 2024-08-04

## 2024-08-04 RX ADMIN — KETOROLAC TROMETHAMINE 15 MG: 30 INJECTION, SOLUTION INTRAMUSCULAR; INTRAVENOUS at 17:42

## 2024-08-04 RX ADMIN — SODIUM CHLORIDE 1000 ML: 0.9 INJECTION, SOLUTION INTRAVENOUS at 17:42

## 2024-08-04 RX ADMIN — IOHEXOL 50 ML: 240 INJECTION, SOLUTION INTRATHECAL; INTRAVASCULAR; INTRAVENOUS; ORAL at 20:21

## 2024-08-04 RX ADMIN — IOHEXOL 100 ML: 350 INJECTION, SOLUTION INTRAVENOUS at 20:21

## 2024-08-04 NOTE — ED PROVIDER NOTES
History  Chief Complaint   Patient presents with    Abdominal Pain     Pt reports had abd surgery in nov for this same problem but now its even worse. Pt reports lower abd pain denies n/v/d      24 yo M presenting for evaluation of abdominal pain.    Pt reports 2 weeks of pain to L side of abdomen. Intermittent, currently reports it is severe, keeps him from sleeping. Feels similar to when he had intussusception in November. Has been able to tolerate po without N/V. Last BM this morning, denies loose, dark or bloody stools.  Tried NSAIDs at home, nothing taken today    Denies fevers, chills, urinary sx             Per independent review of external records:   - November 2023: CT: intussusception   operative report:  Approximately 15 cm segment of small bowel small bowel intussusception present in the mid jejunum. Lead point was a mobile intra-luminal soft bezoar. This was able to be milked distally. No fixed mass was present. No lymphadenopathy, or Meckles diverticulum were found. No resection was performed.      Dec 2013 Colonoscopy-   Inadequate prep with solid stool present in the rectum.  Procedure aborted in the rectum  Mild granular mucosa in the rectum; performed cold forceps biopsy to rule out colitis  Internal hemorrhoids. Hypertrophied anal papila    Prior to Admission Medications   Prescriptions Last Dose Informant Patient Reported? Taking?   acetaminophen (TYLENOL) 325 mg tablet Past Month Self No Yes   Sig: Take 2 tablets (650 mg total) by mouth every 6 (six) hours as needed for mild pain or moderate pain   acetaminophen (TYLENOL) 500 mg tablet Past Month  No Yes   Sig: Take 1 tablet (500 mg total) by mouth every 6 (six) hours as needed for mild pain   docusate sodium (COLACE) 100 mg capsule Not Taking Self No No   Sig: Take 1 capsule (100 mg total) by mouth every 12 (twelve) hours   Patient not taking: Reported on 12/12/2023   ibuprofen (MOTRIN) 400 mg tablet Past Week  No Yes   Sig: Take 1 tablet (400  mg total) by mouth every 6 (six) hours as needed for mild pain   lidocaine (LMX) 4 % cream More than a month Self No No   Sig: Apply topically as needed for mild pain   methocarbamol (ROBAXIN) 500 mg tablet  Self No No   Sig: Take 1 tablet (500 mg total) by mouth 3 (three) times a day as needed for muscle spasms for up to 5 days   polyethylene glycol (MIRALAX) 17 g packet Not Taking  No No   Sig: Take 17 g by mouth daily   Patient not taking: Reported on 8/5/2024      Facility-Administered Medications: None       Past Medical History:   Diagnosis Date    No known health problems        Past Surgical History:   Procedure Laterality Date    NV LAPS ABD PRTM&OMENTUM DX W/WO SPEC BR/WA SPX N/A 11/27/2023    Procedure: LAPAROSCOPY DIAGNOSTIC;  Surgeon: Em Yeboah MD;  Location: [facility] OR;  Service: General       History reviewed. No pertinent family history.  I have reviewed and agree with the history as documented.    E-Cigarette/Vaping    E-Cigarette Use Current Some Day User      E-Cigarette/Vaping Substances    Nicotine Yes     THC No     CBD No     Flavoring No     Other No     Unknown No      Social History     Tobacco Use    Smoking status: Former     Types: Cigars    Smokeless tobacco: Never   Vaping Use    Vaping status: Some Days    Substances: Nicotine   Substance Use Topics    Alcohol use: Not Currently    Drug use: Yes     Types: Marijuana     Comment: last use ~1month       Review of Systems    Physical Exam  Physical Exam  Vitals and nursing note reviewed.   Constitutional:       General: He is not in acute distress.     Appearance: Normal appearance. He is well-developed.   HENT:      Head: Normocephalic and atraumatic.      Nose: Nose normal.   Eyes:      Extraocular Movements: Extraocular movements intact.      Conjunctiva/sclera: Conjunctivae normal.   Cardiovascular:      Rate and Rhythm: Normal rate and regular rhythm.      Heart sounds: Normal heart sounds.   Pulmonary:      Effort:  Pulmonary effort is normal. No respiratory distress.      Breath sounds: Normal breath sounds. No stridor. No wheezing or rales.   Chest:      Chest wall: No tenderness.   Abdominal:      General: There is no distension.      Palpations: Abdomen is soft.      Tenderness: There is abdominal tenderness in the left upper quadrant and left lower quadrant. There is no guarding or rebound.   Musculoskeletal:         General: No swelling, tenderness or deformity.      Cervical back: Normal range of motion and neck supple.   Skin:     General: Skin is warm and dry.      Findings: No rash.   Neurological:      General: No focal deficit present.      Mental Status: He is alert and oriented to person, place, and time.      Motor: No abnormal muscle tone.      Coordination: Coordination normal.   Psychiatric:         Thought Content: Thought content normal.         Judgment: Judgment normal.         Vital Signs  ED Triage Vitals   Temperature Pulse Respirations Blood Pressure SpO2   08/04/24 1708 08/04/24 1708 08/04/24 1708 08/04/24 1708 08/04/24 1708   98.4 °F (36.9 °C) 96 14 117/64 97 %      Temp Source Heart Rate Source Patient Position - Orthostatic VS BP Location FiO2 (%)   08/05/24 0345 08/04/24 1822 08/04/24 1822 08/04/24 1822 --   Oral Monitor Sitting Right arm       Pain Score       08/04/24 1742       9           Vitals:    08/06/24 1525 08/06/24 2101 08/07/24 0800 08/07/24 1515   BP: 120/66 119/64 126/65 118/69   Pulse: 70 64 57 64   Patient Position - Orthostatic VS:             Visual Acuity      ED Medications  Medications   polyethylene glycol (MIRALAX) packet 17 g (has no administration in time range)   acetaminophen (TYLENOL) tablet 650 mg (has no administration in time range)   ondansetron (ZOFRAN) injection 4 mg (4 mg Intravenous Given 8/7/24 1455)   aluminum-magnesium hydroxide-simethicone (MAALOX) oral suspension 30 mL (has no administration in time range)   simethicone (MYLICON) chewable tablet 80 mg (has  no administration in time range)   lactated ringers infusion (0 mL/hr Intravenous Stopped 8/6/24 1618)   Famotidine (PF) (PEPCID) injection 20 mg (20 mg Intravenous Given 8/7/24 0837)   lactated ringers infusion (75 mL/hr Intravenous New Bag 8/6/24 2044)   ceFAZolin (ANCEF) IVPB (premix in dextrose) 2,000 mg 50 mL (has no administration in time range)   metroNIDAZOLE (FLAGYL) IVPB (premix) 500 mg 100 mL (has no administration in time range)   oxyCODONE (ROXICODONE) IR tablet 5 mg (has no administration in time range)   oxyCODONE (ROXICODONE) immediate release tablet 10 mg (has no administration in time range)   HYDROmorphone (DILAUDID) injection 0.5 mg (0.5 mg Intravenous Given 8/7/24 1420)   dicyclomine (BENTYL) tablet 20 mg (20 mg Oral Given 8/7/24 1421)   ketorolac (TORADOL) injection 15 mg (15 mg Intravenous Given 8/4/24 1742)   sodium chloride 0.9 % bolus 1,000 mL (0 mL Intravenous Stopped 8/4/24 1840)   iohexol (OMNIPAQUE) 350 MG/ML injection (MULTI-DOSE) 100 mL (100 mL Intravenous Given 8/4/24 2021)   iohexol (OMNIPAQUE) 240 MG/ML solution 50 mL (50 mL Oral Given 8/4/24 2021)   potassium chloride (Klor-Con M20) CR tablet 40 mEq (40 mEq Oral Given 8/5/24 1402)   iohexol (OMNIPAQUE) 350 MG/ML injection (SINGLE-DOSE) 100 mL (100 mL Intravenous Given 8/7/24 0853)       Diagnostic Studies  Results Reviewed       Procedure Component Value Units Date/Time    Comprehensive metabolic panel [[id number]]  (Abnormal) Collected: 08/05/24 0445    Lab Status: Final result Specimen: Blood from Arm, Right Updated: 08/05/24 0620     Sodium 141 mmol/L      Potassium 3.4 mmol/L      Chloride 106 mmol/L      CO2 31 mmol/L      ANION GAP 4 mmol/L      BUN 5 mg/dL      Creatinine 0.86 mg/dL      Glucose 87 mg/dL      Calcium 8.9 mg/dL      AST 12 U/L      ALT 8 U/L      Alkaline Phosphatase 56 U/L      Total Protein 6.0 g/dL      Albumin 3.8 g/dL      Total Bilirubin 0.57 mg/dL      eGFR 123 ml/min/1.73sq m     Narrative:       National Kidney Disease Foundation guidelines for Chronic Kidney Disease (CKD):     Stage 1 with normal or high GFR (GFR > 90 mL/min/1.73 square meters)    Stage 2 Mild CKD (GFR = 60-89 mL/min/1.73 square meters)    Stage 3A Moderate CKD (GFR = 45-59 mL/min/1.73 square meters)    Stage 3B Moderate CKD (GFR = 30-44 mL/min/1.73 square meters)    Stage 4 Severe CKD (GFR = 15-29 mL/min/1.73 square meters)    Stage 5 End Stage CKD (GFR <15 mL/min/1.73 square meters)  Note: GFR calculation is accurate only with a steady state creatinine    Magnesium [[id number]]  (Normal) Collected: 08/05/24 0445    Lab Status: Final result Specimen: Blood from Arm, Right Updated: 08/05/24 0620     Magnesium 2.0 mg/dL     CBC and differential [[id number]]  (Abnormal) Collected: 08/05/24 0441    Lab Status: Final result Specimen: Blood Updated: 08/05/24 0557     WBC 7.32 Thousand/uL      RBC 4.52 Million/uL      Hemoglobin 14.5 g/dL      Hematocrit 41.8 %      MCV 93 fL      MCH 32.1 pg      MCHC 34.7 g/dL      RDW 12.1 %      MPV 9.6 fL      Platelets 281 Thousands/uL      nRBC 0 /100 WBCs      Segmented % 46 %      Immature Grans % 0 %      Lymphocytes % 40 %      Monocytes % 5 %      Eosinophils Relative 8 %      Basophils Relative 1 %      Absolute Neutrophils 3.43 Thousands/µL      Absolute Immature Grans 0.01 Thousand/uL      Absolute Lymphocytes 2.94 Thousands/µL      Absolute Monocytes 0.34 Thousand/µL      Eosinophils Absolute 0.55 Thousand/µL      Basophils Absolute 0.05 Thousands/µL     Comprehensive metabolic panel [[id number]] Collected: 08/04/24 1737    Lab Status: Final result Specimen: Blood from Arm, Left Updated: 08/04/24 1803     Sodium 140 mmol/L      Potassium 3.8 mmol/L      Chloride 107 mmol/L      CO2 29 mmol/L      ANION GAP 4 mmol/L      BUN 7 mg/dL      Creatinine 1.07 mg/dL      Glucose 94 mg/dL      Calcium 9.2 mg/dL      AST 14 U/L      ALT 9 U/L      Alkaline Phosphatase 68 U/L      Total Protein 6.4 g/dL       Albumin 4.1 g/dL      Total Bilirubin 0.38 mg/dL      eGFR 98 ml/min/1.73sq m     Narrative:      National Kidney Disease Foundation guidelines for Chronic Kidney Disease (CKD):     Stage 1 with normal or high GFR (GFR > 90 mL/min/1.73 square meters)    Stage 2 Mild CKD (GFR = 60-89 mL/min/1.73 square meters)    Stage 3A Moderate CKD (GFR = 45-59 mL/min/1.73 square meters)    Stage 3B Moderate CKD (GFR = 30-44 mL/min/1.73 square meters)    Stage 4 Severe CKD (GFR = 15-29 mL/min/1.73 square meters)    Stage 5 End Stage CKD (GFR <15 mL/min/1.73 square meters)  Note: GFR calculation is accurate only with a steady state creatinine    Lipase [[id number]]  (Normal) Collected: 08/04/24 1737    Lab Status: Final result Specimen: Blood from Arm, Left Updated: 08/04/24 1803     Lipase 16 u/L     Lactic acid, plasma (w/reflex if result > 2.0) [[id number]]  (Normal) Collected: 08/04/24 1737    Lab Status: Final result Specimen: Blood from Arm, Left Updated: 08/04/24 1801     LACTIC ACID 1.4 mmol/L     Narrative:      Result may be elevated if tourniquet was used during collection.    CBC and differential [[id number]]  (Abnormal) Collected: 08/04/24 1737    Lab Status: Final result Specimen: Blood from Arm, Left Updated: 08/04/24 1743     WBC 10.80 Thousand/uL      RBC 4.96 Million/uL      Hemoglobin 15.5 g/dL      Hematocrit 44.6 %      MCV 90 fL      MCH 31.3 pg      MCHC 34.8 g/dL      RDW 12.0 %      MPV 9.3 fL      Platelets 289 Thousands/uL      nRBC 0 /100 WBCs      Segmented % 75 %      Immature Grans % 0 %      Lymphocytes % 16 %      Monocytes % 4 %      Eosinophils Relative 5 %      Basophils Relative 0 %      Absolute Neutrophils 8.01 Thousands/µL      Absolute Immature Grans 0.03 Thousand/uL      Absolute Lymphocytes 1.75 Thousands/µL      Absolute Monocytes 0.44 Thousand/µL      Eosinophils Absolute 0.53 Thousand/µL      Basophils Absolute 0.04 Thousands/µL                    CT small bowel enterography   Final Result  by Holly David MD (08/07 1040)   Small bowel intussusception noted on the previous study has resolved.      There is passage of contrast into the colon from the previously administered contrast on August 4, 2024      A borderline dilated loop of small bowel seen in the left upper quadrant no bowel wall thickening, pneumatosis or free air, nonspecific. Correlate clinically   *  Inflammation: No imaging signs of active inflammation.   *  Stricture: None.   *  Penetrating complication: None.   *  Perianal disease: None.   *  Other complications: None.         Workstation performed: [id number]         CT abdomen pelvis with contrast   Final Result by Hari Mandel MD (08/05 0651)      Small bowel-small bowel intussusception in the left upper abdomen without associated suspicious findings. No bowel obstruction.         Workstation performed: [id number]                    Procedures  Procedures         ED Course  ED Course as of 08/07/24 1828   Sun Aug 04, 2024   1824 Labs unremarkable   1917 Pt reassessed, still working on his oral contrast   2123 CT sent to [facility], not read yet   2323 Vrad read: 1. There is a short segment jejunojejunal intussusception in the left lateral abdomen, which is  nonspecific but could be due to transient intussusception of no clinical significance because no  abnormal small bowel dilation/small bowel obstruction is seen and because the oral contrast material  passed through this region to the level of the ileocecal valve. Follow-up as clinically warranted for  persistent symptoms. There is no evidence of bowel obstruction, pneumoperitoneum, pneumatosis,  appendicitis, or abscess. Mild amount of nonspecific dependent low-density ascites is seen posterior  to the urinary bladder.  2. There is questionable thickening involving the wall of the underdistended distal esophagus similar  compared with the prior exam, which is nonspecific but should be correlated with any clinical  evidence of  esophagitis.  3. Multiple nonspecific top-normal size bilateral inguinal lymph nodes measuring up to 13 mm in short  axis diameter are seen, which have increased in size from 12/02/2023 (at which time there were  bilateral inguinal lymph nodes measuring up to 8 mm short axis)   2329 Pt updated regarding scan results, messaged surgery for eval                                 SBIRT 20yo+      Flowsheet Row Most Recent Value   Initial Alcohol Screen: US AUDIT-C     1. How often do you have a drink containing alcohol? 0 Filed at: 08/04/2024 1736   2. How many drinks containing alcohol do you have on a typical day you are drinking?  0 Filed at: 08/04/2024 1736   3a. Male UNDER 65: How often do you have five or more drinks on one occasion? 0 Filed at: 08/04/2024 1736   3b. FEMALE Any Age, or MALE 65+: How often do you have 4 or more drinks on one occassion? 0 Filed at: 08/04/2024 1736   Audit-C Score 0 Filed at: 08/04/2024 1736   [place]: How many times in the past year have you...    Used an illegal drug or used a prescription medication for non-medical reasons? Never Filed at: 08/04/2024 1736                      Medical Decision Making  24 yo M with L sided abdominal pain- will treat sx,CBC to assess for leukocytosis/anemia, CMP to assess for elevated LFTs/ALF/electrolyte abn, lipase to r/o pancreatitis, CT A/P to r/o intussusception/diverticulitis/other pathology    Messaged surgery resident    Problems Addressed:  Inguinal lymphadenopathy: acute illness or injury  Intussusception (HCC): acute illness or injury  Left sided abdominal pain: acute illness or injury    Amount and/or Complexity of Data Reviewed  External Data Reviewed: labs, radiology and notes.  Labs: ordered. Decision-making details documented in ED Course.  Radiology: ordered and independent interpretation performed. Decision-making details documented in ED Course.    Risk  Prescription drug management.  Decision regarding hospitalization.                  Disposition  Final diagnoses:   Intussusception (HCC)   Left sided abdominal pain   Inguinal lymphadenopathy     Time reflects when diagnosis was documented in both MDM as applicable and the Disposition within this note       Time User Action Codes Description Comment    8/4/2024 11:30 PM Nava Zuniga CHAS Add [K56.1] Intussusception (HCC)     8/4/2024 11:31 PM Nava Zuniga CHAS Add [R10.9] Left sided abdominal pain     8/4/2024 11:31 PM Nava Zuniga CHAS Add [R59.0] Inguinal lymphadenopathy           ED Disposition       ED Disposition   Admit    Condition   Stable    Date/Time   Mon Aug 5, 2024 0237    Comment   Case was discussed with RAUL and the patient's admission status was agreed to be Admission Status: inpatient status to the service of Dr. Rene .               MD Documentation      Flowsheet Row Most Recent Value   Transported by (Company and Unit #) SO/Family          RN Documentation      Flowsheet Row Most Recent Value   Transported by (Company and Unit #) SO/Family          Follow-up Information       Follow up With Specialties Details Why Contact Info    SHIRA Price Nurse Practitioner   [address]  [place] [id number] [phone]              Current Discharge Medication List        CONTINUE these medications which have NOT CHANGED    Details   !! acetaminophen (TYLENOL) 325 mg tablet Take 2 tablets (650 mg total) by mouth every 6 (six) hours as needed for mild pain or moderate pain  Refills: 0    Associated Diagnoses: Intussusception of small intestine (HCC)      !! acetaminophen (TYLENOL) 500 mg tablet Take 1 tablet (500 mg total) by mouth every 6 (six) hours as needed for mild pain  Qty: 30 tablet, Refills: 0    Associated Diagnoses: Abrasion of left hand and fingers, initial encounter; Rib pain      ibuprofen (MOTRIN) 400 mg tablet Take 1 tablet (400 mg total) by mouth every 6 (six) hours as needed for mild pain  Qty: 12 tablet, Refills: 0    Associated Diagnoses: Abrasion of left hand and  fingers, initial encounter; Rib pain      docusate sodium (COLACE) 100 mg capsule Take 1 capsule (100 mg total) by mouth every 12 (twelve) hours  Qty: 60 capsule, Refills: 0    Associated Diagnoses: Constipation      lidocaine (LMX) 4 % cream Apply topically as needed for mild pain  Qty: 15 g, Refills: 0    Associated Diagnoses: Rectal bleeding      methocarbamol (ROBAXIN) 500 mg tablet Take 1 tablet (500 mg total) by mouth 3 (three) times a day as needed for muscle spasms for up to 5 days  Qty: 15 tablet, Refills: 0    Associated Diagnoses: Generalized abdominal pain; S/P exploratory laparotomy      polyethylene glycol (MIRALAX) 17 g packet Take 17 g by mouth daily  Qty: 30 each, Refills: 11    Associated Diagnoses: Constipation, unspecified constipation type       !! - Potential duplicate medications found. Please discuss with provider.          No discharge procedures on file.    PDMP Review       None            ED Provider  Electronically Signed by             Nava Zuniga DO  08/07/24 9903

## 2024-08-04 NOTE — ED NOTES
Pt watching videos on his phone, appears to be in no acute distress.       Sharon Mcgarry RN  08/04/24 4827

## 2024-08-05 PROBLEM — R10.9 INTRACTABLE ABDOMINAL PAIN: Status: ACTIVE | Noted: 2024-08-05

## 2024-08-05 PROBLEM — R63.4 UNINTENTIONAL WEIGHT LOSS: Status: ACTIVE | Noted: 2024-08-05

## 2024-08-05 PROBLEM — K59.09 OTHER CONSTIPATION: Status: ACTIVE | Noted: 2024-08-05

## 2024-08-05 PROBLEM — R59.0 INGUINAL LYMPHADENOPATHY: Status: ACTIVE | Noted: 2024-08-05

## 2024-08-05 PROBLEM — K62.5 RECTAL BLEEDING: Status: ACTIVE | Noted: 2024-08-05

## 2024-08-05 PROBLEM — E87.6 HYPOKALEMIA: Status: ACTIVE | Noted: 2024-08-05

## 2024-08-05 LAB
ALBUMIN SERPL BCG-MCNC: 3.8 G/DL (ref 3.5–5)
ALP SERPL-CCNC: 56 U/L (ref 34–104)
ALT SERPL W P-5'-P-CCNC: 8 U/L (ref 7–52)
ANION GAP SERPL CALCULATED.3IONS-SCNC: 4 MMOL/L (ref 4–13)
AST SERPL W P-5'-P-CCNC: 12 U/L (ref 13–39)
BASOPHILS # BLD AUTO: 0.05 THOUSANDS/ÂΜL (ref 0–0.1)
BASOPHILS NFR BLD AUTO: 1 % (ref 0–1)
BILIRUB SERPL-MCNC: 0.57 MG/DL (ref 0.2–1)
BUN SERPL-MCNC: 5 MG/DL (ref 5–25)
CALCIUM SERPL-MCNC: 8.9 MG/DL (ref 8.4–10.2)
CHLORIDE SERPL-SCNC: 106 MMOL/L (ref 96–108)
CO2 SERPL-SCNC: 31 MMOL/L (ref 21–32)
CREAT SERPL-MCNC: 0.86 MG/DL (ref 0.6–1.3)
EOSINOPHIL # BLD AUTO: 0.55 THOUSAND/ÂΜL (ref 0–0.61)
EOSINOPHIL NFR BLD AUTO: 8 % (ref 0–6)
ERYTHROCYTE [DISTWIDTH] IN BLOOD BY AUTOMATED COUNT: 12.1 % (ref 11.6–15.1)
GFR SERPL CREATININE-BSD FRML MDRD: 123 ML/MIN/1.73SQ M
GLUCOSE SERPL-MCNC: 87 MG/DL (ref 65–140)
HCT VFR BLD AUTO: 41.8 % (ref 36.5–49.3)
HGB BLD-MCNC: 14.5 G/DL (ref 12–17)
HIV 1+2 AB+HIV1 P24 AG SERPL QL IA: NORMAL
HIV1 P24 AG SER QL: NORMAL
IMM GRANULOCYTES # BLD AUTO: 0.01 THOUSAND/UL (ref 0–0.2)
IMM GRANULOCYTES NFR BLD AUTO: 0 % (ref 0–2)
LYMPHOCYTES # BLD AUTO: 2.94 THOUSANDS/ÂΜL (ref 0.6–4.47)
LYMPHOCYTES NFR BLD AUTO: 40 % (ref 14–44)
MAGNESIUM SERPL-MCNC: 2 MG/DL (ref 1.9–2.7)
MCH RBC QN AUTO: 32.1 PG (ref 26.8–34.3)
MCHC RBC AUTO-ENTMCNC: 34.7 G/DL (ref 31.4–37.4)
MCV RBC AUTO: 93 FL (ref 82–98)
MONOCYTES # BLD AUTO: 0.34 THOUSAND/ÂΜL (ref 0.17–1.22)
MONOCYTES NFR BLD AUTO: 5 % (ref 4–12)
NEUTROPHILS # BLD AUTO: 3.43 THOUSANDS/ÂΜL (ref 1.85–7.62)
NEUTS SEG NFR BLD AUTO: 46 % (ref 43–75)
NRBC BLD AUTO-RTO: 0 /100 WBCS
PLATELET # BLD AUTO: 281 THOUSANDS/UL (ref 149–390)
PMV BLD AUTO: 9.6 FL (ref 8.9–12.7)
POTASSIUM SERPL-SCNC: 3.4 MMOL/L (ref 3.5–5.3)
PROT SERPL-MCNC: 6 G/DL (ref 6.4–8.4)
RBC # BLD AUTO: 4.52 MILLION/UL (ref 3.88–5.62)
SODIUM SERPL-SCNC: 141 MMOL/L (ref 135–147)
WBC # BLD AUTO: 7.32 THOUSAND/UL (ref 4.31–10.16)

## 2024-08-05 PROCEDURE — 99448 NTRPROF PH1/NTRNET/EHR 21-30: CPT | Performed by: NURSE PRACTITIONER

## 2024-08-05 PROCEDURE — 80053 COMPREHEN METABOLIC PANEL: CPT | Performed by: NURSE PRACTITIONER

## 2024-08-05 PROCEDURE — 85025 COMPLETE CBC W/AUTO DIFF WBC: CPT | Performed by: NURSE PRACTITIONER

## 2024-08-05 PROCEDURE — 86480 TB TEST CELL IMMUN MEASURE: CPT | Performed by: INTERNAL MEDICINE

## 2024-08-05 PROCEDURE — 80074 ACUTE HEPATITIS PANEL: CPT | Performed by: INTERNAL MEDICINE

## 2024-08-05 PROCEDURE — 99244 OFF/OP CNSLTJ NEW/EST MOD 40: CPT | Performed by: SURGERY

## 2024-08-05 PROCEDURE — 99254 IP/OBS CNSLTJ NEW/EST MOD 60: CPT | Performed by: STUDENT IN AN ORGANIZED HEALTH CARE EDUCATION/TRAINING PROGRAM

## 2024-08-05 PROCEDURE — 99221 1ST HOSP IP/OBS SF/LOW 40: CPT | Performed by: INTERNAL MEDICINE

## 2024-08-05 PROCEDURE — 87491 CHLMYD TRACH DNA AMP PROBE: CPT

## 2024-08-05 PROCEDURE — 87806 HIV AG W/HIV1&2 ANTB W/OPTIC: CPT | Performed by: INTERNAL MEDICINE

## 2024-08-05 PROCEDURE — 87591 N.GONORRHOEAE DNA AMP PROB: CPT

## 2024-08-05 PROCEDURE — 83735 ASSAY OF MAGNESIUM: CPT | Performed by: NURSE PRACTITIONER

## 2024-08-05 RX ORDER — POLYETHYLENE GLYCOL 3350 17 G/17G
17 POWDER, FOR SOLUTION ORAL DAILY PRN
Status: DISCONTINUED | OUTPATIENT
Start: 2024-08-05 | End: 2024-08-09 | Stop reason: HOSPADM

## 2024-08-05 RX ORDER — POTASSIUM CHLORIDE 1500 MG/1
40 TABLET, EXTENDED RELEASE ORAL ONCE
Status: COMPLETED | OUTPATIENT
Start: 2024-08-05 | End: 2024-08-05

## 2024-08-05 RX ORDER — OXYCODONE HYDROCHLORIDE 5 MG/1
5 TABLET ORAL EVERY 6 HOURS PRN
Status: DISCONTINUED | OUTPATIENT
Start: 2024-08-05 | End: 2024-08-07

## 2024-08-05 RX ORDER — ONDANSETRON 2 MG/ML
4 INJECTION INTRAMUSCULAR; INTRAVENOUS EVERY 6 HOURS PRN
Status: DISCONTINUED | OUTPATIENT
Start: 2024-08-05 | End: 2024-08-09 | Stop reason: HOSPADM

## 2024-08-05 RX ORDER — SODIUM CHLORIDE, SODIUM LACTATE, POTASSIUM CHLORIDE, CALCIUM CHLORIDE 600; 310; 30; 20 MG/100ML; MG/100ML; MG/100ML; MG/100ML
100 INJECTION, SOLUTION INTRAVENOUS CONTINUOUS
Status: DISPENSED | OUTPATIENT
Start: 2024-08-05 | End: 2024-08-06

## 2024-08-05 RX ORDER — KETOROLAC TROMETHAMINE 30 MG/ML
30 INJECTION, SOLUTION INTRAMUSCULAR; INTRAVENOUS EVERY 6 HOURS PRN
Status: DISCONTINUED | OUTPATIENT
Start: 2024-08-05 | End: 2024-08-07

## 2024-08-05 RX ORDER — SIMETHICONE 80 MG
80 TABLET,CHEWABLE ORAL 4 TIMES DAILY PRN
Status: DISCONTINUED | OUTPATIENT
Start: 2024-08-05 | End: 2024-08-09 | Stop reason: HOSPADM

## 2024-08-05 RX ORDER — SODIUM CHLORIDE 9 MG/ML
100 INJECTION, SOLUTION INTRAVENOUS CONTINUOUS
Status: DISCONTINUED | OUTPATIENT
Start: 2024-08-05 | End: 2024-08-05

## 2024-08-05 RX ORDER — ACETAMINOPHEN 325 MG/1
650 TABLET ORAL EVERY 6 HOURS PRN
Status: DISCONTINUED | OUTPATIENT
Start: 2024-08-05 | End: 2024-08-09 | Stop reason: HOSPADM

## 2024-08-05 RX ORDER — MAGNESIUM HYDROXIDE/ALUMINUM HYDROXICE/SIMETHICONE 120; 1200; 1200 MG/30ML; MG/30ML; MG/30ML
30 SUSPENSION ORAL EVERY 6 HOURS PRN
Status: DISCONTINUED | OUTPATIENT
Start: 2024-08-05 | End: 2024-08-09 | Stop reason: HOSPADM

## 2024-08-05 RX ORDER — SODIUM CHLORIDE, SODIUM LACTATE, POTASSIUM CHLORIDE, CALCIUM CHLORIDE 600; 310; 30; 20 MG/100ML; MG/100ML; MG/100ML; MG/100ML
100 INJECTION, SOLUTION INTRAVENOUS CONTINUOUS
Status: DISCONTINUED | OUTPATIENT
Start: 2024-08-05 | End: 2024-08-05

## 2024-08-05 RX ORDER — FAMOTIDINE 10 MG/ML
20 INJECTION, SOLUTION INTRAVENOUS EVERY 12 HOURS SCHEDULED
Status: DISCONTINUED | OUTPATIENT
Start: 2024-08-05 | End: 2024-08-09 | Stop reason: HOSPADM

## 2024-08-05 RX ADMIN — KETOROLAC TROMETHAMINE 30 MG: 30 INJECTION, SOLUTION INTRAMUSCULAR; INTRAVENOUS at 22:48

## 2024-08-05 RX ADMIN — MORPHINE SULFATE 2 MG: 2 INJECTION, SOLUTION INTRAMUSCULAR; INTRAVENOUS at 20:54

## 2024-08-05 RX ADMIN — SODIUM CHLORIDE, SODIUM LACTATE, POTASSIUM CHLORIDE, AND CALCIUM CHLORIDE 100 ML/HR: .6; .31; .03; .02 INJECTION, SOLUTION INTRAVENOUS at 04:28

## 2024-08-05 RX ADMIN — KETOROLAC TROMETHAMINE 30 MG: 30 INJECTION, SOLUTION INTRAMUSCULAR; INTRAVENOUS at 09:56

## 2024-08-05 RX ADMIN — FAMOTIDINE 20 MG: 10 INJECTION INTRAVENOUS at 14:03

## 2024-08-05 RX ADMIN — SODIUM CHLORIDE, SODIUM LACTATE, POTASSIUM CHLORIDE, AND CALCIUM CHLORIDE 100 ML/HR: .6; .31; .03; .02 INJECTION, SOLUTION INTRAVENOUS at 14:04

## 2024-08-05 RX ADMIN — POTASSIUM CHLORIDE 40 MEQ: 1500 TABLET, EXTENDED RELEASE ORAL at 14:02

## 2024-08-05 RX ADMIN — KETOROLAC TROMETHAMINE 30 MG: 30 INJECTION, SOLUTION INTRAMUSCULAR; INTRAVENOUS at 16:28

## 2024-08-05 RX ADMIN — MORPHINE SULFATE 2 MG: 2 INJECTION, SOLUTION INTRAMUSCULAR; INTRAVENOUS at 04:49

## 2024-08-05 RX ADMIN — FAMOTIDINE 20 MG: 10 INJECTION INTRAVENOUS at 22:31

## 2024-08-05 RX ADMIN — KETOROLAC TROMETHAMINE 30 MG: 30 INJECTION, SOLUTION INTRAMUSCULAR; INTRAVENOUS at 03:43

## 2024-08-05 NOTE — ASSESSMENT & PLAN NOTE
Fourth ED visit for abdominal pain    C:   1. There is a short segment jejunojejunal intussusception in the left lateral abdomen, which is nonspecific but could be due to transient intussusception of no clinical significance because no abnormal small bowel dilation/small bowel obstruction is seen and because the oral contrast material passed through this region to the level of the ileocecal valve. Follow-up as clinically warranted for persistent symptoms. There is no evidence of bowel obstruction, pneumoperitoneum, pneumatosis, appendicitis, or abscess. Mild amount of nonspecific dependent low-density ascites is seen posterior to the urinary bladder. 2. There is questionable thickening involving the wall of the underdistended distal esophagus similar compared with the prior exam, which is nonspecific but should be correlated with any clinical evidence of esophagitis. 3. Multiple nonspecific top-normal size bilateral inguinal lymph nodes measuring up to 13 mm in short axis diameter are seen, which have increased in size from 12/02/2023 (at which time there were bilateral inguinal lymph nodes measuring up to 8 mm short axis)

## 2024-08-05 NOTE — PLAN OF CARE
Problem: PAIN - ADULT  Goal: Verbalizes/displays adequate comfort level or baseline comfort level  Description: Interventions:  - Encourage patient to monitor pain and request assistance  - Assess pain using appropriate pain scale  - Administer analgesics based on type and severity of pain and evaluate response  - Implement non-pharmacological measures as appropriate and evaluate response  - Consider cultural and social influences on pain and pain management  - Notify physician/advanced practitioner if interventions unsuccessful or patient reports new pain  8/5/2024 1548 by Cammy Tatum RN  Outcome: Progressing  8/5/2024 1528 by Cammy Tatum RN  Outcome: Progressing     Problem: INFECTION - ADULT  Goal: Absence or prevention of progression during hospitalization  Description: INTERVENTIONS:  - Assess and monitor for signs and symptoms of infection  - Monitor lab/diagnostic results  - Monitor all insertion sites, i.e. indwelling lines, tubes, and drains  - Monitor endotracheal if appropriate and nasal secretions for changes in amount and color  - [place] appropriate cooling/warming therapies per order  - Administer medications as ordered  - Instruct and encourage patient and family to use good hand hygiene technique  - Identify and instruct in appropriate isolation precautions for identified infection/condition  8/5/2024 1548 by Cammy Tatum RN  Outcome: Progressing  8/5/2024 1528 by Cammy Tautm RN  Outcome: Progressing  Goal: Absence of fever/infection during neutropenic period  Description: INTERVENTIONS:  - Monitor WBC    8/5/2024 1548 by Cammy Tatum RN  Outcome: Progressing  8/5/2024 1528 by Cammy Tatum RN  Outcome: Progressing     Problem: SAFETY ADULT  Goal: Patient will remain free of falls  Description: INTERVENTIONS:  - Educate patient/family on patient safety including physical limitations  - Instruct patient to call for assistance with activity   - Consult OT/PT to assist with strengthening/mobility    - Keep Call bell within reach  - Keep bed low and locked with side rails adjusted as appropriate  - Keep care items and personal belongings within reach  - Initiate and maintain comfort rounds  - Make Fall Risk Sign visible to staff  - Offer Toileting every 2 Hours, in advance of need  - Initiate/Maintain alarm  - Obtain necessary fall risk management equipment  - Apply yellow socks and bracelet for high fall risk patients  - Consider moving patient to room near nurses station  8/5/2024 1548 by Cammy Tatum RN  Outcome: Progressing  8/5/2024 1528 by Cammy Tatum RN  Outcome: Progressing  Goal: Maintain or return to baseline ADL function  Description: INTERVENTIONS:  -  Assess patient's ability to carry out ADLs; assess patient's baseline for ADL function and identify physical deficits which impact ability to perform ADLs (bathing, care of mouth/teeth, toileting, grooming, dressing, etc.)  - Assess/evaluate cause of self-care deficits   - Assess range of motion  - Assess patient's mobility; develop plan if impaired  - Assess patient's need for assistive devices and provide as appropriate  - Encourage maximum independence but intervene and supervise when necessary  - Involve family in performance of ADLs  - Assess for home care needs following discharge   - Consider OT consult to assist with ADL evaluation and planning for discharge  - Provide patient education as appropriate  8/5/2024 1548 by Cammy Tatum RN  Outcome: Progressing  8/5/2024 1528 by Cammy Tatum RN  Outcome: Progressing  Goal: Maintains/Returns to pre admission functional level  Description: INTERVENTIONS:  - Perform AM-PAC 6 Click Basic Mobility/ Daily Activity assessment daily.  - Set and communicate daily mobility goal to care team and patient/family/caregiver.   - Collaborate with rehabilitation services on mobility goals if consulted  - Perform Range of Motion 3 times a day.  - Reposition patient every 2 hours.  - Dangle patient 3 times a day  -  Stand patient 3 times a day  - Ambulate patient 3 times a day  - Out of bed to chair 3 times a day   - Out of bed for meals 3 times a day  - Out of bed for toileting  - Record patient progress and toleration of activity level   8/5/2024 1548 by Cammy Tatum RN  Outcome: Progressing  8/5/2024 1528 by Cammy Tatum RN  Outcome: Progressing     Problem: DISCHARGE PLANNING  Goal: Discharge to home or other facility with appropriate resources  Description: INTERVENTIONS:  - Identify barriers to discharge w/patient and caregiver  - Arrange for needed discharge resources and transportation as appropriate  - Identify discharge learning needs (meds, wound care, etc.)  - Arrange for interpretive services to assist at discharge as needed  - Refer to Case Management Department for coordinating discharge planning if the patient needs post-hospital services based on physician/advanced practitioner order or complex needs related to functional status, cognitive ability, or social support system  8/5/2024 1548 by Cammy Tatum RN  Outcome: Progressing  8/5/2024 1528 by Cammy Tatum RN  Outcome: Progressing     Problem: Knowledge Deficit  Goal: Patient/family/caregiver demonstrates understanding of disease process, treatment plan, medications, and discharge instructions  Description: Complete learning assessment and assess knowledge base.  Interventions:  - Provide teaching at level of understanding  - Provide teaching via preferred learning methods  8/5/2024 1548 by Cammy Tatum RN  Outcome: Progressing  8/5/2024 1528 by Cammy Tatum RN  Outcome: Progressing     Problem: METABOLIC, FLUID AND ELECTROLYTES - ADULT  Goal: Electrolytes maintained within normal limits  Description: INTERVENTIONS:  - Monitor labs and assess patient for signs and symptoms of electrolyte imbalances  - Administer electrolyte replacement as ordered  - Monitor response to electrolyte replacements, including repeat lab results as appropriate  - Instruct patient  on fluid and nutrition as appropriate  8/5/2024 1548 by Cammy Tatum RN  Outcome: Progressing  8/5/2024 1528 by Cammy Tatum RN  Outcome: Progressing  Goal: Fluid balance maintained  Description: INTERVENTIONS:  - Monitor labs   - Monitor I/O and WT  - Instruct patient on fluid and nutrition as appropriate  - Assess for signs & symptoms of volume excess or deficit  8/5/2024 1548 by Cammy Tatum RN  Outcome: Progressing  8/5/2024 1528 by Cammy Tatum RN  Outcome: Progressing     Problem: GASTROINTESTINAL - ADULT  Goal: Minimal or absence of nausea and/or vomiting  Description: INTERVENTIONS:  - Administer IV fluids if ordered to ensure adequate hydration  - Maintain NPO status until nausea and vomiting are resolved  - Nasogastric tube if ordered  - Administer ordered antiemetic medications as needed  - Provide nonpharmacologic comfort measures as appropriate  - Advance diet as tolerated, if ordered  - Consider nutrition services referral to assist patient with adequate nutrition and appropriate food choices  Outcome: Progressing  Goal: Maintains or returns to baseline bowel function  Description: INTERVENTIONS:  - Assess bowel function  - Encourage oral fluids to ensure adequate hydration  - Administer IV fluids if ordered to ensure adequate hydration  - Administer ordered medications as needed  - Encourage mobilization and activity  - Consider nutritional services referral to assist patient with adequate nutrition and appropriate food choices  Outcome: Progressing  Goal: Maintains adequate nutritional intake  Description: INTERVENTIONS:  - Monitor percentage of each meal consumed  - Identify factors contributing to decreased intake, treat as appropriate  - Assist with meals as needed  - Monitor I&O, weight, and lab values if indicated  - Obtain nutrition services referral as needed  Outcome: Progressing      23

## 2024-08-05 NOTE — ED NOTES
"Pt states \"I ain't had nothing to eat all fucking day.  What is taking so long?  It has been hours.\"   Advised pt that we could not give him anything to eat due to his complaint of abd pain as we did not want to exacerbate a potential problem, especially since he has had serious abdominal issues in the past.       Sharon Mcgarry RN  08/04/24 2338    "

## 2024-08-05 NOTE — CONSULTS
"e-Consult ([facility])  - Interventional Radiology  Reji Shi 23 y.o. male MRN: [id number]  Unit/Bed#: [place] [address] -01 Encounter: [id number]          Interventional Radiology has been consulted to evaluate Reji Shi    We were consulted by Internal Medicine concerning this patient with left groin adenopathy and surgery recommending biopsy.    Inpatient Consult to IR  Consult performed by: SHIRA Antoine  Consult ordered by: Derrick Welsh DO        08/05/24    Assessment/Recommendation:   22 yo male with recurrent intussusception who presented to ED on 8/4/24 with intractable abdominal pain.     CT A/P demonstrating: \"Small bowel-small bowel intussusception in the left upper abdomen without associated suspicious findings. No bowel obstruction\".     Patient with enlarged lymph nodes and reporting significant weight loss, night sweats over the last 6 months.     General Surgery consulted for recurrent intussusception.   GI consulted for evaluation for PSB infectious vs inflammatory work up for etiology of recurrence.    IR consulted for evaluation and possible left groin lymph node biopsy.     -Discussed case with Dr Giron who reviewed imaging.  -Recommend Infectious Disease consult to work up infectious causes.   -follow up peripheral flow studies  -Can consider IR biopsy when acute illness resolves.   -Please see IR Attending Attestation above for more recommendations  -remainder of care per Primary team     21-30 minutes, >50% of the total time devoted to medical consultative verbal/EMR discussion between providers. Written report will be generated in the EMR.     Thank you for allowing Interventional Radiology to participate in the care of Reji Shi. Please don't hesitate to call or TigerText us with any questions.     SHIRA Antoine            "

## 2024-08-05 NOTE — APP STUDENT NOTE
"Assessment & Plan:   Intussusception  24 y/o M patient with history of small bowel intussusception in 11/2023 requiring diagnostic laparoscopy admitted due left lower quadrant abdominal pain x 2 weeks and unintentional 20 pound weight loss.  CT abdomen and pelvis revealing \"Small bowel-small bowel intussusception in the left upper abdomen without associated suspicious findings. No bowel obstruction.\"  Lactic acid normal   General surgery following    No acute surgical intervention   Recommends NPO until GI evaluation, analgesia, anti-emetics  If worsening abdominal pain or inability to tolerate PO or signs of obstruction will consider repeating diagnostic laparoscopy  GI following    Potential infectious vs inflammatory work up due to recurrence of intussception   Continue pain management     Inguinal lymphadenopathy    Enlarged inguinal lymph nodes with unintentional 20 lb weight loss and night sweats  IR following    Recommend to consider IR biopsy when acute illness resolves   Will consult infectious disease to work up infectious causes    HIV, TB, and hepatitis panels pending     Unintentional weight loss     Reports 20 lb unintentional weight loss  Infectious disease consult   HIV, TB, and hepatitis panels pending    Rectal bleeding     Denies current rectal bleeding but recent history of multiple ER visits and GI workup   Patient engages in anal intercourse  Colonoscopy completed 12/2024 however was unsuccessful due to inadequate preparation   Recommended to repeat in 3 months - which is overdue    Constipation    Denies current constipation as last normal BM was prior to presentation yesterday  Denies bleeding or change in stool color     Hypokalemia     Potassium low at 3.4 today  Will give Kcl tablet     Code Status: Level 1 - Full Code    Subjective:   Patient is examined while laying in the hospital bed today. Reports that his pain is improved from yesterday but still bothersome especially when leaning " -subjectively euvolemic  -EF 25-30%  -continue entresto was restarted, coreg  -pt followed up with cardiology , no changes,    forward. Denies anorexia, nausea, vomiting, diarrhea, constipation, urinary symptoms like frequency or urgency. Reports he had a normal BM just prior to presenting to the ED yesterday without evidence of bleeding or change in stool color or consistency. Admits to 15-20 lb weight loss unintentionally.     No other acute complaints.     Objective:     Vitals:   Temp (24hrs), Av.9 °F (36.6 °C), Min:97.7 °F (36.5 °C), Max:98.4 °F (36.9 °C)    Temp:  [97.7 °F (36.5 °C)-98.4 °F (36.9 °C)] 97.7 °F (36.5 °C)  HR:  [55-96] 55  Resp:  [14-20] 20  BP: (113-141)/(60-92) 113/64  SpO2:  [96 %-99 %] 96 %  Body mass index is 20.82 kg/m².     Input and Output Summary (last 24 hours):     Intake/Output Summary (Last 24 hours) at 2024 1422  Last data filed at 2024 1840  Gross per 24 hour   Intake 1000 ml   Output --   Net 1000 ml       Physical Exam:   Physical Exam  Vitals and nursing note reviewed.   Constitutional:       Appearance: Normal appearance. He is normal weight. He is not ill-appearing.   HENT:      Head: Normocephalic.   Cardiovascular:      Rate and Rhythm: Normal rate and regular rhythm.      Pulses: Normal pulses.      Heart sounds: Normal heart sounds.   Pulmonary:      Effort: Pulmonary effort is normal.      Breath sounds: Normal breath sounds. No wheezing.   Abdominal:      General: Bowel sounds are normal.      Palpations: Abdomen is soft.      Tenderness: There is abdominal tenderness in the left upper quadrant and left lower quadrant.   Skin:     General: Skin is warm.      Capillary Refill: Capillary refill takes less than 2 seconds.   Neurological:      Mental Status: He is alert. Mental status is at baseline.   Psychiatric:         Mood and Affect: Mood normal.         Behavior: Behavior is cooperative.         Thought Content: Thought content normal.         Judgment: Judgment normal.          Additional Data:     Labs:  Results from last 7 days   Lab Units 24  0441   WBC Thousand/uL 7.32    HEMOGLOBIN g/dL 14.5   HEMATOCRIT % 41.8   PLATELETS Thousands/uL 281   SEGS PCT % 46   LYMPHO PCT % 40   MONO PCT % 5   EOS PCT % 8*     Results from last 7 days   Lab Units 08/05/24  0445   SODIUM mmol/L 141   POTASSIUM mmol/L 3.4*   CHLORIDE mmol/L 106   CO2 mmol/L 31   BUN mg/dL 5   CREATININE mg/dL 0.86   ANION GAP mmol/L 4   CALCIUM mg/dL 8.9   ALBUMIN g/dL 3.8   TOTAL BILIRUBIN mg/dL 0.57   ALK PHOS U/L 56   ALT U/L 8   AST U/L 12*   GLUCOSE RANDOM mg/dL 87                 Results from last 7 days   Lab Units 08/04/24  1737   LACTIC ACID mmol/L 1.4       Lines/Drains:  Invasive Devices       Peripheral Intravenous Line  Duration             Peripheral IV 08/05/24 Distal;Left;Upper;Ventral (anterior) Arm <1 day                          Imaging: Reviewed radiology reports from this admission including: abdominal/pelvic CT    Recent Cultures (last 7 days):         Last 24 Hours Medication List:   Current Facility-Administered Medications   Medication Dose Route Frequency Provider Last Rate    acetaminophen  650 mg Oral Q6H PRN SHIRA Cross      aluminum-magnesium hydroxide-simethicone  30 mL Oral Q6H PRN SHIRA Cross      famotidine  20 mg Intravenous Q12H LISA Derrick Welsh,       ketorolac  30 mg Intravenous Q6H PRN SHIRA Cross      lactated ringers  100 mL/hr Intravenous Continuous Derrick Welsh,  100 mL/hr (08/05/24 1404)    morphine injection  2 mg Intravenous Q4H PRN SHIRA Cross      ondansetron  4 mg Intravenous Q6H PRN SHIRA Cross      oxyCODONE  5 mg Oral Q6H PRN SHIRA Cross      polyethylene glycol  17 g Oral Daily PRN SHIRA Cross      simethicone  80 mg Oral 4x Daily PRN SHIRA Cross          Today, Patient Was Seen By: Sydney Souliere    **Please Note: This note may have been constructed using a voice recognition system.**

## 2024-08-05 NOTE — H&P
Formerly [facility], later [facility]  H&P  Name: Reji Shi 23 y.o. male I MRN: [id number]  Unit/Bed#: ED-24 I Date of Admission: 8/4/2024   Date of Service: 8/5/2024 I Hospital Day: 0      Assessment & Plan   * Intussusception (HCC)  Assessment & Plan  Surgical admission in Nov for intussusception s/p diagnostic laparoscopy 11/27/23 which showed a 15cm segment of small bowel intussusception mid jejunum with a mobile intraluminal soft bezoar which was milked distally    CT: vRad  Short segment jejunojejunal intussusception in the left lateral abdomen, which is nonspecific but could be due to transient intussusception of no clinical significance because no abnormal small bowel dilation/SBO is seen and because the oral contrast material passed through this region to the level of the ileocecal valve.   No evidence of bowel obstruction, pneumoperitoneum, pneumatosis, appendicitis, or abscess. Mild amount of nonspecific dependent low-density ascites seen posterior to the urinary bladder  Keep NPO for now. IV hydration  PRN Analgesics/antiemetics  Monitor lytes and replete  Serial abdominal exams  Discussed with surgery, recommending slim admit for GI consult.  Will consider diagnostic lap inpatient vs outpatient  GI consult     Inguinal lymphadenopathy  Assessment & Plan  Incidental finding on CT A/P:  Multiple nonspecific top-normal size B/L inguinal lymph nodes measuring up to 13 mm increased in size from 12/02/2023     Rectal bleeding  Assessment & Plan  Currently denies bleeding  Seen outpatient by GI Dec 6 for ongoing rectal bleeding over one month  Patient engages in anal sex  S/p colonoscopy 12/14: Poor prep.  Procedure aborted in the rectum  Mild granular mucosa in the rectum; performed cold forceps biopsy to rule out colitis  Internal hemorrhoids. Hypertrophied anal papila  Recommended Repeat colonoscopy in 3 months     Other constipation  Assessment & Plan  Follows with surgery and GI   Prescribed  "MiraLAX         VTE Prophylaxis: Low risk, ambulate  Code Status: FC  POLST: POLST is not applicable to this patient  Discussion with family:     Anticipated Length of Stay:  Patient will be admitted on an Observation basis with an anticipated length of stay of  < 2 midnights.   Justification for Hospital Stay:  intractable abdominal pain history of intussusception    Total Time for Visit, including Counseling / Coordination of Care: 45 minutes.  Greater than 50% of this total time spent on direct patient counseling and coordination of care.    Chief Complaint:   \" Severe stabbing abdominal pain\"    History of Present Illness:    Reji Shi is a 23 y.o. male who presents with c/o intractable abdominal pain.  Admitted by surgery service Nov for intussusception s/p diagnostic laparoscopy. See above.    Pain in left lower quadrant, nonradiating, sharp and stabbing in nature.  Denies any exacerbating factors, pain resolves on its own.  Reports normal appetite, normal BM yesterday.  Passing gas.  Denies nausea/emesis or abdominal bloating.    Reports 15lb weight loss over the last month. Recent weights below:    Wt Readings from Last 10 Encounters:   08/04/24 64 kg (141 lb 1.5 oz)   07/27/24 64.1 kg (141 lb 5 oz)   07/25/24 63.8 kg (140 lb 9.6 oz)   07/23/24 64.7 kg (142 lb 10.2 oz)   12/14/23 64.9 kg (143 lb)   12/12/23 64.9 kg (143 lb)   12/06/23 64.9 kg (143 lb)   11/27/23 68.4 kg (150 lb 12.7 oz)   11/19/23 67.8 kg (149 lb 7.6 oz)   11/15/23 64.6 kg (142 lb 6.7 oz)        Review of Systems:    Review of Systems   Constitutional:  Positive for diaphoresis and unexpected weight change. Negative for appetite change and fever.        Night sweats  15lb weight loss in 1 month   Respiratory: Negative.     Cardiovascular: Negative.    Gastrointestinal:  Positive for abdominal pain. Negative for abdominal distention, anal bleeding, constipation, diarrhea, nausea and vomiting.   Musculoskeletal: Negative.    Skin: " Negative.    Neurological: Negative.        Past Medical and Surgical History:     Past Medical History:   Diagnosis Date    No known health problems        Past Surgical History:   Procedure Laterality Date    [place] LAPS ABD PRTM&OMENTUM DX W/WO SPEC BR/WA SPX N/A 11/27/2023    Procedure: LAPAROSCOPY DIAGNOSTIC;  Surgeon: Em Yeboah MD;  Location: [facility];  Service: General       Meds/Allergies:    Prior to Admission medications    Medication Sig Start Date End Date Taking? Authorizing Provider   acetaminophen (TYLENOL) 325 mg tablet Take 2 tablets (650 mg total) by mouth every 6 (six) hours as needed for mild pain or moderate pain 11/28/23   Sharad Murrieta MD   acetaminophen (TYLENOL) 500 mg tablet Take 1 tablet (500 mg total) by mouth every 6 (six) hours as needed for mild pain 7/25/24   Faby Toro PA-C   docusate sodium (COLACE) 100 mg capsule Take 1 capsule (100 mg total) by mouth every 12 (twelve) hours  Patient not taking: Reported on 12/12/2023 11/19/23   Marty Barry MD   ibuprofen (MOTRIN) 400 mg tablet Take 1 tablet (400 mg total) by mouth every 6 (six) hours as needed for mild pain 7/25/24   Faby Toro PA-C   lidocaine (LMX) 4 % cream Apply topically as needed for mild pain 11/19/23   Marty Barry MD   methocarbamol (ROBAXIN) 500 mg tablet Take 1 tablet (500 mg total) by mouth 3 (three) times a day as needed for muscle spasms for up to 5 days 12/2/23 12/7/23  Kaylin Jones DO   polyethylene glycol (MIRALAX) 17 g packet Take 17 g by mouth daily 12/14/23   Lydia Delgado MD   cetirizine (ZyrTEC) 10 mg tablet Take 1 tablet (10 mg total) by mouth daily 10/17/21 11/3/21  Evelyn Powell MD     I have reviewed home medications with patient personally.    Allergies: No Known Allergies    Social History:     Marital Status: Single   Occupation: home health aid  Patient Pre-hospital Living Situation: resides alone  Patient Pre-hospital Level of Mobility:  Ambulatory  Patient Pre-hospital Diet Restrictions:   Substance Use History:   Social History     Substance and Sexual Activity   Alcohol Use Not Currently     Social History     Tobacco Use   Smoking Status Former    Types: Cigars   Smokeless Tobacco Never     Social History     Substance and Sexual Activity   Drug Use Yes    Types: Marijuana    Comment: last use ~1month       Family History:    History reviewed. No pertinent family history.    Physical Exam:     Vitals:   Blood Pressure: 141/92 (08/05/24 0009)  Pulse: 58 (08/05/24 0009)  Temperature: 98.4 °F (36.9 °C) (08/04/24 1708)  Respirations: 16 (08/05/24 0009)  Weight - Scale: 64 kg (141 lb 1.5 oz) (08/04/24 1708)  SpO2: 99 % (08/05/24 0009)    Physical Exam  Constitutional:       General: He is not in acute distress.     Appearance: Normal appearance. He is not ill-appearing, toxic-appearing or diaphoretic.      Comments: Underweight   HENT:      Head: Normocephalic and atraumatic.      Mouth/Throat:      Mouth: Mucous membranes are moist.   Eyes:      General: No scleral icterus.  Cardiovascular:      Rate and Rhythm: Normal rate and regular rhythm.      Heart sounds: Normal heart sounds.   Pulmonary:      Effort: Pulmonary effort is normal.      Breath sounds: Normal breath sounds.   Abdominal:      General: Bowel sounds are normal. There is no distension.      Palpations: Abdomen is soft.      Tenderness: There is abdominal tenderness.   Musculoskeletal:      Right lower leg: No edema.      Left lower leg: No edema.   Skin:     General: Skin is dry.   Neurological:      Mental Status: He is alert and oriented to person, place, and time.   Psychiatric:         Mood and Affect: Mood normal.         Behavior: Behavior normal.         Thought Content: Thought content normal.         Judgment: Judgment normal.       Additional Data:     Lab Results: I have personally reviewed pertinent reports.      Results from last 7 days   Lab Units 08/04/24  3027   WBC  Thousand/uL 10.80*   HEMOGLOBIN g/dL 15.5   HEMATOCRIT % 44.6   PLATELETS Thousands/uL 289   SEGS PCT % 75   LYMPHO PCT % 16   MONO PCT % 4   EOS PCT % 5     Results from last 7 days   Lab Units 08/04/24  1737   SODIUM mmol/L 140   POTASSIUM mmol/L 3.8   CHLORIDE mmol/L 107   CO2 mmol/L 29   BUN mg/dL 7   CREATININE mg/dL 1.07   ANION GAP mmol/L 4   CALCIUM mg/dL 9.2   ALBUMIN g/dL 4.1   TOTAL BILIRUBIN mg/dL 0.38   ALK PHOS U/L 68   ALT U/L 9   AST U/L 14   GLUCOSE RANDOM mg/dL 94                 Results from last 7 days   Lab Units 08/04/24  1737   LACTIC ACID mmol/L 1.4       Imaging: I have personally reviewed pertinent reports.      CT abdomen pelvis with contrast    (Results Pending)       EKG, Pathology, and Other Studies Reviewed on Admission:   CT colonoscopy    Allscripts / Epic Records Reviewed: Yes     ** Please Note: This note has been constructed using a voice recognition system. **

## 2024-08-05 NOTE — CONSULTS
[facility]ultation -  [facility]  Reji Shi 23 y.o. male MRN: [id number]  Unit/Bed#: [place] [address] -01 Encounter: [id number]            Inpatient consult to gastroenterology     Date/Time  8/5/2024 5:16 PM     Performed by  Karla Escudero DO   Authorized by  SHIRA Cross             Reason for Consult / Principal Problem: Left Sided Abdominal Pain    ASSESSMENT AND PLAN:      Mr. Reji Shi is a 23-year-old male with a prior history of intussusception status post diagnostic laparoscopy 11/2023 and constipation who presented with intractable abdominal pain for which GI is consulted.     Abdominal Pain   History of Intussusception     Patient previously hospitalized 11/2023 after presenting with abdominal pain. Found to have approx 15 cm segment of small bowel intussusception present in the mid jejunum. Noted to have a lead point intraluminal bezoar that was able to able to be milked distally. No resection completed at that time and no Meckles or lymphadenopathy noted. Since this hospitalization, patient with ongoing abdominal pain primarily in the LLQ region. Pain present at this time but more severe than baseline. Labs stable but CT imaging demonstrating recurrent jejunal intussusception. Suspect that this is the likely etiology of patient's pain and will defer to surgical team for operative management. Although, reason for recurrent intussusception remains unclear. If symptoms continue to persist, especially in context of weight loss and prior (but not current) rectal bleeding, would benefit from endoscopic evaluation but once intussusception has resolved and likely on an outpatient basis.     Plan:  Okay for diet from GI standpoint; defer to surgical team   No plan for inpatient EGD/colonoscopy at this time; outpatient colonoscopy   Serial abdominal examination; surgery consulted and appreciate input   Additional pain and symptom management per primary team    ______________________________________________________________________    HPI:  Mr. Reji Shi is a 23-year-old male with a prior history of intussusception status post diagnostic laparoscopy 11/2023 and constipation.  Patient presented to [facility] ED on 8/5/2020 for with intractable abdominal pain. Patient reportedly with ongoing LLQ abdominal pain since admission for intussusception in November. Symptoms fluctuate in severity and patient with no identifying aggravating/alleviating factors. No associated symptoms including nausea/vomiting or fever/chills. Patient also with continued brown bowel movements. He does admit to 20 lbs unintentional weight loss.     At time of ED arrival, patient hemodynamically stable and afebrile.  Labs largely unremarkable outside of a very mild leukocytosis of 10.80.  Normal lactic acid.  BUN/serum creatinine along with electrolytes and LFTs within normal limits.  CT abdomen/pelvis with contrast was completed and did reveal small bowel small bowel intussusception in the left upper quadrant without associated suspicious findings.  No evidence of bowel obstruction.    No prior EGD.  Last colonoscopy 12/14/2023 for rectal bleeding with inadequate prep.  Solid stool present in the rectum and therefore procedure aborted.  However, patient noted to have mild granular mucosa in the rectum with internal hemorrhoids and hypertrophied anal papilla.  Rectal biopsies performed and benign.    REVIEW OF SYSTEMS:    CONSTITUTIONAL: Denies any fever, chills, rigors, and weight loss.  HEENT: No earache or tinnitus. Denies hearing loss or visual disturbances.  CARDIOVASCULAR: No chest pain or palpitations.   RESPIRATORY: Denies any cough, hemoptysis, shortness of breath or dyspnea on exertion.  GASTROINTESTINAL: As noted in the History of Present Illness.   GENITOURINARY: No problems with urination. Denies any hematuria or dysuria.  NEUROLOGIC: No dizziness or vertigo, denies headaches.    MUSCULOSKELETAL: Denies any muscle or joint pain.   SKIN: Denies skin rashes or itching.   ENDOCRINE: Denies excessive thirst. Denies intolerance to heat or cold.  PSYCHOSOCIAL: Denies depression or anxiety. Denies any recent memory loss.       Historical Information   Past Medical History:   Diagnosis Date    No known health problems      Past Surgical History:   Procedure Laterality Date    [place] LAPS ABD PRTM&OMENTUM DX W/WO SPEC BR/WA SPX N/A 11/27/2023    Procedure: LAPAROSCOPY DIAGNOSTIC;  Surgeon: Em Yeboah MD;  [facility]: [facility] OR;  Service: General     Social History   Social History     Substance and Sexual Activity   Alcohol Use Not Currently     Social History     Substance and Sexual Activity   Drug Use Yes    Types: Marijuana    Comment: last use ~1month     Social History     Tobacco Use   Smoking Status Former    Types: Cigars   Smokeless Tobacco Never     History reviewed. No pertinent family history.    Meds/Allergies       Medications Prior to Admission:     acetaminophen (TYLENOL) 325 mg tablet    acetaminophen (TYLENOL) 500 mg tablet    ibuprofen (MOTRIN) 400 mg tablet    docusate sodium (COLACE) 100 mg capsule    lidocaine (LMX) 4 % cream    methocarbamol (ROBAXIN) 500 mg tablet    polyethylene glycol (MIRALAX) 17 g packet  Current Facility-Administered Medications   Medication Dose Route Frequency    acetaminophen (TYLENOL) tablet 650 mg  650 mg Oral Q6H PRN    aluminum-magnesium hydroxide-simethicone (MAALOX) oral suspension 30 mL  30 mL Oral Q6H PRN    ketorolac (TORADOL) injection 30 mg  30 mg Intravenous Q6H PRN    lactated ringers infusion  100 mL/hr Intravenous Continuous    morphine injection 2 mg  2 mg Intravenous Q4H PRN    ondansetron (ZOFRAN) injection 4 mg  4 mg Intravenous Q6H PRN    oxyCODONE (ROXICODONE) IR tablet 5 mg  5 mg Oral Q6H PRN    polyethylene glycol (MIRALAX) packet 17 g  17 g Oral Daily PRN    simethicone (MYLICON) chewable tablet 80 mg  80 mg Oral 4x Daily  "PRN       No Known Allergies        Objective     Blood pressure 113/64, pulse 55, temperature 97.7 °F (36.5 °C), resp. rate 20, height 5' 9\" (1.753 m), weight 64 kg (141 lb), SpO2 96%. Body mass index is 20.82 kg/m².      Intake/Output Summary (Last 24 hours) at 8/5/2024 0756  Last data filed at 8/4/2024 1840  Gross per 24 hour   Intake 1000 ml   Output --   Net 1000 ml         PHYSICAL EXAM:      General Appearance:   Alert, cooperative, no distress   HEENT:   Normocephalic, atraumatic, anicteric.     Neck:  Supple, symmetrical, trachea midline   Lungs:   Clear to auscultation bilaterally; no rales, rhonchi or wheezing; respirations unlabored    Heart::   Regular rate and rhythm; no murmur, rub, or gallop.   Abdomen:   Soft, non-distended; normal bowel sounds; no masses, no organomegaly. Tender to palpation in the LLQ.    Genitalia:   Deferred    Rectal:   Deferred    Extremities:  No cyanosis, clubbing or edema    Pulses:  2+ and symmetric all extremities    Skin:  No jaundice, rashes, or lesions    Lymph nodes:  No palpable cervical lymphadenopathy        Lab Results:   Admission on 08/04/2024   Component Date Value    WBC 08/04/2024 10.80 (H)     RBC 08/04/2024 4.96     Hemoglobin 08/04/2024 15.5     Hematocrit 08/04/2024 44.6     MCV 08/04/2024 90     MCH 08/04/2024 31.3     MCHC 08/04/2024 34.8     RDW 08/04/2024 12.0     MPV 08/04/2024 9.3     Platelets 08/04/2024 289     nRBC 08/04/2024 0     Segmented % 08/04/2024 75     Immature Grans % 08/04/2024 0     Lymphocytes % 08/04/2024 16     Monocytes % 08/04/2024 4     Eosinophils Relative 08/04/2024 5     Basophils Relative 08/04/2024 0     Absolute Neutrophils 08/04/2024 8.01 (H)     Absolute Immature Grans 08/04/2024 0.03     Absolute Lymphocytes 08/04/2024 1.75     Absolute Monocytes 08/04/2024 0.44     Eosinophils Absolute 08/04/2024 0.53     Basophils Absolute 08/04/2024 0.04     Sodium 08/04/2024 140     Potassium 08/04/2024 3.8     Chloride 08/04/2024 " 107     CO2 08/04/2024 29     ANION GAP 08/04/2024 4     BUN 08/04/2024 7     Creatinine 08/04/2024 1.07     Glucose 08/04/2024 94     Calcium 08/04/2024 9.2     AST 08/04/2024 14     ALT 08/04/2024 9     Alkaline Phosphatase 08/04/2024 68     Total Protein 08/04/2024 6.4     Albumin 08/04/2024 4.1     Total Bilirubin 08/04/2024 0.38     eGFR 08/04/2024 98     Lipase 08/04/2024 16     LACTIC ACID 08/04/2024 1.4     Sodium 08/05/2024 141     Potassium 08/05/2024 3.4 (L)     Chloride 08/05/2024 106     CO2 08/05/2024 31     ANION GAP 08/05/2024 4     BUN 08/05/2024 5     Creatinine 08/05/2024 0.86     Glucose 08/05/2024 87     Calcium 08/05/2024 8.9     AST 08/05/2024 12 (L)     ALT 08/05/2024 8     Alkaline Phosphatase 08/05/2024 56     Total Protein 08/05/2024 6.0 (L)     Albumin 08/05/2024 3.8     Total Bilirubin 08/05/2024 0.57     eGFR 08/05/2024 123     WBC 08/05/2024 7.32     RBC 08/05/2024 4.52     Hemoglobin 08/05/2024 14.5     Hematocrit 08/05/2024 41.8     MCV 08/05/2024 93     MCH 08/05/2024 32.1     MCHC 08/05/2024 34.7     RDW 08/05/2024 12.1     MPV 08/05/2024 9.6     Platelets 08/05/2024 281     nRBC 08/05/2024 0     Segmented % 08/05/2024 46     Immature Grans % 08/05/2024 0     Lymphocytes % 08/05/2024 40     Monocytes % 08/05/2024 5     Eosinophils Relative 08/05/2024 8 (H)     Basophils Relative 08/05/2024 1     Absolute Neutrophils 08/05/2024 3.43     Absolute Immature Grans 08/05/2024 0.01     Absolute Lymphocytes 08/05/2024 2.94     Absolute Monocytes 08/05/2024 0.34     Eosinophils Absolute 08/05/2024 0.55     Basophils Absolute 08/05/2024 0.05     Magnesium 08/05/2024 2.0        Imaging Studies: I have personally reviewed pertinent imaging studies.

## 2024-08-05 NOTE — ASSESSMENT & PLAN NOTE
Incidental finding on CT A/P:  Multiple nonspecific top-normal size B/L inguinal lymph nodes measuring up to 13 mm increased in size from 12/02/2023

## 2024-08-05 NOTE — PLAN OF CARE
Problem: PAIN - ADULT  Goal: Verbalizes/displays adequate comfort level or baseline comfort level  Description: Interventions:  - Encourage patient to monitor pain and request assistance  - Assess pain using appropriate pain scale  - Administer analgesics based on type and severity of pain and evaluate response  - Implement non-pharmacological measures as appropriate and evaluate response  - Consider cultural and social influences on pain and pain management  - Notify physician/advanced practitioner if interventions unsuccessful or patient reports new pain  Outcome: Progressing     Problem: INFECTION - ADULT  Goal: Absence or prevention of progression during hospitalization  Description: INTERVENTIONS:  - Assess and monitor for signs and symptoms of infection  - Monitor lab/diagnostic results  - Monitor all insertion sites, i.e. indwelling lines, tubes, and drains  - Monitor endotracheal if appropriate and nasal secretions for changes in amount and color  - [place] appropriate cooling/warming therapies per order  - Administer medications as ordered  - Instruct and encourage patient and family to use good hand hygiene technique  - Identify and instruct in appropriate isolation precautions for identified infection/condition  Outcome: Progressing  Goal: Absence of fever/infection during neutropenic period  Description: INTERVENTIONS:  - Monitor WBC    Outcome: Progressing     Problem: SAFETY ADULT  Goal: Patient will remain free of falls  Description: INTERVENTIONS:  - Educate patient/family on patient safety including physical limitations  - Instruct patient to call for assistance with activity   - Consult OT/PT to assist with strengthening/mobility   - Keep Call bell within reach  - Keep bed low and locked with side rails adjusted as appropriate  - Keep care items and personal belongings within reach  - Initiate and maintain comfort rounds  - Make Fall Risk Sign visible to staff  - Offer Toileting every 2 Hours,  in advance of need  - Initiate/Maintain bed alarm  - Obtain necessary fall risk management equipment.  - Apply yellow socks and bracelet for high fall risk patients  - Consider moving patient to room near nurses station  Outcome: Progressing  Goal: Maintain or return to baseline ADL function  Description: INTERVENTIONS:  -  Assess patient's ability to carry out ADLs; assess patient's baseline for ADL function and identify physical deficits which impact ability to perform ADLs (bathing, care of mouth/teeth, toileting, grooming, dressing, etc.)  - Assess/evaluate cause of self-care deficits   - Assess range of motion  - Assess patient's mobility; develop plan if impaired  - Assess patient's need for assistive devices and provide as appropriate  - Encourage maximum independence but intervene and supervise when necessary  - Involve family in performance of ADLs  - Assess for home care needs following discharge   - Consider OT consult to assist with ADL evaluation and planning for discharge  - Provide patient education as appropriate  Outcome: Progressing  Goal: Maintains/Returns to pre admission functional level  Description: INTERVENTIONS:  - Perform AM-PAC 6 Click Basic Mobility/ Daily Activity assessment daily.  - Set and communicate daily mobility goal to care team and patient/family/caregiver.   - Collaborate with rehabilitation services on mobility goals if consulted  - Perform Range of Motion 2 times a day.  - Reposition patient every 2 hours.  - Dangle patient 2 times a day  - Stand patient 2 times a day  - Ambulate patient 2 times a day  - Out of bed to chair 2 times a day   - Out of bed for meals 2 times a day  - Out of bed for toileting  - Record patient progress and toleration of activity level   Outcome: Progressing     Problem: DISCHARGE PLANNING  Goal: Discharge to home or other facility with appropriate resources  Description: INTERVENTIONS:  - Identify barriers to discharge w/patient and caregiver  -  Arrange for needed discharge resources and transportation as appropriate  - Identify discharge learning needs (meds, wound care, etc.)  - Arrange for interpretive services to assist at discharge as needed  - Refer to Case Management Department for coordinating discharge planning if the patient needs post-hospital services based on physician/advanced practitioner order or complex needs related to functional status, cognitive ability, or social support system  Outcome: Progressing     Problem: Knowledge Deficit  Goal: Patient/family/caregiver demonstrates understanding of disease process, treatment plan, medications, and discharge instructions  Description: Complete learning assessment and assess knowledge base.  Interventions:  - Provide teaching at level of understanding  - Provide teaching via preferred learning methods  Outcome: Progressing

## 2024-08-05 NOTE — ED CARE HANDOFF
Emergency Department Sign Out Note        Sign out and transfer of care from Dr. Zuniga. See Separate Emergency Department note.     The patient, Reji Shi, was evaluated by the previous provider for Abdominal Pain.    Workup Completed:  Labs, CT    ED Course / Workup Pending (followup):  Surgery evaluation    11:40 PM  Received s/o on the pt.  Will f/u on surgery recommendations for disposition.      12:18 AM  2nd text to surgery.      12:40 AM  Case been discussed with surgery.  They are reviewing the CT scan.    12:53 AM  Surgery on their way.    2:38 AM  After surgery evaluation, the pt will be admitted to [facility] for GI eval and possible repeat diagnostic lap.                     Procedures  Medical Decision Making  Amount and/or Complexity of Data Reviewed  Labs: ordered.  Radiology: ordered.    Risk  Prescription drug management.  Decision regarding hospitalization.            Disposition  Final diagnoses:   Intussusception (HCC)   Left sided abdominal pain   Inguinal lymphadenopathy     Time reflects when diagnosis was documented in both MDM as applicable and the Disposition within this note       Time User Action Codes Description Comment    8/4/2024 11:30 PM Nava Zuniga Add [K56.1] Intussusception (HCC)     8/4/2024 11:31 PM Nava Zuniga Add [R10.9] Left sided abdominal pain     8/4/2024 11:31 PM Nava Zuniga Add [R59.0] Inguinal lymphadenopathy           ED Disposition       ED Disposition   Admit    Condition   Stable    Date/Time   Mon Aug 5, 2024  2:37 AM    Comment   Case was discussed with [facility] and the patient's admission status was agreed to be Admission Status: inpatient status to the service of Dr. Rene .               Follow-up Information       Follow up With Specialties Details Why Contact Info    SHIRA Price Nurse Practitioner   [address]  [place] [id number] [phone]            Patient's Medications   Discharge Prescriptions    No medications on file     No discharge  procedures on file.       ED Provider  Electronically Signed by     Jaxon Boo Jr.,   08/05/24 0239

## 2024-08-05 NOTE — CONSULTS
Consultation - General Surgery   Reji Shi 23 y.o. male MRN: [id number]  Unit/Bed#: ED-24 Encounter: [id number]    Assessment & Plan     Assessment:  22 yo M with recurrent intussusception, non obstructed     8/4 CT w/ PO: There is a short segment jejunojejunal intussusception in the left lateral abdomen, which is nonspecific but could be due to transient intussusception of no clinical significance because no abnormal small bowel dilation/small bowel obstruction is seen and because the oral contrast material passed through this region to the level of the ileocecal valve. Follow-up as clinically warranted for persistent symptoms. There is no evidence of bowel obstruction, pneumoperitoneum, pneumatosis, appendicitis, or abscess. Mild amount of nonspecific dependent low-density ascites is seen posterior     Plan:  -recommend SLIM evaluation for admission  -GI c/s for evaluation for psb infectious vs inflammatory work up for etiology of recurrence  -recommend NPO until further evaluation by GI  -analgesia   -anti-emetics  - no acute surgical intervention  - general surgery will follow along, if pt has worsening abd pain, inability to leni PO, clinically or radiographically becomes obstructed or work up is negative will consider repeating diagnostic lap        History of Present Illness     HPI:  Reji Shi is a 23 y.o. male is a stable well appearing 22 yo M w/ c/f intussception. Pt pmhx of rectal bleeding 2/2 anoreceptive sex, constipation, marijuana use and previous intussception 11/23 that scan at that time showed a 15cm portion of mid jejunum without obstruction but failure of contrast progression so had diagnostic lap which showed a lead point was soft bezoar that was milked forward, no SBR, no fixed masses or diverticula noted.. Pt presents today with similar presentation. 1 wk of intermittent sharp L periumbilical abd pain that was most severe today. Reports every couple of months or so he feels a  sharp non radiating abd pain  but then subside on their own. He's non obstructed, tolerating PO, had normal BM earlier. had previous cscpe but had poor prep and didn't follow up 2/2 insurance. PO Ct shows short segment intussusception similar location to previous but contrast reaching the IC valve. no prox bowel dilation, no sign of obstruction. Pt reports 15-20 lb wt loss in the last 8 wks, w/ also reporting night sweats. Denies fevers or chills. No chest pain or sob, no wheezing, no episodes of diarrhea or constipation, no urinary complaints, denies blood per rectum or dark or bloody stools. He denies any infectious concerns, no illicit drug use apart marijuana use.      Consult to surgery general  Consult performed by: Anish Schwarz MD  Consult ordered by: Jaxon Boo Jr., DO          Review of Systems  Negative except where mentioned above.  Historical Information   Past Medical History:   Diagnosis Date    No known health problems      Past Surgical History:   Procedure Laterality Date    WA LAPS ABD PRTM&OMENTUM DX W/WO SPEC BR/WA SPX N/A 11/27/2023    Procedure: LAPAROSCOPY DIAGNOSTIC;  Surgeon: Em Yeboah MD;  Location: [facility] OR;  Service: General     Social History   Social History     Substance and Sexual Activity   Alcohol Use Not Currently     Social History     Substance and Sexual Activity   Drug Use Yes    Types: Marijuana    Comment: last use ~1month     E-Cigarette/Vaping    E-Cigarette Use Current Some Day User      E-Cigarette/Vaping Substances    Nicotine Yes     THC No     CBD No     Flavoring No     Other No     Unknown No      Social History     Tobacco Use   Smoking Status Former    Types: Cigars   Smokeless Tobacco Never     Family History: non-contributory    Meds/Allergies   PTA meds:   Prior to Admission Medications   Prescriptions Last Dose Informant Patient Reported? Taking?   acetaminophen (TYLENOL) 325 mg tablet  Self No No   Sig: Take 2 tablets (650 mg total) by mouth  every 6 (six) hours as needed for mild pain or moderate pain   acetaminophen (TYLENOL) 500 mg tablet   No No   Sig: Take 1 tablet (500 mg total) by mouth every 6 (six) hours as needed for mild pain   docusate sodium (COLACE) 100 mg capsule  Self No No   Sig: Take 1 capsule (100 mg total) by mouth every 12 (twelve) hours   Patient not taking: Reported on 12/12/2023   ibuprofen (MOTRIN) 400 mg tablet   No No   Sig: Take 1 tablet (400 mg total) by mouth every 6 (six) hours as needed for mild pain   lidocaine (LMX) 4 % cream  Self No No   Sig: Apply topically as needed for mild pain   methocarbamol (ROBAXIN) 500 mg tablet  Self No No   Sig: Take 1 tablet (500 mg total) by mouth 3 (three) times a day as needed for muscle spasms for up to 5 days   polyethylene glycol (MIRALAX) 17 g packet   No No   Sig: Take 17 g by mouth daily      Facility-Administered Medications: None     No Known Allergies    Objective   First Vitals:   Blood Pressure: 117/64 (08/04/24 1708)  Pulse: 96 (08/04/24 1708)  Temperature: 98.4 °F (36.9 °C) (08/04/24 1708)  Respirations: 14 (08/04/24 1708)  Weight - Scale: 64 kg (141 lb 1.5 oz) (08/04/24 1708)  SpO2: 97 % (08/04/24 1708)    Current Vitals:   Blood Pressure: 141/92 (08/05/24 0009)  Pulse: 58 (08/05/24 0009)  Temperature: 98.4 °F (36.9 °C) (08/04/24 1708)  Respirations: 16 (08/05/24 0009)  Weight - Scale: 64 kg (141 lb 1.5 oz) (08/04/24 1708)  SpO2: 99 % (08/05/24 0009)      Intake/Output Summary (Last 24 hours) at 8/5/2024 0251  Last data filed at 8/4/2024 1840  Gross per 24 hour   Intake 1000 ml   Output --   Net 1000 ml       Invasive Devices       Peripheral Intravenous Line  Duration             Peripheral IV 08/05/24 Distal;Left;Upper;Ventral (anterior) Arm <1 day                    Physical Exam  Constitutional:       Appearance: Normal appearance.      Comments: Thin appearing   HENT:      Head: Normocephalic.      Nose: Nose normal.      Mouth/Throat:      Mouth: Mucous membranes are  "moist.   Eyes:      Extraocular Movements: Extraocular movements intact.      Pupils: Pupils are equal, round, and reactive to light.   Cardiovascular:      Rate and Rhythm: Normal rate.   Pulmonary:      Effort: Pulmonary effort is normal.   Abdominal:      General: There is no distension.      Palpations: Abdomen is soft.      Comments: Minimally tender mostly L periumbilical region, previous surgical scars well healed   Skin:     General: Skin is warm.   Neurological:      General: No focal deficit present.      Mental Status: He is alert and oriented to person, place, and time. Mental status is at baseline.   Psychiatric:         Mood and Affect: Mood normal.         Lab Results: I have personally reviewed pertinent lab results.  , CBC:   Lab Results   Component Value Date    WBC 10.80 (H) 08/04/2024    HGB 15.5 08/04/2024    HCT 44.6 08/04/2024    MCV 90 08/04/2024     08/04/2024    RBC 4.96 08/04/2024    MCH 31.3 08/04/2024    MCHC 34.8 08/04/2024    RDW 12.0 08/04/2024    MPV 9.3 08/04/2024    NRBC 0 08/04/2024   , CMP:   Lab Results   Component Value Date    SODIUM 140 08/04/2024    K 3.8 08/04/2024     08/04/2024    CO2 29 08/04/2024    BUN 7 08/04/2024    CREATININE 1.07 08/04/2024    CALCIUM 9.2 08/04/2024    AST 14 08/04/2024    ALT 9 08/04/2024    ALKPHOS 68 08/04/2024    EGFR 98 08/04/2024   , Urinalysis: No results found for: \"COLORU\", \"CLARITYU\", \"SPECGRAV\", \"PHUR\", \"LEUKOCYTESUR\", \"NITRITE\", \"PROTEINUA\", \"GLUCOSEU\", \"KETONESU\", \"BILIRUBINUR\", \"BLOODU\"  Imaging: I have personally reviewed pertinent reports.   and I have personally reviewed pertinent films in PACS  EKG, Pathology, and Other Studies: I have personally reviewed pertinent reports.   and I have personally reviewed pertinent films in PACS    Counseling / Coordination of Care  Total floor / unit time spent today 40 minutes.  Greater than 50% of total time was spent with the patient and / or family counseling and / or " coordination of care.

## 2024-08-05 NOTE — ED NOTES
"Pt rang call bell again and now states \"Take this fucking thing out of my arm,  This shit hurts like fucking mad.  Take it out or I will wlak the fuck out of here.\"  IV removed.  Catheter intact and no redness or selling noted.       Sharon Mcgarry RN  08/04/24 1814    "

## 2024-08-05 NOTE — PLAN OF CARE
Problem: PAIN - ADULT  Goal: Verbalizes/displays adequate comfort level or baseline comfort level  Description: Interventions:  - Encourage patient to monitor pain and request assistance  - Assess pain using appropriate pain scale  - Administer analgesics based on type and severity of pain and evaluate response  - Implement non-pharmacological measures as appropriate and evaluate response  - Consider cultural and social influences on pain and pain management  - Notify physician/advanced practitioner if interventions unsuccessful or patient reports new pain  Outcome: Progressing     Problem: INFECTION - ADULT  Goal: Absence or prevention of progression during hospitalization  Description: INTERVENTIONS:  - Assess and monitor for signs and symptoms of infection  - Monitor lab/diagnostic results  - Monitor all insertion sites, i.e. indwelling lines, tubes, and drains  - Monitor endotracheal if appropriate and nasal secretions for changes in amount and color  - [place] appropriate cooling/warming therapies per order  - Administer medications as ordered  - Instruct and encourage patient and family to use good hand hygiene technique  - Identify and instruct in appropriate isolation precautions for identified infection/condition  Outcome: Progressing  Goal: Absence of fever/infection during neutropenic period  Description: INTERVENTIONS:  - Monitor WBC    Outcome: Progressing     Problem: SAFETY ADULT  Goal: Patient will remain free of falls  Description: INTERVENTIONS:  - Educate patient/family on patient safety including physical limitations  - Instruct patient to call for assistance with activity   - Consult OT/PT to assist with strengthening/mobility   - Keep Call bell within reach  - Keep bed low and locked with side rails adjusted as appropriate  - Keep care items and personal belongings within reach  - Initiate and maintain comfort rounds  - Make Fall Risk Sign visible to staff  - Obtain necessary fall risk  management equipment  - Apply yellow socks and bracelet for high fall risk patients  - Consider moving patient to room near nurses station  Outcome: Progressing  Goal: Maintain or return to baseline ADL function  Description: INTERVENTIONS:  -  Assess patient's ability to carry out ADLs; assess patient's baseline for ADL function and identify physical deficits which impact ability to perform ADLs (bathing, care of mouth/teeth, toileting, grooming, dressing, etc.)  - Assess/evaluate cause of self-care deficits   - Assess range of motion  - Assess patient's mobility; develop plan if impaired  - Assess patient's need for assistive devices and provide as appropriate  - Encourage maximum independence but intervene and supervise when necessary  - Involve family in performance of ADLs  - Assess for home care needs following discharge   - Consider OT consult to assist with ADL evaluation and planning for discharge  - Provide patient education as appropriate  Outcome: Progressing  Goal: Maintains/Returns to pre admission functional level  Description: INTERVENTIONS:  - Perform AM-PAC 6 Click Basic Mobility/ Daily Activity assessment daily.  - Set and communicate daily mobility goal to care team and patient/family/caregiver.   - Collaborate with rehabilitation services on mobility goals if consulted  - Perform Range of Motion 3 times a day.  - Reposition patient every 2 hours.  - Dangle patient 3 times a day  - Stand patient 3 times a day  - Ambulate patient 3 times a day  - Out of bed to chair 3 times a day   - Out of bed for meals 3 times a day  - Out of bed for toileting  - Record patient progress and toleration of activity level   Outcome: Progressing     Problem: DISCHARGE PLANNING  Goal: Discharge to home or other facility with appropriate resources  Description: INTERVENTIONS:  - Identify barriers to discharge w/patient and caregiver  - Arrange for needed discharge resources and transportation as appropriate  -  Identify discharge learning needs (meds, wound care, etc.)  - Arrange for interpretive services to assist at discharge as needed  - Refer to Case Management Department for coordinating discharge planning if the patient needs post-hospital services based on physician/advanced practitioner order or complex needs related to functional status, cognitive ability, or social support system  Outcome: Progressing     Problem: Knowledge Deficit  Goal: Patient/family/caregiver demonstrates understanding of disease process, treatment plan, medications, and discharge instructions  Description: Complete learning assessment and assess knowledge base.  Interventions:  - Provide teaching at level of understanding  - Provide teaching via preferred learning methods  Outcome: Progressing     Problem: METABOLIC, FLUID AND ELECTROLYTES - ADULT  Goal: Electrolytes maintained within normal limits  Description: INTERVENTIONS:  - Monitor labs and assess patient for signs and symptoms of electrolyte imbalances  - Administer electrolyte replacement as ordered  - Monitor response to electrolyte replacements, including repeat lab results as appropriate  - Instruct patient on fluid and nutrition as appropriate  Outcome: Progressing  Goal: Fluid balance maintained  Description: INTERVENTIONS:  - Monitor labs   - Monitor I/O and WT  - Instruct patient on fluid and nutrition as appropriate  - Assess for signs & symptoms of volume excess or deficit  Outcome: Progressing

## 2024-08-05 NOTE — ED NOTES
"Pt rings call bell and says that his IV is \"extremely painful\" and is asking for it to be removed.  IV site wi\thout redness or swelling, flushes well.  Advised pt that IV needs to stay in place through his stay here in the ER in case we need to administer medications.  Loosened tape on IV slightly and advised pt to not bend arm for increased comfort.       Sharon Mcgarry RN  08/04/24 2343    "

## 2024-08-06 LAB
ALBUMIN SERPL BCG-MCNC: 3.7 G/DL (ref 3.5–5)
ALP SERPL-CCNC: 64 U/L (ref 34–104)
ALT SERPL W P-5'-P-CCNC: 8 U/L (ref 7–52)
ANION GAP SERPL CALCULATED.3IONS-SCNC: 5 MMOL/L (ref 4–13)
AST SERPL W P-5'-P-CCNC: 11 U/L (ref 13–39)
BILIRUB SERPL-MCNC: 0.85 MG/DL (ref 0.2–1)
BUN SERPL-MCNC: 4 MG/DL (ref 5–25)
C TRACH DNA SPEC QL NAA+PROBE: NEGATIVE
CALCIUM SERPL-MCNC: 9 MG/DL (ref 8.4–10.2)
CHLORIDE SERPL-SCNC: 106 MMOL/L (ref 96–108)
CO2 SERPL-SCNC: 29 MMOL/L (ref 21–32)
CREAT SERPL-MCNC: 0.94 MG/DL (ref 0.6–1.3)
ERYTHROCYTE [DISTWIDTH] IN BLOOD BY AUTOMATED COUNT: 11.9 % (ref 11.6–15.1)
GAMMA INTERFERON BACKGROUND BLD IA-ACNC: 0.05 IU/ML
GFR SERPL CREATININE-BSD FRML MDRD: 115 ML/MIN/1.73SQ M
GLUCOSE P FAST SERPL-MCNC: 76 MG/DL (ref 65–99)
GLUCOSE SERPL-MCNC: 76 MG/DL (ref 65–140)
HAV IGM SER QL: NORMAL
HBV CORE IGM SER QL: NORMAL
HBV SURFACE AG SER QL: NORMAL
HCT VFR BLD AUTO: 43.4 % (ref 36.5–49.3)
HCV AB SER QL: NORMAL
HGB BLD-MCNC: 14.7 G/DL (ref 12–17)
M TB IFN-G BLD-IMP: NEGATIVE
M TB IFN-G CD4+ BCKGRND COR BLD-ACNC: 0.03 IU/ML
M TB IFN-G CD4+ BCKGRND COR BLD-ACNC: 0.03 IU/ML
MCH RBC QN AUTO: 30.9 PG (ref 26.8–34.3)
MCHC RBC AUTO-ENTMCNC: 33.9 G/DL (ref 31.4–37.4)
MCV RBC AUTO: 91 FL (ref 82–98)
MITOGEN IGNF BCKGRD COR BLD-ACNC: 9.95 IU/ML
N GONORRHOEA DNA SPEC QL NAA+PROBE: NEGATIVE
PLATELET # BLD AUTO: 298 THOUSANDS/UL (ref 149–390)
PMV BLD AUTO: 9.5 FL (ref 8.9–12.7)
POTASSIUM SERPL-SCNC: 3.8 MMOL/L (ref 3.5–5.3)
PROT SERPL-MCNC: 6.2 G/DL (ref 6.4–8.4)
RBC # BLD AUTO: 4.76 MILLION/UL (ref 3.88–5.62)
SODIUM SERPL-SCNC: 140 MMOL/L (ref 135–147)
WBC # BLD AUTO: 5.95 THOUSAND/UL (ref 4.31–10.16)

## 2024-08-06 PROCEDURE — 99232 SBSQ HOSP IP/OBS MODERATE 35: CPT | Performed by: INTERNAL MEDICINE

## 2024-08-06 PROCEDURE — 99233 SBSQ HOSP IP/OBS HIGH 50: CPT | Performed by: SURGERY

## 2024-08-06 PROCEDURE — 85027 COMPLETE CBC AUTOMATED: CPT | Performed by: INTERNAL MEDICINE

## 2024-08-06 PROCEDURE — 80053 COMPREHEN METABOLIC PANEL: CPT | Performed by: INTERNAL MEDICINE

## 2024-08-06 RX ORDER — SODIUM CHLORIDE, SODIUM LACTATE, POTASSIUM CHLORIDE, CALCIUM CHLORIDE 600; 310; 30; 20 MG/100ML; MG/100ML; MG/100ML; MG/100ML
75 INJECTION, SOLUTION INTRAVENOUS CONTINUOUS
Status: DISPENSED | OUTPATIENT
Start: 2024-08-06 | End: 2024-08-08

## 2024-08-06 RX ADMIN — KETOROLAC TROMETHAMINE 30 MG: 30 INJECTION, SOLUTION INTRAMUSCULAR; INTRAVENOUS at 08:12

## 2024-08-06 RX ADMIN — KETOROLAC TROMETHAMINE 30 MG: 30 INJECTION, SOLUTION INTRAMUSCULAR; INTRAVENOUS at 15:00

## 2024-08-06 RX ADMIN — SODIUM CHLORIDE, SODIUM LACTATE, POTASSIUM CHLORIDE, AND CALCIUM CHLORIDE 100 ML/HR: .6; .31; .03; .02 INJECTION, SOLUTION INTRAVENOUS at 10:08

## 2024-08-06 RX ADMIN — MORPHINE SULFATE 2 MG: 2 INJECTION, SOLUTION INTRAMUSCULAR; INTRAVENOUS at 10:11

## 2024-08-06 RX ADMIN — FAMOTIDINE 20 MG: 10 INJECTION INTRAVENOUS at 08:12

## 2024-08-06 RX ADMIN — MORPHINE SULFATE 2 MG: 2 INJECTION, SOLUTION INTRAMUSCULAR; INTRAVENOUS at 16:00

## 2024-08-06 RX ADMIN — SODIUM CHLORIDE, SODIUM LACTATE, POTASSIUM CHLORIDE, AND CALCIUM CHLORIDE 75 ML/HR: .6; .31; .03; .02 INJECTION, SOLUTION INTRAVENOUS at 20:44

## 2024-08-06 RX ADMIN — KETOROLAC TROMETHAMINE 30 MG: 30 INJECTION, SOLUTION INTRAMUSCULAR; INTRAVENOUS at 21:56

## 2024-08-06 RX ADMIN — SODIUM CHLORIDE, SODIUM LACTATE, POTASSIUM CHLORIDE, AND CALCIUM CHLORIDE 100 ML/HR: .6; .31; .03; .02 INJECTION, SOLUTION INTRAVENOUS at 00:30

## 2024-08-06 RX ADMIN — FAMOTIDINE 20 MG: 10 INJECTION INTRAVENOUS at 21:56

## 2024-08-06 NOTE — PLAN OF CARE
Problem: PAIN - ADULT  Goal: Verbalizes/displays adequate comfort level or baseline comfort level  Description: Interventions:  - Encourage patient to monitor pain and request assistance  - Assess pain using appropriate pain scale  - Administer analgesics based on type and severity of pain and evaluate response  - Implement non-pharmacological measures as appropriate and evaluate response  - Consider cultural and social influences on pain and pain management  - Notify physician/advanced practitioner if interventions unsuccessful or patient reports new pain  Outcome: Progressing     Problem: INFECTION - ADULT  Goal: Absence or prevention of progression during hospitalization  Description: INTERVENTIONS:  - Assess and monitor for signs and symptoms of infection  - Monitor lab/diagnostic results  - Monitor all insertion sites, i.e. indwelling lines, tubes, and drains  - Monitor endotracheal if appropriate and nasal secretions for changes in amount and color  - [place] appropriate cooling/warming therapies per order  - Administer medications as ordered  - Instruct and encourage patient and family to use good hand hygiene technique  - Identify and instruct in appropriate isolation precautions for identified infection/condition  Outcome: Progressing  Goal: Absence of fever/infection during neutropenic period  Description: INTERVENTIONS:  - Monitor WBC    Outcome: Progressing     Problem: SAFETY ADULT  Goal: Patient will remain free of falls  Description: INTERVENTIONS:  - Educate patient/family on patient safety including physical limitations  - Instruct patient to call for assistance with activity   - Consult OT/PT to assist with strengthening/mobility   - Keep Call bell within reach  - Keep bed low and locked with side rails adjusted as appropriate  - Keep care items and personal belongings within reach  - Initiate and maintain comfort rounds  - Make Fall Risk Sign visible to staff  - Apply yellow socks and bracelet  for high fall risk patients  - Consider moving patient to room near nurses station  Outcome: Progressing  Goal: Maintain or return to baseline ADL function  Description: INTERVENTIONS:  -  Assess patient's ability to carry out ADLs; assess patient's baseline for ADL function and identify physical deficits which impact ability to perform ADLs (bathing, care of mouth/teeth, toileting, grooming, dressing, etc.)  - Assess/evaluate cause of self-care deficits   - Assess range of motion  - Assess patient's mobility; develop plan if impaired  - Assess patient's need for assistive devices and provide as appropriate  - Encourage maximum independence but intervene and supervise when necessary  - Involve family in performance of ADLs  - Assess for home care needs following discharge   - Consider OT consult to assist with ADL evaluation and planning for discharge  - Provide patient education as appropriate  Outcome: Progressing  Goal: Maintains/Returns to pre admission functional level  Description: INTERVENTIONS:  - Perform AM-PAC 6 Click Basic Mobility/ Daily Activity assessment daily.  - Set and communicate daily mobility goal to care team and patient/family/caregiver.   - Collaborate with rehabilitation services on mobility goals if consulted  - Perform Range of Motion 3 times a day.  - Reposition patient every 2 hours.  - Dangle patient 3 times a day  - Stand patient 3 times a day  - Ambulate patient 3 times a day  - Out of bed to chair 3 times a day   - Out of bed for meals 3 times a day  - Out of bed for toileting  - Record patient progress and toleration of activity level   Outcome: Progressing     Problem: DISCHARGE PLANNING  Goal: Discharge to home or other facility with appropriate resources  Description: INTERVENTIONS:  - Identify barriers to discharge w/patient and caregiver  - Arrange for needed discharge resources and transportation as appropriate  - Identify discharge learning needs (meds, wound care, etc.)  -  Arrange for interpretive services to assist at discharge as needed  - Refer to Case Management Department for coordinating discharge planning if the patient needs post-hospital services based on physician/advanced practitioner order or complex needs related to functional status, cognitive ability, or social support system  Outcome: Progressing     Problem: Knowledge Deficit  Goal: Patient/family/caregiver demonstrates understanding of disease process, treatment plan, medications, and discharge instructions  Description: Complete learning assessment and assess knowledge base.  Interventions:  - Provide teaching at level of understanding  - Provide teaching via preferred learning methods  Outcome: Progressing     Problem: METABOLIC, FLUID AND ELECTROLYTES - ADULT  Goal: Electrolytes maintained within normal limits  Description: INTERVENTIONS:  - Monitor labs and assess patient for signs and symptoms of electrolyte imbalances  - Administer electrolyte replacement as ordered  - Monitor response to electrolyte replacements, including repeat lab results as appropriate  - Instruct patient on fluid and nutrition as appropriate  Outcome: Progressing  Goal: Fluid balance maintained  Description: INTERVENTIONS:  - Monitor labs   - Monitor I/O and WT  - Instruct patient on fluid and nutrition as appropriate  - Assess for signs & symptoms of volume excess or deficit  Outcome: Progressing     Problem: GASTROINTESTINAL - ADULT  Goal: Minimal or absence of nausea and/or vomiting  Description: INTERVENTIONS:  - Administer IV fluids if ordered to ensure adequate hydration  - Maintain NPO status until nausea and vomiting are resolved  - Nasogastric tube if ordered  - Administer ordered antiemetic medications as needed  - Provide nonpharmacologic comfort measures as appropriate  - Advance diet as tolerated, if ordered  - Consider nutrition services referral to assist patient with adequate nutrition and appropriate food  choices  Outcome: Progressing  Goal: Maintains or returns to baseline bowel function  Description: INTERVENTIONS:  - Assess bowel function  - Encourage oral fluids to ensure adequate hydration  - Administer IV fluids if ordered to ensure adequate hydration  - Administer ordered medications as needed  - Encourage mobilization and activity  - Consider nutritional services referral to assist patient with adequate nutrition and appropriate food choices  Outcome: Progressing  Goal: Maintains adequate nutritional intake  Description: INTERVENTIONS:  - Monitor percentage of each meal consumed  - Identify factors contributing to decreased intake, treat as appropriate  - Assist with meals as needed  - Monitor I&O, weight, and lab values if indicated  - Obtain nutrition services referral as needed  Outcome: Progressing

## 2024-08-06 NOTE — PROGRESS NOTES
[facility]  Progress Note  Name: Reji Shi I  MRN: [id number]  Unit/Bed#: [place] [address] -01 I Date of Admission: 8/4/2024   Date of Service: 8/6/2024 I Hospital Day: 0    Assessment & Plan   * Intussusception (HCC)  Assessment & Plan  History of intussusception November status post laparoscopy presented back to the hospital for abdominal pain and weight loss found to have intussusception again.  Coordinated with surgery and GI.  No GI intervention warranted.  Due to ongoing pain surgery recommend CT enterography to be done tomorrow.    Hypokalemia  Assessment & Plan  Was replaced    Results from last 7 days   Lab Units 08/06/24  0509 08/05/24  0445 08/04/24  1737   POTASSIUM mmol/L 3.8 3.4* 3.8       Unintentional weight loss  Assessment & Plan  Infectious workup negative.  Likely related to abdominal pain and decreased intake over the past couple months.    Inguinal lymphadenopathy  Assessment & Plan  Inguinal lymphadenopathy with weight loss.  Coordinated with surgery and IR.  No IR biopsy-able node.  HIV QuantiFERON hepatitis panel and GC chlamydia all negative.    VTE Pharmacologic Prophylaxis: VTE Score: 0 Low Risk (Score 0-2) - Encourage Ambulation.    Mobility:  Basic Mobility Inpatient Raw Score: 24  JH-HLM Goal: 8: Walk 250 feet or more  JH-HLM Achieved: 7: Walk 25 feet or more  JH-HLM Goal NOT achieved. Continue with multidisciplinary rounding and encourage appropriate mobility to improve upon JH-HLM goals.    Patient Centered Rounds: I have performed bedside rounds with nursing staff today.  Discussions with Specialists or Other Care Team Provider: Case management GI surgery    Education and Discussions with Family / Patient:     Time Spent for Care:   This time was spent on one or more of the following: performing physical exam; counseling and coordination of care; obtaining or reviewing history; documenting in the medical record; reviewing/ordering tests, medications  "or procedures; communicating with other healthcare professionals and discussing with patient's family/caregivers.    Current Length of Stay: 0 day(s)  Current Patient Status: Observation   Certification Statement: The patient, admitted on an observation basis, will now require > 2 midnight hospital stay due to ongoing abdominal pain  Discharge Plan: Anticipate discharge in 48-72 hrs to home.    Code Status: Level 1 - Full Code      Subjective:   Patient seen and examined.  Still having 10 out of 10 abdominal pain    Objective:   Vitals: Blood pressure 120/66, pulse 70, temperature (!) 95.3 °F (35.2 °C), resp. rate 16, height 5' 9\" (1.753 m), weight 64 kg (141 lb), SpO2 97%.    Intake/Output Summary (Last 24 hours) at 8/6/2024 1609  Last data filed at 8/6/2024 1014  Gross per 24 hour   Intake 900 ml   Output 400 ml   Net 500 ml       Physical Exam  Vitals reviewed.   Constitutional:       General: He is not in acute distress.  HENT:      Head: Atraumatic.   Eyes:      Extraocular Movements: Extraocular movements intact.   Cardiovascular:      Rate and Rhythm: Regular rhythm.      Pulses: Normal pulses.   Pulmonary:      Effort: Pulmonary effort is normal.      Breath sounds: No wheezing.   Abdominal:      General: Bowel sounds are normal.      Palpations: Abdomen is soft.      Tenderness: There is no abdominal tenderness. There is no rebound.   Musculoskeletal:         General: No swelling or tenderness.   Skin:     General: Skin is warm and dry.   Neurological:      General: No focal deficit present.      Mental Status: He is alert.      Cranial Nerves: No cranial nerve deficit.   Psychiatric:         Mood and Affect: Mood normal.       Additional Data:   Labs:  Results from last 7 days   Lab Units 08/06/24  0509 08/05/24  0441 08/04/24  1737   WBC Thousand/uL 5.95 7.32 10.80*   HEMOGLOBIN g/dL 14.7 14.5 15.5   PLATELETS Thousands/uL 298 281 289   MCV fL 91 93 90     Results from last 7 days   Lab Units " 08/06/24  0509 08/05/24  0445 08/04/24  1737   SODIUM mmol/L 140 141 140   POTASSIUM mmol/L 3.8 3.4* 3.8   CHLORIDE mmol/L 106 106 107   CO2 mmol/L 29 31 29   ANION GAP mmol/L 5 4 4   BUN mg/dL 4* 5 7   CREATININE mg/dL 0.94 0.86 1.07   CALCIUM mg/dL 9.0 8.9 9.2   ALBUMIN g/dL 3.7 3.8 4.1   TOTAL BILIRUBIN mg/dL 0.85 0.57 0.38   ALK PHOS U/L 64 56 68   ALT U/L 8 8 9   AST U/L 11* 12* 14   EGFR ml/min/1.73sq m 115 123 98   GLUCOSE RANDOM mg/dL 76 87 94     Results from last 7 days   Lab Units 08/05/24  0445   MAGNESIUM mg/dL 2.0                  Results from last 7 days   Lab Units 08/04/24  1737   LACTIC ACID mmol/L 1.4                 * I Have Reviewed All Lab Data Listed Above.    Recent cultures:   Results from last 7 days   Lab Units 08/05/24  1407   RAPID HIV 1X2  Non-Reactive                 Lines/Drains:  Invasive Devices       Peripheral Intravenous Line  Duration             Peripheral IV 08/05/24 Distal;Left;Upper;Ventral (anterior) Arm 1 day                          Telemetry:      Imaging:  Imaging Reports Reviewed Today Include:   CT abdomen pelvis with contrast    Result Date: 8/5/2024  Impression: Small bowel-small bowel intussusception in the left upper abdomen without associated suspicious findings. No bowel obstruction. Workstation performed: [id number]       Scheduled Meds:  Current Facility-Administered Medications   Medication Dose Route Frequency Provider Last Rate    acetaminophen  650 mg Oral Q6H PRN SHIRA Cross      aluminum-magnesium hydroxide-simethicone  30 mL Oral Q6H PRN SHIRA Cross      famotidine  20 mg Intravenous Q12H LISA Welsh,       ketorolac  30 mg Intravenous Q6H PRN SHIRA Cross      morphine injection  2 mg Intravenous Q4H PRN SHIRA Cross      ondansetron  4 mg Intravenous Q6H PRN SHIRA Cross      oxyCODONE  5 mg Oral Q6H PRN SHIRA Cross      polyethylene glycol  17 g Oral Daily PRN Sandra  SHIRA Stewart      simethicone  80 mg Oral 4x Daily PRN SHIAR Cross         Today, Patient Was Seen By: Derrick Welsh DO    ** Please Note: Dictation voice to text software may have been used in the creation of this document. **

## 2024-08-06 NOTE — ASSESSMENT & PLAN NOTE
History of intussusception November status post laparoscopy presented back to the hospital for abdominal pain and weight loss found to have intussusception again.  Coordinated with surgery and GI.  No GI intervention warranted.  Due to ongoing pain surgery recommend CT enterography to be done tomorrow.

## 2024-08-06 NOTE — ASSESSMENT & PLAN NOTE
Infectious workup negative.  Likely related to abdominal pain and decreased intake over the past couple months.

## 2024-08-06 NOTE — ASSESSMENT & PLAN NOTE
Inguinal lymphadenopathy with weight loss.  Coordinated with surgery and IR.  No IR biopsy-able node.  HIV QuantiFERON hepatitis panel and GC chlamydia all negative.

## 2024-08-06 NOTE — PLAN OF CARE
Problem: PAIN - ADULT  Goal: Verbalizes/displays adequate comfort level or baseline comfort level  Description: Interventions:  - Encourage patient to monitor pain and request assistance  - Assess pain using appropriate pain scale  - Administer analgesics based on type and severity of pain and evaluate response  - Implement non-pharmacological measures as appropriate and evaluate response  - Consider cultural and social influences on pain and pain management  - Notify physician/advanced practitioner if interventions unsuccessful or patient reports new pain  Outcome: Progressing     Problem: INFECTION - ADULT  Goal: Absence or prevention of progression during hospitalization  Description: INTERVENTIONS:  - Assess and monitor for signs and symptoms of infection  - Monitor lab/diagnostic results  - Monitor all insertion sites, i.e. indwelling lines, tubes, and drains  - Monitor endotracheal if appropriate and nasal secretions for changes in amount and color  - [place] appropriate cooling/warming therapies per order  - Administer medications as ordered  - Instruct and encourage patient and family to use good hand hygiene technique  - Identify and instruct in appropriate isolation precautions for identified infection/condition  Outcome: Progressing  Goal: Absence of fever/infection during neutropenic period  Description: INTERVENTIONS:  - Monitor WBC    Outcome: Progressing     Problem: SAFETY ADULT  Goal: Patient will remain free of falls  Description: INTERVENTIONS:  - Educate patient/family on patient safety including physical limitations  - Instruct patient to call for assistance with activity   - Consult OT/PT to assist with strengthening/mobility   - Keep Call bell within reach  - Keep bed low and locked with side rails adjusted as appropriate  - Keep care items and personal belongings within reach  - Initiate and maintain comfort rounds  - Make Fall Risk Sign visible to staff  - Offer Toileting every 2 Hours,  in advance of need  - Initiate/Maintain bed alarm  - Obtain necessary fall risk management equipment.  - Apply yellow socks and bracelet for high fall risk patients  - Consider moving patient to room near nurses station  Outcome: Progressing  Goal: Maintain or return to baseline ADL function  Description: INTERVENTIONS:  -  Assess patient's ability to carry out ADLs; assess patient's baseline for ADL function and identify physical deficits which impact ability to perform ADLs (bathing, care of mouth/teeth, toileting, grooming, dressing, etc.)  - Assess/evaluate cause of self-care deficits   - Assess range of motion  - Assess patient's mobility; develop plan if impaired  - Assess patient's need for assistive devices and provide as appropriate  - Encourage maximum independence but intervene and supervise when necessary  - Involve family in performance of ADLs  - Assess for home care needs following discharge   - Consider OT consult to assist with ADL evaluation and planning for discharge  - Provide patient education as appropriate  Outcome: Progressing  Goal: Maintains/Returns to pre admission functional level  Description: INTERVENTIONS:  - Perform AM-PAC 6 Click Basic Mobility/ Daily Activity assessment daily.  - Set and communicate daily mobility goal to care team and patient/family/caregiver.   - Collaborate with rehabilitation services on mobility goals if consulted  - Perform Range of Motion 3 times a day.  - Reposition patient every 2 hours.  - Dangle patient 3 times a day  - Stand patient 3 times a day  - Ambulate patient 3 times a day  - Out of bed to chair 3 times a day   - Out of bed for meals 3 times a day  - Out of bed for toileting  - Record patient progress and toleration of activity level   Outcome: Progressing     Problem: DISCHARGE PLANNING  Goal: Discharge to home or other facility with appropriate resources  Description: INTERVENTIONS:  - Identify barriers to discharge w/patient and caregiver  -  Arrange for needed discharge resources and transportation as appropriate  - Identify discharge learning needs (meds, wound care, etc.)  - Arrange for interpretive services to assist at discharge as needed  - Refer to Case Management Department for coordinating discharge planning if the patient needs post-hospital services based on physician/advanced practitioner order or complex needs related to functional status, cognitive ability, or social support system  Outcome: Progressing     Problem: Knowledge Deficit  Goal: Patient/family/caregiver demonstrates understanding of disease process, treatment plan, medications, and discharge instructions  Description: Complete learning assessment and assess knowledge base.  Interventions:  - Provide teaching at level of understanding  - Provide teaching via preferred learning methods  Outcome: Progressing     Problem: METABOLIC, FLUID AND ELECTROLYTES - ADULT  Goal: Electrolytes maintained within normal limits  Description: INTERVENTIONS:  - Monitor labs and assess patient for signs and symptoms of electrolyte imbalances  - Administer electrolyte replacement as ordered  - Monitor response to electrolyte replacements, including repeat lab results as appropriate  - Instruct patient on fluid and nutrition as appropriate  Outcome: Progressing  Goal: Fluid balance maintained  Description: INTERVENTIONS:  - Monitor labs   - Monitor I/O and WT  - Instruct patient on fluid and nutrition as appropriate  - Assess for signs & symptoms of volume excess or deficit  Outcome: Progressing     Problem: GASTROINTESTINAL - ADULT  Goal: Minimal or absence of nausea and/or vomiting  Description: INTERVENTIONS:  - Administer IV fluids if ordered to ensure adequate hydration  - Maintain NPO status until nausea and vomiting are resolved  - Nasogastric tube if ordered  - Administer ordered antiemetic medications as needed  - Provide nonpharmacologic comfort measures as appropriate  - Advance diet as  tolerated, if ordered  - Consider nutrition services referral to assist patient with adequate nutrition and appropriate food choices  Outcome: Progressing  Goal: Maintains or returns to baseline bowel function  Description: INTERVENTIONS:  - Assess bowel function  - Encourage oral fluids to ensure adequate hydration  - Administer IV fluids if ordered to ensure adequate hydration  - Administer ordered medications as needed  - Encourage mobilization and activity  - Consider nutritional services referral to assist patient with adequate nutrition and appropriate food choices  Outcome: Progressing  Goal: Maintains adequate nutritional intake  Description: INTERVENTIONS:  - Monitor percentage of each meal consumed  - Identify factors contributing to decreased intake, treat as appropriate  - Assist with meals as needed  - Monitor I&O, weight, and lab values if indicated  - Obtain nutrition services referral as needed  Outcome: Progressing

## 2024-08-06 NOTE — PROGRESS NOTES
"Progress Note - Reji Shi 23 y.o. male MRN: [id number]    Unit/Bed#: [place] [address] -01 Encounter: [id number]      Assessment:  23-year-old male with small bowel intussusception and concerning B-symptoms.     Plan:  -No surgical intervention at this time, consider repeat diagnostic lap if patient continues to decompensate  -Advance diet as tolerated  -F/u labs  -Appreciate internal medicine recommendations including clear liquid diet  -Appreciate GI recommendations  -Follow-up ID workup for infectious cause  -Appreciate IR recommendations  -Follow-up infectious labs including gonorrhea/chlamydia swab  -DVT prophylaxis  -Pain and nausea control as needed  -Maintenance IV fluids  -Will discuss whether surgical intervention is necessary  -Remainder of care per primary team    Subjective:   No acute overnight events.  Patient still in significant amount of pain in abdomen.  Patient localizes pain in left upper and lower quadrant.  Patient states pain is 9 out of 10 right now at rest.  Patient denies any nausea, vomiting.  Patient endorses nighttime sweats however patient is afebrile.    Objective:     Visit Vitals  BP 95/74 (BP Location: Right arm)   Pulse 83   Temp 98.5 °F (36.9 °C) (Oral)   Resp 22   Ht 5' 9\" (1.753 m)   Wt 64 kg (141 lb)   SpO2 97%   BMI 20.82 kg/m²   Smoking Status Former   BSA 1.78 m²        No intake or output data in the 24 hours ending 08/05/24 2026  G/C swab in process    Physical Exam: General appearance: alert and oriented, in no acute distress  Lungs: clear to auscultation bilaterally  Heart: regular rate and rhythm, S1, S2 normal, no murmur, click, rub or gallop  Abdomen: soft, tenderness to palpation over the left upper and lower quadrant, bowel sounds normal.  No guarding or rigidity.  Pulses: 2+ and symmetric  Skin: Skin color, texture, turgor normal. No rashes or lesions     Invasive Devices       Peripheral Intravenous Line  Duration             Peripheral IV 08/05/24 " Distal;Left;Upper;Ventral (anterior) Arm <1 day                    Lab, Imaging and other studies: I have personally reviewed pertinent reports.    VTE Pharmacologic Prophylaxis: Sequential compression device (Venodyne)   VTE Mechanical Prophylaxis: sequential compression device

## 2024-08-06 NOTE — ASSESSMENT & PLAN NOTE
Was replaced    Results from last 7 days   Lab Units 08/06/24  0509 08/05/24  0445 08/04/24  1737   POTASSIUM mmol/L 3.8 3.4* 3.8

## 2024-08-07 ENCOUNTER — APPOINTMENT (INPATIENT)
Dept: CT IMAGING | Facility: HOSPITAL | Age: 24
DRG: 247 | End: 2024-08-07
Payer: COMMERCIAL

## 2024-08-07 LAB
ABO GROUP BLD: NORMAL
ANION GAP SERPL CALCULATED.3IONS-SCNC: 4 MMOL/L (ref 4–13)
BLD GP AB SCN SERPL QL: NEGATIVE
BUN SERPL-MCNC: 4 MG/DL (ref 5–25)
CALCIUM SERPL-MCNC: 9 MG/DL (ref 8.4–10.2)
CHLORIDE SERPL-SCNC: 104 MMOL/L (ref 96–108)
CO2 SERPL-SCNC: 30 MMOL/L (ref 21–32)
CREAT SERPL-MCNC: 1.02 MG/DL (ref 0.6–1.3)
ERYTHROCYTE [DISTWIDTH] IN BLOOD BY AUTOMATED COUNT: 11.9 % (ref 11.6–15.1)
GFR SERPL CREATININE-BSD FRML MDRD: 104 ML/MIN/1.73SQ M
GLUCOSE SERPL-MCNC: 90 MG/DL (ref 65–140)
HCT VFR BLD AUTO: 43.3 % (ref 36.5–49.3)
HGB BLD-MCNC: 15.3 G/DL (ref 12–17)
MCH RBC QN AUTO: 32.1 PG (ref 26.8–34.3)
MCHC RBC AUTO-ENTMCNC: 35.3 G/DL (ref 31.4–37.4)
MCV RBC AUTO: 91 FL (ref 82–98)
PLATELET # BLD AUTO: 317 THOUSANDS/UL (ref 149–390)
PMV BLD AUTO: 9.3 FL (ref 8.9–12.7)
POTASSIUM SERPL-SCNC: 3.8 MMOL/L (ref 3.5–5.3)
RBC # BLD AUTO: 4.76 MILLION/UL (ref 3.88–5.62)
RH BLD: POSITIVE
SODIUM SERPL-SCNC: 138 MMOL/L (ref 135–147)
SPECIMEN EXPIRATION DATE: NORMAL
WBC # BLD AUTO: 6.38 THOUSAND/UL (ref 4.31–10.16)

## 2024-08-07 PROCEDURE — 86900 BLOOD TYPING SEROLOGIC ABO: CPT

## 2024-08-07 PROCEDURE — 99232 SBSQ HOSP IP/OBS MODERATE 35: CPT | Performed by: INTERNAL MEDICINE

## 2024-08-07 PROCEDURE — 80048 BASIC METABOLIC PNL TOTAL CA: CPT | Performed by: INTERNAL MEDICINE

## 2024-08-07 PROCEDURE — 86901 BLOOD TYPING SEROLOGIC RH(D): CPT

## 2024-08-07 PROCEDURE — 86850 RBC ANTIBODY SCREEN: CPT

## 2024-08-07 PROCEDURE — 74177 CT ABD & PELVIS W/CONTRAST: CPT

## 2024-08-07 PROCEDURE — 99232 SBSQ HOSP IP/OBS MODERATE 35: CPT | Performed by: SURGERY

## 2024-08-07 PROCEDURE — 85027 COMPLETE CBC AUTOMATED: CPT | Performed by: INTERNAL MEDICINE

## 2024-08-07 RX ORDER — HYDROMORPHONE HCL/PF 1 MG/ML
0.5 SYRINGE (ML) INJECTION EVERY 4 HOURS PRN
Status: DISCONTINUED | OUTPATIENT
Start: 2024-08-07 | End: 2024-08-08

## 2024-08-07 RX ORDER — OXYCODONE HYDROCHLORIDE 5 MG/1
5 TABLET ORAL EVERY 4 HOURS PRN
Status: DISCONTINUED | OUTPATIENT
Start: 2024-08-07 | End: 2024-08-09 | Stop reason: HOSPADM

## 2024-08-07 RX ORDER — FENTANYL CITRATE 50 UG/ML
50 INJECTION, SOLUTION INTRAMUSCULAR; INTRAVENOUS
OUTPATIENT
Start: 2024-08-07

## 2024-08-07 RX ORDER — DICYCLOMINE HCL 20 MG
20 TABLET ORAL
Status: DISCONTINUED | OUTPATIENT
Start: 2024-08-07 | End: 2024-08-09 | Stop reason: HOSPADM

## 2024-08-07 RX ORDER — SODIUM CHLORIDE 9 MG/ML
125 INJECTION, SOLUTION INTRAVENOUS CONTINUOUS
OUTPATIENT
Start: 2024-08-08

## 2024-08-07 RX ORDER — OXYCODONE HYDROCHLORIDE 10 MG/1
10 TABLET ORAL EVERY 4 HOURS PRN
Status: DISCONTINUED | OUTPATIENT
Start: 2024-08-07 | End: 2024-08-09 | Stop reason: HOSPADM

## 2024-08-07 RX ORDER — METRONIDAZOLE 500 MG/100ML
500 INJECTION, SOLUTION INTRAVENOUS
Status: DISCONTINUED | OUTPATIENT
Start: 2024-08-08 | End: 2024-08-08

## 2024-08-07 RX ORDER — ONDANSETRON 2 MG/ML
4 INJECTION INTRAMUSCULAR; INTRAVENOUS EVERY 6 HOURS PRN
OUTPATIENT
Start: 2024-08-07

## 2024-08-07 RX ORDER — CEFAZOLIN SODIUM 2 G/50ML
2000 SOLUTION INTRAVENOUS
Status: DISCONTINUED | OUTPATIENT
Start: 2024-08-08 | End: 2024-08-08

## 2024-08-07 RX ADMIN — DICYCLOMINE HYDROCHLORIDE 20 MG: 20 TABLET ORAL at 21:47

## 2024-08-07 RX ADMIN — FAMOTIDINE 20 MG: 10 INJECTION INTRAVENOUS at 08:37

## 2024-08-07 RX ADMIN — ONDANSETRON 4 MG: 2 INJECTION INTRAMUSCULAR; INTRAVENOUS at 14:55

## 2024-08-07 RX ADMIN — DICYCLOMINE HYDROCHLORIDE 20 MG: 20 TABLET ORAL at 14:21

## 2024-08-07 RX ADMIN — FAMOTIDINE 20 MG: 10 INJECTION INTRAVENOUS at 21:47

## 2024-08-07 RX ADMIN — HYDROMORPHONE HYDROCHLORIDE 0.5 MG: 1 INJECTION, SOLUTION INTRAMUSCULAR; INTRAVENOUS; SUBCUTANEOUS at 14:20

## 2024-08-07 RX ADMIN — IOHEXOL 100 ML: 350 INJECTION, SOLUTION INTRAVENOUS at 08:53

## 2024-08-07 RX ADMIN — KETOROLAC TROMETHAMINE 30 MG: 30 INJECTION, SOLUTION INTRAMUSCULAR; INTRAVENOUS at 08:37

## 2024-08-07 RX ADMIN — OXYCODONE HYDROCHLORIDE 10 MG: 10 TABLET ORAL at 21:52

## 2024-08-07 RX ADMIN — MORPHINE SULFATE 2 MG: 2 INJECTION, SOLUTION INTRAMUSCULAR; INTRAVENOUS at 11:57

## 2024-08-07 NOTE — ASSESSMENT & PLAN NOTE
Was replaced    Results from last 7 days   Lab Units 08/07/24  0616 08/06/24  0509 08/05/24  0445 08/04/24  1737   POTASSIUM mmol/L 3.8 3.8 3.4* 3.8

## 2024-08-07 NOTE — PLAN OF CARE
Problem: PAIN - ADULT  Goal: Verbalizes/displays adequate comfort level or baseline comfort level  Description: Interventions:  - Encourage patient to monitor pain and request assistance  - Assess pain using appropriate pain scale  - Administer analgesics based on type and severity of pain and evaluate response  - Implement non-pharmacological measures as appropriate and evaluate response  - Consider cultural and social influences on pain and pain management  - Notify physician/advanced practitioner if interventions unsuccessful or patient reports new pain  Outcome: Progressing     Problem: INFECTION - ADULT  Goal: Absence or prevention of progression during hospitalization  Description: INTERVENTIONS:  - Assess and monitor for signs and symptoms of infection  - Monitor lab/diagnostic results  - Monitor all insertion sites, i.e. indwelling lines, tubes, and drains  - Monitor endotracheal if appropriate and nasal secretions for changes in amount and color  - [place] appropriate cooling/warming therapies per order  - Administer medications as ordered  - Instruct and encourage patient and family to use good hand hygiene technique  - Identify and instruct in appropriate isolation precautions for identified infection/condition  Outcome: Progressing  Goal: Absence of fever/infection during neutropenic period  Description: INTERVENTIONS:  - Monitor WBC    Outcome: Progressing     Problem: SAFETY ADULT  Goal: Patient will remain free of falls  Description: INTERVENTIONS:  - Educate patient/family on patient safety including physical limitations  - Instruct patient to call for assistance with activity   - Consult OT/PT to assist with strengthening/mobility   - Keep Call bell within reach  - Keep bed low and locked with side rails adjusted as appropriate  - Keep care items and personal belongings within reach  - Initiate and maintain comfort rounds    Problem: INFECTION - ADULT  Goal: Absence of fever/infection during  neutropenic period  Description: INTERVENTIONS:  - Monitor WBC    Outcome: Progressing     - Apply yellow socks and bracelet for high fall risk patients  - Consider moving patient to room near nurses station  Outcome: Progressing  Goal: Maintain or return to baseline ADL function  Description: INTERVENTIONS:  -  Assess patient's ability to carry out ADLs; assess patient's baseline for ADL function and identify physical deficits which impact ability to perform ADLs (bathing, care of mouth/teeth, toileting, grooming, dressing, etc.)  - Assess/evaluate cause of self-care deficits   - Assess range of motion  - Assess patient's mobility; develop plan if impaired  - Assess patient's need for assistive devices and provide as appropriate  - Encourage maximum independence but intervene and supervise when necessary  - Involve family in performance of ADLs  - Assess for home care needs following discharge   - Consider OT consult to assist with ADL evaluation and planning for discharge  - Provide patient education as appropriate  Outcome: Progressing  Goal: Maintains/Returns to pre admission functional level  Description: INTERVENTIONS:  - Perform AM-PAC 6 Click Basic Mobility/ Daily Activity assessment daily.  - Set and communicate daily mobility goal to care team and patient/family/caregiver.   - Collaborate with rehabilitation services on mobility goals if consulted  - Perform Range of Motion 3 times a day.  - Reposition patient every 2 hours.  - Dangle patient 3 times a day  - Stand patient 3 times a day  - Ambulate patient 3 times a day  - Out of bed to chair 3 times a day   - Out of bed for meals 3 times a day  - Out of bed for toileting  - Record patient progress and toleration of activity level   Outcome: Progressing     Problem: DISCHARGE PLANNING  Goal: Discharge to home or other facility with appropriate resources  Description: INTERVENTIONS:  - Identify barriers to discharge w/patient and caregiver  - Arrange for  needed discharge resources and transportation as appropriate  - Identify discharge learning needs (meds, wound care, etc.)  - Arrange for interpretive services to assist at discharge as needed  - Refer to Case Management Department for coordinating discharge planning if the patient needs post-hospital services based on physician/advanced practitioner order or complex needs related to functional status, cognitive ability, or social support system  Outcome: Progressing     Problem: Knowledge Deficit  Goal: Patient/family/caregiver demonstrates understanding of disease process, treatment plan, medications, and discharge instructions  Description: Complete learning assessment and assess knowledge base.  Interventions:  - Provide teaching at level of understanding  - Provide teaching via preferred learning methods  Outcome: Progressing     Problem: METABOLIC, FLUID AND ELECTROLYTES - ADULT  Goal: Electrolytes maintained within normal limits  Description: INTERVENTIONS:  - Monitor labs and assess patient for signs and symptoms of electrolyte imbalances  - Administer electrolyte replacement as ordered  - Monitor response to electrolyte replacements, including repeat lab results as appropriate  - Instruct patient on fluid and nutrition as appropriate  Outcome: Progressing  Goal: Fluid balance maintained  Description: INTERVENTIONS:  - Monitor labs   - Monitor I/O and WT  - Instruct patient on fluid and nutrition as appropriate  - Assess for signs & symptoms of volume excess or deficit  Outcome: Progressing     Problem: GASTROINTESTINAL - ADULT  Goal: Minimal or absence of nausea and/or vomiting  Description: INTERVENTIONS:  - Administer IV fluids if ordered to ensure adequate hydration  - Maintain NPO status until nausea and vomiting are resolved  - Nasogastric tube if ordered  - Administer ordered antiemetic medications as needed  - Provide nonpharmacologic comfort measures as appropriate  - Advance diet as tolerated, if  ordered  - Consider nutrition services referral to assist patient with adequate nutrition and appropriate food choices  Outcome: Progressing  Goal: Maintains or returns to baseline bowel function  Description: INTERVENTIONS:  - Assess bowel function  - Encourage oral fluids to ensure adequate hydration  - Administer IV fluids if ordered to ensure adequate hydration  - Administer ordered medications as needed  - Encourage mobilization and activity  - Consider nutritional services referral to assist patient with adequate nutrition and appropriate food choices  Outcome: Progressing  Goal: Maintains adequate nutritional intake  Description: INTERVENTIONS:  - Monitor percentage of each meal consumed  - Identify factors contributing to decreased intake, treat as appropriate  - Assist with meals as needed  - Monitor I&O, weight, and lab values if indicated  - Obtain nutrition services referral as needed  Outcome: Progressing

## 2024-08-07 NOTE — PROGRESS NOTES
"Progress Note - Reji Shi 23 y.o. male MRN: [id number]    Unit/Bed#: [place] [address] -01 Encounter: [id number]      Assessment:  23-year-old male with small bowel intussusception and concerning B symptoms    Plan:  -Follow-up CTE planned for 8/7  -Discuss possibility for surgical intervention including OR for diagnostic lap, possible ex lap, possible bowel resection pending CTE  -N.p.o. at midnight  -Maintenance IV fluids for rehydration  -Pain and nausea control as needed  -DVT prophylaxis  -Encourage OOB, ambulation  -Appreciate ID recommendations  -Appreciate GI recommendations  -Remainder of care per primary team    Subjective:   Patient doing okay.  No acute overnight events.  He states still having diffuse abdominal pain that he rates an 8 out of 10.  Patient denies any nausea, vomiting, fevers or chills.  Patient is passing gas but not having any bowel movements.    Objective:     Visit Vitals  /64   Pulse 64   Temp 98.9 °F (37.2 °C)   Resp 20   Ht 5' 9\" (1.753 m)   Wt 64 kg (141 lb)   SpO2 96%   BMI 20.82 kg/m²   Smoking Status Former   BSA 1.78 m²          Intake/Output Summary (Last 24 hours) at 8/6/2024 2207  Last data filed at 8/6/2024 1014  Gross per 24 hour   Intake 900 ml   Output --   Net 900 ml        Physical Exam: General appearance: alert and oriented, in no acute distress  Lungs: clear to auscultation bilaterally  Heart: regular rate and rhythm, S1, S2 normal, no murmur, click, rub or gallop  Abdomen: soft, diffusely tender to palpation, no masses.  No guarding or rigidity  Pulses: 2+ and symmetric  Skin: Skin color, texture, turgor normal. No rashes or lesions     Invasive Devices       Peripheral Intravenous Line  Duration             Peripheral IV 08/05/24 Distal;Left;Upper;Ventral (anterior) Arm 1 day                    Lab, Imaging and other studies: I have personally reviewed pertinent reports.    VTE Pharmacologic Prophylaxis: Sequential compression device (Venodyne)   VTE " Mechanical Prophylaxis: sequential compression device

## 2024-08-07 NOTE — PLAN OF CARE
Problem: PAIN - ADULT  Goal: Verbalizes/displays adequate comfort level or baseline comfort level  Description: Interventions:  - Encourage patient to monitor pain and request assistance  - Assess pain using appropriate pain scale  - Administer analgesics based on type and severity of pain and evaluate response  - Implement non-pharmacological measures as appropriate and evaluate response  - Consider cultural and social influences on pain and pain management  - Notify physician/advanced practitioner if interventions unsuccessful or patient reports new pain  Outcome: Progressing     Problem: INFECTION - ADULT  Goal: Absence or prevention of progression during hospitalization  Description: INTERVENTIONS:  - Assess and monitor for signs and symptoms of infection  - Monitor lab/diagnostic results  - Monitor all insertion sites, i.e. indwelling lines, tubes, and drains  - Monitor endotracheal if appropriate and nasal secretions for changes in amount and color  - [place] appropriate cooling/warming therapies per order  - Administer medications as ordered  - Instruct and encourage patient and family to use good hand hygiene technique  - Identify and instruct in appropriate isolation precautions for identified infection/condition  Outcome: Progressing  Goal: Absence of fever/infection during neutropenic period  Description: INTERVENTIONS:  - Monitor WBC    Outcome: Progressing     Problem: SAFETY ADULT  Goal: Patient will remain free of falls  Description: INTERVENTIONS:  - Educate patient/family on patient safety including physical limitations  - Instruct patient to call for assistance with activity   - Consult OT/PT to assist with strengthening/mobility   - Keep Call bell within reach  - Keep bed low and locked with side rails adjusted as appropriate  - Keep care items and personal belongings within reach  - Initiate and maintain comfort rounds  - Make Fall Risk Sign visible to staff  - Offer Toileting every 2 Hours,  in advance of need  - Initiate/Maintain bed alarm  - Obtain necessary fall risk management equipment.  - Apply yellow socks and bracelet for high fall risk patients  - Consider moving patient to room near nurses station  Outcome: Progressing  Goal: Maintain or return to baseline ADL function  Description: INTERVENTIONS:  -  Assess patient's ability to carry out ADLs; assess patient's baseline for ADL function and identify physical deficits which impact ability to perform ADLs (bathing, care of mouth/teeth, toileting, grooming, dressing, etc.)  - Assess/evaluate cause of self-care deficits   - Assess range of motion  - Assess patient's mobility; develop plan if impaired  - Assess patient's need for assistive devices and provide as appropriate  - Encourage maximum independence but intervene and supervise when necessary  - Involve family in performance of ADLs  - Assess for home care needs following discharge   - Consider OT consult to assist with ADL evaluation and planning for discharge  - Provide patient education as appropriate  Outcome: Progressing  Goal: Maintains/Returns to pre admission functional level  Description: INTERVENTIONS:  - Perform AM-PAC 6 Click Basic Mobility/ Daily Activity assessment daily.  - Set and communicate daily mobility goal to care team and patient/family/caregiver.   - Collaborate with rehabilitation services on mobility goals if consulted  - Perform Range of Motion 3 times a day.  - Reposition patient every 2 hours.  - Dangle patient 3 times a day  - Stand patient 3 times a day  - Ambulate patient 3 times a day  - Out of bed to chair 3 times a day   - Out of bed for meals 3 times a day  - Out of bed for toileting  - Record patient progress and toleration of activity level   Outcome: Progressing     Problem: DISCHARGE PLANNING  Goal: Discharge to home or other facility with appropriate resources  Description: INTERVENTIONS:  - Identify barriers to discharge w/patient and caregiver  -  Arrange for needed discharge resources and transportation as appropriate  - Identify discharge learning needs (meds, wound care, etc.)  - Arrange for interpretive services to assist at discharge as needed  - Refer to Case Management Department for coordinating discharge planning if the patient needs post-hospital services based on physician/advanced practitioner order or complex needs related to functional status, cognitive ability, or social support system  Outcome: Progressing     Problem: Knowledge Deficit  Goal: Patient/family/caregiver demonstrates understanding of disease process, treatment plan, medications, and discharge instructions  Description: Complete learning assessment and assess knowledge base.  Interventions:  - Provide teaching at level of understanding  - Provide teaching via preferred learning methods  Outcome: Progressing     Problem: METABOLIC, FLUID AND ELECTROLYTES - ADULT  Goal: Electrolytes maintained within normal limits  Description: INTERVENTIONS:  - Monitor labs and assess patient for signs and symptoms of electrolyte imbalances  - Administer electrolyte replacement as ordered  - Monitor response to electrolyte replacements, including repeat lab results as appropriate  - Instruct patient on fluid and nutrition as appropriate  Outcome: Progressing  Goal: Fluid balance maintained  Description: INTERVENTIONS:  - Monitor labs   - Monitor I/O and WT  - Instruct patient on fluid and nutrition as appropriate  - Assess for signs & symptoms of volume excess or deficit  Outcome: Progressing     Problem: GASTROINTESTINAL - ADULT  Goal: Minimal or absence of nausea and/or vomiting  Description: INTERVENTIONS:  - Administer IV fluids if ordered to ensure adequate hydration  - Maintain NPO status until nausea and vomiting are resolved  - Nasogastric tube if ordered  - Administer ordered antiemetic medications as needed  - Provide nonpharmacologic comfort measures as appropriate  - Advance diet as  tolerated, if ordered  - Consider nutrition services referral to assist patient with adequate nutrition and appropriate food choices  Outcome: Progressing  Goal: Maintains or returns to baseline bowel function  Description: INTERVENTIONS:  - Assess bowel function  - Encourage oral fluids to ensure adequate hydration  - Administer IV fluids if ordered to ensure adequate hydration  - Administer ordered medications as needed  - Encourage mobilization and activity  - Consider nutritional services referral to assist patient with adequate nutrition and appropriate food choices  Outcome: Progressing  Goal: Maintains adequate nutritional intake  Description: INTERVENTIONS:  - Monitor percentage of each meal consumed  - Identify factors contributing to decreased intake, treat as appropriate  - Assist with meals as needed  - Monitor I&O, weight, and lab values if indicated  - Obtain nutrition services referral as needed  Outcome: Progressing

## 2024-08-07 NOTE — UTILIZATION REVIEW
Initial Clinical Review    OBSERVATION 8/5 CHANGED TO INPATIENT ON 8/6 @ 1716 - intussusception with continued infectious work up, imaging, poss OR.     Admission: Date/Time/Statement:   Admission Orders (From admission, onward)       Ordered        08/06/24 1716  INPATIENT ADMISSION  Once                          Orders Placed This Encounter   Procedures    INPATIENT ADMISSION     Standing Status:   Standing     Number of Occurrences:   1     Order Specific Question:   Level of Care     Answer:   Med Surg [16]     Order Specific Question:   Estimated length of stay     Answer:   More than 2 Midnights     Order Specific Question:   Certification     Answer:   I certify that inpatient services are medically necessary for this patient for a duration of greater than two midnights. See H&P and MD Progress Notes for additional information about the patient's course of treatment.     ED Arrival Information       Expected   -    Arrival   8/4/2024 17:03    Acuity   Urgent              Means of arrival   Walk-In    Escorted by   Self    Service   Hospitalist    Admission type   Emergency              Arrival complaint   abdominal pain             Chief Complaint   Patient presents with    Abdominal Pain     Pt reports had abd surgery in nov for this same problem but now its even worse. Pt reports lower abd pain denies n/v/d        Initial Presentation: 23 y.o. male presents to the ED from home with c/o intractable LLQ, sharp, nonradiating, stabbing abd pain.  + flatus, appetite, no N/V, 15 lb weight loss x 1 month. PMH: intussusception s/p dx lap 11/23, rectal bleeding, constipation.  In the ED VS stable, pain rated 9/10.  Labs - elevated WBC, low K.  Imaging - small bowel intussusception in the left upper abdomen without associated suspicious findings. No bowel obstruction. Treated with IV Toradol x2, IV fluids.  On exam underweight, abd tenderness.  Admitted to OBSERVATION status with Intussusception, incidental finding  inguinal lymphadenopathy increased in size- NPO, IV fluids, PRN analgesia, antiemetics, trend lytes, GI consult, surgery consult, poss OR, Miralax.      Anticipated Length of Stay/Certification Statement: Patient will be admitted on an Observation basis with an anticipated length of stay of  < 2 midnights.   Justification for Hospital Stay:  intractable abdominal pain history of intussusception     8/5 Surgery Consult - small bowel intussusception - n.p.o., IV fluids, and trend abdominal exams, analgesia, antiemetics, no acute surgical intervention.     8/5 GI Consult - freq abd pain d/t small bowel intussusception s/p dx lap.  If resolves can have further work up with CT or MR enterography to look for intraluminal lead point.  If persists will need surgical intervention w/ consideration of resection of the affected jejunal segment.     8/5 IR Consult - lymphadenopathy, weight loss, bowel problems, intussusception with small L groin nodes appears globular, get infectious work up (HIV, STDs), recover from current illness and if negative work up will re-eval for core biopsy L groin node. Consider CT chest.      Date: 8/6   CHANGED TO INPATIENT STATUS:   Can advance diet, infectious workup, IV fluids, analgesia.  On exam significant abd pain localized LUQ and LLQ rated 9/10 - using IV Morphine and IV Toradol, night time sweats, no N/V. Will have CT enterography on 8/7.  K WNL, WBC WNL.      Certification Statement: The patient, admitted on an observation basis, will now require > 2 midnight hospital stay due to ongoing abdominal pain     Date:  8/7  Day 2 IP:   CT enterography today.  May need OR.  Make NPO p MN, IV fluids, PRN analgesia, OOB.  On exam diffuse abd pain pt rated 8/10- using IV Morphine and IV Toradol, no N/V, + flatus, no BM, soft, diffusely tender to palpation, no masses.  No guarding or rigidity.  Afebrile.       ED Triage Vitals   Temperature Pulse Respirations Blood Pressure SpO2 Pain Score    08/04/24 1708 08/04/24 1708 08/04/24 1708 08/04/24 1708 08/04/24 1708 08/04/24 1742   98.4 °F (36.9 °C) 96 14 117/64 97 % 9     Weight (last 2 days)       Date/Time Weight    08/05/24 0345 64 (141)            Vital Signs (last 3 days)       Date/Time Temp Pulse Resp BP MAP (mmHg) SpO2 O2 Device Patient Position - Orthostatic VS [place] Coma Scale Score Pain    08/07/24 08:00:56 97.5 °F (36.4 °C) 57 15 126/65 85 97 % -- -- -- --    08/06/24 2156 -- -- -- -- -- -- -- -- -- 7 08/06/24 21:01:05 98.9 °F (37.2 °C) 64 20 119/64 82 96 % -- -- -- --    08/06/24 2000 -- -- -- -- -- -- None (Room air) -- 15 7    08/06/24 1600 -- -- -- -- -- -- -- -- -- 10 - Worst Possible Pain    08/06/24 15:25:28 -- 70 16 120/66 84 97 % -- -- -- --    08/06/24 15:24:26 95.3 °F (35.2 °C) 58 20 -- -- 97 % -- -- -- --    08/06/24 1500 -- -- -- -- -- -- -- -- -- 10 - Worst Possible Pain    08/06/24 1011 -- -- -- -- -- -- -- -- -- 8 08/06/24 0816 -- -- -- -- -- -- -- -- 15 --    08/06/24 0812 -- -- -- -- -- -- -- -- -- 8 08/06/24 07:36:55 97.9 °F (36.6 °C) 74 -- 118/69 85 97 % -- -- -- --    08/05/24 2248 -- -- -- -- -- -- -- -- -- 8 08/05/24 22:08:30 98.5 °F (36.9 °C) 83 22 95/74 81 97 % None (Room air) Lying -- --    08/05/24 2054 -- -- -- -- -- -- -- -- -- 7 08/05/24 2000 -- -- -- -- -- -- None (Room air) -- 15 7    08/05/24 1628 -- -- -- -- -- -- -- -- -- 8 08/05/24 15:43:45 97.5 °F (36.4 °C) 60 20 112/62 79 96 % None (Room air) Lying -- --    08/05/24 1515 -- -- -- -- -- 96 % None (Room air) -- 15 7    08/05/24 0956 -- -- -- -- -- -- -- -- -- 9 08/05/24 0830 -- -- -- -- -- -- None (Room air) -- -- 9 08/05/24 07:37:48 97.7 °F (36.5 °C) 55 20 113/64 80 96 % None (Room air) Lying -- --    08/05/24 0449 -- -- -- -- -- -- -- -- -- 9 08/05/24 03:53:50 -- 61 -- 120/69 86 97 % -- -- -- --    08/05/24 0349 -- -- -- -- -- -- None (Room air) -- -- 9 08/05/24 0345 97.7 °F (36.5 °C) 58 16 126/72 -- 99 % None (Room air)  Lying -- --    08/05/24 0343 -- -- -- -- -- -- -- -- -- 9    08/05/24 0009 -- 58 16 141/92 -- 99 % None (Room air) Lying -- 8 08/04/24 2110 -- 77 14 128/60 -- 99 % None (Room air) Standing -- No Pain    08/04/24 1822 -- 59 15 126/73 -- 99 % None (Room air) Sitting -- 8 08/04/24 1742 -- -- -- -- -- -- -- -- -- 9 08/04/24 1708 98.4 °F (36.9 °C) 96 14 117/64 -- 97 % -- -- -- --          Pertinent Labs/Diagnostic Test Results:   Radiology:  CT abdomen pelvis with contrast   Final Interpretation by Hari Mandel MD (08/05 0651)      Small bowel-small bowel intussusception in the left upper abdomen without associated suspicious findings. No bowel obstruction.      CT small bowel enterography    (Results Pending)        Cardiology:  No orders to display     GI:  No orders to display           Results from last 7 days   Lab Units 08/07/24  0616 08/06/24  0509 08/05/24 0441 08/04/24  1737   WBC Thousand/uL 6.38 5.95 7.32 10.80*   HEMOGLOBIN g/dL 15.3 14.7 14.5 15.5   HEMATOCRIT % 43.3 43.4 41.8 44.6   PLATELETS Thousands/uL 317 298 281 289   TOTAL NEUT ABS Thousands/µL  --   --  3.43 8.01*         Results from last 7 days   Lab Units 08/07/24  0616 08/06/24  0509 08/05/24  0445 08/04/24  1737   SODIUM mmol/L 138 140 141 140   POTASSIUM mmol/L 3.8 3.8 3.4* 3.8   CHLORIDE mmol/L 104 106 106 107   CO2 mmol/L 30 29 31 29   ANION GAP mmol/L 4 5 4 4   BUN mg/dL 4* 4* 5 7   CREATININE mg/dL 1.02 0.94 0.86 1.07   EGFR ml/min/1.73sq m 104 115 123 98   CALCIUM mg/dL 9.0 9.0 8.9 9.2   MAGNESIUM mg/dL  --   --  2.0  --      Results from last 7 days   Lab Units 08/06/24  0509 08/05/24  0445 08/04/24  1737   AST U/L 11* 12* 14   ALT U/L 8 8 9   ALK PHOS U/L 64 56 68   TOTAL PROTEIN g/dL 6.2* 6.0* 6.4   ALBUMIN g/dL 3.7 3.8 4.1   TOTAL BILIRUBIN mg/dL 0.85 0.57 0.38         Results from last 7 days   Lab Units 08/07/24  0616 08/06/24  0509 08/05/24  0445 08/04/24  1737   GLUCOSE RANDOM mg/dL 90 76 87 94     Results from last 7  days   Lab Units 08/04/24  1737   LACTIC ACID mmol/L 1.4     Results from last 7 days   Lab Units 08/05/24  1407   HEP B S AG  Non-reactive   HEP C AB  Non-reactive   HEP B C IGM  Non-reactive     Results from last 7 days   Lab Units 08/04/24  1737   LIPASE u/L 16     ED Treatment-Medication Administration from 08/04/2024 1703 to 08/05/2024 0349         Date/Time Order Dose Route Action     08/04/2024 1742 ketorolac (TORADOL) injection 15 mg 15 mg Intravenous Given     08/04/2024 1742 sodium chloride 0.9 % bolus 1,000 mL 1,000 mL Intravenous New Bag     08/04/2024 2021 iohexol (OMNIPAQUE) 350 MG/ML injection (MULTI-DOSE) 100 mL 100 mL Intravenous Given     08/04/2024 2021 iohexol (OMNIPAQUE) 240 MG/ML solution 50 mL 50 mL Oral Given     08/05/2024 0343 ketorolac (TORADOL) injection 30 mg 30 mg Intravenous Given          Present on Admission:   Intussusception (HCC)   Rectal bleeding   Other constipation   Inguinal lymphadenopathy      Admitting Diagnosis: Intussusception (HCC) [K56.1]  Inguinal lymphadenopathy [R59.0]  Left sided abdominal pain [R10.9]  Age/Sex: 23 y.o. male  Admission Orders:  Scheduled Medications:  famotidine, 20 mg, Intravenous, Q12H LISA      Continuous IV Infusions:  lactated ringers, 75 mL/hr, Intravenous, Continuous      PRN Meds:  acetaminophen, 650 mg, Oral, Q6H PRN  aluminum-magnesium hydroxide-simethicone, 30 mL, Oral, Q6H PRN  ketorolac, 30 mg, Intravenous, Q6H PRN - x 4 8/5, x 3 8/6  morphine injection, 2 mg, Intravenous, Q4H PRN - x 2 8/5, x2 8/6  ondansetron, 4 mg, Intravenous, Q6H PRN  oxyCODONE, 5 mg, Oral, Q6H PRN  polyethylene glycol, 17 g, Oral, Daily PRN  simethicone, 80 mg, Oral, 4x Daily PRN    IV fluids  IV Famotidine  Ambulate  CT small bowel enterography  NPO  IP CONSULT TO ACUTE CARE SURGERY  IP CONSULT TO GASTROENTEROLOGY  INPATIENT CONSULT TO IR    Network Utilization Review Department  ATTENTION: Please call with any questions or concerns to [phone] and carefully  listen to the prompts so that you are directed to the right person. All voicemails are confidential.   For Discharge needs, contact Care Management DC Support Team at [phone] opt. [phone]  Send all requests for admission clinical reviews, approved or denied determinations and any other requests to dedicated fax number below belonging to the campus where the patient is receiving treatment. List of dedicated fax numbers for the Facilities:  FACILITY NAME UR FAX NUMBER   ADMISSION DENIALS (Administrative/Medical Necessity) [phone]   DISCHARGE SUPPORT TEAM (NETWORK) [phone]   PARENT CHILD HEALTH (Maternity/NICU/Pediatrics) [phone]   [facility] [phone]   [facility] [phone]   [facility], [facility] [phone]   [facility] [phone]   [facility] [phone]   [facility] [phone]   [facility] [phone]   [facility] [phone]   [facility] [phone]   [facility] [phone]   [facility] [phone]   [facility] [phone]

## 2024-08-07 NOTE — PROGRESS NOTES
[facility]  Progress Note  Name: Reji Shi I  MRN: [id number]  Unit/Bed#: [place] [address] -01 I Date of Admission: 8/4/2024   Date of Service: 8/7/2024 I Hospital Day: 1    Assessment & Plan   * Intussusception (HCC)  Assessment & Plan  History of intussusception November status post laparoscopy presented back to the hospital for abdominal pain and weight loss found to have intussusception again.  Coordinated with surgery and GI.  No GI intervention warranted.  Due to ongoing pain surgery recommend CT enterography shows resolution of intussusception  Continue IV fluids.  Surgery recommends back on liquid diet.    Hypokalemia  Assessment & Plan  Was replaced    Results from last 7 days   Lab Units 08/07/24  0616 08/06/24  0509 08/05/24  0445 08/04/24  1737   POTASSIUM mmol/L 3.8 3.8 3.4* 3.8       Unintentional weight loss  Assessment & Plan  Infectious workup negative.  Likely related to abdominal pain and decreased intake over the past couple months.    Inguinal lymphadenopathy  Assessment & Plan  Inguinal lymphadenopathy with weight loss.  Coordinated with surgery and IR.  No IR biopsy-able node.  HIV QuantiFERON hepatitis panel and GC chlamydia all negative.    VTE Pharmacologic Prophylaxis: VTE Score: 0 Low Risk (Score 0-2) - Encourage Ambulation.    Mobility:  Basic Mobility Inpatient Raw Score: 24  JH-HLM Goal: 8: Walk 250 feet or more  JH-HLM Achieved: 7: Walk 25 feet or more  JH-HLM Goal NOT achieved. Continue with multidisciplinary rounding and encourage appropriate mobility to improve upon JH-HLM goals.    Patient Centered Rounds: I have performed bedside rounds with nursing staff today.  Discussions with Specialists or Other Care Team Provider: Case management surgery GI    Education and Discussions with Family / Patient:     Time Spent for Care:   This time was spent on one or more of the following: performing physical exam; counseling and coordination of care; obtaining  "or reviewing history; documenting in the medical record; reviewing/ordering tests, medications or procedures; communicating with other healthcare professionals and discussing with patient's family/caregivers.    Current Length of Stay: 1 day(s)  Current Patient Status: Inpatient   Certification Statement: The patient will continue to require additional inpatient hospital stay due to abdominal pain  Discharge Plan: Anticipate discharge in 24-48 hrs to home.    Code Status: Level 1 - Full Code      Subjective:   Patient seen and examined.  Still having left lower quadrant pain.    Objective:   Vitals: Blood pressure 126/65, pulse 57, temperature 97.5 °F (36.4 °C), resp. rate 15, height 5' 9\" (1.753 m), weight 64 kg (141 lb), SpO2 97%.  No intake or output data in the 24 hours ending 08/07/24 1344    Physical Exam  Vitals reviewed.   Constitutional:       General: He is not in acute distress.  HENT:      Head: Atraumatic.   Cardiovascular:      Rate and Rhythm: Regular rhythm.      Heart sounds: Normal heart sounds.   Pulmonary:      Effort: Pulmonary effort is normal.      Breath sounds: No wheezing.   Abdominal:      General: Bowel sounds are normal.      Palpations: Abdomen is soft.      Tenderness: There is abdominal tenderness (Left lower quadrant).   Musculoskeletal:         General: No swelling or tenderness.   Skin:     General: Skin is warm and dry.   Neurological:      General: No focal deficit present.      Mental Status: He is alert.       Additional Data:   Labs:  Results from last 7 days   Lab Units 08/07/24  0616 08/06/24  0509 08/05/24  0441   WBC Thousand/uL 6.38 5.95 7.32   HEMOGLOBIN g/dL 15.3 14.7 14.5   PLATELETS Thousands/uL 317 298 281   MCV fL 91 91 93     Results from last 7 days   Lab Units 08/07/24  0616 08/06/24  0509 08/05/24  0445 08/04/24  1737   SODIUM mmol/L 138 140 141 140   POTASSIUM mmol/L 3.8 3.8 3.4* 3.8   CHLORIDE mmol/L 104 106 106 107   CO2 mmol/L 30 29 31 29   ANION GAP mmol/L " 4 5 4 4   BUN mg/dL 4* 4* 5 7   CREATININE mg/dL 1.02 0.94 0.86 1.07   CALCIUM mg/dL 9.0 9.0 8.9 9.2   ALBUMIN g/dL  --  3.7 3.8 4.1   TOTAL BILIRUBIN mg/dL  --  0.85 0.57 0.38   ALK PHOS U/L  --  64 56 68   ALT U/L  --  8 8 9   AST U/L  --  11* 12* 14   EGFR ml/min/1.73sq m 104 115 123 98   GLUCOSE RANDOM mg/dL 90 76 87 94     Results from last 7 days   Lab Units 08/05/24  0445   MAGNESIUM mg/dL 2.0                  Results from last 7 days   Lab Units 08/04/24  1737   LACTIC ACID mmol/L 1.4                 * I Have Reviewed All Lab Data Listed Above.    Recent cultures:   Results from last 7 days   Lab Units 08/05/24  1407   RAPID HIV 1X2  Non-Reactive                 Lines/Drains:  Invasive Devices       Peripheral Intravenous Line  Duration             Peripheral IV 08/05/24 Distal;Left;Upper;Ventral (anterior) Arm 2 days                          Telemetry:      Imaging:  Imaging Reports Reviewed Today Include:   CT small bowel enterography    Result Date: 8/7/2024  Impression: Small bowel intussusception noted on the previous study has resolved. There is passage of contrast into the colon from the previously administered contrast on August 4, 2024 A borderline dilated loop of small bowel seen in the left upper quadrant no bowel wall thickening, pneumatosis or free air, nonspecific. Correlate clinically *  Inflammation: No imaging signs of active inflammation. *  Stricture: None. *  Penetrating complication: None. *  Perianal disease: None. *  Other complications: None. Workstation performed: [id number]     CT abdomen pelvis with contrast    Result Date: 8/5/2024  Impression: Small bowel-small bowel intussusception in the left upper abdomen without associated suspicious findings. No bowel obstruction. Workstation performed: [id number]       Scheduled Meds:  Current Facility-Administered Medications   Medication Dose Route Frequency Provider Last Rate    acetaminophen  650 mg Oral Q6H PRN Sandra Stewart  SHIRA      aluminum-magnesium hydroxide-simethicone  30 mL Oral Q6H PRN SHIRA Cross      [START ON 8/8/2024] cefazolin  2,000 mg Intravenous On Call To OR Mica Dixon MD      famotidine  20 mg Intravenous Q12H [facility] Derrick Welsh DO      ketorolac  30 mg Intravenous Q6H PRN SHIRA Cross      lactated ringers  75 mL/hr Intravenous Continuous Derrick Welhs DO 75 mL/hr (08/06/24 2044)    [START ON 8/8/2024] metroNIDAZOLE  500 mg Intravenous On Call To OR Mica Dixon MD      morphine injection  2 mg Intravenous Q4H PRN SHIRA Cross      ondansetron  4 mg Intravenous Q6H PRN SHIRA Cross      oxyCODONE  5 mg Oral Q6H PRN SHIRA Cross      polyethylene glycol  17 g Oral Daily PRN SHIRA Cross      simethicone  80 mg Oral 4x Daily PRN SHIRA Cross         Today, Patient Was Seen By: Derrick Welsh DO    ** Please Note: Dictation voice to text software may have been used in the creation of this document. **

## 2024-08-07 NOTE — ASSESSMENT & PLAN NOTE
History of intussusception November status post laparoscopy presented back to the hospital for abdominal pain and weight loss found to have intussusception again.  Coordinated with surgery and GI.  No GI intervention warranted.  Due to ongoing pain surgery recommend CT enterography shows resolution of intussusception  Continue IV fluids.  Surgery recommends back on liquid diet.

## 2024-08-07 NOTE — CASE MANAGEMENT
Case Management Assessment & Discharge Planning Note    Patient name Reji Shi  Location [facility] 2 [facility] 2 M* MRN [id number]  : 2000 Date 2024       Current Admission Date: 2024  Current Admission Diagnosis:Intussusception (HCC)   Patient Active Problem List    Diagnosis Date Noted Date Diagnosed    Rectal bleeding 2024     Other constipation 2024     Inguinal lymphadenopathy 2024     Unintentional weight loss 2024     Hypokalemia 2024     Marijuana smoker, episodic 2023     Intussusception (HCC) 2023     Palpitations 2021     Depression 2021       LOS (days): 1  Geometric Mean LOS (GMLOS) (days):   Days to GMLOS:     OBJECTIVE:    Risk of Unplanned Readmission Score: 9.54         Current admission status: Inpatient       Preferred Pharmacy:   [id number] - [place] - [address] [address]  Phone: [phone] Fax: [phone]    Primary Care Provider: SHIRA Price    Primary Insurance: [place] [employer]  Secondary Insurance:     ASSESSMENT:  Active Health Care Proxies       Kali Nixon Health Care Representative - Sister, Legal Guardian   Primary Phone: [phone] (Mobile)                 Advance Directives  Does patient have a Health Care POA?: No  Was patient offered paperwork?: No  Does patient currently have a Health Care decision maker?: Yes, please see Health Care Proxy section  Does patient have Advance Directives?: No  Was patient offered paperwork?: No  Primary Contact: Kali Livingston-sister         Readmission Root Cause  30 Day Readmission: No    Patient Information  Admitted from:: Home  Mental Status: Alert  During Assessment patient was accompanied by: Not accompanied during assessment  Assessment information provided by:: Patient  Primary Caregiver: Self  Support Systems: Spouse/significant other, Parent, Friend, Family  members  [facility] of Residence: [place]  What [facility] do you live in?: [place]- Current address updated in EHR  Home entry access options. Select all that apply.: Stairs  Number of steps to enter home.:  (2 flights to 2nd floor apartment- no steps to enter building)  Do the steps have railings?: Yes  Type of Current Residence: Apartment  Floor Level: 2  Upon entering residence, is there a bedroom on the main floor (no further steps)?: Yes  Upon entering residence, is there a bathroom on the main floor (no further steps)?: Yes  Living Arrangements: Lives w/ Spouse/significant other  Is patient a ?: No    Activities of Daily Living Prior to Admission  Functional Status: Independent  Completes ADLs independently?: Yes  Ambulates independently?: Yes  Does patient use assisted devices?: No  Does patient currently own DME?: No  Does patient have a history of Outpatient Therapy (PT/OT)?: No  Does the patient have a history of Short-Term Rehab?: No  Does patient have a history of HHC?: No  Does patient currently have HHC?: No         Patient Information Continued  Income Source: Employed (no work excuse needed)  Does patient have prescription coverage?: Yes (Uses [facility] [address])  Does patient have a history of substance abuse?: No  Does patient have a history of Mental Health Diagnosis?: No                Social Determinants of Health (SDOH)      Flowsheet Row Most Recent Value   Housing Stability    In the last 12 months, was there a time when you were not able to pay the mortgage or rent on time? N   In the past 12 months, how many times have you moved where you were living? 1   At any time in the past 12 months, were you homeless or living in a shelter (including now)? N   Transportation Needs    In the past 12 months, has lack of transportation kept you from medical appointments or from getting medications? no   In the past 12 months, has lack of transportation kept you from meetings, work, or from getting  things needed for daily living? No   Food Insecurity    Within the past 12 months, you worried that your food would run out before you got the money to buy more. Never true   Within the past 12 months, the food you bought just didn't last and you didn't have money to get more. Never true   Utilities    In the past 12 months has the electric, gas, oil, or water company threatened to shut off services in your home? No            DISCHARGE DETAILS:    Discharge planning discussed with:: pt        CM contacted family/caregiver?: No- see comments (pt declined need to do same)  Were Treatment Team discharge recommendations reviewed with patient/caregiver?: Yes  Did patient/caregiver verbalize understanding of patient care needs?: Yes       Contacts  Patient Contacts: Kali Livingston-sister  Relationship to Patient:: Family  Contact Method: Phone  Phone Number: [phone]  Reason/Outcome: Emergency Contact    Requested Home Health Care         Is the patient interested in HHC at discharge?: No    DME Referral Provided  Referral made for DME?: No         Would you like to participate in our [facility] service program?  : No - Declined    Treatment Team Recommendation: Home  Discharge Destination Plan:: Home  Transport at Discharge : Auto with designated , Family        Transported by (Company and Unit #): SO/Family                             Additional Comments: Pt not medically cleared this day for diagnostic lap (as informed by nursing).  Will evaluate changes to dc needs post operatively.

## 2024-08-08 LAB
ANION GAP SERPL CALCULATED.3IONS-SCNC: 7 MMOL/L (ref 4–13)
BUN SERPL-MCNC: 3 MG/DL (ref 5–25)
CALCIUM SERPL-MCNC: 9.4 MG/DL (ref 8.4–10.2)
CHLORIDE SERPL-SCNC: 104 MMOL/L (ref 96–108)
CO2 SERPL-SCNC: 28 MMOL/L (ref 21–32)
CREAT SERPL-MCNC: 1.03 MG/DL (ref 0.6–1.3)
ERYTHROCYTE [DISTWIDTH] IN BLOOD BY AUTOMATED COUNT: 12 % (ref 11.6–15.1)
GFR SERPL CREATININE-BSD FRML MDRD: 103 ML/MIN/1.73SQ M
GLUCOSE SERPL-MCNC: 74 MG/DL (ref 65–140)
HCT VFR BLD AUTO: 46.3 % (ref 36.5–49.3)
HGB BLD-MCNC: 16 G/DL (ref 12–17)
MCH RBC QN AUTO: 31.8 PG (ref 26.8–34.3)
MCHC RBC AUTO-ENTMCNC: 34.6 G/DL (ref 31.4–37.4)
MCV RBC AUTO: 92 FL (ref 82–98)
PLATELET # BLD AUTO: 354 THOUSANDS/UL (ref 149–390)
PMV BLD AUTO: 9.7 FL (ref 8.9–12.7)
POTASSIUM SERPL-SCNC: 4.1 MMOL/L (ref 3.5–5.3)
RBC # BLD AUTO: 5.03 MILLION/UL (ref 3.88–5.62)
SODIUM SERPL-SCNC: 139 MMOL/L (ref 135–147)
WBC # BLD AUTO: 8.68 THOUSAND/UL (ref 4.31–10.16)

## 2024-08-08 PROCEDURE — 80048 BASIC METABOLIC PNL TOTAL CA: CPT | Performed by: INTERNAL MEDICINE

## 2024-08-08 PROCEDURE — 99232 SBSQ HOSP IP/OBS MODERATE 35: CPT | Performed by: INTERNAL MEDICINE

## 2024-08-08 PROCEDURE — 85027 COMPLETE CBC AUTOMATED: CPT | Performed by: INTERNAL MEDICINE

## 2024-08-08 PROCEDURE — 99232 SBSQ HOSP IP/OBS MODERATE 35: CPT

## 2024-08-08 RX ORDER — METOCLOPRAMIDE HYDROCHLORIDE 5 MG/ML
10 INJECTION INTRAMUSCULAR; INTRAVENOUS EVERY 6 HOURS PRN
Status: DISCONTINUED | OUTPATIENT
Start: 2024-08-08 | End: 2024-08-09 | Stop reason: HOSPADM

## 2024-08-08 RX ORDER — SODIUM CHLORIDE, SODIUM LACTATE, POTASSIUM CHLORIDE, CALCIUM CHLORIDE 600; 310; 30; 20 MG/100ML; MG/100ML; MG/100ML; MG/100ML
75 INJECTION, SOLUTION INTRAVENOUS CONTINUOUS
Status: DISCONTINUED | OUTPATIENT
Start: 2024-08-08 | End: 2024-08-09 | Stop reason: HOSPADM

## 2024-08-08 RX ADMIN — DICYCLOMINE HYDROCHLORIDE 20 MG: 20 TABLET ORAL at 11:07

## 2024-08-08 RX ADMIN — DICYCLOMINE HYDROCHLORIDE 20 MG: 20 TABLET ORAL at 16:38

## 2024-08-08 RX ADMIN — ONDANSETRON 4 MG: 2 INJECTION INTRAMUSCULAR; INTRAVENOUS at 10:06

## 2024-08-08 RX ADMIN — DICYCLOMINE HYDROCHLORIDE 20 MG: 20 TABLET ORAL at 21:39

## 2024-08-08 RX ADMIN — FAMOTIDINE 20 MG: 10 INJECTION INTRAVENOUS at 21:39

## 2024-08-08 RX ADMIN — MORPHINE SULFATE 2 MG: 2 INJECTION, SOLUTION INTRAMUSCULAR; INTRAVENOUS at 08:12

## 2024-08-08 RX ADMIN — FAMOTIDINE 20 MG: 10 INJECTION INTRAVENOUS at 08:12

## 2024-08-08 RX ADMIN — DICYCLOMINE HYDROCHLORIDE 20 MG: 20 TABLET ORAL at 04:55

## 2024-08-08 RX ADMIN — HYDROMORPHONE HYDROCHLORIDE 0.5 MG: 1 INJECTION, SOLUTION INTRAMUSCULAR; INTRAVENOUS; SUBCUTANEOUS at 00:25

## 2024-08-08 RX ADMIN — OXYCODONE HYDROCHLORIDE 10 MG: 10 TABLET ORAL at 04:52

## 2024-08-08 RX ADMIN — SODIUM CHLORIDE, SODIUM LACTATE, POTASSIUM CHLORIDE, AND CALCIUM CHLORIDE 75 ML/HR: .6; .31; .03; .02 INJECTION, SOLUTION INTRAVENOUS at 19:34

## 2024-08-08 RX ADMIN — ONDANSETRON 4 MG: 2 INJECTION INTRAMUSCULAR; INTRAVENOUS at 00:25

## 2024-08-08 RX ADMIN — SODIUM CHLORIDE, SODIUM LACTATE, POTASSIUM CHLORIDE, AND CALCIUM CHLORIDE 75 ML/HR: .6; .31; .03; .02 INJECTION, SOLUTION INTRAVENOUS at 02:26

## 2024-08-08 RX ADMIN — METOCLOPRAMIDE HYDROCHLORIDE 10 MG: 5 INJECTION INTRAMUSCULAR; INTRAVENOUS at 11:07

## 2024-08-08 NOTE — ASSESSMENT & PLAN NOTE
History of intussusception November status post laparoscopy presented back to the hospital for abdominal pain and weight loss found to have intussusception again.  Initial CT on admission intussusception present.  Due to ongoing pain surgery recommend CT enterography shows resolution of intussusception  Continue IV fluids.    Pain better today, will advance diet and reassess

## 2024-08-08 NOTE — QUICK NOTE
Nursing reached out that patient wishing to leave AGAINST MEDICAL ADVISE to seek second opinion elsewhere. General surgery provider had lengthy discussion with patient regarding plan. Plan for patient to follow-up outpatient as intussusception resolved on CT scan, no indication for surgical intervention at this time. Pt reports dissatisfied with this plan. Discussed with patient risks of leaving against medical advise and reported patient able to return to ED with new or worsening symptoms. Pt ultimately decided not to leave and stay overnight to continue discussion in AM.

## 2024-08-08 NOTE — ASSESSMENT & PLAN NOTE
Has been replaced    Results from last 7 days   Lab Units 08/08/24  0430 08/07/24  0616 08/06/24  0509 08/05/24  0445   POTASSIUM mmol/L 4.1 3.8 3.8 3.4*

## 2024-08-08 NOTE — PLAN OF CARE
Problem: PAIN - ADULT  Goal: Verbalizes/displays adequate comfort level or baseline comfort level  Description: Interventions:  - Encourage patient to monitor pain and request assistance  - Assess pain using appropriate pain scale  - Administer analgesics based on type and severity of pain and evaluate response  - Implement non-pharmacological measures as appropriate and evaluate response  - Consider cultural and social influences on pain and pain management  - Notify physician/advanced practitioner if interventions unsuccessful or patient reports new pain  Outcome: Progressing     Problem: INFECTION - ADULT  Goal: Absence or prevention of progression during hospitalization  Description: INTERVENTIONS:  - Assess and monitor for signs and symptoms of infection  - Monitor lab/diagnostic results  - Monitor all insertion sites, i.e. indwelling lines, tubes, and drains  - Monitor endotracheal if appropriate and nasal secretions for changes in amount and color  - Camilla appropriate cooling/warming therapies per order  - Administer medications as ordered  - Instruct and encourage patient and family to use good hand hygiene technique  - Identify and instruct in appropriate isolation precautions for identified infection/condition  Outcome: Progressing  Goal: Absence of fever/infection during neutropenic period  Description: INTERVENTIONS:  - Monitor WBC    Outcome: Progressing     Problem: SAFETY ADULT  Goal: Patient will remain free of falls  Description: INTERVENTIONS:  - Educate patient/family on patient safety including physical limitations  - Instruct patient to call for assistance with activity   - Consult OT/PT to assist with strengthening/mobility   - Keep Call bell within reach  - Keep bed low and locked with side rails adjusted as appropriate  - Keep care items and personal belongings within reach  - Initiate and maintain comfort rounds  - Make Fall Risk Sign visible to staff  - Apply yellow socks and bracelet  for high fall risk patients  - Consider moving patient to room near nurses station  Outcome: Progressing  Goal: Maintain or return to baseline ADL function  Description: INTERVENTIONS:  -  Assess patient's ability to carry out ADLs; assess patient's baseline for ADL function and identify physical deficits which impact ability to perform ADLs (bathing, care of mouth/teeth, toileting, grooming, dressing, etc.)  - Assess/evaluate cause of self-care deficits   - Assess range of motion  - Assess patient's mobility; develop plan if impaired  - Assess patient's need for assistive devices and provide as appropriate  - Encourage maximum independence but intervene and supervise when necessary  - Involve family in performance of ADLs  - Assess for home care needs following discharge   - Consider OT consult to assist with ADL evaluation and planning for discharge  - Provide patient education as appropriate  Outcome: Progressing  Goal: Maintains/Returns to pre admission functional level  Description: INTERVENTIONS:  - Perform AM-PAC 6 Click Basic Mobility/ Daily Activity assessment daily.  - Set and communicate daily mobility goal to care team and patient/family/caregiver.   - Collaborate with rehabilitation services on mobility goals if consulted  - Perform Range of Motion 4 times a day.  - Reposition patient every 2 hours.  - Dangle patient 4 times a day  - Stand patient 4 times a day  - Ambulate patient 4 times a day  - Out of bed to chair 4 times a day   - Out of bed for meals 3 times a day  - Out of bed for toileting  - Record patient progress and toleration of activity level   Outcome: Progressing     Problem: DISCHARGE PLANNING  Goal: Discharge to home or other facility with appropriate resources  Description: INTERVENTIONS:  - Identify barriers to discharge w/patient and caregiver  - Arrange for needed discharge resources and transportation as appropriate  - Identify discharge learning needs (meds, wound care, etc.)  -  Arrange for interpretive services to assist at discharge as needed  - Refer to Case Management Department for coordinating discharge planning if the patient needs post-hospital services based on physician/advanced practitioner order or complex needs related to functional status, cognitive ability, or social support system  Outcome: Progressing     Problem: Knowledge Deficit  Goal: Patient/family/caregiver demonstrates understanding of disease process, treatment plan, medications, and discharge instructions  Description: Complete learning assessment and assess knowledge base.  Interventions:  - Provide teaching at level of understanding  - Provide teaching via preferred learning methods  Outcome: Progressing     Problem: METABOLIC, FLUID AND ELECTROLYTES - ADULT  Goal: Electrolytes maintained within normal limits  Description: INTERVENTIONS:  - Monitor labs and assess patient for signs and symptoms of electrolyte imbalances  - Administer electrolyte replacement as ordered  - Monitor response to electrolyte replacements, including repeat lab results as appropriate  - Instruct patient on fluid and nutrition as appropriate  Outcome: Progressing  Goal: Fluid balance maintained  Description: INTERVENTIONS:  - Monitor labs   - Monitor I/O and WT  - Instruct patient on fluid and nutrition as appropriate  - Assess for signs & symptoms of volume excess or deficit  Outcome: Progressing     Problem: GASTROINTESTINAL - ADULT  Goal: Minimal or absence of nausea and/or vomiting  Description: INTERVENTIONS:  - Administer IV fluids if ordered to ensure adequate hydration  - Maintain NPO status until nausea and vomiting are resolved  - Nasogastric tube if ordered  - Administer ordered antiemetic medications as needed  - Provide nonpharmacologic comfort measures as appropriate  - Advance diet as tolerated, if ordered  - Consider nutrition services referral to assist patient with adequate nutrition and appropriate food  choices  Outcome: Progressing  Goal: Maintains or returns to baseline bowel function  Description: INTERVENTIONS:  - Assess bowel function  - Encourage oral fluids to ensure adequate hydration  - Administer IV fluids if ordered to ensure adequate hydration  - Administer ordered medications as needed  - Encourage mobilization and activity  - Consider nutritional services referral to assist patient with adequate nutrition and appropriate food choices  Outcome: Progressing  Goal: Maintains adequate nutritional intake  Description: INTERVENTIONS:  - Monitor percentage of each meal consumed  - Identify factors contributing to decreased intake, treat as appropriate  - Assist with meals as needed  - Monitor I&O, weight, and lab values if indicated  - Obtain nutrition services referral as needed  Outcome: Progressing

## 2024-08-08 NOTE — QUICK NOTE
Met with patient due to concerns with level of care.  General surgery attending had a lengthy discussion with patient earlier today about the plan moving forward since his CTE scan showed the intussusception had resolved.  When I met with the patient, I asked if he had any complaints.  He acknowledged he was still having some abdominal pain and that he wanted his IV out and wanted to leave to go to another hospital.  I educated him about the risks of leaving including but not limited to strict return precaution if he does leave, increased abdominal pain, and aspiration due to vomiting.  Patient acknowledged and agreed to leave AGAINST MEDICAL ADVICE.     Update: Patient wanted to stay per internal medicine and follow-up with conversation in the morning.

## 2024-08-08 NOTE — PROGRESS NOTES
"Progress Note - Reji Shi 23 y.o. male MRN: [id number]    Unit/Bed#: [place] [address] -01 Encounter: [id number]      Assessment:  23-year-old male with small bowel intussusception.  Discussion with patient yesterday on how intussusception has resolved and there is no abnormality or lead point identified, was detailed and thorough.  It was explicitly stated that there was no surgical intervention at this time and that we will discuss plan with GI as well as primary team for possible capsule endoscopy.    Plan:  -Follow-up outpatient with GI for possible capsule endoscopy  -Possible discharge home today per medicine  -DVT prophylaxis  -Pain and nausea control as needed  -Encourage OOB, ambulation  -Appreciate GI recommendations  -Remainder of care per primary team      Subjective:   Overnight, patient wanted to leave AMA and get a second opinion from a different hospital.  However, patient decided to stay after educated on the risks vs benefits.  This morning patient still reports 10 out of 10 abdominal pain with associated vomiting that was characterized as brown/clear secretion.  Patient denies any fevers or chills, however he still continues to have night sweats.    Objective:     Visit Vitals  /70 (BP Location: Right arm)   Pulse 69   Temp 98.5 °F (36.9 °C) (Oral)   Resp 22   Ht 5' 9\" (1.753 m)   Wt 64 kg (141 lb)   SpO2 96%   BMI 20.82 kg/m²   Smoking Status Former   BSA 1.78 m²          Intake/Output Summary (Last 24 hours) at 8/7/2024 2239  Last data filed at 8/7/2024 1730  Gross per 24 hour   Intake 240 ml   Output --   Net 240 ml     Recent Labs     08/06/24  0509 08/07/24  0616   WBC 5.95 6.38   HGB 14.7 15.3    317   SODIUM 140 138   K 3.8 3.8    104   CO2 29 30   BUN 4* 4*   CREATININE 0.94 1.02   GLUC 76 90   CALCIUM 9.0 9.0   AST 11*  --    ALT 8  --    ALKPHOS 64  --    TBILI 0.85  --         Physical Exam: General appearance: alert and oriented, in no acute distress  Lungs: " clear to auscultation bilaterally  Heart: regular rate and rhythm, S1, S2 normal, no murmur, click, rub or gallop  Abdomen: soft, minimally tender to palpation over central abdomen.  No guarding or rigidity.  No masses or hernia.  Pulses: 2+ and symmetric  Skin: Skin color, texture, turgor normal. No rashes or lesions     Invasive Devices       Peripheral Intravenous Line  Duration             Peripheral IV 08/05/24 Distal;Left;Upper;Ventral (anterior) Arm 2 days                    Lab, Imaging and other studies: I have personally reviewed pertinent reports.    VTE Pharmacologic Prophylaxis: Sequential compression device (Venodyne)   VTE Mechanical Prophylaxis: sequential compression device

## 2024-08-08 NOTE — PROGRESS NOTES
[facility]  Progress Note  Name: Rjei Shi I  MRN: [id number]  Unit/Bed#: [place] [address] -01 I Date of Admission: 8/4/2024   Date of Service: 8/8/2024 I Hospital Day: 2    Assessment & Plan   * Intussusception (HCC)  Assessment & Plan  History of intussusception November status post laparoscopy presented back to the hospital for abdominal pain and weight loss found to have intussusception again.  Initial CT on admission intussusception present.  Due to ongoing pain surgery recommend CT enterography shows resolution of intussusception  Continue IV fluids.    Pain better today, will advance diet and reassess    Hypokalemia  Assessment & Plan  Has been replaced    Results from last 7 days   Lab Units 08/08/24  0430 08/07/24  0616 08/06/24  0509 08/05/24  0445   POTASSIUM mmol/L 4.1 3.8 3.8 3.4*       Unintentional weight loss  Assessment & Plan  Infectious workup negative.  Likely related to abdominal pain and decreased intake over the past couple months.    Inguinal lymphadenopathy  Assessment & Plan  Inguinal lymphadenopathy with weight loss.  Coordinated with surgery and IR.  No IR biopsy-able node.  HIV QuantiFERON hepatitis panel and GC chlamydia all negative.    VTE Pharmacologic Prophylaxis: VTE Score: 0 Low Risk (Score 0-2) - Encourage Ambulation.    Mobility:  Basic Mobility Inpatient Raw Score: 24  JH-HLM Goal: 8: Walk 250 feet or more  JH-HLM Achieved: 8: Walk 250 feet ot more  JH-HLM Goal achieved. Continue to encourage appropriate mobility.    Patient Centered Rounds: I have performed bedside rounds with nursing staff today.  Discussions with Specialists or Other Care Team Provider: GI surgery case management    Education and Discussions with Family / Patient: Updated  (sister) via phone.    Time Spent for Care:   This time was spent on one or more of the following: performing physical exam; counseling and coordination of care; obtaining or reviewing  "history; documenting in the medical record; reviewing/ordering tests, medications or procedures; communicating with other healthcare professionals and discussing with patient's family/caregivers.    Current Length of Stay: 2 day(s)  Current Patient Status: Inpatient   Certification Statement: The patient will continue to require additional inpatient hospital stay due to abdominal pain  Discharge Plan: Anticipate discharge in 24-48 hrs to home.    Code Status: Level 1 - Full Code      Subjective:   Patient seen and examined.  Events last night noted including thoughts of leaving AMA.  Feeling much better on exam this afternoon.    Objective:   Vitals: Blood pressure 111/63, pulse 56, temperature (!) 97.3 °F (36.3 °C), resp. rate 16, height 5' 9\" (1.753 m), weight 64 kg (141 lb), SpO2 98%.    Intake/Output Summary (Last 24 hours) at 8/8/2024 1740  Last data filed at 8/8/2024 0511  Gross per 24 hour   Intake 480 ml   Output 200 ml   Net 280 ml       Physical Exam  Vitals reviewed.   Constitutional:       General: He is not in acute distress.  HENT:      Head: Atraumatic.   Cardiovascular:      Rate and Rhythm: Regular rhythm.      Heart sounds: Normal heart sounds.   Pulmonary:      Effort: Pulmonary effort is normal.      Breath sounds: No wheezing.   Abdominal:      General: Bowel sounds are normal. There is no distension.      Palpations: Abdomen is soft.      Tenderness: There is no abdominal tenderness.   Musculoskeletal:         General: No swelling.   Skin:     General: Skin is warm and dry.   Neurological:      General: No focal deficit present.      Mental Status: He is alert.      Cranial Nerves: No cranial nerve deficit.   Psychiatric:         Mood and Affect: Mood normal.       Additional Data:   Labs:  Results from last 7 days   Lab Units 08/08/24  0430 08/07/24  0616 08/06/24  0509   WBC Thousand/uL 8.68 6.38 5.95   HEMOGLOBIN g/dL 16.0 15.3 14.7   PLATELETS Thousands/uL 354 317 298   MCV fL 92 91 91 "     Results from last 7 days   Lab Units 08/08/24  0430 08/07/24  0616 08/06/24  0509 08/05/24  0445 08/04/24  1737   SODIUM mmol/L 139 138 140 141 140   POTASSIUM mmol/L 4.1 3.8 3.8 3.4* 3.8   CHLORIDE mmol/L 104 104 106 106 107   CO2 mmol/L 28 30 29 31 29   ANION GAP mmol/L 7 4 5 4 4   BUN mg/dL 3* 4* 4* 5 7   CREATININE mg/dL 1.03 1.02 0.94 0.86 1.07   CALCIUM mg/dL 9.4 9.0 9.0 8.9 9.2   ALBUMIN g/dL  --   --  3.7 3.8 4.1   TOTAL BILIRUBIN mg/dL  --   --  0.85 0.57 0.38   ALK PHOS U/L  --   --  64 56 68   ALT U/L  --   --  8 8 9   AST U/L  --   --  11* 12* 14   EGFR ml/min/1.73sq m 103 104 115 123 98   GLUCOSE RANDOM mg/dL 74 90 76 87 94     Results from last 7 days   Lab Units 08/05/24  0445   MAGNESIUM mg/dL 2.0                  Results from last 7 days   Lab Units 08/04/24  1737   LACTIC ACID mmol/L 1.4                 * I Have Reviewed All Lab Data Listed Above.    Recent cultures:   Results from last 7 days   Lab Units 08/05/24  1407   RAPID HIV 1X2  Non-Reactive                 Lines/Drains:  Invasive Devices       Peripheral Intravenous Line  Duration             Peripheral IV 08/05/24 Distal;Left;Upper;Ventral (anterior) Arm 3 days                          Telemetry:      Imaging:  Imaging Reports Reviewed Today Include:   CT small bowel enterography    Result Date: 8/7/2024  Impression: Small bowel intussusception noted on the previous study has resolved. There is passage of contrast into the colon from the previously administered contrast on August 4, 2024 A borderline dilated loop of small bowel seen in the left upper quadrant no bowel wall thickening, pneumatosis or free air, nonspecific. Correlate clinically *  Inflammation: No imaging signs of active inflammation. *  Stricture: None. *  Penetrating complication: None. *  Perianal disease: None. *  Other complications: None. Workstation performed: [id number]     CT abdomen pelvis with contrast    Result Date: 8/5/2024  Impression: Small bowel-small  bowel intussusception in the left upper abdomen without associated suspicious findings. No bowel obstruction. Workstation performed: [id number]       Scheduled Meds:  Current Facility-Administered Medications   Medication Dose Route Frequency Provider Last Rate    acetaminophen  650 mg Oral Q6H PRN SHIRA Cross      aluminum-magnesium hydroxide-simethicone  30 mL Oral Q6H PRN SHIRA Cross      dicyclomine  20 mg Oral 4x Daily (AC & HS) Derrick Welsh DO      famotidine  20 mg Intravenous Q12H LISA Derrick Welsh, DO      metoclopramide  10 mg Intravenous Q6H PRN Derrick Welsh, DO      morphine injection  2 mg Intravenous Q4H PRN Derrick Welsh, DO      ondansetron  4 mg Intravenous Q6H PRN SHIRA Cross      oxyCODONE  10 mg Oral Q4H PRN Derrick Welsh, DO      oxyCODONE  5 mg Oral Q4H PRN Derrick Welsh, DO      polyethylene glycol  17 g Oral Daily PRN SHIRA Cross      simethicone  80 mg Oral 4x Daily PRN SHIRA Cross         Today, Patient Was Seen By: Derrick Welsh DO    ** Please Note: Dictation voice to text software may have been used in the creation of this document. **

## 2024-08-08 NOTE — PLAN OF CARE
Problem: PAIN - ADULT  Goal: Verbalizes/displays adequate comfort level or baseline comfort level  Description: Interventions:  - Encourage patient to monitor pain and request assistance  - Assess pain using appropriate pain scale  - Administer analgesics based on type and severity of pain and evaluate response  - Implement non-pharmacological measures as appropriate and evaluate response  - Consider cultural and social influences on pain and pain management  - Notify physician/advanced practitioner if interventions unsuccessful or patient reports new pain  Outcome: Progressing     Problem: INFECTION - ADULT  Goal: Absence or prevention of progression during hospitalization  Description: INTERVENTIONS:  - Assess and monitor for signs and symptoms of infection  - Monitor lab/diagnostic results  - Monitor all insertion sites, i.e. indwelling lines, tubes, and drains  - Monitor endotracheal if appropriate and nasal secretions for changes in amount and color  - [place] appropriate cooling/warming therapies per order  - Administer medications as ordered  - Instruct and encourage patient and family to use good hand hygiene technique  - Identify and instruct in appropriate isolation precautions for identified infection/condition  Outcome: Progressing  Goal: Absence of fever/infection during neutropenic period  Description: INTERVENTIONS:  - Monitor WBC    Outcome: Progressing     Problem: SAFETY ADULT  Goal: Patient will remain free of falls  Description: INTERVENTIONS:  - Educate patient/family on patient safety including physical limitations  - Instruct patient to call for assistance with activity   - Consult OT/PT to assist with strengthening/mobility   - Keep Call bell within reach  - Keep bed low and locked with side rails adjusted as appropriate  - Keep care items and personal belongings within reach  - Initiate and maintain comfort rounds  - Make Fall Risk Sign visible to staff  - Apply yellow socks and bracelet  for high fall risk patients  - Consider moving patient to room near nurses station  Outcome: Progressing  Goal: Maintain or return to baseline ADL function  Description: INTERVENTIONS:  -  Assess patient's ability to carry out ADLs; assess patient's baseline for ADL function and identify physical deficits which impact ability to perform ADLs (bathing, care of mouth/teeth, toileting, grooming, dressing, etc.)  - Assess/evaluate cause of self-care deficits   - Assess range of motion  - Assess patient's mobility; develop plan if impaired  - Assess patient's need for assistive devices and provide as appropriate  - Encourage maximum independence but intervene and supervise when necessary  - Involve family in performance of ADLs  - Assess for home care needs following discharge   - Consider OT consult to assist with ADL evaluation and planning for discharge  - Provide patient education as appropriate  Outcome: Progressing  Goal: Maintains/Returns to pre admission functional level  Description: INTERVENTIONS:  - Perform AM-PAC 6 Click Basic Mobility/ Daily Activity assessment daily.  - Set and communicate daily mobility goal to care team and patient/family/caregiver.   - Collaborate with rehabilitation services on mobility goals if consulted  - Perform Range of Motion 3 times a day.  - Reposition patient every 2 hours.  - Dangle patient 3 times a day  - Stand patient 3 times a day  - Ambulate patient 3 times a day  - Out of bed to chair 3 times a day   - Out of bed for meals 3 times a day  - Out of bed for toileting  - Record patient progress and toleration of activity level   Outcome: Progressing     Problem: DISCHARGE PLANNING  Goal: Discharge to home or other facility with appropriate resources  Description: INTERVENTIONS:  - Identify barriers to discharge w/patient and caregiver  - Arrange for needed discharge resources and transportation as appropriate  - Identify discharge learning needs (meds, wound care, etc.)  -  Arrange for interpretive services to assist at discharge as needed  - Refer to Case Management Department for coordinating discharge planning if the patient needs post-hospital services based on physician/advanced practitioner order or complex needs related to functional status, cognitive ability, or social support system  Outcome: Progressing     Problem: Knowledge Deficit  Goal: Patient/family/caregiver demonstrates understanding of disease process, treatment plan, medications, and discharge instructions  Description: Complete learning assessment and assess knowledge base.  Interventions:  - Provide teaching at level of understanding  - Provide teaching via preferred learning methods  Outcome: Progressing     Problem: METABOLIC, FLUID AND ELECTROLYTES - ADULT  Goal: Electrolytes maintained within normal limits  Description: INTERVENTIONS:  - Monitor labs and assess patient for signs and symptoms of electrolyte imbalances  - Administer electrolyte replacement as ordered  - Monitor response to electrolyte replacements, including repeat lab results as appropriate  - Instruct patient on fluid and nutrition as appropriate  Outcome: Progressing  Goal: Fluid balance maintained  Description: INTERVENTIONS:  - Monitor labs   - Monitor I/O and WT  - Instruct patient on fluid and nutrition as appropriate  - Assess for signs & symptoms of volume excess or deficit  Outcome: Progressing     Problem: GASTROINTESTINAL - ADULT  Goal: Minimal or absence of nausea and/or vomiting  Description: INTERVENTIONS:  - Administer IV fluids if ordered to ensure adequate hydration  - Maintain NPO status until nausea and vomiting are resolved  - Nasogastric tube if ordered  - Administer ordered antiemetic medications as needed  - Provide nonpharmacologic comfort measures as appropriate  - Advance diet as tolerated, if ordered  - Consider nutrition services referral to assist patient with adequate nutrition and appropriate food  choices  Outcome: Progressing  Goal: Maintains or returns to baseline bowel function  Description: INTERVENTIONS:  - Assess bowel function  - Encourage oral fluids to ensure adequate hydration  - Administer IV fluids if ordered to ensure adequate hydration  - Administer ordered medications as needed  - Encourage mobilization and activity  - Consider nutritional services referral to assist patient with adequate nutrition and appropriate food choices  Outcome: Progressing  Goal: Maintains adequate nutritional intake  Description: INTERVENTIONS:  - Monitor percentage of each meal consumed  - Identify factors contributing to decreased intake, treat as appropriate  - Assist with meals as needed  - Monitor I&O, weight, and lab values if indicated  - Obtain nutrition services referral as needed  Outcome: Progressing

## 2024-08-09 VITALS
HEIGHT: 69 IN | OXYGEN SATURATION: 98 % | RESPIRATION RATE: 18 BRPM | SYSTOLIC BLOOD PRESSURE: 112 MMHG | WEIGHT: 141 LBS | DIASTOLIC BLOOD PRESSURE: 60 MMHG | TEMPERATURE: 98.2 F | BODY MASS INDEX: 20.88 KG/M2 | HEART RATE: 74 BPM

## 2024-08-09 LAB
ALBUMIN SERPL BCG-MCNC: 3.9 G/DL (ref 3.5–5)
ALP SERPL-CCNC: 57 U/L (ref 34–104)
ALT SERPL W P-5'-P-CCNC: 6 U/L (ref 7–52)
ANION GAP SERPL CALCULATED.3IONS-SCNC: 5 MMOL/L (ref 4–13)
AST SERPL W P-5'-P-CCNC: 11 U/L (ref 13–39)
BILIRUB SERPL-MCNC: 0.6 MG/DL (ref 0.2–1)
BUN SERPL-MCNC: 5 MG/DL (ref 5–25)
CALCIUM SERPL-MCNC: 9.1 MG/DL (ref 8.4–10.2)
CHLORIDE SERPL-SCNC: 104 MMOL/L (ref 96–108)
CO2 SERPL-SCNC: 30 MMOL/L (ref 21–32)
CREAT SERPL-MCNC: 1.01 MG/DL (ref 0.6–1.3)
ERYTHROCYTE [DISTWIDTH] IN BLOOD BY AUTOMATED COUNT: 11.9 % (ref 11.6–15.1)
GFR SERPL CREATININE-BSD FRML MDRD: 105 ML/MIN/1.73SQ M
GLUCOSE SERPL-MCNC: 88 MG/DL (ref 65–140)
HCT VFR BLD AUTO: 41.9 % (ref 36.5–49.3)
HGB BLD-MCNC: 14.6 G/DL (ref 12–17)
MCH RBC QN AUTO: 31.1 PG (ref 26.8–34.3)
MCHC RBC AUTO-ENTMCNC: 34.8 G/DL (ref 31.4–37.4)
MCV RBC AUTO: 89 FL (ref 82–98)
PLATELET # BLD AUTO: 327 THOUSANDS/UL (ref 149–390)
PMV BLD AUTO: 9.2 FL (ref 8.9–12.7)
POTASSIUM SERPL-SCNC: 3.7 MMOL/L (ref 3.5–5.3)
PROT SERPL-MCNC: 6.2 G/DL (ref 6.4–8.4)
RBC # BLD AUTO: 4.69 MILLION/UL (ref 3.88–5.62)
SODIUM SERPL-SCNC: 139 MMOL/L (ref 135–147)
WBC # BLD AUTO: 8.41 THOUSAND/UL (ref 4.31–10.16)

## 2024-08-09 PROCEDURE — 99239 HOSP IP/OBS DSCHRG MGMT >30: CPT | Performed by: INTERNAL MEDICINE

## 2024-08-09 PROCEDURE — NC001 PR NO CHARGE: Performed by: SURGERY

## 2024-08-09 PROCEDURE — 80053 COMPREHEN METABOLIC PANEL: CPT | Performed by: INTERNAL MEDICINE

## 2024-08-09 PROCEDURE — 85027 COMPLETE CBC AUTOMATED: CPT | Performed by: INTERNAL MEDICINE

## 2024-08-09 RX ORDER — FAMOTIDINE 20 MG/1
20 TABLET, FILM COATED ORAL DAILY
Qty: 90 TABLET | Refills: 0 | Status: SHIPPED | OUTPATIENT
Start: 2024-08-09

## 2024-08-09 RX ORDER — OXYCODONE HYDROCHLORIDE 5 MG/1
5 TABLET ORAL EVERY 8 HOURS PRN
Qty: 15 TABLET | Refills: 0 | Status: SHIPPED | OUTPATIENT
Start: 2024-08-09 | End: 2024-08-19

## 2024-08-09 RX ORDER — DICYCLOMINE HCL 20 MG
TABLET ORAL
Qty: 60 TABLET | Refills: 0 | Status: SHIPPED | OUTPATIENT
Start: 2024-08-09 | End: 2027-05-08

## 2024-08-09 RX ORDER — ONDANSETRON 4 MG/1
4 TABLET, FILM COATED ORAL EVERY 8 HOURS PRN
Qty: 20 TABLET | Refills: 0 | Status: SHIPPED | OUTPATIENT
Start: 2024-08-09

## 2024-08-09 RX ADMIN — DICYCLOMINE HYDROCHLORIDE 20 MG: 20 TABLET ORAL at 08:16

## 2024-08-09 RX ADMIN — FAMOTIDINE 20 MG: 10 INJECTION INTRAVENOUS at 08:16

## 2024-08-09 NOTE — CASE MANAGEMENT
Case Management Discharge Planning Note    Patient name Reji Shi  Location [facility] M* MRN [id number]  : 2000 Date 2024       Current Admission Date: 2024  Current Admission Diagnosis:Intussusception (HCC)   Patient Active Problem List    Diagnosis Date Noted Date Diagnosed    Rectal bleeding 2024     Other constipation 2024     Inguinal lymphadenopathy 2024     Unintentional weight loss 2024     Hypokalemia 2024     Marijuana smoker, episodic 2023     Intussusception (HCC) 2023     Palpitations 2021     Depression 2021       LOS (days): 3  Geometric Mean LOS (GMLOS) (days):   Days to GMLOS:     OBJECTIVE:  Risk of Unplanned Readmission Score: 10.07         Current admission status: Inpatient   Preferred Pharmacy:   [id number] - [place] - [address] [place] [address]  [address]  [address] [address]  Phone: [phone] Fax: [phone]    Primary Care Provider: SHIRA Price    Primary Insurance: [place] [employer]  Secondary Insurance:     DISCHARGE DETAILS:    Treatment Team Recommendation: Home  Discharge Destination Plan:: Home  Transport at Discharge : Ride Share     Number/Name of Dispatcher: Hai [phone]  Transported by (Company and Unit #): [employer], [employer]  ETA of Transport (Date): 24  ETA of Transport (Time): 1130     Transfer Mode: Ambulate  Accompanied by: Alone          Additional Comments: CM received message from bedside nurse and attending, patient set for discharge. Patient requesting transport. CM confirmed with patient address for transport. CM set [employer] ([employer])  for 1130A.M. CM to follow for further dishcharge planning needs.       Dary Pillai,

## 2024-08-09 NOTE — DISCHARGE SUMMARY
[facility]  Discharge- Reji Shi 2000, 23 y.o. male MRN: [id number]  Unit/Bed#: [place] [address] -01 Encounter: [id number]  Primary Care Provider: SHIRA Price   Date and time admitted to hospital: 8/4/2024  5:09 PM    Discharge Diagnoses:  * Intussusception (HCC)  Assessment & Plan  History of intussusception November status post laparoscopy presented back to the hospital for abdominal pain and weight loss found to have intussusception again.  Initial CT on admission intussusception present.  Due to ongoing pain surgery recommend CT enterography shows resolution of intussusception  Symptoms resolved with IV fluids and addition of dicyclomine.  Discharge: Oxycodone 15 tablets, dicyclomine, ondansetron, famotidine    Hypokalemia  Assessment & Plan  Has been replaced    Results from last 7 days   Lab Units 08/09/24  0441 08/08/24  0430 08/07/24  0616 08/06/24  0509   POTASSIUM mmol/L 3.7 4.1 3.8 3.8       Unintentional weight loss  Assessment & Plan  Infectious workup negative.  Likely related to abdominal pain and decreased intake over the past couple months.    Inguinal lymphadenopathy  Assessment & Plan  Inguinal lymphadenopathy with weight loss.  Coordinated with surgery and IR.  No IR biopsy-able node.  HIV QuantiFERON hepatitis panel and GC chlamydia all negative.      Medical Problems       Resolved Problems  Date Reviewed: 8/9/2024   None        Discharging Physician / Practitioner: Derrick Welsh DO  PCP: SHIRA Price  Admission Date:   Admission Orders (From admission, onward)       Ordered        08/06/24 1716  INPATIENT ADMISSION  Once            08/05/24 0309  Place in Observation  Once                        Discharge Date: 08/09/24    Consultations During Hospital Stay:  IP CONSULT TO ACUTE CARE SURGERY  IP CONSULT TO GASTROENTEROLOGY  INPATIENT CONSULT TO IR     Procedures Performed:   None    Images:   CT small bowel enterography    Result Date:  8/7/2024  Impression: Small bowel intussusception noted on the previous study has resolved. There is passage of contrast into the colon from the previously administered contrast on August 4, 2024 A borderline dilated loop of small bowel seen in the left upper quadrant no bowel wall thickening, pneumatosis or free air, nonspecific. Correlate clinically *  Inflammation: No imaging signs of active inflammation. *  Stricture: None. *  Penetrating complication: None. *  Perianal disease: None. *  Other complications: None. Workstation performed: [id number]     CT abdomen pelvis with contrast    Result Date: 8/5/2024  Impression: Small bowel-small bowel intussusception in the left upper abdomen without associated suspicious findings. No bowel obstruction. Workstation performed: [id number]       Lab Results:   Results from last 7 days   Lab Units 08/09/24  0441 08/08/24  0430 08/07/24  0616 08/06/24  0509 08/05/24  0441 08/04/24  1737   WBC Thousand/uL 8.41 8.68 6.38 5.95 7.32 10.80*   HEMOGLOBIN g/dL 14.6 16.0 15.3 14.7 14.5 15.5   HEMATOCRIT % 41.9 46.3 43.3 43.4 41.8 44.6   MCV fL 89 92 91 91 93 90   PLATELETS Thousands/uL 327 354 317 298 281 289     Results from last 7 days   Lab Units 08/09/24  0441 08/08/24  0430 08/07/24  0616 08/06/24  0509 08/05/24  0445 08/04/24  1737   SODIUM mmol/L 139 139 138 140 141 140   POTASSIUM mmol/L 3.7 4.1 3.8 3.8 3.4* 3.8   CHLORIDE mmol/L 104 104 104 106 106 107   CO2 mmol/L 30 28 30 29 31 29   BUN mg/dL 5 3* 4* 4* 5 7   CREATININE mg/dL 1.01 1.03 1.02 0.94 0.86 1.07   CALCIUM mg/dL 9.1 9.4 9.0 9.0 8.9 9.2   ALBUMIN g/dL 3.9  --   --  3.7 3.8 4.1   TOTAL BILIRUBIN mg/dL 0.60  --   --  0.85 0.57 0.38   ALK PHOS U/L 57  --   --  64 56 68   ALT U/L 6*  --   --  8 8 9   AST U/L 11*  --   --  11* 12* 14   EGFR ml/min/1.73sq m 105 103 104 115 123 98   GLUCOSE RANDOM mg/dL 88 74 90 76 87 94           Results from last 7 days   Lab Units 08/04/24  1737   LACTIC ACID mmol/L 1.4           "  Results from last 7 days   Lab Units 08/05/24  1407   RAPID HIV 1X2  Non-Reactive         Incidental Findings:     Test Results Pending at Discharge (will require follow up):      Reason for Admission:   Abdominal Pain (Pt reports had abd surgery in nov for this same problem but now its even worse. Pt reports lower abd pain denies n/v/d )    Hospital Course:   Reji Shi is a 23 y.o. male patient who originally presented to the hospital on 8/4/2024 due to abdominal pain.  He did have abdominal surgery in November.  During his hospitalization he was found to have intussusception.  N.p.o. and eventually after seen by surgery and GI his intussusception resolved.  His diet was advanced and he did not have any further abdominal pain with the addition of dicyclomine famotidine and as needed analgesics.    Patient did have weight loss which may have been from his chronic abdominal pain.  Infectious workup is negative thus far.  There was question of a inguinal lymph node however it was not biopsy-able at this time.    Please see above list of diagnoses and related plan for additional information.     Condition at Discharge: stable     Discharge Day Visit / Exam:   Subjective: Patient seen and examined.  No further abdominal pain in the    Vitals: Blood Pressure: 112/60 (08/09/24 0817)  Pulse: 74 (08/09/24 0817)  Temperature: 98.2 °F (36.8 °C) (08/09/24 0817)  Temp Source: Oral (08/08/24 2034)  Respirations: 18 (08/08/24 2034)  Height: 5' 9\" (175.3 cm) (08/05/24 0345)  Weight - Scale: 64 kg (141 lb) (08/05/24 0345)  SpO2: 98 % (08/09/24 0817)    Exam:   Physical Exam  Vitals reviewed.   Constitutional:       General: He is not in acute distress.  HENT:      Head: Atraumatic.   Eyes:      Extraocular Movements: Extraocular movements intact.   Cardiovascular:      Rate and Rhythm: Regular rhythm.      Heart sounds: Normal heart sounds.   Pulmonary:      Effort: Pulmonary effort is normal.      Breath sounds: No " wheezing.   Abdominal:      General: Bowel sounds are normal.      Palpations: Abdomen is soft.      Tenderness: There is no abdominal tenderness.   Musculoskeletal:         General: No swelling.   Skin:     General: Skin is warm and dry.   Neurological:      General: No focal deficit present.      Mental Status: He is alert.   Psychiatric:         Mood and Affect: Mood normal.       Discussion with Family: Sister on telephone during hospitalization    Discharge instructions/Information to patient and family:   See after visit summary for information provided to patient and family.      Provisions for Follow-Up Care:  See after visit summary for information related to follow-up care and any pertinent home health orders.      Mobility at time of Discharge:  Basic Mobility Inpatient Raw Score: 24  JH-HLM Goal: 8: Walk 250 feet or more  JH-HLM Achieved: 8: Walk 250 feet ot more  JH-HLM Goal achieved. Continue to encourage appropriate mobility.    Disposition:   Home    Planned Readmission: No     Discharge Statement:  I spent 35 minutes discharging the patient. This time was spent on the day of discharge. I had direct contact with the patient on the day of discharge. Greater than 50% of the total time was spent examining patient, answering all patient questions, arranging and discussing plan of care with patient as well as directly providing post-discharge instructions.  Additional time then spent on discharge activities.    Discharge Medications:  See after visit summary for reconciled discharge medications provided to patient and family.      ** Please Note: This note has been constructed using a voice recognition system **

## 2024-08-09 NOTE — PROGRESS NOTES
"Progress Note - General Surgery   Reji Shi 23 y.o. male MRN: [id number]  Unit/Bed#: [place] [address] -01 Encounter: [id number]    Assessment:  23-year-old male initially presenting with small bowel intussusception, radiographic resolution, clinical improvement    Plan:  -DC home per primary  -No acute surgical intervention  -Analgesia and antiemetics  -May follow-up with primary care physician  -No need for surgical follow-up  -Recommended diary of symptoms should they return  -Care per primary  -General Surgery signing off at this time, please epic secure chat with any further questions or concerns.    Subjective/Objective     Subjective: No acute events overnight.  Patient feels like he is back to baseline.  Denies any nausea vomiting.  Has been tolerating regular diet reports having bowel movements and flatus.  He reports he would like to go home    Objective: AVSS on room air    Blood pressure 113/62, pulse 63, temperature 98.7 °F (37.1 °C), temperature source Oral, resp. rate 18, height 5' 9\" (1.753 m), weight 64 kg (141 lb), SpO2 98%.,Body mass index is 20.82 kg/m².    I/O: Unmeasured    Invasive Devices       Peripheral Intravenous Line  Duration             Peripheral IV 08/05/24 Distal;Left;Upper;Ventral (anterior) Arm 4 days                    Physical Exam:  General: No acute distress, alert and oriented  CV: RRR  Lungs: Normal work of breathing   Abdomen: Soft, nontender, nondistended  Skin: Warm, dry    Lab, Imaging and other studies:  Recent Labs     08/08/24  0430 08/09/24  0441   WBC 8.68 8.41   HGB 16.0 14.6    327   SODIUM 139 139   K 4.1 3.7    104   CO2 28 30   BUN 3* 5   CREATININE 1.03 1.01   GLUC 74 88   CALCIUM 9.4 9.1   AST  --  11*   ALT  --  6*   ALKPHOS  --  57   TBILI  --  0.60     "

## 2024-08-09 NOTE — PLAN OF CARE
Problem: PAIN - ADULT  Goal: Verbalizes/displays adequate comfort level or baseline comfort level  Description: Interventions:  - Encourage patient to monitor pain and request assistance  - Assess pain using appropriate pain scale  - Administer analgesics based on type and severity of pain and evaluate response  - Implement non-pharmacological measures as appropriate and evaluate response  - Consider cultural and social influences on pain and pain management  - Notify physician/advanced practitioner if interventions unsuccessful or patient reports new pain  Outcome: Progressing     Problem: INFECTION - ADULT  Goal: Absence or prevention of progression during hospitalization  Description: INTERVENTIONS:  - Assess and monitor for signs and symptoms of infection  - Monitor lab/diagnostic results  - Monitor all insertion sites, i.e. indwelling lines, tubes, and drains  - Monitor endotracheal if appropriate and nasal secretions for changes in amount and color  - [place] appropriate cooling/warming therapies per order  - Administer medications as ordered  - Instruct and encourage patient and family to use good hand hygiene technique  - Identify and instruct in appropriate isolation precautions for identified infection/condition  Outcome: Progressing  Goal: Absence of fever/infection during neutropenic period  Description: INTERVENTIONS:  - Monitor WBC    Outcome: Progressing     Problem: SAFETY ADULT  Goal: Patient will remain free of falls  Description: INTERVENTIONS:  - Educate patient/family on patient safety including physical limitations  - Instruct patient to call for assistance with activity   - Consult OT/PT to assist with strengthening/mobility   - Keep Call bell within reach  - Keep bed low and locked with side rails adjusted as appropriate  - Keep care items and personal belongings within reach  - Initiate and maintain comfort rounds  - Make Fall Risk Sign visible to staff  - Offer Toileting every  Hours,  in advance of need  - Initiate/Maintain alarm  - Obtain necessary fall risk management equipment:   - Apply yellow socks and bracelet for high fall risk patients  - Consider moving patient to room near nurses station  Outcome: Progressing  Goal: Maintain or return to baseline ADL function  Description: INTERVENTIONS:  -  Assess patient's ability to carry out ADLs; assess patient's baseline for ADL function and identify physical deficits which impact ability to perform ADLs (bathing, care of mouth/teeth, toileting, grooming, dressing, etc.)  - Assess/evaluate cause of self-care deficits   - Assess range of motion  - Assess patient's mobility; develop plan if impaired  - Assess patient's need for assistive devices and provide as appropriate  - Encourage maximum independence but intervene and supervise when necessary  - Involve family in performance of ADLs  - Assess for home care needs following discharge   - Consider OT consult to assist with ADL evaluation and planning for discharge  - Provide patient education as appropriate  Outcome: Progressing  Goal: Maintains/Returns to pre admission functional level  Description: INTERVENTIONS:  - Perform AM-PAC 6 Click Basic Mobility/ Daily Activity assessment daily.  - Set and communicate daily mobility goal to care team and patient/family/caregiver.   - Collaborate with rehabilitation services on mobility goals if consulted  - Perform Range of Motion  times a day.  - Reposition patient every  hours.  - Dangle patient  times a day  - Stand patient  times a day  - Ambulate patient  times a day  - Out of bed to chair  times a day   - Out of bed for meals  times a day  - Out of bed for toileting  - Record patient progress and toleration of activity level   Outcome: Progressing     Problem: DISCHARGE PLANNING  Goal: Discharge to home or other facility with appropriate resources  Description: INTERVENTIONS:  - Identify barriers to discharge w/patient and caregiver  - Arrange for  needed discharge resources and transportation as appropriate  - Identify discharge learning needs (meds, wound care, etc.)  - Arrange for interpretive services to assist at discharge as needed  - Refer to Case Management Department for coordinating discharge planning if the patient needs post-hospital services based on physician/advanced practitioner order or complex needs related to functional status, cognitive ability, or social support system  Outcome: Progressing     Problem: Knowledge Deficit  Goal: Patient/family/caregiver demonstrates understanding of disease process, treatment plan, medications, and discharge instructions  Description: Complete learning assessment and assess knowledge base.  Interventions:  - Provide teaching at level of understanding  - Provide teaching via preferred learning methods  Outcome: Progressing     Problem: METABOLIC, FLUID AND ELECTROLYTES - ADULT  Goal: Electrolytes maintained within normal limits  Description: INTERVENTIONS:  - Monitor labs and assess patient for signs and symptoms of electrolyte imbalances  - Administer electrolyte replacement as ordered  - Monitor response to electrolyte replacements, including repeat lab results as appropriate  - Instruct patient on fluid and nutrition as appropriate  Outcome: Progressing  Goal: Fluid balance maintained  Description: INTERVENTIONS:  - Monitor labs   - Monitor I/O and WT  - Instruct patient on fluid and nutrition as appropriate  - Assess for signs & symptoms of volume excess or deficit  Outcome: Progressing     Problem: GASTROINTESTINAL - ADULT  Goal: Minimal or absence of nausea and/or vomiting  Description: INTERVENTIONS:  - Administer IV fluids if ordered to ensure adequate hydration  - Maintain NPO status until nausea and vomiting are resolved  - Nasogastric tube if ordered  - Administer ordered antiemetic medications as needed  - Provide nonpharmacologic comfort measures as appropriate  - Advance diet as tolerated, if  ordered  - Consider nutrition services referral to assist patient with adequate nutrition and appropriate food choices  Outcome: Progressing  Goal: Maintains or returns to baseline bowel function  Description: INTERVENTIONS:  - Assess bowel function  - Encourage oral fluids to ensure adequate hydration  - Administer IV fluids if ordered to ensure adequate hydration  - Administer ordered medications as needed  - Encourage mobilization and activity  - Consider nutritional services referral to assist patient with adequate nutrition and appropriate food choices  Outcome: Progressing  Goal: Maintains adequate nutritional intake  Description: INTERVENTIONS:  - Monitor percentage of each meal consumed  - Identify factors contributing to decreased intake, treat as appropriate  - Assist with meals as needed  - Monitor I&O, weight, and lab values if indicated  - Obtain nutrition services referral as needed  Outcome: Progressing

## 2024-08-09 NOTE — NURSING NOTE
Reviewed discharge instructions and follow up appointments with patient. Verbalized understanding. No questions.

## 2024-08-09 NOTE — PLAN OF CARE
Problem: PAIN - ADULT  Goal: Verbalizes/displays adequate comfort level or baseline comfort level  Description: Interventions:  - Encourage patient to monitor pain and request assistance  - Assess pain using appropriate pain scale  - Administer analgesics based on type and severity of pain and evaluate response  - Implement non-pharmacological measures as appropriate and evaluate response  - Consider cultural and social influences on pain and pain management  - Notify physician/advanced practitioner if interventions unsuccessful or patient reports new pain  Outcome: Adequate for Discharge     Problem: INFECTION - ADULT  Goal: Absence or prevention of progression during hospitalization  Description: INTERVENTIONS:  - Assess and monitor for signs and symptoms of infection  - Monitor lab/diagnostic results  - Monitor all insertion sites, i.e. indwelling lines, tubes, and drains  - Monitor endotracheal if appropriate and nasal secretions for changes in amount and color  - [place] appropriate cooling/warming therapies per order  - Administer medications as ordered  - Instruct and encourage patient and family to use good hand hygiene technique  - Identify and instruct in appropriate isolation precautions for identified infection/condition  Outcome: Adequate for Discharge  Goal: Absence of fever/infection during neutropenic period  Description: INTERVENTIONS:  - Monitor WBC    Outcome: Adequate for Discharge     Problem: SAFETY ADULT  Goal: Patient will remain free of falls  Description: INTERVENTIONS:  - Educate patient/family on patient safety including physical limitations  - Instruct patient to call for assistance with activity   - Consult OT/PT to assist with strengthening/mobility   - Keep Call bell within reach  - Keep bed low and locked with side rails adjusted as appropriate  - Keep care items and personal belongings within reach  - Initiate and maintain comfort rounds  - Apply yellow socks and bracelet for  high fall risk patients  - Consider moving patient to room near nurses station  Outcome: Adequate for Discharge  Goal: Maintain or return to baseline ADL function  Description: INTERVENTIONS:  -  Assess patient's ability to carry out ADLs; assess patient's baseline for ADL function and identify physical deficits which impact ability to perform ADLs (bathing, care of mouth/teeth, toileting, grooming, dressing, etc.)  - Assess/evaluate cause of self-care deficits   - Assess range of motion  - Assess patient's mobility; develop plan if impaired  - Assess patient's need for assistive devices and provide as appropriate  - Encourage maximum independence but intervene and supervise when necessary  - Involve family in performance of ADLs  - Assess for home care needs following discharge   - Consider OT consult to assist with ADL evaluation and planning for discharge  - Provide patient education as appropriate  Outcome: Adequate for Discharge  Goal: Maintains/Returns to pre admission functional level  Description: INTERVENTIONS:  - Perform AM-PAC 6 Click Basic Mobility/ Daily Activity assessment daily.  - Set and communicate daily mobility goal to care team and patient/family/caregiver.   - Collaborate with rehabilitation services on mobility goals if consulted  - Ambulate patient   - Out of bed to chair   - Out of bed for meals   - Out of bed for toileting  - Record patient progress and toleration of activity level   Outcome: Adequate for Discharge     Problem: DISCHARGE PLANNING  Goal: Discharge to home or other facility with appropriate resources  Description: INTERVENTIONS:  - Identify barriers to discharge w/patient and caregiver  - Arrange for needed discharge resources and transportation as appropriate  - Identify discharge learning needs (meds, wound care, etc.)  - Arrange for interpretive services to assist at discharge as needed  - Refer to Case Management Department for coordinating discharge planning if the  patient needs post-hospital services based on physician/advanced practitioner order or complex needs related to functional status, cognitive ability, or social support system  Outcome: Adequate for Discharge     Problem: Knowledge Deficit  Goal: Patient/family/caregiver demonstrates understanding of disease process, treatment plan, medications, and discharge instructions  Description: Complete learning assessment and assess knowledge base.  Interventions:  - Provide teaching at level of understanding  - Provide teaching via preferred learning methods  Outcome: Adequate for Discharge     Problem: METABOLIC, FLUID AND ELECTROLYTES - ADULT  Goal: Electrolytes maintained within normal limits  Description: INTERVENTIONS:  - Monitor labs and assess patient for signs and symptoms of electrolyte imbalances  - Administer electrolyte replacement as ordered  - Monitor response to electrolyte replacements, including repeat lab results as appropriate  - Instruct patient on fluid and nutrition as appropriate  Outcome: Adequate for Discharge  Goal: Fluid balance maintained  Description: INTERVENTIONS:  - Monitor labs   - Monitor I/O and WT  - Instruct patient on fluid and nutrition as appropriate  - Assess for signs & symptoms of volume excess or deficit  Outcome: Adequate for Discharge     Problem: GASTROINTESTINAL - ADULT  Goal: Minimal or absence of nausea and/or vomiting  Description: INTERVENTIONS:  - Administer IV fluids if ordered to ensure adequate hydration  - Maintain NPO status until nausea and vomiting are resolved  - Nasogastric tube if ordered  - Administer ordered antiemetic medications as needed  - Provide nonpharmacologic comfort measures as appropriate  - Advance diet as tolerated, if ordered  - Consider nutrition services referral to assist patient with adequate nutrition and appropriate food choices  Outcome: Adequate for Discharge  Goal: Maintains or returns to baseline bowel function  Description:  INTERVENTIONS:  - Assess bowel function  - Encourage oral fluids to ensure adequate hydration  - Administer IV fluids if ordered to ensure adequate hydration  - Administer ordered medications as needed  - Encourage mobilization and activity  - Consider nutritional services referral to assist patient with adequate nutrition and appropriate food choices  Outcome: Adequate for Discharge  Goal: Maintains adequate nutritional intake  Description: INTERVENTIONS:  - Monitor percentage of each meal consumed  - Identify factors contributing to decreased intake, treat as appropriate  - Assist with meals as needed  - Monitor I&O, weight, and lab values if indicated  - Obtain nutrition services referral as needed  Outcome: Adequate for Discharge

## 2024-08-09 NOTE — ASSESSMENT & PLAN NOTE
Has been replaced    Results from last 7 days   Lab Units 08/09/24  0441 08/08/24  0430 08/07/24  0616 08/06/24  0509   POTASSIUM mmol/L 3.7 4.1 3.8 3.8

## 2024-08-09 NOTE — ASSESSMENT & PLAN NOTE
History of intussusception November status post laparoscopy presented back to the hospital for abdominal pain and weight loss found to have intussusception again.  Initial CT on admission intussusception present.  Due to ongoing pain surgery recommend CT enterography shows resolution of intussusception  Symptoms resolved with IV fluids and addition of dicyclomine.  Discharge: Oxycodone 15 tablets, dicyclomine, ondansetron, famotidine

## 2024-08-16 NOTE — UTILIZATION REVIEW
URGENT/EMERGENT  INPATIENT/SPU AUTHORIZATION REQUEST    Date: 08/16/24            # Pages in this Request:     X New Request   Additional Information for PA#:     Office Contact Name:  Shanice Wing Title: Utilization Review, Registed Nurse     Phone: [phone]  Ext.     Availability (Date/Time): M-F 8-4    Type of Review:    Inpatient Review    Current    For Late Pick-up Cases:  How your facility was first notified of the Late Pick-up:   When your facility was first notified of the Late Pick-up (date):     Was the recipient a prisoner at the time of admission? no           RECIPIENT INFORMATION    Recipient ID#: [id number]  Recipient Name: Reji Shi      YOB: 2000  24 y.o. Recipient Alias:     Gender:  X Male  Female Medicaid Eligibility (HCB Package):          INSURANCE INFORMATION    (All other private or governmental health insurance benefits must be utilized prior to billing the [place] Program)    Was this admission the result of an MVA, other accident, assault, injury, fall, gunshot, bite etc.?  no   If yes, provide a brief description of the incident.      Does the recipient have other insurance coverage?  no  Insurance Company Name:    Policy #:  Did that insurance pay on this claim?    Yes  No     Did that insurance deny this claim?   Yes  No     If yes, reason for denial:      Does the recipient have Medicare?  no  Did Medicare exhaust prior to this admission?    Yes  No     Did Medicare partially pay this claim?   Yes  No     Did Medicare deny this claim?   Yes  No   If yes, reason for denial:      Was the recipient a prisoner at the time of admission?  no        PROVIDER INFORMATION    Hospital Name: [facility]   [facility] Provider ID#: [id number]     Admitting Physician: [facility]                          [employer] Provider ID#: [id number]        ADMISSION INFORMATION    Type of Admission: (please choose one)  ED    Admission Floor or Unit  "Type:MED-SURG      Dates/Times:        ED Date/Time: 8/4/2024  5:03 PM        Observation Date/Time:  8/5/2024  0309        IP Admission Date/Time: 8/6/24  5:16 PM        Discharge or Transfer Date/Time: 8/9/2024 11:23 AM        DIAGNOSIS/PROCEDURE CODES    Primary Diagnosis Code/Primary Diagnosis Code description:   Intussusception (HCC) [K56.1]  Inguinal lymphadenopathy [R59.0]  Left sided abdominal pain [R10.9]  E87.6 - Hypokalemia   Additional Diagnosis Code(s) and Description(s)-(up to 3 additional codes):      Procedure Code (1) and description:none        CLINICAL INFORMATION - PRIOR ADMISSION ONLY    Is there a prior admission with a discharge date within 30 days of the date of this admission?    x No (Proceed to the next section - \"Clinical Information - General Review Checklist\")      Yes (Provide the following information)     Prior admission dates:    MA Prior Authorization Number:        Review Outcome:     Diagnosis Code(s)/Description:    Procedure Code/Description:    Findings:    Treatment:    Condition on Discharge:   Vitals:    Labs:   Imaging:   Medications:    Follow-up Instructions:    Disposition:        CLINICAL INFORMATION - GENERAL REVIEW CHECKLIST    EMERGENCY DEPARTMENT: (Proceed to \"ADMISSION\" if Direct Admission)    Presenting Signs/Symptoms:  Chief Complaint   Patient presents with    Abdominal Pain     Pt reports had abd surgery in nov for this same problem but now its even worse. Pt reports lower abd pain denies n/v/d      Initial Presentation: 23 y.o. male presents to the ED from home with c/o intractable LLQ, sharp, nonradiating, stabbing abd pain.  + flatus, appetite, no N/V, 15 lb weight loss x 1 month. PMH: intussusception s/p dx lap 11/23, rectal bleeding, constipation.  In the ED VS stable, pain rated 9/10.  Labs - elevated WBC, low K.  Imaging - small bowel intussusception in the left upper abdomen without associated suspicious findings. No bowel obstruction. Treated with IV " Toradol x2, IV fluids.  On exam underweight, abd tenderness.  Admitted to OBSERVATION status with Intussusception, incidental finding inguinal lymphadenopathy increased in size- NPO, IV fluids, PRN analgesia, antiemetics, trend lytes, GI consult, surgery consult, poss OR, Miralax.       Anticipated Length of Stay/Certification Statement: Patient will be admitted on an Observation basis with an anticipated length of stay of  < 2 midnights.   Justification for Hospital Stay:  intractable abdominal pain history of intussusception      8/5 Surgery Consult - small bowel intussusception - n.p.o., IV fluids, and trend abdominal exams, analgesia, antiemetics, no acute surgical intervention.      8/5 GI Consult - freq abd pain d/t small bowel intussusception s/p dx lap.  If resolves can have further work up with CT or MR enterography to look for intraluminal lead point.  If persists will need surgical intervention w/ consideration of resection of the affected jejunal segment.      8/5 IR Consult - lymphadenopathy, weight loss, bowel problems, intussusception with small L groin nodes appears globular, get infectious work up (HIV, STDs), recover from current illness and if negative work up will re-eval for core biopsy L groin node. Consider CT chest.       Date: 8/6   CHANGED TO INPATIENT STATUS:   Can advance diet, infectious workup, IV fluids, analgesia.  On exam significant abd pain localized LUQ and LLQ rated 9/10 - using IV Morphine and IV Toradol, night time sweats, no N/V. Will have CT enterography on 8/7.  K WNL, WBC WNL.       Certification Statement: The patient, admitted on an observation basis, will now require > 2 midnight hospital stay due to ongoing abdominal pain      Date:  8/7  Day 2 IP:   CT enterography today.  May need OR.  Make NPO p MN, IV fluids, PRN analgesia, OOB.  On exam diffuse abd pain pt rated 8/10- using IV Morphine and IV Toradol, no N/V, + flatus, no BM, soft, diffusely tender to palpation, no  masses.  No guarding or rigidity.  Afebrile.   Medication/treatment prior to arrival in the ED:    Past Medical History:  has a past medical history of No known health problems.     Clinical Exam:    Initial Vital Signs: (Temp, Pulse, Resp, and BP)   ED Triage Vitals   Temperature Pulse Respirations Blood Pressure SpO2   08/04/24 1708 08/04/24 1708 08/04/24 1708 08/04/24 1708 08/04/24 1708   98.4 °F (36.9 °C) 96 14 117/64 97 %      Temp Source Heart Rate Source Patient Position - Orthostatic VS BP Location FiO2 (%)   08/05/24 0345 08/04/24 1822 08/04/24 1822 08/04/24 1822 --   Oral Monitor Sitting Right arm       Pain Score       08/04/24 1742       9           Pertinent Repeat Vital Signs: (include times they were obtained)  08/07/24 08:00:56 97.5 °F (36.4 °C) 57 15 126/65 85 97 % -- -- -- --      08/06/24 2156 -- -- -- -- -- -- -- -- -- 7     08/06/24 21:01:05 98.9 °F (37.2 °C) 64 20 119/64 82 96 % -- -- -- --     08/06/24 2000 -- -- -- -- -- -- None (Room air) -- 15 7     08/06/24 1600 -- -- -- -- -- -- -- -- -- 10 - Worst Possible Pain     08/06/24 15:25:28 -- 70 16 120/66 84 97 % -- -- -- --     08/06/24 15:24:26 95.3 °F (35.2 °C) 58 20 -- -- 97 % -- -- -- --     08/06/24 1500 -- -- -- -- -- -- -- -- -- 10 - Worst Possible Pain     08/06/24 1011 -- -- -- -- -- -- -- -- -- 8     08/06/24 0816 -- -- -- -- -- -- -- -- 15 --     08/06/24 0812 -- -- -- -- -- -- -- -- -- 8 08/06/24 07:36:55 97.9 °F (36.6 °C) 74 -- 118/69 85 97 % -- -- -- --     08/05/24 2248 -- -- -- -- -- -- -- -- -- 8 08/05/24 22:08:30 98.5 °F (36.9 °C) 83 22 95/74 81 97 % None (Room air) Lying -- --     08/05/24 2054 -- -- -- -- -- -- -- -- -- 7 08/05/24 2000 -- -- -- -- -- -- None (Room air) -- 15 7     08/05/24 1628 -- -- -- -- -- -- -- -- -- 8 08/05/24 15:43:45 97.5 °F (36.4 °C) 60 20 112/62 79 96 % None (Room air) Lying -- --     08/05/24 1515 -- -- -- -- -- 96 % None (Room air) -- 15 7     08/05/24 0956 -- -- -- -- -- -- -- --  -- 9 08/05/24 0830 -- -- -- -- -- -- None (Room air) -- -- 9 08/05/24 07:37:48 97.7 °F (36.5 °C) 55 20 113/64 80 96 % None (Room air) Lying -- --     08/05/24 0449 -- -- -- -- -- -- -- -- -- 9 08/05/24 03:53:50 -- 61 -- 120/69 86 97 % -- -- -- --     08/05/24 0349 -- -- -- -- -- -- None (Room air) -- -- 9 08/05/24 0345 97.7 °F (36.5 °C) 58 16 126/72 -- 99 % None (Room air) Lying -- --     08/05/24 0343 -- -- -- -- -- -- -- -- -- 9 08/05/24 0009 -- 58 16 141/92 -- 99 % None (Room air) Lying -- 8 08/04/24 2110 -- 77 14 128/60 -- 99 % None (Room air) Standing -- No Pain     08/04/24 1822 -- 59 15 126/73 -- 99 % None (Room air) Sitting -- 8 08/04/24 1742 -- -- -- -- -- -- -- -- -- 9 08/04/24 1708 98.4 °F (36.9 °C) 96 14 117/64 -- 97 % -- -- -- --      Pertinent Sustained Findings: (include times they were obtained)    Weight in Kilograms:   Wt Readings from Last 1 Encounters:   08/05/24 64 kg (141 lb)       Pertinent Labs (results):  Results from last 7 days   Lab Units 08/07/24  0616 08/06/24  0509 08/05/24  0441 08/04/24  1737   WBC Thousand/uL 6.38 5.95 7.32 10.80*   HEMOGLOBIN g/dL 15.3 14.7 14.5 15.5   HEMATOCRIT % 43.3 43.4 41.8 44.6   PLATELETS Thousands/uL 317 298 281 289   TOTAL NEUT ABS Thousands/µL  --   --  3.43 8.01*                  Results from last 7 days   Lab Units 08/07/24  0616 08/06/24  0509 08/05/24  0445 08/04/24  1737   SODIUM mmol/L 138 140 141 140   POTASSIUM mmol/L 3.8 3.8 3.4* 3.8   CHLORIDE mmol/L 104 106 106 107   CO2 mmol/L 30 29 31 29   ANION GAP mmol/L 4 5 4 4   BUN mg/dL 4* 4* 5 7   CREATININE mg/dL 1.02 0.94 0.86 1.07   EGFR ml/min/1.73sq m 104 115 123 98   CALCIUM mg/dL 9.0 9.0 8.9 9.2   MAGNESIUM mg/dL  --   --  2.0  --              Results from last 7 days   Lab Units 08/06/24  0509 08/05/24  0445 08/04/24  1737   AST U/L 11* 12* 14   ALT U/L 8 8 9   ALK PHOS U/L 64 56 68   TOTAL PROTEIN g/dL 6.2* 6.0* 6.4   ALBUMIN g/dL 3.7 3.8 4.1   TOTAL BILIRUBIN  "mg/dL 0.85 0.57 0.38                  Results from last 7 days   Lab Units 08/07/24  0616 08/06/24  0509 08/05/24  0445 08/04/24  1737   GLUCOSE RANDOM mg/dL 90 76 87 94           Results from last 7 days   Lab Units 08/04/24  1737   LACTIC ACID mmol/L 1.4           Results from last 7 days   Lab Units 08/05/24  1407   HEP B S AG   Non-reactive   HEP C AB   Non-reactive   HEP B C IGM   Non-reactive           Results from last 7 days   Lab Units 08/04/24  1737   LIPASE u/L 16       Radiology (results):  CT abdomen pelvis with contrast   Final Interpretation by Hari Mandel MD (08/05 0651)       Small bowel-small bowel intussusception in the left upper abdomen without associated suspicious findings. No bowel obstruction.       CT small bowel enterography    (Results Pending)         EKG (results):     Other tests (results):    Tests pending final results:    Treatment in the ED:   Medication Administration from 08/04/2024 1703 to 08/05/2024 0349         Date/Time Order Dose Route Action Action by Comments     08/04/2024 1742 EDT ketorolac (TORADOL) injection 15 mg 15 mg Intravenous Given                  08/04/2024 1742 EDT sodium chloride 0.9 % bolus 1,000 mL 1,000 mL Intravenous New Bag                             08/05/2024 0343 EDT ketorolac (TORADOL) injection 30 mg 30 mg Intravenous Given               Other treatments:      Change in condition while in the ED:     Response to ED Treatment:          OBSERVATION: (Proceed to \"ADMISSION\" if Direct Admission)    Orders written during the observation period  IV fluids  IV Famotidine  Ambulate  CT small bowel enterography  NPO  IP CONSULT TO ACUTE CARE SURGERY  IP CONSULT TO GASTROENTEROLOGY  INPATIENT CONSULT TO IR    Meds Name, dose, route, time, how may doses given:  PRN Meds Name, dose, route, time, how many doses given within the first 24 hrs.:  PRN Meds:  acetaminophen, 650 mg, Oral, Q6H PRN  aluminum-magnesium hydroxide-simethicone, 30 mL, Oral, Q6H " PRN  ketorolac, 30 mg, Intravenous, Q6H PRN - x 4 8/5, x 3 8/6  morphine injection, 2 mg, Intravenous, Q4H PRN - x 2 8/5, x2 8/6  ondansetron, 4 mg, Intravenous, Q6H PRN  oxyCODONE, 5 mg, Oral, Q6H PRN  polyethylene glycol, 17 g, Oral, Daily PRN  simethicone, 80 mg, Oral, 4x Daily PRN       IVs Type, rate, and total amt. ordered/given:  Labs, imaging, other:  Consults and findings:  Intussusception (HCC)  Active Problems:    Rectal bleeding    Other constipation    Inguinal lymphadenopathy    Unintentional weight loss    Hypokalemia  Resolved Problems:    * No resolved hospital problems. *     Plan  Intussusception.  Diet started with clear liquids.  GI and surgery following.  Weight loss unintentional.  Patient reports 20 pound weight loss.  Surgery recommended infectious workup given lymphadenopathy.  Discussed with IR.  Single lymphadenopathy not too impressive, further workup of other symptoms.  Patient agreeable to HIV/infectious workup.  Ordered.  Rectal bleeding.  Hypokalemia.  Replace and recheck    [facility]ultation -  [facility]  Reji Shi 23 y.o. male MRN: [id number]  Unit/Bed#: [address] 218-01 Encounter: [id number]                Inpatient consult to gastroenterology      Date/Time  8/5/2024 5:16 PM      Performed by  aKrla Escudero DO   Authorized by  SHIRA Cross                 Reason for Consult / Principal Problem: Left Sided Abdominal Pain     ASSESSMENT AND PLAN:       Mr. Reji Shi is a 23-year-old male with a prior history of intussusception status post diagnostic laparoscopy 11/2023 and constipation who presented with intractable abdominal pain for which GI is consulted.      Abdominal Pain   History of Intussusception      Patient previously hospitalized 11/2023 after presenting with abdominal pain. Found to have approx 15 cm segment of small bowel intussusception present in the mid jejunum. Noted to have a lead point intraluminal bezoar that was able  to able to be milked distally. No resection completed at that time and no Meckles or lymphadenopathy noted. Since this hospitalization, patient with ongoing abdominal pain primarily in the LLQ region. Pain present at this time but more severe than baseline. Labs stable but CT imaging demonstrating recurrent jejunal intussusception. Suspect that this is the likely etiology of patient's pain and will defer to surgical team for operative management. Although, reason for recurrent intussusception remains unclear. If symptoms continue to persist, especially in context of weight loss and prior (but not current) rectal bleeding, would benefit from endoscopic evaluation but once intussusception has resolved and likely on an outpatient basis.      Plan:  Okay for diet from GI standpoint; defer to surgical team   No plan for inpatient EGD/colonoscopy at this time; outpatient colonoscopy   Serial abdominal examination; surgery consulted and appreciate input   Additional pain and symptom management per primary team     Test Results during the observation period  Pertinent Lab tests (dates/results):  Culture results (blood, urine, spinal, wound, respiratory, etc.):  Imaging tests (dates/results):  EKG (dates/results):  Other test (dates/results):  Tests pending (dates/results):    Surgical or Invasive Procedures during the observation period  Name of surgery/procedure:  IR CONSULT:  intussusception     I personally reviewed the imaging.  Small left groin node which does appear globular but appears similar to imaging in 2023     Overall, this lymph node deserves  further evaluation after other systemic evaluation for infection or malignancy     Recommend continued infectious workup such as HIV STDs     Recommend recovery from this acute illness for which he is currently n.p.o. and awaiting GI evaluation     If remainder of workup negative please contact interventional radiology service again to evaluate for core biopsy of left  groin node     We can also follow this with imaging as an outpatient     Patient may also benefit from CT chest to evaluate for any adenopathy in the chest      SURGERY CONSULT:  Assessment:  23-year-old male with small bowel intussusception and concerning B-symptoms.      Plan:  -No surgical intervention at this time, consider repeat diagnostic lap if patient continues to decompensate  -Advance diet as tolerated  -F/u labs  -Appreciate internal medicine recommendations including clear liquid diet  -Appreciate GI recommendations  -Follow-up ID workup for infectious cause  -Appreciate IR recommendations  -Follow-up infectious labs including gonorrhea/chlamydia swab  -DVT prophylaxis  -Pain and nausea control as needed  -Maintenance IV fluids  -Will discuss whether surgical intervention is necessary  -Remainder of care per primary team    Response to Treatment, Major Change in Condition, Major Charge in Treatment during the observation period  OBSERVATION 8/5 CHANGED TO INPATIENT ON 8/6 @ 1716 - intussusception with continued infectious work up, imaging, poss OR.         ADMISSION:    DIRECT Admissions Only:    Presenting Signs/Symptoms:     Medication/treatment prior to arrival:    Past Medical History:    Clinical Exam on admission:    Vital Signs on admission: (Temp, Pulse, Resp, and BP)    Weight in kilograms:     ALL Admissions:    Admission Orders and Other Orders written within the first 24 hrs after admission  Meds Name, dose, route, time, how may doses given:    PRN Meds:  acetaminophen, 650 mg, Oral, Q6H PRN  aluminum-magnesium hydroxide-simethicone, 30 mL, Oral, Q6H PRN  ketorolac, 30 mg, Intravenous, Q6H PRN - x 4 8/5, x 3 8/6  morphine injection, 2 mg, Intravenous, Q4H PRN - x 2 8/5, x2 8/6  ondansetron, 4 mg, Intravenous, Q6H PRN  oxyCODONE, 5 mg, Oral, Q6H PRN  polyethylene glycol, 17 g, Oral, Daily PRN  simethicone, 80 mg, Oral, 4x Daily PRN  Labs, imaging, other:      Consults and findings:    Intussusception (HCC)  Assessment & Plan  History of intussusception November status post laparoscopy presented back to the hospital for abdominal pain and weight loss found to have intussusception again.  Coordinated with surgery and GI.  No GI intervention warranted.  Due to ongoing pain surgery recommend CT enterography to be done tomorrow.     Hypokalemia  Assessment & Plan  Was replaced            Results from last 7 days   Lab Units 08/06/24  0509 08/05/24  0445 08/04/24  1737   POTASSIUM mmol/L 3.8 3.4* 3.8         Unintentional weight loss  Assessment & Plan  Infectious workup negative.  Likely related to abdominal pain and decreased intake over the past couple months.     Inguinal lymphadenopathy  Assessment & Plan  Inguinal lymphadenopathy with weight loss.  Coordinated with surgery and IR.  No IR biopsy-able node.  HIV QuantiFERON hepatitis panel and GC chlamydia all negative.         Test Results after admission  Pertinent Lab tests (dates/results):    Culture results (blood, urine, spinal, wound, respiratory, etc.):  Imaging tests (dates/results):    EKG (dates/results):    Other test (dates/results):  Tests pending (dates/results):    Surgical or Invasive Procedures  Name of surgery/procedure:  Date & Time:  Patient Response:  Post-operative orders:  Operative Report/Findings:    Response to Treatment, Major Change in Condition, Major Charge in Treatment anytime during admission      Disposition on Discharge  Home, Rehab, SNF, LTC, Shelter, etc.: Home/Self Care    Cease to Breathe (CTB)  If a patient expires during an admission, in addition to the above information, please include:    Summary/timeline of the patient's decline in condition:    Medications and treatment:    Patient response to treatment:    Date and time patient ceased to breathe:        Is there a Readmission that follows this admission? no  If yes, provide dates:        InterQual Review  InterQual Criteria Met: no    Please include the  InterQual Review, InterQual year/version used, and the criteria selected:           PLEASE SUBMIT THE COMPLETED FORM TO THE DEPARTMENT OF HUMAN SERVICES - DIVISION OF CLINICAL  REVIEW VIA FAX -[phone] or VIA E-MAIL TO [email or web address]    Signature: Shanice Wing Date:  08/16/24    Confidentiality Notice: The documents accompanying this telecopy may contain confidential information belonging to the sender.  The information is intended only for the use of the individual named above. If you are not the intended recipient, you are hereby notified  That any disclosure, copying, distribution or taking of any telecopy is strictly prohibited.

## 2024-08-26 ENCOUNTER — HOSPITAL ENCOUNTER (INPATIENT)
Facility: HOSPITAL | Age: 24
LOS: 2 days | Discharge: LEFT BEFORE DISCHARGE | DRG: 251 | End: 2024-08-30
Admitting: FAMILY MEDICINE
Payer: COMMERCIAL

## 2024-08-26 ENCOUNTER — APPOINTMENT (EMERGENCY)
Dept: CT IMAGING | Facility: HOSPITAL | Age: 24
DRG: 251 | End: 2024-08-26
Payer: COMMERCIAL

## 2024-08-26 DIAGNOSIS — K92.1 HEMATOCHEZIA: ICD-10-CM

## 2024-08-26 DIAGNOSIS — R19.5 ABNORMAL STOOLS: ICD-10-CM

## 2024-08-26 DIAGNOSIS — R52 PAIN MANAGEMENT: ICD-10-CM

## 2024-08-26 DIAGNOSIS — R52 INTRACTABLE PAIN: Primary | ICD-10-CM

## 2024-08-26 DIAGNOSIS — R10.9 ABDOMINAL PAIN: ICD-10-CM

## 2024-08-26 PROBLEM — Z87.19 HISTORY OF INTUSSUSCEPTION: Status: ACTIVE | Noted: 2024-08-26

## 2024-08-26 LAB
ALBUMIN SERPL BCG-MCNC: 4.2 G/DL (ref 3.5–5)
ALP SERPL-CCNC: 58 U/L (ref 34–104)
ALT SERPL W P-5'-P-CCNC: 9 U/L (ref 7–52)
ANION GAP SERPL CALCULATED.3IONS-SCNC: 6 MMOL/L (ref 4–13)
AST SERPL W P-5'-P-CCNC: 13 U/L (ref 13–39)
BASOPHILS # BLD AUTO: 0.03 THOUSANDS/ÂΜL (ref 0–0.1)
BASOPHILS NFR BLD AUTO: 1 % (ref 0–1)
BILIRUB SERPL-MCNC: 0.4 MG/DL (ref 0.2–1)
BUN SERPL-MCNC: 6 MG/DL (ref 5–25)
CALCIUM SERPL-MCNC: 9.3 MG/DL (ref 8.4–10.2)
CHLORIDE SERPL-SCNC: 103 MMOL/L (ref 96–108)
CO2 SERPL-SCNC: 31 MMOL/L (ref 21–32)
CREAT SERPL-MCNC: 0.95 MG/DL (ref 0.6–1.3)
EOSINOPHIL # BLD AUTO: 0.21 THOUSAND/ÂΜL (ref 0–0.61)
EOSINOPHIL NFR BLD AUTO: 4 % (ref 0–6)
ERYTHROCYTE [DISTWIDTH] IN BLOOD BY AUTOMATED COUNT: 11.9 % (ref 11.6–15.1)
GFR SERPL CREATININE-BSD FRML MDRD: 113 ML/MIN/1.73SQ M
GLUCOSE SERPL-MCNC: 92 MG/DL (ref 65–140)
HCT VFR BLD AUTO: 44.1 % (ref 36.5–49.3)
HGB BLD-MCNC: 15.3 G/DL (ref 12–17)
IMM GRANULOCYTES # BLD AUTO: 0.02 THOUSAND/UL (ref 0–0.2)
IMM GRANULOCYTES NFR BLD AUTO: 0 % (ref 0–2)
LIPASE SERPL-CCNC: 18 U/L (ref 11–82)
LYMPHOCYTES # BLD AUTO: 1.79 THOUSANDS/ÂΜL (ref 0.6–4.47)
LYMPHOCYTES NFR BLD AUTO: 32 % (ref 14–44)
MCH RBC QN AUTO: 32.3 PG (ref 26.8–34.3)
MCHC RBC AUTO-ENTMCNC: 34.7 G/DL (ref 31.4–37.4)
MCV RBC AUTO: 93 FL (ref 82–98)
MONOCYTES # BLD AUTO: 0.27 THOUSAND/ÂΜL (ref 0.17–1.22)
MONOCYTES NFR BLD AUTO: 5 % (ref 4–12)
NEUTROPHILS # BLD AUTO: 3.23 THOUSANDS/ÂΜL (ref 1.85–7.62)
NEUTS SEG NFR BLD AUTO: 58 % (ref 43–75)
NRBC BLD AUTO-RTO: 0 /100 WBCS
PLATELET # BLD AUTO: 326 THOUSANDS/UL (ref 149–390)
PMV BLD AUTO: 8.6 FL (ref 8.9–12.7)
POTASSIUM SERPL-SCNC: 4 MMOL/L (ref 3.5–5.3)
PROT SERPL-MCNC: 6.5 G/DL (ref 6.4–8.4)
RBC # BLD AUTO: 4.73 MILLION/UL (ref 3.88–5.62)
SODIUM SERPL-SCNC: 140 MMOL/L (ref 135–147)
WBC # BLD AUTO: 5.55 THOUSAND/UL (ref 4.31–10.16)

## 2024-08-26 PROCEDURE — 85025 COMPLETE CBC W/AUTO DIFF WBC: CPT

## 2024-08-26 PROCEDURE — 96376 TX/PRO/DX INJ SAME DRUG ADON: CPT

## 2024-08-26 PROCEDURE — 99285 EMERGENCY DEPT VISIT HI MDM: CPT

## 2024-08-26 PROCEDURE — 80053 COMPREHEN METABOLIC PANEL: CPT

## 2024-08-26 PROCEDURE — 83690 ASSAY OF LIPASE: CPT

## 2024-08-26 PROCEDURE — 99284 EMERGENCY DEPT VISIT MOD MDM: CPT

## 2024-08-26 PROCEDURE — 96374 THER/PROPH/DIAG INJ IV PUSH: CPT

## 2024-08-26 PROCEDURE — 36415 COLL VENOUS BLD VENIPUNCTURE: CPT

## 2024-08-26 PROCEDURE — 74177 CT ABD & PELVIS W/CONTRAST: CPT

## 2024-08-26 PROCEDURE — 99222 1ST HOSP IP/OBS MODERATE 55: CPT | Performed by: FAMILY MEDICINE

## 2024-08-26 RX ORDER — HYDROMORPHONE HCL/PF 1 MG/ML
1 SYRINGE (ML) INJECTION ONCE
Status: COMPLETED | OUTPATIENT
Start: 2024-08-26 | End: 2024-08-26

## 2024-08-26 RX ORDER — KETOROLAC TROMETHAMINE 30 MG/ML
15 INJECTION, SOLUTION INTRAMUSCULAR; INTRAVENOUS EVERY 6 HOURS PRN
Status: DISCONTINUED | OUTPATIENT
Start: 2024-08-26 | End: 2024-08-30 | Stop reason: HOSPADM

## 2024-08-26 RX ORDER — ONDANSETRON 2 MG/ML
4 INJECTION INTRAMUSCULAR; INTRAVENOUS ONCE
Status: COMPLETED | OUTPATIENT
Start: 2024-08-26 | End: 2024-08-26

## 2024-08-26 RX ORDER — POLYETHYLENE GLYCOL 3350 17 G/17G
17 POWDER, FOR SOLUTION ORAL DAILY
Status: DISCONTINUED | OUTPATIENT
Start: 2024-08-26 | End: 2024-08-30 | Stop reason: HOSPADM

## 2024-08-26 RX ORDER — SODIUM CHLORIDE 9 MG/ML
100 INJECTION, SOLUTION INTRAVENOUS CONTINUOUS
Status: DISCONTINUED | OUTPATIENT
Start: 2024-08-26 | End: 2024-08-30 | Stop reason: HOSPADM

## 2024-08-26 RX ORDER — ONDANSETRON 2 MG/ML
4 INJECTION INTRAMUSCULAR; INTRAVENOUS EVERY 4 HOURS PRN
Status: DISCONTINUED | OUTPATIENT
Start: 2024-08-26 | End: 2024-08-30 | Stop reason: HOSPADM

## 2024-08-26 RX ORDER — HYDROMORPHONE HCL/PF 1 MG/ML
0.5 SYRINGE (ML) INJECTION ONCE
Status: COMPLETED | OUTPATIENT
Start: 2024-08-26 | End: 2024-08-26

## 2024-08-26 RX ORDER — DOCUSATE SODIUM 100 MG/1
100 CAPSULE, LIQUID FILLED ORAL 2 TIMES DAILY
Status: DISCONTINUED | OUTPATIENT
Start: 2024-08-26 | End: 2024-08-30 | Stop reason: HOSPADM

## 2024-08-26 RX ORDER — HYDROMORPHONE HCL/PF 1 MG/ML
0.5 SYRINGE (ML) INJECTION EVERY 4 HOURS PRN
Status: DISCONTINUED | OUTPATIENT
Start: 2024-08-26 | End: 2024-08-28

## 2024-08-26 RX ADMIN — HYDROMORPHONE HYDROCHLORIDE 0.5 MG: 1 INJECTION, SOLUTION INTRAMUSCULAR; INTRAVENOUS; SUBCUTANEOUS at 12:01

## 2024-08-26 RX ADMIN — HYDROMORPHONE HYDROCHLORIDE 0.5 MG: 1 INJECTION, SOLUTION INTRAMUSCULAR; INTRAVENOUS; SUBCUTANEOUS at 18:38

## 2024-08-26 RX ADMIN — ONDANSETRON 4 MG: 2 INJECTION INTRAMUSCULAR; INTRAVENOUS at 17:50

## 2024-08-26 RX ADMIN — HYDROMORPHONE HYDROCHLORIDE 1 MG: 1 INJECTION, SOLUTION INTRAMUSCULAR; INTRAVENOUS; SUBCUTANEOUS at 16:19

## 2024-08-26 RX ADMIN — IOHEXOL 100 ML: 350 INJECTION, SOLUTION INTRAVENOUS at 14:31

## 2024-08-26 RX ADMIN — KETOROLAC TROMETHAMINE 15 MG: 30 INJECTION, SOLUTION INTRAMUSCULAR; INTRAVENOUS at 18:39

## 2024-08-26 RX ADMIN — DOCUSATE SODIUM 100 MG: 100 CAPSULE, LIQUID FILLED ORAL at 19:02

## 2024-08-26 RX ADMIN — KETOROLAC TROMETHAMINE 15 MG: 30 INJECTION, SOLUTION INTRAMUSCULAR; INTRAVENOUS at 23:07

## 2024-08-26 RX ADMIN — SODIUM CHLORIDE 100 ML/HR: 0.9 INJECTION, SOLUTION INTRAVENOUS at 19:02

## 2024-08-26 RX ADMIN — POLYETHYLENE GLYCOL 3350 17 G: 17 POWDER, FOR SOLUTION ORAL at 19:02

## 2024-08-26 NOTE — ASSESSMENT & PLAN NOTE
-In the setting of history of intussusception back in November 2023 confirmed by diagnostic laparoscopy  -Current pain regimen includes 15 mg scheduled ketorolac and 0.5 mg Dilaudid as needed every 4 hours  -Overnight received a one-time dose of 0.5 mg Dilaudid  -CT abd/pelvis 8/26.24 with moderate colonic stool burden  -States pain is similar to when he had his initial intussusception  -As intussusception can be intermittent it is possible this pain represents repeat intussusception  -Currently passing gas, last stool was yesterday prior to presenting to the ED  -Thus far has had to clear white liquid episodes of vomitus: one in the ED  yesterday and one this morning  -Admitted from 8/4-8/9 at [facility]: initial CT showed intussusception and then CT enterography showed resolution: Symptoms resolved with IV fluids and dicyclomine  - Patient reported a bowel mov form yesterday with presence of blood.   - Digital rectal exam performed today, no presence of hemorrhoids, fissures, masses, blood or hematochezia.   - GI consulted, Recommend go for colonoscopy  - Patient is currently bowel preparation for procedure.    PLAN  - Maintenance IV fluid  - maintain clear diet surgery until 8 pm today  - Bowel regimen of mirilax and colace  - NPO starting 8 pm today.   - Bowel preparation for colonoscopy tomorrow morning.  - Consider Abdominal CT scan w/o cont if symptoms worse.   - Continue 0.5 q4 PRN for pain.  - Continue scheduled ketorolac 3rd day PRN for pain   - Tylenol 975 mg q8 scheduled for pain.   - Colonoscopy tomorrow.

## 2024-08-26 NOTE — H&P
History and Physical - [facility]    Patient Information: Reji Shi 24 y.o. male MRN: [id number]  Unit/Bed#: 7T [facility] 709-01 Encounter: [id number]  Admitting Physician: Rebecca Fay Kab-Perlman, MD  PCP: SHIRA Price  Date of Admission:  08/27/24    Assessment and Plan    Abdominal Pain  Assessment & Plan  -In the setting of history of intussusception back in November 2023 confirmed by diagnostic laparoscopy  -Post 1.5 mg Dilaudid total in the ED  -CT abd/pelvis 8/26.24 with moderate colonic stool burden  -States pain is similar to when he had his initial intussusception  -As intussusception can be intermittent it is possible this pain represents repeat intussusception    PLAN  -Scheduled ketorolac and as needed Dilaudid  -Serial abdominal exams  -Maintenance IV fluid  -N.p.o.  -Surgery consult  -bowel regimen of mirilax and colace    History of intussusception  Assessment & Plan  Please see pain management A/P    Marijuana smoker, episodic  Assessment & Plan  -States smokes marijuana about once monthly, smoked prior to admission today    PLAN  -consider counseling upon discharge        VTE Prophylaxis: VTE Score: 0 Low Risk (Score 0-2) - Encourage Ambulation.  Code Status: Prior  Anticipated Length of Stay:  Patient will be admitted on an Observation basis with an anticipated length of stay of  less than 2 midnights.     Justification for Hospital Stay: Pain management in the setting of hx of intussucception  Total Time for Visit, including Counseling / Coordination of Care: 30 mins. Greater than 50% of this total time spent on direct patient counseling and coordination of care.  Patient Information Sharing: Reji Shi does not wish inpatient team to notify their loved ones of their condition and current plan.     Chief Complaint:     Chief Complaint   Patient presents with    Abdominal Pain     Patient reports umbilical abdominal pain since Friday, denies nausea/vomiting or  "diarrhea. Using rx meds \"the pain keeps getting worse\".  Gastro appt on sept 5. Reporting 10 out 10pain now.     History of Present Illness:    Reji Shi is a 24 y.o. male who presents with chills, and severe pain in the abdomen that began Friday. The pain was intermittent and progressed until it wasn't tolerable anymore. Took dicyclomine, oxycodone 15mg, famotidine 20mg, ondansetron 4mgm and tylenol 500mg and nothing made him feel better.     No n/v/d. No recent travel. No new restuarants. No known sick contacts.     In November of 2023 has emergency abdominal surgery for intussucception.     Today states the pain is similar to what he had in November         ED COURSE:  Vitals: unremarkable and stable  Labs: CBC, CMP, Lipase, all unremarkable and stable  Imaging: CT abd and pelvis and unremarkable  Meds: dilaudid 1.5mg total    Review of Systems:  Review of Systems   Constitutional:  Negative for fatigue and fever.   Respiratory:  Negative for shortness of breath.    Cardiovascular:  Negative for chest pain and palpitations.   Gastrointestinal:  Positive for abdominal pain and nausea. Negative for blood in stool, constipation, diarrhea and vomiting.   Neurological:  Positive for headaches.   Hematological:  Does not bruise/bleed easily.       Past Medical and Surgical History:   Past Medical History:   Diagnosis Date    No known health problems      Past Surgical History:   Procedure Laterality Date    COLON SURGERY      OH LAPS ABD PRTM&OMENTUM DX W/WO SPEC BR/WA SPX N/A 11/27/2023    Procedure: LAPAROSCOPY DIAGNOSTIC;  Surgeon: Em Yeboah MD;  Location: [facility];  Service: General     Meds/Allergies:  Allergies: No Known Allergies  Prior to Admission Medications   Prescriptions Last Dose Informant Patient Reported? Taking?   acetaminophen (TYLENOL) 500 mg tablet Past Month  No Yes   Sig: Take 1 tablet (500 mg total) by mouth every 6 (six) hours as needed for mild pain   dicyclomine (BENTYL) 20 " mg tablet Past Week  No Yes   Sig: Take 1 tablet (20 mg total) by mouth 4 (four) times a day (before meals and at bedtime) for 3 days, THEN 1 tablet (20 mg total) 4 (four) times a day as needed (abdominal pain or cramping).   docusate sodium (COLACE) 100 mg capsule Not Taking Self No No   Sig: Take 1 capsule (100 mg total) by mouth every 12 (twelve) hours   Patient not taking: Reported on 8/26/2024   famotidine (PEPCID) 20 mg tablet 8/25/2024  No Yes   Sig: Take 1 tablet (20 mg total) by mouth daily   ondansetron (ZOFRAN) 4 mg tablet 8/25/2024  No Yes   Sig: Take 1 tablet (4 mg total) by mouth every 8 (eight) hours as needed for nausea or vomiting   polyethylene glycol (MIRALAX) 17 g packet Not Taking  No No   Sig: Take 17 g by mouth daily   Patient not taking: Reported on 8/26/2024      Facility-Administered Medications: None     Social History:     Social History     Socioeconomic History    Marital status: Single     Spouse name: Not on file    Number of children: Not on file    Years of education: Not on file    Highest education level: Not on file   Occupational History    Not on file   Tobacco Use    Smoking status: Former     Types: Cigars    Smokeless tobacco: Never   Vaping Use    Vaping status: Former    Substances: Nicotine   Substance and Sexual Activity    Alcohol use: Not Currently    Drug use: Yes     Types: Marijuana     Comment: last use ~1month    Sexual activity: Not Currently   Other Topics Concern    Not on file   Social History Narrative    ** Merged History Encounter **          Social Determinants of Health     Financial Resource Strain: Not on file   Food Insecurity: No Food Insecurity (8/7/2024)    Hunger Vital Sign     Worried About Running Out of Food in the Last Year: Never true     Ran Out of Food in the Last Year: Never true   Transportation Needs: No Transportation Needs (8/7/2024)    PRAPARE - Transportation     Lack of Transportation (Medical): No     Lack of Transportation  "(Non-Medical): No   Physical Activity: Not on file   Stress: Not on file   Social Connections: Not on file   Intimate Partner Violence: Not on file   Housing Stability: Low Risk  (8/7/2024)    Housing Stability Vital Sign     Unable to Pay for Housing in the Last Year: No     Number of Times Moved in the Last Year: 1     Homeless in the Last Year: No       Patient Pre-hospital Level of Mobility: ambulates without issue  Patient Pre-hospital Diet Restrictions: none    Family History:  History reviewed. No pertinent family history.    Physical Exam:   Vitals:   Blood Pressure: 111/58 (08/27/24 1524)  Pulse: 66 (08/27/24 1524)  Temperature: 98.2 °F (36.8 °C) (08/27/24 1524)  Temp Source: Temporal (08/27/24 1524)  Respirations: 16 (08/27/24 1524)  Height: 5' 9\" (175.3 cm) (08/26/24 1900)  Weight - Scale: 63.9 kg (140 lb 14 oz) (08/26/24 1900)  SpO2: 96 % (08/27/24 1524)    Physical Exam  Constitutional:       Appearance: Normal appearance. He is normal weight. He is ill-appearing.   HENT:      Head: Normocephalic and atraumatic.      Right Ear: External ear normal.      Left Ear: External ear normal.      Nose: Nose normal.      Mouth/Throat:      Mouth: Mucous membranes are moist.      Pharynx: Oropharynx is clear.   Eyes:      Extraocular Movements: Extraocular movements intact.      Comments: Bilateral conjunctivitis   Cardiovascular:      Rate and Rhythm: Normal rate and regular rhythm.      Pulses: Normal pulses.      Heart sounds: Normal heart sounds. No murmur heard.     No friction rub. No gallop.   Abdominal:      General: Abdomen is flat. There is no distension.      Palpations: Abdomen is soft. There is no mass.      Tenderness: There is abdominal tenderness (predominantly in hypogastric region). There is rebound. There is no guarding.      Hernia: No hernia is present.      Comments: Increased bowel sounds  Single and first episode of vomiting while obtaining HPI: Vomit was clear, nonbilious and nonbloody " left upper arm "  Musculoskeletal:         General: Normal range of motion.      Cervical back: Normal range of motion. No tenderness.   Lymphadenopathy:      Cervical: No cervical adenopathy.   Skin:     General: Skin is warm.      Capillary Refill: Capillary refill takes less than 2 seconds.   Neurological:      Mental Status: He is alert and oriented to person, place, and time. Mental status is at baseline.   Psychiatric:         Mood and Affect: Mood normal.         Behavior: Behavior normal.         Lab Results: I have personally reviewed pertinent reports.    Results from last 7 days   Lab Units 08/26/24  1200   WBC Thousand/uL 5.55   HEMOGLOBIN g/dL 15.3   HEMATOCRIT % 44.1   PLATELETS Thousands/uL 326   SEGS PCT % 58   LYMPHO PCT % 32   MONO PCT % 5   EOS PCT % 4     Results from last 7 days   Lab Units 08/27/24  0443 08/26/24  1200   POTASSIUM mmol/L 3.6 4.0   CHLORIDE mmol/L 104 103   CO2 mmol/L 33* 31   BUN mg/dL 6 6   CREATININE mg/dL 0.92 0.95   CALCIUM mg/dL 9.2 9.3   ALK PHOS U/L  --  58   ALT U/L  --  9   AST U/L  --  13   EGFR ml/min/1.73sq m 117 113                                Invalid input(s): \"URIBILINOGEN\"          Imaging: I have personally reviewed pertinent reports.    CT abdomen pelvis with contrast    Result Date: 8/26/2024  Narrative: CT ABDOMEN AND PELVIS WITH IV CONTRAST INDICATION: worsened LLQ abd pain, re-evaluation of intussusception. COMPARISON: CT abdomen/pelvis 4/4/2024 and 4/7/2024 TECHNIQUE: CT examination of the abdomen and pelvis was performed. Multiplanar 2D reformatted images were created from the source data. This examination, like all CT scans performed in the [facility], was performed utilizing techniques to minimize radiation dose exposure, including the use of iterative reconstruction and automated exposure control. Radiation dose length product (DLP) for this visit: 276 mGy-cm IV Contrast: 100 mL of iohexol (OMNIPAQUE) Enteric Contrast: Not administered. FINDINGS: " ABDOMEN LOWER CHEST: No clinically significant abnormality in the visualized lower chest. LIVER/BILIARY TREE: Unremarkable. GALLBLADDER: No calcified gallstones. No pericholecystic inflammatory change. SPLEEN: Unremarkable. PANCREAS: Unremarkable. ADRENAL GLANDS: Unremarkable. KIDNEYS/URETERS: Unremarkable. No hydronephrosis. STOMACH AND BOWEL: Moderate colonic stool burden. No abnormal dilation. Orally administered contrast material reaches the proximal ileum bowel loops. APPENDIX: Normal. ABDOMINOPELVIC CAVITY: No ascites. No pneumoperitoneum. No lymphadenopathy. VESSELS: Unremarkable for patient's age. PELVIS REPRODUCTIVE ORGANS: Unremarkable for patient's age. URINARY BLADDER: Unremarkable. ABDOMINAL WALL/INGUINAL REGIONS: Unremarkable. BONES: No acute fracture or suspicious osseous lesion.     Impression: No bowel obstruction or intussusception. Moderate colonic stool burden Workstation performed: [id number]     CT small bowel enterography    Result Date: 8/7/2024  Narrative: CT ABDOMEN AND PELVIS WITH IV CONTRAST- ENTEROGRAPHY - WITH CONTRAST INDICATION: Surgery recommended CT enterography to evaluate intussusception. COMPARISON: None. TECHNIQUE: Contrast-enhanced CT examination of the abdomen and pelvis was performed utilizing thin section technique and after the administration of low density enteric contrast according to protocol designed specifically to obtain sensitive evaluation of the small bowel. Multiplanar 2D reformatted images were created from the source data. Radiation dose length product (DLP) for this visit: 399 mGy-cm . This examination, like all CT scans performed in the [facility], was performed utilizing techniques to minimize radiation dose exposure, including the use of iterative reconstruction and automated exposure control. IV Contrast: 100 mL of iohexol (OMNIPAQUE) Enteric Contrast: 1350 cc of low density enteric contrast (Breeza) was administered. FINDINGS: ABDOMEN  ENTEROGRAPHY: - Small bowel distension: Adequate. - Bowel: No segments of bowel wall thickening or hyperenhancement to indicate active inflammatory bowel disease. The previously noted intussusception has resolved borderline focally dilated loop seen in the left upper quadrant. There is passage of contrast which was administered on August 4, 2024 into the colon - Mesenteric findings: No significant mesenteric lymph node enlargement seen - Extraintestinal findings: None. REMAINDER OF THE ABDOMEN AND PELVIS: LOWER CHEST: No clinically significant abnormality in the visualized lower chest. LIVER/BILIARY TREE: Unremarkable. GALLBLADDER: No calcified gallstones. No pericholecystic inflammatory change. SPLEEN: Unremarkable. PANCREAS: Unremarkable. ADRENAL GLANDS: Unremarkable. KIDNEYS/URETERS: Unremarkable. No hydronephrosis. APPENDIX: No findings to suggest appendicitis. ABDOMINOPELVIC CAVITY: No ascites. No pneumoperitoneum. No lymphadenopathy. VESSELS: Unremarkable for patient's age. PELVIS REPRODUCTIVE ORGANS: Unremarkable for patient's age. URINARY BLADDER: Unremarkable. ABDOMINAL WALL/INGUINAL REGIONS: Unremarkable. BONES: No acute fracture or suspicious osseous lesion.     Impression: Small bowel intussusception noted on the previous study has resolved. There is passage of contrast into the colon from the previously administered contrast on August 4, 2024 A borderline dilated loop of small bowel seen in the left upper quadrant no bowel wall thickening, pneumatosis or free air, nonspecific. Correlate clinically *  Inflammation: No imaging signs of active inflammation. *  Stricture: None. *  Penetrating complication: None. *  Perianal disease: None. *  Other complications: None. Workstation performed: [id number]     CT abdomen pelvis with contrast    Result Date: 8/5/2024  Narrative: CT ABDOMEN AND PELVIS WITH IV CONTRAST INDICATION: intermittent L sided abdominal pain, h/o intussusception. COMPARISON: CT abdomen  pelvis December 2, 2023 TECHNIQUE: CT examination of the abdomen and pelvis was performed. Multiplanar 2D reformatted images were created from the source data. This examination, like all CT scans performed in the [facility], was performed utilizing techniques to minimize radiation dose exposure, including the use of iterative reconstruction and automated exposure control. Radiation dose length product (DLP) for this visit: 425.41 mGy-cm IV Contrast: 50 mL of iohexol (OMNIPAQUE) 100 mL of iohexol (OMNIPAQUE) Enteric Contrast: Administered. FINDINGS: ABDOMEN LOWER CHEST: No clinically significant abnormality in the visualized lower chest. LIVER/BILIARY TREE: Unremarkable. GALLBLADDER: No calcified gallstones. No pericholecystic inflammatory change. SPLEEN: Unremarkable. PANCREAS: Unremarkable. ADRENAL GLANDS: Unremarkable. KIDNEYS/URETERS: Unremarkable. No hydronephrosis. STOMACH AND BOWEL: Small bowel-small bowel intussusception in the left upper abdomen (series 2, image 64). No bowel obstruction. APPENDIX: Normal. ABDOMINOPELVIC CAVITY: No ascites. No pneumoperitoneum. No lymphadenopathy. VESSELS: Unremarkable for patient's age. PELVIS REPRODUCTIVE ORGANS: Unremarkable for patient's age. URINARY BLADDER: Unremarkable. ABDOMINAL WALL/INGUINAL REGIONS: Unremarkable. BONES: No acute fracture or suspicious osseous lesion.     Impression: Small bowel-small bowel intussusception in the left upper abdomen without associated suspicious findings. No bowel obstruction. Workstation performed: [id number]     XR toe fifth min 2 views RIGHT    Result Date: 7/29/2024  Narrative: XR TOE RIGHT FIFTH MIN 2 VIEWS INDICATION: toe pain. COMPARISON: None FINDINGS: No acute fracture or dislocation. Mild DIP joint of the fifth toe. No significant degenerative changes. No lytic or blastic osseous lesion. Unremarkable soft tissues.     Impression: No fracture is seen. There is mild widening at the PIP joint of the fifth toe  for which correlation is advised. Workstation performed: [id number]       EKG, Pathology, and Other Studies Reviewed on Admission:   EKG  Result Date: 08/27/24  Impression:  NSR with QTc 388    Entire H&P was discussed with Dr. Garcia who agreed to what is noted above    Rebecca Fay Kab-Perlman, MD  08/27/24  3:30 PM

## 2024-08-26 NOTE — ED PROVIDER NOTES
"History  Chief Complaint   Patient presents with    Abdominal Pain     Patient reports umbilical abdominal pain since Friday, denies nausea/vomiting or diarrhea. Using rx meds \"the pain keeps getting worse\".  Gastro appt on sept 5. Reporting 10 out 10pain now.     HPI    23 y/o M with prior hx of recurrent intussusception presenting with worsened abdominal pain. Pt states pain feels same as prior episodes, severe pain mostly LLQ. Having normal BM. Denies nausea/vomiting. Only has pain as symptom, denies anything else.     Prior to Admission Medications   Prescriptions Last Dose Informant Patient Reported? Taking?   acetaminophen (TYLENOL) 500 mg tablet Past Month  No Yes   Sig: Take 1 tablet (500 mg total) by mouth every 6 (six) hours as needed for mild pain   dicyclomine (BENTYL) 20 mg tablet Past Week  No Yes   Sig: Take 1 tablet (20 mg total) by mouth 4 (four) times a day (before meals and at bedtime) for 3 days, THEN 1 tablet (20 mg total) 4 (four) times a day as needed (abdominal pain or cramping).   docusate sodium (COLACE) 100 mg capsule Not Taking Self No No   Sig: Take 1 capsule (100 mg total) by mouth every 12 (twelve) hours   Patient not taking: Reported on 8/26/2024   famotidine (PEPCID) 20 mg tablet 8/25/2024  No Yes   Sig: Take 1 tablet (20 mg total) by mouth daily   ondansetron (ZOFRAN) 4 mg tablet 8/25/2024  No Yes   Sig: Take 1 tablet (4 mg total) by mouth every 8 (eight) hours as needed for nausea or vomiting   polyethylene glycol (MIRALAX) 17 g packet Not Taking  No No   Sig: Take 17 g by mouth daily   Patient not taking: Reported on 8/26/2024      Facility-Administered Medications: None       Past Medical History:   Diagnosis Date    No known health problems        Past Surgical History:   Procedure Laterality Date    COLON SURGERY      SC LAPS ABD PRTM&OMENTUM DX W/WO SPEC BR/WA SPX N/A 11/27/2023    Procedure: LAPAROSCOPY DIAGNOSTIC;  Surgeon: Em Yeboah MD;  Location: [facility];  " Service: General       History reviewed. No pertinent family history.  I have reviewed and agree with the history as documented.    E-Cigarette/Vaping    E-Cigarette Use Former User      E-Cigarette/Vaping Substances    Nicotine Yes     THC No     CBD No     Flavoring No     Other No     Unknown No      Social History     Tobacco Use    Smoking status: Former     Types: Cigars    Smokeless tobacco: Never   Vaping Use    Vaping status: Former    Substances: Nicotine   Substance Use Topics    Alcohol use: Not Currently    Drug use: Yes     Types: Marijuana     Comment: last use ~1month       Review of Systems   Constitutional:  Negative for chills and fever.   HENT:  Negative for ear pain and sore throat.    Eyes:  Negative for pain and visual disturbance.   Respiratory:  Negative for cough and shortness of breath.    Cardiovascular:  Negative for chest pain and palpitations.   Gastrointestinal:  Positive for abdominal pain. Negative for vomiting.   Genitourinary:  Negative for dysuria and hematuria.   Musculoskeletal:  Negative for arthralgias and back pain.   Skin:  Negative for color change and rash.   Neurological:  Negative for seizures and syncope.   All other systems reviewed and are negative.      Physical Exam  Physical Exam  Vitals and nursing note reviewed.   Constitutional:       General: He is not in acute distress.     Appearance: He is well-developed. He is not toxic-appearing.   HENT:      Head: Normocephalic and atraumatic.      Right Ear: External ear normal.      Left Ear: External ear normal.      Nose: Nose normal.      Mouth/Throat:      Pharynx: Oropharynx is clear. No oropharyngeal exudate or posterior oropharyngeal erythema.   Eyes:      Extraocular Movements: Extraocular movements intact.      Conjunctiva/sclera: Conjunctivae normal.      Pupils: Pupils are equal, round, and reactive to light.   Cardiovascular:      Rate and Rhythm: Normal rate and regular rhythm.      Pulses: Normal pulses.       Heart sounds: Normal heart sounds. No murmur heard.     No friction rub. No gallop.   Pulmonary:      Effort: Pulmonary effort is normal. No respiratory distress.      Breath sounds: Normal breath sounds. No wheezing, rhonchi or rales.   Abdominal:      General: Abdomen is flat. A surgical scar is present.      Palpations: Abdomen is soft.      Tenderness: There is abdominal tenderness in the left lower quadrant. There is no guarding or rebound.   Musculoskeletal:         General: Normal range of motion.      Cervical back: Normal range of motion. No rigidity.      Right lower leg: No edema.      Left lower leg: No edema.   Skin:     General: Skin is warm and dry.      Capillary Refill: Capillary refill takes less than 2 seconds.   Neurological:      General: No focal deficit present.      Mental Status: He is alert.   Psychiatric:         Mood and Affect: Mood normal.         Vital Signs  ED Triage Vitals   Temperature Pulse Respirations Blood Pressure SpO2   08/26/24 1121 08/26/24 1121 08/26/24 1121 08/26/24 1121 08/26/24 1121   98 °F (36.7 °C) 77 16 132/72 99 %      Temp Source Heart Rate Source Patient Position - Orthostatic VS BP Location FiO2 (%)   08/26/24 1121 08/26/24 1121 08/26/24 1121 08/26/24 1121 --   Oral Monitor Sitting Left arm       Pain Score       08/26/24 1118       10 - Worst Possible Pain           Vitals:    08/26/24 1734 08/26/24 1900 08/26/24 2307 08/27/24 0709   BP: 109/62 128/76 124/72 129/71   Pulse: 57 58 62 74   Patient Position - Orthostatic VS: Lying Lying Lying Lying         Visual Acuity      ED Medications  Medications   iohexol (OMNIPAQUE) 240 MG/ML solution 50 mL (has no administration in time range)   ketorolac (TORADOL) injection 15 mg (15 mg Intravenous Given 8/26/24 2307)   HYDROmorphone (DILAUDID) injection 0.5 mg (0.5 mg Intravenous Given 8/26/24 1838)   polyethylene glycol (MIRALAX) packet 17 g (17 g Oral Given 8/26/24 1902)   docusate sodium (COLACE) capsule 100 mg  (100 mg Oral Given 8/26/24 1902)   sodium chloride 0.9 % infusion (100 mL/hr Intravenous New Bag 8/26/24 1902)   ondansetron (ZOFRAN) injection 4 mg (has no administration in time range)   HYDROmorphone (DILAUDID) injection 0.5 mg (0.5 mg Intravenous Given 8/26/24 1201)   iohexol (OMNIPAQUE) 350 MG/ML injection (MULTI-DOSE) 100 mL (100 mL Intravenous Given 8/26/24 1431)   HYDROmorphone (DILAUDID) injection 1 mg (1 mg Intravenous Given 8/26/24 1619)   ondansetron (ZOFRAN) injection 4 mg (4 mg Intravenous Given 8/26/24 1750)       Diagnostic Studies  Results Reviewed       Procedure Component Value Units Date/Time    Comprehensive metabolic panel [[id number]] Collected: 08/26/24 1200    Lab Status: Final result Specimen: Blood from Arm, Left Updated: 08/26/24 1231     Sodium 140 mmol/L      Potassium 4.0 mmol/L      Chloride 103 mmol/L      CO2 31 mmol/L      ANION GAP 6 mmol/L      BUN 6 mg/dL      Creatinine 0.95 mg/dL      Glucose 92 mg/dL      Calcium 9.3 mg/dL      AST 13 U/L      ALT 9 U/L      Alkaline Phosphatase 58 U/L      Total Protein 6.5 g/dL      Albumin 4.2 g/dL      Total Bilirubin 0.40 mg/dL      eGFR 113 ml/min/1.73sq m     Narrative:      National Kidney Disease Foundation guidelines for Chronic Kidney Disease (CKD):     Stage 1 with normal or high GFR (GFR > 90 mL/min/1.73 square meters)    Stage 2 Mild CKD (GFR = 60-89 mL/min/1.73 square meters)    Stage 3A Moderate CKD (GFR = 45-59 mL/min/1.73 square meters)    Stage 3B Moderate CKD (GFR = 30-44 mL/min/1.73 square meters)    Stage 4 Severe CKD (GFR = 15-29 mL/min/1.73 square meters)    Stage 5 End Stage CKD (GFR <15 mL/min/1.73 square meters)  Note: GFR calculation is accurate only with a steady state creatinine    Lipase [[id number]]  (Normal) Collected: 08/26/24 1200    Lab Status: Final result Specimen: Blood from Arm, Left Updated: 08/26/24 1231     Lipase 18 u/L     CBC and differential [[id number]]  (Abnormal) Collected: 08/26/24 1200    Lab  Status: Final result Specimen: Blood from Arm, Left Updated: 08/26/24 1215     WBC 5.55 Thousand/uL      RBC 4.73 Million/uL      Hemoglobin 15.3 g/dL      Hematocrit 44.1 %      MCV 93 fL      MCH 32.3 pg      MCHC 34.7 g/dL      RDW 11.9 %      MPV 8.6 fL      Platelets 326 Thousands/uL      nRBC 0 /100 WBCs      Segmented % 58 %      Immature Grans % 0 %      Lymphocytes % 32 %      Monocytes % 5 %      Eosinophils Relative 4 %      Basophils Relative 1 %      Absolute Neutrophils 3.23 Thousands/µL      Absolute Immature Grans 0.02 Thousand/uL      Absolute Lymphocytes 1.79 Thousands/µL      Absolute Monocytes 0.27 Thousand/µL      Eosinophils Absolute 0.21 Thousand/µL      Basophils Absolute 0.03 Thousands/µL                    CT abdomen pelvis with contrast   Final Result by Jayla Mccall MD (08/26 1542)      No bowel obstruction or intussusception. Moderate colonic stool burden         Workstation performed: [id number]                    Procedures  Procedures         ED Course  ED Course as of 08/27/24 0801   Mon Aug 26, 2024   1428 Hx recurrent intussusception, upcoming appt in 2-3 weeks for outpatient f/u. Pending CT results for dispo.                                   SBIRT 22yo+      Flowsheet Row Most Recent Value   Initial Alcohol Screen: US AUDIT-C     1. How often do you have a drink containing alcohol? 0 Filed at: 08/26/2024 1119   2. How many drinks containing alcohol do you have on a typical day you are drinking?  0 Filed at: 08/26/2024 1119   3a. Male UNDER 65: How often do you have five or more drinks on one occasion? 0 Filed at: 08/26/2024 1119   3b. FEMALE Any Age, or MALE 65+: How often do you have 4 or more drinks on one occassion? 0 Filed at: 08/26/2024 1119   Audit-C Score 0 Filed at: 08/26/2024 1119   [place]: How many times in the past year have you...    Used an illegal drug or used a prescription medication for non-medical reasons? Never Filed at: 08/26/2024 1119                       Medical Decision Making  23 y/o M with LLQ abd pain. VSS. Pt with recurrent intusseception hx, will obtain CT eval to eval for recurrence. Labs WNL. Pt signed out pending CT result in stable condition.     Amount and/or Complexity of Data Reviewed  Labs: ordered.  Radiology: ordered.    Risk  Prescription drug management.  Decision regarding hospitalization.                 Disposition  Final diagnoses:   Intractable pain   Abdominal pain     Time reflects when diagnosis was documented in both MDM as applicable and the Disposition within this note       Time User Action Codes Description Comment    8/26/2024  4:23 PM Gianni Degroot Add [R52] Intractable pain     8/26/2024  4:23 PM Gianni Degroot [R10.9] Abdominal pain           ED Disposition       ED Disposition   Admit    Condition   Stable    Date/Time   Mon Aug 26, 2024 4433    Comment   Case was discussed with [facility] and the patient's admission status was agreed to be Admission Status: observation status to the service of Dr. Garcia .               Follow-up Information    None         Current Discharge Medication List        CONTINUE these medications which have NOT CHANGED    Details   acetaminophen (TYLENOL) 500 mg tablet Take 1 tablet (500 mg total) by mouth every 6 (six) hours as needed for mild pain  Qty: 30 tablet, Refills: 0    Associated Diagnoses: Abrasion of left hand and fingers, initial encounter; Rib pain      dicyclomine (BENTYL) 20 mg tablet Take 1 tablet (20 mg total) by mouth 4 (four) times a day (before meals and at bedtime) for 3 days, THEN 1 tablet (20 mg total) 4 (four) times a day as needed (abdominal pain or cramping).  Qty: 60 tablet, Refills: 0    Associated Diagnoses: Left sided abdominal pain      famotidine (PEPCID) 20 mg tablet Take 1 tablet (20 mg total) by mouth daily  Qty: 90 tablet, Refills: 0    Associated Diagnoses: Left sided abdominal pain      ondansetron (ZOFRAN) 4 mg tablet Take 1 tablet (4  mg total) by mouth every 8 (eight) hours as needed for nausea or vomiting  Qty: 20 tablet, Refills: 0    Associated Diagnoses: Left sided abdominal pain      docusate sodium (COLACE) 100 mg capsule Take 1 capsule (100 mg total) by mouth every 12 (twelve) hours  Qty: 60 capsule, Refills: 0    Associated Diagnoses: Constipation      polyethylene glycol (MIRALAX) 17 g packet Take 17 g by mouth daily  Qty: 30 each, Refills: 11    Associated Diagnoses: Constipation, unspecified constipation type             No discharge procedures on file.    PDMP Review       None            ED Provider  Electronically Signed by             Robert Brown MD  08/27/24 0831

## 2024-08-26 NOTE — ASSESSMENT & PLAN NOTE
-Exploratory laparotomy on 11/27/2023 was performed and the bezoar was removed, no bowel resection  -CT 11/26/23: Stomach is mildly distended with air and fluid. No intraluminal mass seen. There is a short segment of small bowel intussusception in the left upper abdomen involving proximal jejunal loops noted without focal lead point mass       PLAN  -surgery consult and see abdominal pain A/P.

## 2024-08-26 NOTE — ASSESSMENT & PLAN NOTE
-States smokes marijuana about once monthly, smoked prior to admission today    PLAN  -consider counseling upon discharge.

## 2024-08-27 LAB
ANION GAP SERPL CALCULATED.3IONS-SCNC: 6 MMOL/L (ref 4–13)
BUN SERPL-MCNC: 6 MG/DL (ref 5–25)
CALCIUM SERPL-MCNC: 9.2 MG/DL (ref 8.4–10.2)
CHLORIDE SERPL-SCNC: 104 MMOL/L (ref 96–108)
CO2 SERPL-SCNC: 33 MMOL/L (ref 21–32)
CREAT SERPL-MCNC: 0.92 MG/DL (ref 0.6–1.3)
GFR SERPL CREATININE-BSD FRML MDRD: 117 ML/MIN/1.73SQ M
GLUCOSE P FAST SERPL-MCNC: 86 MG/DL (ref 65–99)
GLUCOSE SERPL-MCNC: 86 MG/DL (ref 65–140)
POTASSIUM SERPL-SCNC: 3.6 MMOL/L (ref 3.5–5.3)
SODIUM SERPL-SCNC: 143 MMOL/L (ref 135–147)

## 2024-08-27 PROCEDURE — 80048 BASIC METABOLIC PNL TOTAL CA: CPT

## 2024-08-27 PROCEDURE — 99255 IP/OBS CONSLTJ NEW/EST HI 80: CPT | Performed by: SPECIALIST

## 2024-08-27 PROCEDURE — 99232 SBSQ HOSP IP/OBS MODERATE 35: CPT | Performed by: FAMILY MEDICINE

## 2024-08-27 RX ORDER — DICYCLOMINE HYDROCHLORIDE 10 MG/1
10 CAPSULE ORAL
Status: DISCONTINUED | OUTPATIENT
Start: 2024-08-27 | End: 2024-08-30 | Stop reason: HOSPADM

## 2024-08-27 RX ADMIN — DICYCLOMINE HYDROCHLORIDE 10 MG: 10 CAPSULE ORAL at 21:01

## 2024-08-27 RX ADMIN — HYDROMORPHONE HYDROCHLORIDE 0.5 MG: 1 INJECTION, SOLUTION INTRAMUSCULAR; INTRAVENOUS; SUBCUTANEOUS at 20:43

## 2024-08-27 RX ADMIN — DOCUSATE SODIUM 100 MG: 100 CAPSULE, LIQUID FILLED ORAL at 08:09

## 2024-08-27 RX ADMIN — POLYETHYLENE GLYCOL 3350 17 G: 17 POWDER, FOR SOLUTION ORAL at 08:09

## 2024-08-27 RX ADMIN — KETOROLAC TROMETHAMINE 15 MG: 30 INJECTION, SOLUTION INTRAMUSCULAR; INTRAVENOUS at 11:31

## 2024-08-27 RX ADMIN — SODIUM CHLORIDE 100 ML/HR: 0.9 INJECTION, SOLUTION INTRAVENOUS at 20:23

## 2024-08-27 RX ADMIN — DICYCLOMINE HYDROCHLORIDE 10 MG: 10 CAPSULE ORAL at 17:13

## 2024-08-27 RX ADMIN — DOCUSATE SODIUM 100 MG: 100 CAPSULE, LIQUID FILLED ORAL at 17:13

## 2024-08-27 RX ADMIN — SODIUM CHLORIDE 100 ML/HR: 0.9 INJECTION, SOLUTION INTRAVENOUS at 10:21

## 2024-08-27 RX ADMIN — KETOROLAC TROMETHAMINE 15 MG: 30 INJECTION, SOLUTION INTRAMUSCULAR; INTRAVENOUS at 18:32

## 2024-08-27 NOTE — PLAN OF CARE
Problem: Nutrition/Hydration-ADULT  Goal: Nutrient/Hydration intake appropriate for improving, restoring or maintaining nutritional needs  Description: Monitor and assess patient's nutrition/hydration status for malnutrition. Collaborate with interdisciplinary team and initiate plan and interventions as ordered.  Monitor patient's weight and dietary intake as ordered or per policy. Utilize nutrition screening tool and intervene as necessary. Determine patient's food preferences and provide high-protein, high-caloric foods as appropriate.     INTERVENTIONS:  - Monitor oral intake, urinary output, labs, and treatment plans  - Assess nutrition and hydration status and recommend course of action  - Evaluate amount of meals eaten  - Assist patient with eating if necessary   - Allow adequate time for meals  - Recommend/ encourage appropriate diets, oral nutritional supplements, and vitamin/mineral supplements  - Order, calculate, and assess calorie counts as needed  - Recommend, monitor, and adjust tube feedings and TPN/PPN based on assessed needs  - Assess need for intravenous fluids  - Provide specific nutrition/hydration education as appropriate  - Include patient/family/caregiver in decisions related to nutrition  Outcome: Progressing      WDL

## 2024-08-27 NOTE — QUICK NOTE
Patient was seen and assessed at bedside.   Vital signs within normal limits.   Patient was stable, pleasant, speaking comfortably. Does endorse continuous generalized abdominal pain and mild clear vomiting.    Answered all questions.  Updated on plan, amenable to pain control, zofran, and waiting for surgery consult tomorrow.   General tenderness to palpation jakob. No rigidity or rebound tenderness.  Denies any SOB ,chest pain, or difficulty breathing.     Will continue to monitor overnight and revaluate in the morning.

## 2024-08-27 NOTE — CASE MANAGEMENT
Case Management Assessment & Discharge Planning Note    Patient name Reji Shi  Location 7T [facility] 709/[facility]T [facility] 709-01 MRN [id number]  : 2000 Date 2024       Current Admission Date: 2024  Current Admission Diagnosis:History of intussusception   Patient Active Problem List    Diagnosis Date Noted Date Diagnosed    History of intussusception 2024     Abdominal Pain 2024     Rectal bleeding 2024     Other constipation 2024     Inguinal lymphadenopathy 2024     Unintentional weight loss 2024     Hypokalemia 2024     Marijuana smoker, episodic 2023     Intussusception (HCC) 2023     Palpitations 2021     Depression 2021       LOS (days): 0  Geometric Mean LOS (GMLOS) (days):   Days to GMLOS:     OBJECTIVE:        Current admission status: Observation    Preferred Pharmacy:   [id number] - [place] - [address] [place] [address]  [address] [place] [address] [address]  [facility] [address]  Phone: [phone] Fax: [phone]    Primary Care Provider: SHIRA Price    Primary Insurance: [id number]  Secondary Insurance:     ASSESSMENT:  Active Health Care Proxies       [facility] Care Representative - Sister, Legal Guardian   Primary Phone: [phone] (Mobile)                 Readmission Root Cause  30 Day Readmission: No    Patient Information  Admitted from:: Home  Mental Status: Alert  During Assessment patient was accompanied by: Not accompanied during assessment  Assessment information provided by:: Patient  Primary Caregiver: Self  Support Systems: Self, Parent, Friend, Family members, Spouse/significant other  County of Residence: [place]  What city do you live in?: [place]  Home entry access options. Select all that apply.: Stairs  Number of steps to enter home.:  (2 flights)  Type of Current Residence: Apartment  Floor Level: 3  Upon entering residence, is there a bedroom on the main floor (no  further steps)?: Yes  Upon entering residence, is there a bathroom on the main floor (no further steps)?: Yes  Living Arrangements: Lives w/ Spouse/significant other  Is patient a ?: No    Activities of Daily Living Prior to Admission  Functional Status: Independent  Completes ADLs independently?: Yes  Ambulates independently?: Yes  Does patient use assisted devices?: No  Does patient currently own DME?: No  Does patient have a history of Outpatient Therapy (PT/OT)?: No  Does the patient have a history of Short-Term Rehab?: No  Does patient have a history of HHC?: No  Does patient currently have HHC?: No    Patient Information Continued  Income Source: Employed  Does patient have prescription coverage?: Yes  Does patient receive dialysis treatments?: No  Does patient have a history of substance abuse?: No  Does patient have a history of Mental Health Diagnosis?: No      Means of Transportation  Means of Transport to Appts:: Drives Self    Social Determinants of Health (SDOH)      Flowsheet Row Most Recent Value   Housing Stability    In the last 12 months, was there a time when you were not able to pay the mortgage or rent on time? N   In the past 12 months, how many times have you moved where you were living? 1   At any time in the past 12 months, were you homeless or living in a shelter (including now)? N   Transportation Needs    In the past 12 months, has lack of transportation kept you from medical appointments or from getting medications? no   Food Insecurity    Within the past 12 months, you worried that your food would run out before you got the money to buy more. Never true   Within the past 12 months, the food you bought just didn't last and you didn't have money to get more. Never true   Utilities    In the past 12 months has the electric, gas, oil, or water company threatened to shut off services in your home? No          DISCHARGE DETAILS:    Discharge planning discussed with:: Patient  Freedom  of Choice: Yes     CM contacted family/caregiver?: No- see comments (AAOx 3)    Would you like to participate in our [facility] service program?  : No - Declined     Additional Comments: Met with patient at bedside and CM assessmnet completed.  CM department following thru discharge

## 2024-08-27 NOTE — NURSING NOTE
Pt slept well, medicated once with toradol 15mg IV for abdominl pain with relief.  Pt remains NPO.  Call bell with in reach continue to monitor.

## 2024-08-27 NOTE — PLAN OF CARE
Problem: Nutrition/Hydration-ADULT  Goal: Nutrient/Hydration intake appropriate for improving, restoring or maintaining nutritional needs  Description: Monitor and assess patient's nutrition/hydration status for malnutrition. Collaborate with interdisciplinary team and initiate plan and interventions as ordered.  Monitor patient's weight and dietary intake as ordered or per policy. Utilize nutrition screening tool and intervene as necessary. Determine patient's food preferences and provide high-protein, high-caloric foods as appropriate.     INTERVENTIONS:  - Monitor oral intake, urinary output, labs, and treatment plans  - Assess nutrition and hydration status and recommend course of action  - Evaluate amount of meals eaten  - Assist patient with eating if necessary   - Allow adequate time for meals  - Recommend/ encourage appropriate diets, oral nutritional supplements, and vitamin/mineral supplements  - Order, calculate, and assess calorie counts as needed  - Recommend, monitor, and adjust tube feedings and TPN/PPN based on assessed needs  - Assess need for intravenous fluids  - Provide specific nutrition/hydration education as appropriate  - Include patient/family/caregiver in decisions related to nutrition  Outcome: Progressing     Problem: PAIN - ADULT  Goal: Verbalizes/displays adequate comfort level or baseline comfort level  Description: Interventions:  - Encourage patient to monitor pain and request assistance  - Assess pain using appropriate pain scale  - Administer analgesics based on type and severity of pain and evaluate response  - Implement non-pharmacological measures as appropriate and evaluate response  - Consider cultural and social influences on pain and pain management  - Notify physician/advanced practitioner if interventions unsuccessful or patient reports new pain  Outcome: Progressing     Problem: INFECTION - ADULT  Goal: Absence or prevention of progression during  hospitalization  Description: INTERVENTIONS:  - Assess and monitor for signs and symptoms of infection  - Monitor lab/diagnostic results  - Monitor all insertion sites, i.e. indwelling lines, tubes, and drains  - Monitor endotracheal if appropriate and nasal secretions for changes in amount and color  - [place] appropriate cooling/warming therapies per order  - Administer medications as ordered  - Instruct and encourage patient and family to use good hand hygiene technique  - Identify and instruct in appropriate isolation precautions for identified infection/condition  Outcome: Progressing     Problem: DISCHARGE PLANNING  Goal: Discharge to home or other facility with appropriate resources  Description: INTERVENTIONS:  - Identify barriers to discharge w/patient and caregiver  - Arrange for needed discharge resources and transportation as appropriate  - Identify discharge learning needs (meds, wound care, etc.)  - Arrange for interpretive services to assist at discharge as needed  - Refer to Case Management Department for coordinating discharge planning if the patient needs post-hospital services based on physician/advanced practitioner order or complex needs related to functional status, cognitive ability, or social support system  Outcome: Progressing

## 2024-08-27 NOTE — PROGRESS NOTES
Progress Note    Reji Shi 24 y.o. male MRN: [id number]  Unit/Bed#: 7T [facility] 709-01 Encounter: [id number]  Admitting Physician: Rebecca Fay Kab-Perlman, MD  PCP: SHIRA Price  Date of Admission:  8/26/2024 11:20 AM    Assessment and Plan    Abdominal Pain  Assessment & Plan  -In the setting of history of intussusception back in November 2023 confirmed by diagnostic laparoscopy  -Current pain regimen includes 15 mg scheduled ketorolac and 0.5 mg Dilaudid as needed every 4 hours  -Overnight received a one-time dose of 0.5 mg Dilaudid  -CT abd/pelvis 8/26.24 with moderate colonic stool burden  -States pain is similar to when he had his initial intussusception  -As intussusception can be intermittent it is possible this pain represents repeat intussusception  -No bloody stools  -Currently passing gas, last stool was yesterday prior to presenting to the ED  -Thus far has had to clear white liquid episodes of vomitus: one in the ED  yesterday and one this morning    PLAN  -Continue scheduled ketorolac and as needed Dilaudid  -Maintenance IV fluid  -maintain NPO status until seen by surgery and obtain specialty input   -bowel regimen of mirilax and colace    History of intussusception  Assessment & Plan  -Exploratory laparotomy on 11/27/2023 was performed and the bezoar was removed, no bowel resection  -CT 11/26/23: Stomach is mildly distended with air and fluid. No intraluminal mass seen. There is a short segment of small bowel intussusception in the left upper abdomen involving proximal jejunal loops noted without focal lead point mass     PLAN  -surgery consult and see abdominal pain A/P    Marijuana smoker, episodic  Assessment & Plan  -States smokes marijuana about once monthly, smoked prior to admission today    PLAN  -consider counseling upon discharge        VTE Pharmacologic Prophylaxis: VTE Score: 0 Low Risk (Score 0-2) - Encourage Ambulation.    Patient Centered Rounds: I have performed bedside rounds  with nursing staff today.    Discussions with Specialists or Other Care Team Provider: none    Education and Discussions with Family / Patient: Patient  Patient Information Sharing: With the consent of Reji Shi , their loved ones (sister Alexandra # [phone]) were notified today by inpatient team of the patient’s condition and current plan.  All questions answered.     Time Spent for Care: 30 minutes.  More than 50% of total time spent on counseling and coordination of care as described above.    Current Length of Stay: 0 day(s)    Current Patient Status: Observation   Certification Statement: The patient will continue to require additional inpatient hospital stay due to continued abdominal pain in the setting of history of intussusception    Discharge Plan: Once pain has improved and after surgery consult input    Code Status: Prior      Subjective:   Reji was found laying in bed comfortably in the morning, reporting improvement in pain however later in the afternoon the pain had worsened.  He had a second episode of vomiting in the morning which was again watery and clear with 1 speckles of blood.  He states he has not had any bloody bowel movements or dark red-brown bowel movements.  He clarifies that this pain has been intermittently ongoing since his original diagnosis back in 2023.  Overnight he received two 15 mg doses of ketorolac and one 0.5 mg dose of Dilaudid to help manage his pain.    Objective:     Vitals:   Temp (24hrs), Av °F (36.7 °C), Min:97.8 °F (36.6 °C), Max:98.2 °F (36.8 °C)    Temp:  [97.8 °F (36.6 °C)-98.2 °F (36.8 °C)] 98.2 °F (36.8 °C)  HR:  [57-74] 66  Resp:  [16] 16  BP: (109-129)/(58-76) 111/58  SpO2:  [96 %-100 %] 96 %  Body mass index is 20.8 kg/m².     Input and Output Summary (last 24 hours):     No intake or output data in the 24 hours ending 24 [id number]    Physical Exam:     Physical Exam  Constitutional:       Appearance: Normal appearance. He is normal  weight.   HENT:      Head: Normocephalic and atraumatic.      Right Ear: External ear normal.      Left Ear: External ear normal.      Nose: Nose normal.      Mouth/Throat:      Mouth: Mucous membranes are moist.      Pharynx: Oropharynx is clear.   Eyes:      Conjunctiva/sclera: Conjunctivae normal.   Cardiovascular:      Rate and Rhythm: Normal rate and regular rhythm.      Pulses: Normal pulses.      Heart sounds: Normal heart sounds. No murmur heard.     No friction rub. No gallop.   Pulmonary:      Effort: Pulmonary effort is normal.      Breath sounds: Normal breath sounds. No wheezing, rhonchi or rales.   Abdominal:      General: There is no distension.      Palpations: There is no mass.      Tenderness: There is abdominal tenderness (in the morning hypogastric region tenderness and in the afternoon pain predominantly in left lower quadrant). There is rebound. There is no right CVA tenderness, left CVA tenderness or guarding.      Hernia: No hernia is present.   Musculoskeletal:         General: Normal range of motion.      Cervical back: Normal range of motion.   Skin:     General: Skin is warm.      Capillary Refill: Capillary refill takes less than 2 seconds.      Findings: No rash.   Neurological:      Mental Status: He is alert and oriented to person, place, and time.   Psychiatric:         Mood and Affect: Mood normal.       Additional Data:     Labs:    Results from last 7 days   Lab Units 08/26/24  1200   WBC Thousand/uL 5.55   HEMOGLOBIN g/dL 15.3   HEMATOCRIT % 44.1   PLATELETS Thousands/uL 326   SEGS PCT % 58   LYMPHO PCT % 32   MONO PCT % 5   EOS PCT % 4     Results from last 7 days   Lab Units 08/27/24  0443 08/26/24  1200   POTASSIUM mmol/L 3.6 4.0   CHLORIDE mmol/L 104 103   CO2 mmol/L 33* 31   BUN mg/dL 6 6   CREATININE mg/dL 0.92 0.95   CALCIUM mg/dL 9.2 9.3   ALK PHOS U/L  --  58   ALT U/L  --  9   AST U/L  --  13                     * I Have Reviewed All Lab Data Listed Above.  * Additional  Pertinent Lab Tests Reviewed: All Labs Within Last 24 Hours Reviewed    Imaging:    Imaging Reports Reviewed Today Include: CT abdomen and pelvis from 8/26/24  Imaging Personally Reviewed by Myself Includes: none    Recent Cultures (last 7 days):           Last 24 Hours Medication List:   Current Facility-Administered Medications   Medication Dose Route Frequency Provider Last Rate    docusate sodium  100 mg Oral BID Rebecca Fay Kab-Perlman, MD      HYDROmorphone  0.5 mg Intravenous Q4H PRN Rebecca Fay Kab-Perlman, MD      iohexol  50 mL Oral Once in imaging Robert Brown MD      ketorolac  15 mg Intravenous Q6H PRN Rebecca Fay Kab-Perlman, MD      ondansetron  4 mg Intravenous Q4H PRN Rebecca Fay Kab-Perlman, MD      polyethylene glycol  17 g Oral Daily Rebecca Fay Kab-Perlman, MD      sodium chloride  100 mL/hr Intravenous Continuous Rebecca Fay Kab-Perlman,  mL/hr (08/27/24 1021)         Rebecca Fay Kab-Perlman, MD  08/27/24  3:43 PM

## 2024-08-27 NOTE — CONSULTS
"Chief Complaint: Abdominal pain      History of Present Illness: Patient is a 24-year-old black male who presented to the emergency room at [facility] with severe abdominal pain.  He has a history of an intussusception in October 2023.  At that time he underwent a diagnostic laparoscopy and presumably a reduction of the intussusception.  He was discharged in 5 days later who presented to the emergency room with once again severe generalized abdominal pain.    He presented to the [facility] with severe abdominal pain.  At that time his laboratory values were essentially normal with a normal white count and no left shift.  He had a CT scan that was read as \"no bowel obstruction or intussusception \".  \"Moderate colonic stool burden \".  Apparently had intractable abdominal pain and was admitted to the medical service for further diagnosis and treatment.    He was seen in the [facility] floor today and was sleeping.  He was easily aroused and at that time complained of left lower quadrant abdominal pain.  No nausea or vomiting.  He had a normal bowel movement.      Past Medical History:   Past Medical History:   Diagnosis Date    No known health problems          Past Surgical History:    Past Surgical History:   Procedure Laterality Date    COLON SURGERY      MI LAPS ABD PRTM&OMENTUM DX W/WO SPEC BR/WA SPX N/A 11/27/2023    Procedure: LAPAROSCOPY DIAGNOSTIC;  Surgeon: Em Yeboah MD;  Location: [facility];  Service: General         Allergies:  No Known Allergies      Medications:    Current Facility-Administered Medications:     dicyclomine (BENTYL) capsule 10 mg, 10 mg, Oral, 4x Daily (AC & HS), Rebecca Fay Kab-Perlman, MD, 10 mg at 08/27/24 1713    docusate sodium (COLACE) capsule 100 mg, 100 mg, Oral, BID, Rebecca Fay Kab-Perlman, MD, 100 mg at 08/27/24 1713    HYDROmorphone (DILAUDID) injection 0.5 mg, 0.5 mg, Intravenous, Q4H PRN, Rebecca Fay Kab-Perlman, MD, 0.5 mg " at 08/26/24 1838    iohexol (OMNIPAQUE) 240 MG/ML solution 50 mL, 50 mL, Oral, Once in imaging, Robert Brown MD    ketorolac (TORADOL) injection 15 mg, 15 mg, Intravenous, Q6H PRN, Rebecca Fay Kab-Perlman, MD, 15 mg at 08/27/24 1832    ondansetron (ZOFRAN) injection 4 mg, 4 mg, Intravenous, Q4H PRN, Rebecca Fay Kab-Perlman, MD    polyethylene glycol (MIRALAX) packet 17 g, 17 g, Oral, Daily, Rebecca Fay Kab-Perlman, MD, 17 g at 08/27/24 0809    sodium chloride 0.9 % infusion, 100 mL/hr, Intravenous, Continuous, Rebecca Fay Kab-Perlman, MD, Last Rate: 100 mL/hr at 08/27/24 1021, 100 mL/hr at 08/27/24 1021      Social History:  Social History     Social History     Substance and Sexual Activity   Alcohol Use Not Currently     Social History     Substance and Sexual Activity   Drug Use Yes    Types: Marijuana    Comment: last use ~1month     Social History     Tobacco Use   Smoking Status Former    Types: Cigars   Smokeless Tobacco Never         Family History:  History reviewed. No pertinent family history.      Review of Systems:    Please refer to the formal H&P.    Vitals:  Vitals:    08/27/24 1524   BP: 111/58   Pulse: 66   Resp: 16   Temp: 98.2 °F (36.8 °C)   SpO2: 96%       Physical Exam:  Slim young adult black male awake alert and in no apparent distress.    Vital signs as above.    Abdomen flat soft no tenderness noted.  Laparoscopic incisions are noted and healed.  No mass no hernia.      Lab Results: I have personally reviewed pertinent reports. See below.  Imaging: I have personally reviewed pertinent imaging studies primarily CT scan of the abdomen and pelvis.  I also reviewed the previous operative report from his surgery in October 2023.  EKG, Pathology, and Other Studies: I have personally reviewed pertinent reports.     Admission on 08/26/2024   Component Date Value    WBC 08/26/2024 5.55     RBC 08/26/2024 4.73     Hemoglobin 08/26/2024 15.3     Hematocrit 08/26/2024 44.1     MCV 08/26/2024 93      MCH 08/26/2024 32.3     MCHC 08/26/2024 34.7     RDW 08/26/2024 11.9     MPV 08/26/2024 8.6 (L)     Platelets 08/26/2024 326     nRBC 08/26/2024 0     Segmented % 08/26/2024 58     Immature Grans % 08/26/2024 0     Lymphocytes % 08/26/2024 32     Monocytes % 08/26/2024 5     Eosinophils Relative 08/26/2024 4     Basophils Relative 08/26/2024 1     Absolute Neutrophils 08/26/2024 3.23     Absolute Immature Grans 08/26/2024 0.02     Absolute Lymphocytes 08/26/2024 1.79     Absolute Monocytes 08/26/2024 0.27     Eosinophils Absolute 08/26/2024 0.21     Basophils Absolute 08/26/2024 0.03     Sodium 08/26/2024 140     Potassium 08/26/2024 4.0     Chloride 08/26/2024 103     CO2 08/26/2024 31     ANION GAP 08/26/2024 6     BUN 08/26/2024 6     Creatinine 08/26/2024 0.95     Glucose 08/26/2024 92     Calcium 08/26/2024 9.3     AST 08/26/2024 13     ALT 08/26/2024 9     Alkaline Phosphatase 08/26/2024 58     Total Protein 08/26/2024 6.5     Albumin 08/26/2024 4.2     Total Bilirubin 08/26/2024 0.40     eGFR 08/26/2024 113     Lipase 08/26/2024 18     Sodium 08/27/2024 143     Potassium 08/27/2024 3.6     Chloride 08/27/2024 104     CO2 08/27/2024 33 (H)     ANION GAP 08/27/2024 6     BUN 08/27/2024 6     Creatinine 08/27/2024 0.92     Glucose 08/27/2024 86     Glucose, Fasting 08/27/2024 86     Calcium 08/27/2024 9.2     eGFR 08/27/2024 117          Impression:  Intractable abdominal pain.  No leukocytosis.  No specific abnormality on CT scan.  I do agree with a colonic stool burden and feel it is more than moderate.  No surgical abnormalities are noted at this time.  I believe his findings are functional and he needs to be evaluated by GI.  I also believe that he has chronic constipation.  I am not convinced that he had an intussusception at the time of his diagnostic laparoscopy.    Plan:  As per the above impression.  No surgical issues at this time.  Would recommend starting antispasmodics.  Start clear liquids and see  how he does.  I will follow this patient along with you.  Thank you for this consult.

## 2024-08-28 LAB
ALBUMIN SERPL BCG-MCNC: 3.7 G/DL (ref 3.5–5)
ALP SERPL-CCNC: 60 U/L (ref 34–104)
ALT SERPL W P-5'-P-CCNC: 8 U/L (ref 7–52)
ANION GAP SERPL CALCULATED.3IONS-SCNC: 5 MMOL/L (ref 4–13)
AST SERPL W P-5'-P-CCNC: 11 U/L (ref 13–39)
BILIRUB SERPL-MCNC: 0.38 MG/DL (ref 0.2–1)
BUN SERPL-MCNC: 4 MG/DL (ref 5–25)
CALCIUM SERPL-MCNC: 8.8 MG/DL (ref 8.4–10.2)
CHLORIDE SERPL-SCNC: 107 MMOL/L (ref 96–108)
CO2 SERPL-SCNC: 29 MMOL/L (ref 21–32)
CREAT SERPL-MCNC: 0.95 MG/DL (ref 0.6–1.3)
GFR SERPL CREATININE-BSD FRML MDRD: 113 ML/MIN/1.73SQ M
GLUCOSE P FAST SERPL-MCNC: 97 MG/DL (ref 65–99)
GLUCOSE SERPL-MCNC: 97 MG/DL (ref 65–140)
HEMOCCULT STL QL: NEGATIVE
HEMOCCULT STL QL: NORMAL
HEMOCCULT STL QL: NORMAL
POTASSIUM SERPL-SCNC: 3.6 MMOL/L (ref 3.5–5.3)
PROT SERPL-MCNC: 5.8 G/DL (ref 6.4–8.4)
SODIUM SERPL-SCNC: 141 MMOL/L (ref 135–147)

## 2024-08-28 PROCEDURE — 99232 SBSQ HOSP IP/OBS MODERATE 35: CPT | Performed by: FAMILY MEDICINE

## 2024-08-28 PROCEDURE — 80053 COMPREHEN METABOLIC PANEL: CPT

## 2024-08-28 PROCEDURE — 82272 OCCULT BLD FECES 1-3 TESTS: CPT

## 2024-08-28 PROCEDURE — 99222 1ST HOSP IP/OBS MODERATE 55: CPT | Performed by: INTERNAL MEDICINE

## 2024-08-28 RX ORDER — BISACODYL 5 MG/1
20 TABLET, DELAYED RELEASE ORAL ONCE
Status: COMPLETED | OUTPATIENT
Start: 2024-08-29 | End: 2024-08-29

## 2024-08-28 RX ORDER — POLYETHYLENE GLYCOL 3350 17 G/17G
238 POWDER, FOR SOLUTION ORAL ONCE
Status: DISCONTINUED | OUTPATIENT
Start: 2024-08-28 | End: 2024-08-28

## 2024-08-28 RX ORDER — POLYETHYLENE GLYCOL 3350 17 G/17G
238 POWDER, FOR SOLUTION ORAL ONCE
Status: COMPLETED | OUTPATIENT
Start: 2024-08-28 | End: 2024-08-28

## 2024-08-28 RX ORDER — ACETAMINOPHEN 325 MG/1
975 TABLET ORAL EVERY 8 HOURS SCHEDULED
Status: CANCELLED | OUTPATIENT
Start: 2024-08-28

## 2024-08-28 RX ORDER — HYDROMORPHONE HCL/PF 1 MG/ML
0.5 SYRINGE (ML) INJECTION EVERY 4 HOURS PRN
Status: DISCONTINUED | OUTPATIENT
Start: 2024-08-28 | End: 2024-08-30 | Stop reason: HOSPADM

## 2024-08-28 RX ORDER — ACETAMINOPHEN 325 MG/1
975 TABLET ORAL EVERY 12 HOURS
Status: DISCONTINUED | OUTPATIENT
Start: 2024-08-28 | End: 2024-08-30 | Stop reason: HOSPADM

## 2024-08-28 RX ORDER — HYDROMORPHONE HCL/PF 1 MG/ML
1 SYRINGE (ML) INJECTION EVERY 4 HOURS PRN
Status: DISCONTINUED | OUTPATIENT
Start: 2024-08-28 | End: 2024-08-28

## 2024-08-28 RX ADMIN — HYDROMORPHONE HYDROCHLORIDE 1 MG: 1 INJECTION, SOLUTION INTRAMUSCULAR; INTRAVENOUS; SUBCUTANEOUS at 12:55

## 2024-08-28 RX ADMIN — DICYCLOMINE HYDROCHLORIDE 10 MG: 10 CAPSULE ORAL at 10:49

## 2024-08-28 RX ADMIN — DICYCLOMINE HYDROCHLORIDE 10 MG: 10 CAPSULE ORAL at 22:04

## 2024-08-28 RX ADMIN — KETOROLAC TROMETHAMINE 15 MG: 30 INJECTION, SOLUTION INTRAMUSCULAR; INTRAVENOUS at 15:29

## 2024-08-28 RX ADMIN — POLYETHYLENE GLYCOL 3350 238 G: 17 POWDER, FOR SOLUTION ORAL at 17:39

## 2024-08-28 RX ADMIN — SODIUM CHLORIDE 100 ML/HR: 0.9 INJECTION, SOLUTION INTRAVENOUS at 06:40

## 2024-08-28 RX ADMIN — ACETAMINOPHEN 975 MG: 325 TABLET ORAL at 09:50

## 2024-08-28 RX ADMIN — HYDROMORPHONE HYDROCHLORIDE 0.5 MG: 1 INJECTION, SOLUTION INTRAMUSCULAR; INTRAVENOUS; SUBCUTANEOUS at 09:02

## 2024-08-28 RX ADMIN — ACETAMINOPHEN 975 MG: 325 TABLET ORAL at 20:51

## 2024-08-28 RX ADMIN — DICYCLOMINE HYDROCHLORIDE 10 MG: 10 CAPSULE ORAL at 08:00

## 2024-08-28 RX ADMIN — DICYCLOMINE HYDROCHLORIDE 10 MG: 10 CAPSULE ORAL at 15:29

## 2024-08-28 RX ADMIN — KETOROLAC TROMETHAMINE 15 MG: 30 INJECTION, SOLUTION INTRAMUSCULAR; INTRAVENOUS at 08:03

## 2024-08-28 RX ADMIN — DOCUSATE SODIUM 100 MG: 100 CAPSULE, LIQUID FILLED ORAL at 18:06

## 2024-08-28 NOTE — QUICK NOTE
Today morning states had a bowel movement yesterday which had a little bit of dark brown blood on it.  He is in a lot of pain this morning and is going to receive half a milligram of Dilaudid.  Physical exam significant for left lower quadrant pain.  Placing consult to GI and informing surgery.

## 2024-08-28 NOTE — CONSULTS
Consultation -  [facility]  Reji Shi 24 y.o. male MRN: [id number]  Unit/Bed#: 7T [facility] 709-01 Encounter: [id number]        Inpatient consult to gastroenterology  Consult performed by: Mike Staples MD  Consult ordered by: Rebecca Fay Kab-Perlman, MD          Reason for Consult / Principal Problem:     Abdominal pain, constipation, hematochezia      ASSESSMENT AND PLAN:      24-year-old with history of small bowel intussusception status post diagnostic laparoscopy in November 2023, marijuana use, constipation currently admitted with worsening abdominal pain and CT showing high stool burden.    1.  Left lower quadrant abdominal pain  2.  Hematochezia  3.  History of constipation    Patient requiring opioids during current hospitalization.  Continues to have abdominal pain and is tender to touch.  General surgery evaluated.  No evidence of bowel obstruction or intussusception.  Given hematochezia and history of constipation, may have underlying IBD, polyps or rectal tumors with ulceration.  Could also have functional symptoms.  He was seen on outpatient by GI and plan for was made for colonoscopy due to the symptoms.  Colonoscopy last year was incomplete due to inadequate prep.  The scope could not be advanced further than the rectum due to solid stool.  Repeat colonoscopy was ordered but not done yet due to lack of insurance in the past.  I discussed with patient about investigating with colonoscopy.  He is agreeable.  Will order 2-day prep and plan for colonoscopy on Friday later this week.  Continue management of constipation with current bowel regimen.  Monitor hemoglobin and signs of overt GI bleed.  Minimize opioids.  Discussed plan with primary team.  We will follow.    Mike Staples MD  Gastroenterology  [facility]  Date: August 28, 2024      ______________________________________________________________________    HPI: 24-year-old with history of small  bowel intussusception status post diagnostic laparoscopy in November 2023, marijuana use, constipation currently admitted with worsening abdominal pain and CT showing high stool burden.    Patient reports left lower quadrant pain which is chronic but has been worsening and lead to his hospitalization.  He has been requiring opioids during current hospitalization.  He currently denies constipation but has had hard stool in the past.  He reports blood in the stool that started yesterday.  He has had blood in stool in the past as well.  No nausea or vomiting.  Weight is stable over the past year.  No family history of colon cancer.  Denies any cigarette smoking or alcohol use.  Does admit to intermittent marijuana use.    CT during current hospitalization showed moderate stool burden.  CBC done yesterday was normal.  Lipase renal and liver tests are normal as well.      REVIEW OF SYSTEMS:    CONSTITUTIONAL: Denies any fever, chills, rigors, and weight loss.  HEENT: No earache or tinnitus. Denies hearing loss or visual disturbances.  CARDIOVASCULAR: No chest pain or palpitations.   RESPIRATORY: Denies any cough, hemoptysis, shortness of breath or dyspnea on exertion.  GASTROINTESTINAL: As noted in the History of Present Illness.   GENITOURINARY: No problems with urination. Denies any hematuria or dysuria.  NEUROLOGIC: No dizziness or vertigo, denies headaches.   MUSCULOSKELETAL: Denies any muscle or joint pain.   SKIN: Denies skin rashes or itching.   ENDOCRINE: Denies excessive thirst. Denies intolerance to heat or cold.  PSYCHOSOCIAL: Denies depression or anxiety. Denies any recent memory loss.       Historical Information   Past Medical History:   Diagnosis Date    No known health problems      Past Surgical History:   Procedure Laterality Date    COLON SURGERY      [place] LAPS ABD PRTM&OMENTUM DX W/WO SPEC BR/WA SPX N/A 11/27/2023    Procedure: LAPAROSCOPY DIAGNOSTIC;  Surgeon: Em Yeboah MD;  Location: [facility]  "OR;  Service: General     Social History   Social History     Substance and Sexual Activity   Alcohol Use Not Currently     Social History     Substance and Sexual Activity   Drug Use Yes    Types: Marijuana    Comment: last use ~1month     Social History     Tobacco Use   Smoking Status Former    Types: Cigars   Smokeless Tobacco Never     History reviewed. No pertinent family history.    Meds/Allergies       Medications Prior to Admission:     acetaminophen (TYLENOL) 500 mg tablet    dicyclomine (BENTYL) 20 mg tablet    famotidine (PEPCID) 20 mg tablet    ondansetron (ZOFRAN) 4 mg tablet    docusate sodium (COLACE) 100 mg capsule    polyethylene glycol (MIRALAX) 17 g packet  Current Facility-Administered Medications   Medication Dose Route Frequency    acetaminophen (TYLENOL) tablet 975 mg  975 mg Oral Q12H    [START ON 8/29/2024] bisacodyl (DULCOLAX) EC tablet 20 mg  20 mg Oral Once    dicyclomine (BENTYL) capsule 10 mg  10 mg Oral 4x Daily (AC & HS)    docusate sodium (COLACE) capsule 100 mg  100 mg Oral BID    HYDROmorphone (DILAUDID) injection 1 mg  1 mg Intravenous Q4H PRN    iohexol (OMNIPAQUE) 240 MG/ML solution 50 mL  50 mL Oral Once in imaging    ketorolac (TORADOL) injection 15 mg  15 mg Intravenous Q6H PRN    ondansetron (ZOFRAN) injection 4 mg  4 mg Intravenous Q4H PRN    polyethylene glycol (GLYCOLAX) bowel prep 238 g  238 g Oral Once    [START ON 8/29/2024] polyethylene glycol (GOLYTELY) bowel prep 4,000 mL  4,000 mL Oral Once    polyethylene glycol (MIRALAX) packet 17 g  17 g Oral Daily    sodium chloride 0.9 % infusion  100 mL/hr Intravenous Continuous       No Known Allergies        Objective     Blood pressure 107/67, pulse (!) 53, temperature (!) 97 °F (36.1 °C), temperature source Temporal, resp. rate 18, height 5' 9\" (1.753 m), weight 63.9 kg (140 lb 14 oz), SpO2 96%. Body mass index is 20.8 kg/m².      Intake/Output Summary (Last 24 hours) at 8/28/2024 1532  Last data filed at 8/28/2024 " 1315  Gross per 24 hour   Intake 3400 ml   Output --   Net 3400 ml         PHYSICAL EXAM:      General Appearance:   Alert, cooperative, no distress   HEENT:   Normocephalic, atraumatic, anicteric.     Neck:  Supple, symmetrical, trachea midline   Lungs:   Clear to auscultation bilaterally; no rales, rhonchi or wheezing; respirations unlabored    Heart::   Regular rate and rhythm; no murmur, rub, or gallop.   Abdomen:   Soft, non-tender, non-distended; normal bowel sounds; no masses, no organomegaly    Genitalia:   Deferred    Rectal:   Deferred    Extremities:  No cyanosis, clubbing or edema    Pulses:  2+ and symmetric all extremities    Skin:  No jaundice, rashes, or lesions    Lymph nodes:  No palpable cervical lymphadenopathy        Lab Results:   Admission on 08/26/2024   Component Date Value    WBC 08/26/2024 5.55     RBC 08/26/2024 4.73     Hemoglobin 08/26/2024 15.3     Hematocrit 08/26/2024 44.1     MCV 08/26/2024 93     MCH 08/26/2024 32.3     MCHC 08/26/2024 34.7     RDW 08/26/2024 11.9     MPV 08/26/2024 8.6 (L)     Platelets 08/26/2024 326     nRBC 08/26/2024 0     Segmented % 08/26/2024 58     Immature Grans % 08/26/2024 0     Lymphocytes % 08/26/2024 32     Monocytes % 08/26/2024 5     Eosinophils Relative 08/26/2024 4     Basophils Relative 08/26/2024 1     Absolute Neutrophils 08/26/2024 3.23     Absolute Immature Grans 08/26/2024 0.02     Absolute Lymphocytes 08/26/2024 1.79     Absolute Monocytes 08/26/2024 0.27     Eosinophils Absolute 08/26/2024 0.21     Basophils Absolute 08/26/2024 0.03     Sodium 08/26/2024 140     Potassium 08/26/2024 4.0     Chloride 08/26/2024 103     CO2 08/26/2024 31     ANION GAP 08/26/2024 6     BUN 08/26/2024 6     Creatinine 08/26/2024 0.95     Glucose 08/26/2024 92     Calcium 08/26/2024 9.3     AST 08/26/2024 13     ALT 08/26/2024 9     Alkaline Phosphatase 08/26/2024 58     Total Protein 08/26/2024 6.5     Albumin 08/26/2024 4.2     Total Bilirubin 08/26/2024  0.40     eGFR 08/26/2024 113     Lipase 08/26/2024 18     Sodium 08/27/2024 143     Potassium 08/27/2024 3.6     Chloride 08/27/2024 104     CO2 08/27/2024 33 (H)     ANION GAP 08/27/2024 6     BUN 08/27/2024 6     Creatinine 08/27/2024 0.92     Glucose 08/27/2024 86     Glucose, Fasting 08/27/2024 86     Calcium 08/27/2024 9.2     eGFR 08/27/2024 117     Sodium 08/28/2024 141     Potassium 08/28/2024 3.6     Chloride 08/28/2024 107     CO2 08/28/2024 29     ANION GAP 08/28/2024 5     BUN 08/28/2024 4 (L)     Creatinine 08/28/2024 0.95     Glucose 08/28/2024 97     Glucose, Fasting 08/28/2024 97     Calcium 08/28/2024 8.8     AST 08/28/2024 11 (L)     ALT 08/28/2024 8     Alkaline Phosphatase 08/28/2024 60     Total Protein 08/28/2024 5.8 (L)     Albumin 08/28/2024 3.7     Total Bilirubin 08/28/2024 0.38     eGFR 08/28/2024 113     Fecal Occult Blood Diagn* 08/28/2024 Negative     Fecal Occult Blood Diagn* 08/28/2024 Test not performed     Fecal Occult Blood Diagn* 08/28/2024 Test not performed        Imaging Studies: I have personally reviewed pertinent imaging studies.

## 2024-08-28 NOTE — QUICK NOTE
Patient seen and assessed during rounds.  Continues to endorse generalized abdominal pain but has improved since Dilaudid administration at 2043.  Denies nausea or vomiting.  Medical management discussed with patient and is amenable.  Vital signs reviewed and is stable.  Will continue to monitor closely overnight.

## 2024-08-28 NOTE — UTILIZATION REVIEW
"Initial Clinical Review    Pt initially admitted as Observation on 8/26. Changed to Inpatient on 8/28. Pt requiring continued stay d/t ongoing management of abdominal pain.    Admission: Date/Time/Statement:   Admission Orders (From admission, onward)       Ordered        08/28/24 1257  INPATIENT ADMISSION  Once            08/26/24 1624  Place in Observation  Once                          Orders Placed This Encounter   Procedures    INPATIENT ADMISSION     Standing Status:   Standing     Number of Occurrences:   1     Order Specific Question:   Level of Care     Answer:   Med Surg [16]     Order Specific Question:   Estimated length of stay     Answer:   More than 2 Midnights     Order Specific Question:   Certification     Answer:   I certify that inpatient services are medically necessary for this patient for a duration of greater than two midnights. See H&P and MD Progress Notes for additional information about the patient's course of treatment.     ED Arrival Information       Expected   -    Arrival   8/26/2024 11:02    Acuity   Urgent              Means of arrival   Walk-In    Escorted by   Family Member    Service   Family Medicine    Admission type   Emergency              Arrival complaint   Abdominal pain             Chief Complaint   Patient presents with    Abdominal Pain     Patient reports umbilical abdominal pain since Friday, denies nausea/vomiting or diarrhea. Using rx meds \"the pain keeps getting worse\".  Gastro appt on sept 5. Reporting 10 out 10pain now.       Initial Presentation: 24 y.o. male who presented self from home to [facility] ED. Admitted as Observation for evaluation and treatment of intractable abdominal pain. PMHx: intussusception (Nov 2023). Presented w/ chills, severe pain in abdomen that started on Friday. Started as intermittent but progressed to be untolerable. Took dicyclomine, oxycodone 15mg, famotidine 20mg, ondansetron 4mgm and tylenol 500mg and nothing made " him feel better. On exam, b/l conjunctivitis, hypogastric tenderness w/ rebound, increased bowel sounds, 1 episode of clear emesis. Imaging colonic stool burden. Plan: Ketorolac and PRN dilaudid, serial abdominal exams, IVF, NPO, bowel regimen.  IV Toradol x2 doses today, IV dilaudid 0.5 mg x1.    8/27: Reports improvement in pain this morning, but then worsened in the afternoon, another episode of vomiting. Exam: abdominal tenderness in LLQ w/ rebound. Plan: continue Toradol, PRN dilaudid, IVF, NPO, bowel regimen, supportive care, Trend labs, replete electrolytes as needed. General Surgery consulted. IV Toradol x2 doses today, IV dilaudid 0.5 mg x1.    General Surgery: Pt w/ stool burden. Believe chronic constipation. No surgical intervention. Recommend starting antispasmodics. Start clear liquids.      Date: 08/28/24   Changed to Inpatient Status: Reports pain improved to 6/10. Bowel movement overnight w/ fresh red blood. Does not report constipation. Tolerating liquids but notes poor appetite. Exam: abdominal tenderness to deep palpation, digital rectal exam unremarkable. Plan: continue Ketorolac, IVF, clear liquids, bowel regimen, increase PRN dilaudid dose for pain management, scheduled Tylenol. GI consulted.  IV Toradol x2 doses today, IV dilaudid 0.5 mg x1 and 1 mg x1.     GI: Pt requires colonoscopy. Requires 2 day prep as prior colonoscopy failed s/t inadequate prep. Trend H&H. Minimize opioids.      08/29/24 Day 2: Pt reports unchanged abdominal pain. Exam: abdomen tender w/ voluntary guarding. Plan: continue second day of bowel prep for colonoscopy tomorrow; dulcolax and golytely 4,000 mL. IVF, clear liquids, NPO after midnight, Trend labs, replete electrolytes as needed.    ED Triage Vitals   Temperature Pulse Respirations Blood Pressure SpO2 Pain Score   08/26/24 1121 08/26/24 1121 08/26/24 1121 08/26/24 1121 08/26/24 1121 08/26/24 1118   98 °F (36.7 °C) 77 16 132/72 99 % 10 - Worst Possible Pain     Wt  Readings from Last 1 Encounters:   08/26/24 63.9 kg (140 lb 14 oz)       Vital Signs (last 3 days)       Date/Time Temp Pulse Resp BP MAP (mmHg) SpO2 O2 Device Patient Position - Orthostatic VS [place]o Coma Scale Score Pain    08/29/24 1000 -- -- -- -- -- -- -- -- 15 --    08/29/24 0849 -- -- -- -- -- -- -- -- -- 7    08/29/24 0710 96.6 °F (35.9 °C) 51 18 112/67 -- 98 % None (Room air) Lying -- --    08/28/24 2329 97.6 °F (36.4 °C) 60 18 109/68 77 99 % None (Room air) Lying -- --    08/28/24 2051 -- -- -- -- -- -- -- -- -- 6    08/28/24 2000 -- -- -- -- -- -- -- -- 15 6    08/28/24 1529 97.9 °F (36.6 °C) 71 18 127/60 93 96 % None (Room air) Lying -- 10 - Worst Possible Pain    08/28/24 1255 -- -- -- -- -- -- -- -- -- 9 08/28/24 0950 -- -- -- -- -- -- -- -- -- 7    08/28/24 0902 -- -- -- -- -- -- -- -- -- 9 08/28/24 0803 -- -- -- -- -- -- -- -- -- 8 08/28/24 0719 97 °F (36.1 °C) 53 18 107/67 -- 96 % None (Room air) Lying -- --    08/27/24 2331 98.1 °F (36.7 °C) 69 16 120/62 71 97 % None (Room air) Lying -- --    08/27/24 2043 -- -- -- -- -- -- -- -- -- 8 08/27/24 1832 -- -- -- -- -- -- -- -- -- 9    08/27/24 1524 98.2 °F (36.8 °C) 66 16 111/58 67 96 % None (Room air) Lying -- --    08/27/24 1131 -- -- -- -- -- -- -- -- -- 9    08/27/24 1100 -- -- -- -- -- -- -- -- -- 7    08/27/24 0709 97.8 °F (36.6 °C) 74 16 129/71 83 98 % None (Room air) Lying -- --    08/26/24 2326 -- -- -- -- -- -- -- -- -- 9    08/26/24 2307 98 °F (36.7 °C) 62 16 124/72 89 97 % None (Room air) Lying -- 9 08/26/24 2015 -- -- -- -- -- -- -- -- -- 9 08/26/24 1900 97.8 °F (36.6 °C) 58 16 128/76 85 98 % None (Room air) Lying -- --    08/26/24 1838 -- -- -- -- -- -- -- -- -- 8 08/26/24 1734 -- 57 16 109/62 -- 100 % None (Room air) Lying -- --    08/26/24 1619 -- -- -- -- -- -- -- -- -- 8 08/26/24 1400 -- 75 18 127/74 -- 99 % None (Room air) Lying -- 4    08/26/24 1201 -- -- -- -- -- -- -- -- -- 10 - Worst Possible Pain     08/26/24 1121 98 °F (36.7 °C) 77 16 132/72 -- 99 % None (Room air) Sitting -- --    08/26/24 1118 -- -- -- -- -- -- -- -- -- 10 - Worst Possible Pain              Pertinent Labs/Diagnostic Test Results:   Radiology:  CT abdomen pelvis with contrast   Final Interpretation by Jayla Mccall MD (08/26 1542)      No bowel obstruction or intussusception. Moderate colonic stool burden         Workstation performed: [id number]               Results from last 7 days   Lab Units 08/29/24  0500 08/26/24  1200   WBC Thousand/uL 5.92 5.55   HEMOGLOBIN g/dL 15.9 15.3   HEMATOCRIT % 45.1 44.1   PLATELETS Thousands/uL 343 326   TOTAL NEUT ABS Thousands/µL 2.38 3.23         Results from last 7 days   Lab Units 08/29/24  0500 08/28/24  0508 08/27/24  0443 08/26/24  1200   SODIUM mmol/L 140 141 143 140   POTASSIUM mmol/L 3.9 3.6 3.6 4.0   CHLORIDE mmol/L 105 107 104 103   CO2 mmol/L 31 29 33* 31   ANION GAP mmol/L 4 5 6 6   BUN mg/dL 3* 4* 6 6   CREATININE mg/dL 0.93 0.95 0.92 0.95   EGFR ml/min/1.73sq m 116 113 117 113   CALCIUM mg/dL 9.4 8.8 9.2 9.3     Results from last 7 days   Lab Units 08/29/24  0500 08/28/24  0508 08/26/24  1200   AST U/L 12* 11* 13   ALT U/L 8 8 9   ALK PHOS U/L 61 60 58   TOTAL PROTEIN g/dL 6.3* 5.8* 6.5   ALBUMIN g/dL 3.9 3.7 4.2   TOTAL BILIRUBIN mg/dL 0.67 0.38 0.40         Results from last 7 days   Lab Units 08/29/24  0500 08/28/24  0508 08/27/24  0443 08/26/24  1200   GLUCOSE RANDOM mg/dL 81 97 86 92      Results from last 7 days   Lab Units 08/26/24  1200   LIPASE u/L 18           ED Treatment-Medication Administration from 08/26/2024 1102 to 08/26/2024 1944         Date/Time Order Dose Route Action     08/26/2024 1201 HYDROmorphone (DILAUDID) injection 0.5 mg 0.5 mg Intravenous Given     08/26/2024 1431 iohexol (OMNIPAQUE) 350 MG/ML injection (MULTI-DOSE) 100 mL 100 mL Intravenous Given     08/26/2024 1619 HYDROmorphone (DILAUDID) injection 1 mg 1 mg Intravenous Given     08/26/2024 2795  ondansetron (ZOFRAN) injection 4 mg 4 mg Intravenous Given     08/26/2024 1839 ketorolac (TORADOL) injection 15 mg 15 mg Intravenous Given     08/26/2024 1838 HYDROmorphone (DILAUDID) injection 0.5 mg 0.5 mg Intravenous Given     08/26/2024 1902 polyethylene glycol (MIRALAX) packet 17 g 17 g Oral Given     08/26/2024 1902 docusate sodium (COLACE) capsule 100 mg 100 mg Oral Given     08/26/2024 1902 sodium chloride 0.9 % infusion 100 mL/hr Intravenous New Bag            Past Medical History:   Diagnosis Date    No known health problems      Present on Admission:   Abdominal Pain   Marijuana smoker, episodic      Admitting Diagnosis: Abdominal pain [R10.9]  Intractable pain [R52]  Age/Sex: 24 y.o. male  Admission Orders:  Scheduled Medications:  acetaminophen, 975 mg, Oral, Q12H  bisacodyl, 20 mg, Oral, Once  dicyclomine, 10 mg, Oral, 4x Daily (AC & HS)  docusate sodium, 100 mg, Oral, BID  polyethylene glycol, 4,000 mL, Oral, Once  polyethylene glycol, 17 g, Oral, Daily    Continuous IV Infusions:  sodium chloride, 100 mL/hr, Intravenous, Continuous    PRN Meds:  HYDROmorphone, 0.5 mg, Intravenous, Q4H PRN; 8/27 x1, 8/28 x1  HYDROmorphone, 1 mg, Intravenous, 8/28 x1 then discontinued  ketorolac, 15 mg, Intravenous, Q6H PRN; 8/26 x1, 8/27 x2, 8/28 x2  ondansetron, 4 mg, Intravenous, Q4H PRN    polyethylene glycol (GLYCOLAX) bowel prep 238 g  Dose: 238 g  Freq: Once Route: PO  Start: 08/28/24 1700 End: 08/28/24 1739    IP CONSULT TO ACUTE CARE SURGERY  IP CONSULT TO GASTROENTEROLOGY    Network Utilization Review Department  ATTENTION: Please call with any questions or concerns to [phone] and carefully listen to the prompts so that you are directed to the right person. All voicemails are confidential.   For Discharge needs, contact Care Management DC Support Team at [phone] opt. [phone]  Send all requests for admission clinical reviews, approved or denied determinations and any other requests to dedicated fax number  below belonging to the campus where the patient is receiving treatment. List of dedicated fax numbers for the Facilities:  FACILITY NAME UR FAX NUMBER   ADMISSION DENIALS (Administrative/Medical Necessity) [phone]   DISCHARGE SUPPORT TEAM (NETWORK) [phone]   PARENT CHILD HEALTH (Maternity/NICU/Pediatrics) [phone]   [facility] [phone]   [facility] [phone]   [facility] [phone]   [facility] [phone]   [facility] [phone]   [facility] [phone]   [facility] [phone]   [facility] [phone]   [facility] [phone]   [facility] [phone]   [facility] [phone]   [facility] [phone]

## 2024-08-28 NOTE — PLAN OF CARE
Problem: Nutrition/Hydration-ADULT  Goal: Nutrient/Hydration intake appropriate for improving, restoring or maintaining nutritional needs  Description: Monitor and assess patient's nutrition/hydration status for malnutrition. Collaborate with interdisciplinary team and initiate plan and interventions as ordered.  Monitor patient's weight and dietary intake as ordered or per policy. Utilize nutrition screening tool and intervene as necessary. Determine patient's food preferences and provide high-protein, high-caloric foods as appropriate.     INTERVENTIONS:  - Monitor oral intake, urinary output, labs, and treatment plans  - Assess nutrition and hydration status and recommend course of action  - Evaluate amount of meals eaten  - Assist patient with eating if necessary   - Allow adequate time for meals  - Recommend/ encourage appropriate diets, oral nutritional supplements, and vitamin/mineral supplements  - Order, calculate, and assess calorie counts as needed  - Recommend, monitor, and adjust tube feedings and TPN/PPN based on assessed needs  - Assess need for intravenous fluids  - Provide specific nutrition/hydration education as appropriate  - Include patient/family/caregiver in decisions related to nutrition  Outcome: Progressing     Problem: PAIN - ADULT  Goal: Verbalizes/displays adequate comfort level or baseline comfort level  Description: Interventions:  - Encourage patient to monitor pain and request assistance  - Assess pain using appropriate pain scale  - Administer analgesics based on type and severity of pain and evaluate response  - Implement non-pharmacological measures as appropriate and evaluate response  - Consider cultural and social influences on pain and pain management  - Notify physician/advanced practitioner if interventions unsuccessful or patient reports new pain  Outcome: Progressing     Problem: INFECTION - ADULT  Goal: Absence or prevention of progression during  hospitalization  Description: INTERVENTIONS:  - Assess and monitor for signs and symptoms of infection  - Monitor lab/diagnostic results  - Monitor all insertion sites, i.e. indwelling lines, tubes, and drains  - Monitor endotracheal if appropriate and nasal secretions for changes in amount and color  - [place] appropriate cooling/warming therapies per order  - Administer medications as ordered  - Instruct and encourage patient and family to use good hand hygiene technique  - Identify and instruct in appropriate isolation precautions for identified infection/condition  Outcome: Progressing  Goal: Absence of fever/infection during neutropenic period  Description: INTERVENTIONS:  - Monitor WBC    Outcome: Progressing     Problem: SAFETY ADULT  Goal: Patient will remain free of falls  Description: INTERVENTIONS:  - Educate patient/family on patient safety including physical limitations  - Instruct patient to call for assistance with activity   - Consult OT/PT to assist with strengthening/mobility   - Keep Call bell within reach  - Keep bed low and locked with side rails adjusted as appropriate  - Keep care items and personal belongings within reach  - Initiate and maintain comfort rounds  - Make Fall Risk Sign visible to staff  - Apply yellow socks and bracelet for high fall risk patients  - Consider moving patient to room near nurses station  Outcome: Progressing  Goal: Maintain or return to baseline ADL function  Description: INTERVENTIONS:  -  Assess patient's ability to carry out ADLs; assess patient's baseline for ADL function and identify physical deficits which impact ability to perform ADLs (bathing, care of mouth/teeth, toileting, grooming, dressing, etc.)  - Assess/evaluate cause of self-care deficits   - Assess range of motion  - Assess patient's mobility; develop plan if impaired  - Assess patient's need for assistive devices and provide as appropriate  - Encourage maximum independence but intervene and  supervise when necessary  - Involve family in performance of ADLs  - Assess for home care needs following discharge   - Consider OT consult to assist with ADL evaluation and planning for discharge  - Provide patient education as appropriate  Outcome: Progressing  Goal: Maintains/Returns to pre admission functional level  Description: INTERVENTIONS:  - Perform AM-PAC 6 Click Basic Mobility/ Daily Activity assessment daily.  - Set and communicate daily mobility goal to care team and patient/family/caregiver.   - Collaborate with rehabilitation services on mobility goals if consulted  -- Out of bed for toileting  - Record patient progress and toleration of activity level   Outcome: Progressing     Problem: DISCHARGE PLANNING  Goal: Discharge to home or other facility with appropriate resources  Description: INTERVENTIONS:  - Identify barriers to discharge w/patient and caregiver  - Arrange for needed discharge resources and transportation as appropriate  - Identify discharge learning needs (meds, wound care, etc.)  - Arrange for interpretive services to assist at discharge as needed  - Refer to Case Management Department for coordinating discharge planning if the patient needs post-hospital services based on physician/advanced practitioner order or complex needs related to functional status, cognitive ability, or social support system  Outcome: Progressing

## 2024-08-29 LAB
ALBUMIN SERPL BCG-MCNC: 3.9 G/DL (ref 3.5–5)
ALP SERPL-CCNC: 61 U/L (ref 34–104)
ALT SERPL W P-5'-P-CCNC: 8 U/L (ref 7–52)
ANION GAP SERPL CALCULATED.3IONS-SCNC: 4 MMOL/L (ref 4–13)
AST SERPL W P-5'-P-CCNC: 12 U/L (ref 13–39)
BASOPHILS # BLD AUTO: 0.04 THOUSANDS/ÂΜL (ref 0–0.1)
BASOPHILS NFR BLD AUTO: 1 % (ref 0–1)
BILIRUB SERPL-MCNC: 0.67 MG/DL (ref 0.2–1)
BUN SERPL-MCNC: 3 MG/DL (ref 5–25)
CALCIUM SERPL-MCNC: 9.4 MG/DL (ref 8.4–10.2)
CHLORIDE SERPL-SCNC: 105 MMOL/L (ref 96–108)
CO2 SERPL-SCNC: 31 MMOL/L (ref 21–32)
CREAT SERPL-MCNC: 0.93 MG/DL (ref 0.6–1.3)
EOSINOPHIL # BLD AUTO: 0.43 THOUSAND/ÂΜL (ref 0–0.61)
EOSINOPHIL NFR BLD AUTO: 7 % (ref 0–6)
ERYTHROCYTE [DISTWIDTH] IN BLOOD BY AUTOMATED COUNT: 12 % (ref 11.6–15.1)
GFR SERPL CREATININE-BSD FRML MDRD: 116 ML/MIN/1.73SQ M
GLUCOSE SERPL-MCNC: 81 MG/DL (ref 65–140)
HCT VFR BLD AUTO: 45.1 % (ref 36.5–49.3)
HGB BLD-MCNC: 15.9 G/DL (ref 12–17)
IMM GRANULOCYTES # BLD AUTO: 0.01 THOUSAND/UL (ref 0–0.2)
IMM GRANULOCYTES NFR BLD AUTO: 0 % (ref 0–2)
LYMPHOCYTES # BLD AUTO: 2.64 THOUSANDS/ÂΜL (ref 0.6–4.47)
LYMPHOCYTES NFR BLD AUTO: 45 % (ref 14–44)
MCH RBC QN AUTO: 31.7 PG (ref 26.8–34.3)
MCHC RBC AUTO-ENTMCNC: 35.3 G/DL (ref 31.4–37.4)
MCV RBC AUTO: 90 FL (ref 82–98)
MONOCYTES # BLD AUTO: 0.42 THOUSAND/ÂΜL (ref 0.17–1.22)
MONOCYTES NFR BLD AUTO: 7 % (ref 4–12)
NEUTROPHILS # BLD AUTO: 2.38 THOUSANDS/ÂΜL (ref 1.85–7.62)
NEUTS SEG NFR BLD AUTO: 40 % (ref 43–75)
NRBC BLD AUTO-RTO: 0 /100 WBCS
PLATELET # BLD AUTO: 343 THOUSANDS/UL (ref 149–390)
PMV BLD AUTO: 9.3 FL (ref 8.9–12.7)
POTASSIUM SERPL-SCNC: 3.9 MMOL/L (ref 3.5–5.3)
PROT SERPL-MCNC: 6.3 G/DL (ref 6.4–8.4)
RBC # BLD AUTO: 5.02 MILLION/UL (ref 3.88–5.62)
SODIUM SERPL-SCNC: 140 MMOL/L (ref 135–147)
WBC # BLD AUTO: 5.92 THOUSAND/UL (ref 4.31–10.16)

## 2024-08-29 PROCEDURE — 80053 COMPREHEN METABOLIC PANEL: CPT

## 2024-08-29 PROCEDURE — 99232 SBSQ HOSP IP/OBS MODERATE 35: CPT | Performed by: STUDENT IN AN ORGANIZED HEALTH CARE EDUCATION/TRAINING PROGRAM

## 2024-08-29 PROCEDURE — 85025 COMPLETE CBC W/AUTO DIFF WBC: CPT

## 2024-08-29 PROCEDURE — 99232 SBSQ HOSP IP/OBS MODERATE 35: CPT | Performed by: FAMILY MEDICINE

## 2024-08-29 RX ADMIN — POLYETHYLENE GLYCOL-3350 AND ELECTROLYTES 4000 ML: 236; 6.74; 5.86; 2.97; 22.74 POWDER, FOR SOLUTION ORAL at 19:40

## 2024-08-29 RX ADMIN — DICYCLOMINE HYDROCHLORIDE 10 MG: 10 CAPSULE ORAL at 17:23

## 2024-08-29 RX ADMIN — DICYCLOMINE HYDROCHLORIDE 10 MG: 10 CAPSULE ORAL at 21:54

## 2024-08-29 RX ADMIN — DICYCLOMINE HYDROCHLORIDE 10 MG: 10 CAPSULE ORAL at 06:20

## 2024-08-29 RX ADMIN — SODIUM CHLORIDE 100 ML/HR: 0.9 INJECTION, SOLUTION INTRAVENOUS at 02:43

## 2024-08-29 RX ADMIN — ONDANSETRON 4 MG: 2 INJECTION INTRAMUSCULAR; INTRAVENOUS at 21:54

## 2024-08-29 RX ADMIN — ACETAMINOPHEN 975 MG: 325 TABLET ORAL at 08:49

## 2024-08-29 RX ADMIN — DOCUSATE SODIUM 100 MG: 100 CAPSULE, LIQUID FILLED ORAL at 17:18

## 2024-08-29 RX ADMIN — DICYCLOMINE HYDROCHLORIDE 10 MG: 10 CAPSULE ORAL at 11:24

## 2024-08-29 RX ADMIN — SODIUM CHLORIDE 100 ML/HR: 0.9 INJECTION, SOLUTION INTRAVENOUS at 20:55

## 2024-08-29 RX ADMIN — BISACODYL 20 MG: 5 TABLET, COATED ORAL at 17:18

## 2024-08-29 NOTE — PLAN OF CARE
Problem: Nutrition/Hydration-ADULT  Goal: Nutrient/Hydration intake appropriate for improving, restoring or maintaining nutritional needs  Description: Monitor and assess patient's nutrition/hydration status for malnutrition. Collaborate with interdisciplinary team and initiate plan and interventions as ordered.  Monitor patient's weight and dietary intake as ordered or per policy. Utilize nutrition screening tool and intervene as necessary. Determine patient's food preferences and provide high-protein, high-caloric foods as appropriate.     INTERVENTIONS:  - Monitor oral intake, urinary output, labs, and treatment plans  - Assess nutrition and hydration status and recommend course of action  - Evaluate amount of meals eaten  - Assist patient with eating if necessary   - Allow adequate time for meals  - Recommend/ encourage appropriate diets, oral nutritional supplements, and vitamin/mineral supplements  - Order, calculate, and assess calorie counts as needed  - Recommend, monitor, and adjust tube feedings and TPN/PPN based on assessed needs  - Assess need for intravenous fluids  - Provide specific nutrition/hydration education as appropriate  - Include patient/family/caregiver in decisions related to nutrition  Outcome: Progressing     Problem: PAIN - ADULT  Goal: Verbalizes/displays adequate comfort level or baseline comfort level  Description: Interventions:  - Encourage patient to monitor pain and request assistance  - Assess pain using appropriate pain scale  - Administer analgesics based on type and severity of pain and evaluate response  - Implement non-pharmacological measures as appropriate and evaluate response  - Consider cultural and social influences on pain and pain management  - Notify physician/advanced practitioner if interventions unsuccessful or patient reports new pain  Outcome: Progressing     Problem: DISCHARGE PLANNING  Goal: Discharge to home or other facility with appropriate  resources  Description: INTERVENTIONS:  - Identify barriers to discharge w/patient and caregiver  - Arrange for needed discharge resources and transportation as appropriate  - Identify discharge learning needs (meds, wound care, etc.)  - Arrange for interpretive services to assist at discharge as needed  - Refer to Case Management Department for coordinating discharge planning if the patient needs post-hospital services based on physician/advanced practitioner order or complex needs related to functional status, cognitive ability, or social support system  Outcome: Progressing

## 2024-08-29 NOTE — PLAN OF CARE
Problem: Nutrition/Hydration-ADULT  Goal: Nutrient/Hydration intake appropriate for improving, restoring or maintaining nutritional needs  Description: Monitor and assess patient's nutrition/hydration status for malnutrition. Collaborate with interdisciplinary team and initiate plan and interventions as ordered.  Monitor patient's weight and dietary intake as ordered or per policy. Utilize nutrition screening tool and intervene as necessary. Determine patient's food preferences and provide high-protein, high-caloric foods as appropriate.     INTERVENTIONS:  - Monitor oral intake, urinary output, labs, and treatment plans  - Assess nutrition and hydration status and recommend course of action  - Evaluate amount of meals eaten  - Assist patient with eating if necessary   - Allow adequate time for meals  - Recommend/ encourage appropriate diets, oral nutritional supplements, and vitamin/mineral supplements  - Order, calculate, and assess calorie counts as needed  - Recommend, monitor, and adjust tube feedings and TPN/PPN based on assessed needs  - Assess need for intravenous fluids  - Provide specific nutrition/hydration education as appropriate  - Include patient/family/caregiver in decisions related to nutrition  Outcome: Progressing     Problem: PAIN - ADULT  Goal: Verbalizes/displays adequate comfort level or baseline comfort level  Description: Interventions:  - Encourage patient to monitor pain and request assistance  - Assess pain using appropriate pain scale  - Administer analgesics based on type and severity of pain and evaluate response  - Implement non-pharmacological measures as appropriate and evaluate response  - Consider cultural and social influences on pain and pain management  - Notify physician/advanced practitioner if interventions unsuccessful or patient reports new pain  Outcome: Progressing     Problem: INFECTION - ADULT  Goal: Absence or prevention of progression during  hospitalization  Description: INTERVENTIONS:  - Assess and monitor for signs and symptoms of infection  - Monitor lab/diagnostic results  - Monitor all insertion sites, i.e. indwelling lines, tubes, and drains  - Monitor endotracheal if appropriate and nasal secretions for changes in amount and color  - [place] appropriate cooling/warming therapies per order  - Administer medications as ordered  - Instruct and encourage patient and family to use good hand hygiene technique  - Identify and instruct in appropriate isolation precautions for identified infection/condition  Outcome: Progressing

## 2024-08-29 NOTE — UTILIZATION REVIEW
NOTIFICATION OF INPATIENT MEDICAL ADMISSION   AUTHORIZATION REQUEST   SERVICING FACILITY:   [address] [address]  Tax ID: [id number]  NPI: [id number] ATTENDING PROVIDER:  Attending Name and NPI#: Nena Garcia Md [[id number]]  Address: [address] [place] [address]  Phone: [phone]     ADMISSION INFORMATION:  Place of Service: Inpatient [facility]  Place of Service Code: 21  Inpatient Admission Date/Time: 8/28/24 12:57 PM  Discharge Date/Time: No discharge date for patient encounter.  Admitting Diagnosis Code/Description:  Abdominal pain [R10.9]  Intractable pain [R52]     UTILIZATION REVIEW CONTACT:  Soni Frank, Utilization   Network Utilization Review Department  Phone: [phone]  Fax [phone]  Email: [email or web address]  Contact for approvals/pending authorizations, clinical reviews, and discharge.     PHYSICIAN ADVISORY SERVICES:  Medical Necessity Denial & [place] Review  Phone: [phone]  Fax: [phone]  Email: [email or web address]     DISCHARGE SUPPORT TEAM:  For Patients Discharge Needs & Updates  Phone: [phone] opt. 2 Fax: [phone]  Email: [email or web address]

## 2024-08-29 NOTE — PROGRESS NOTES
Progress Note- Reji Shi 24 y.o. male MRN: [id number]    Unit/Bed#: 7T [facility] 709-01 Encounter: [id number]      Assessment and Plan:    24-year-old man with longstanding abdominal pain, small bowel intussusception s/p diagnostic lap 11/2023, chronic marijuana use, chronic constipation admitted 8/26 with abdominal pain.  CT on presentation with no obstruction or intussusception though increased stool burden.  Undergoing 2 day colonoscopy prep with colonoscopy anticipated tomorrow.  -Continue bowel prep as ordered. Anticipate colonoscopy tomorrow    ______________________________________________________________________    Subjective:     Reports continued unchanged abdominal pain.    Medication Administration - last 24 hours from 08/28/2024 1232 to 08/29/2024 1232         Date/Time Order Dose Route Action Action by     08/28/2024 1529 EDT ketorolac (TORADOL) injection 15 mg 15 mg Intravenous Given Pauline Salazar     08/29/2024 0809 EDT polyethylene glycol (MIRALAX) packet 17 g 17 g Oral Not Given Shruti Castellanos RN     08/29/2024 0809 EDT docusate sodium (COLACE) capsule 100 mg 100 mg Oral Not Given Shruti Castellanos RN     08/28/2024 1806 EDT docusate sodium (COLACE) capsule 100 mg 100 mg Oral Given Pauline Salazar     08/29/2024 0243 EDT sodium chloride 0.9 % infusion 100 mL/hr Intravenous New Bag Johana Piper RN     08/29/2024 1124 EDT dicyclomine (BENTYL) capsule 10 mg 10 mg Oral Given Shruti Castellanos RN     08/29/2024 0620 EDT dicyclomine (BENTYL) capsule 10 mg 10 mg Oral Given Johana Piper RN     08/28/2024 2204 EDT dicyclomine (BENTYL) capsule 10 mg 10 mg Oral Given Johana Piper RN     08/28/2024 1529 EDT dicyclomine (BENTYL) capsule 10 mg 10 mg Oral Given Pauline Salazar     08/28/2024 1255 EDT HYDROmorphone (DILAUDID) injection 1 mg 1 mg Intravenous Given Pauline Salazar     08/29/2024 0849 EDT acetaminophen (TYLENOL) tablet 975 mg 975 mg Oral Given Shruti Castellanos RN     08/28/2024 2051 EDT acetaminophen (TYLENOL)  "tablet 975 mg 975 mg Oral Given Johana Piper RN     08/28/2024 1643 EDT polyethylene glycol (GLYCOLAX) bowel prep 238 g 238 g Oral Not Given Pauline Salazar     08/28/2024 1739 EDT polyethylene glycol (GLYCOLAX) bowel prep 238 g 238 g Oral Given Pauline Salazar            Objective:     Vitals: Blood pressure 112/67, pulse (!) 51, temperature (!) 96.6 °F (35.9 °C), temperature source Temporal, resp. rate 18, height 5' 9\" (1.753 m), weight 63.9 kg (140 lb 14 oz), SpO2 98%.,Body mass index is 20.8 kg/m².      Intake/Output Summary (Last 24 hours) at 8/29/2024 1232  Last data filed at 8/29/2024 1001  Gross per 24 hour   Intake 2353.34 ml   Output --   Net 2353.34 ml       Physical Exam:   General Appearance: Awake and alert, appears very comfortable  Abdomen: Soft, tender with voluntary guarding, non-distended; bowel sounds normal; no masses or no organomegaly    Invasive Devices       Peripheral Intravenous Line  Duration             Peripheral IV 08/29/24 Distal;Right;Upper;Ventral (anterior) Arm <1 day                    Lab Results:  Admission on 08/26/2024   Component Date Value    WBC 08/26/2024 5.55     RBC 08/26/2024 4.73     Hemoglobin 08/26/2024 15.3     Hematocrit 08/26/2024 44.1     MCV 08/26/2024 93     MCH 08/26/2024 32.3     MCHC 08/26/2024 34.7     RDW 08/26/2024 11.9     MPV 08/26/2024 8.6 (L)     Platelets 08/26/2024 326     nRBC 08/26/2024 0     Segmented % 08/26/2024 58     Immature Grans % 08/26/2024 0     Lymphocytes % 08/26/2024 32     Monocytes % 08/26/2024 5     Eosinophils Relative 08/26/2024 4     Basophils Relative 08/26/2024 1     Absolute Neutrophils 08/26/2024 3.23     Absolute Immature Grans 08/26/2024 0.02     Absolute Lymphocytes 08/26/2024 1.79     Absolute Monocytes 08/26/2024 0.27     Eosinophils Absolute 08/26/2024 0.21     Basophils Absolute 08/26/2024 0.03     Sodium 08/26/2024 140     Potassium 08/26/2024 4.0     Chloride 08/26/2024 103     CO2 08/26/2024 31     ANION GAP 08/26/2024 " 6     BUN 08/26/2024 6     Creatinine 08/26/2024 0.95     Glucose 08/26/2024 92     Calcium 08/26/2024 9.3     AST 08/26/2024 13     ALT 08/26/2024 9     Alkaline Phosphatase 08/26/2024 58     Total Protein 08/26/2024 6.5     Albumin 08/26/2024 4.2     Total Bilirubin 08/26/2024 0.40     eGFR 08/26/2024 113     Lipase 08/26/2024 18     Sodium 08/27/2024 143     Potassium 08/27/2024 3.6     Chloride 08/27/2024 104     CO2 08/27/2024 33 (H)     ANION GAP 08/27/2024 6     BUN 08/27/2024 6     Creatinine 08/27/2024 0.92     Glucose 08/27/2024 86     Glucose, Fasting 08/27/2024 86     Calcium 08/27/2024 9.2     eGFR 08/27/2024 117     Sodium 08/28/2024 141     Potassium 08/28/2024 3.6     Chloride 08/28/2024 107     CO2 08/28/2024 29     ANION GAP 08/28/2024 5     BUN 08/28/2024 4 (L)     Creatinine 08/28/2024 0.95     Glucose 08/28/2024 97     Glucose, Fasting 08/28/2024 97     Calcium 08/28/2024 8.8     AST 08/28/2024 11 (L)     ALT 08/28/2024 8     Alkaline Phosphatase 08/28/2024 60     Total Protein 08/28/2024 5.8 (L)     Albumin 08/28/2024 3.7     Total Bilirubin 08/28/2024 0.38     eGFR 08/28/2024 113     Fecal Occult Blood Diagn* 08/28/2024 Negative     Fecal Occult Blood Diagn* 08/28/2024 Test not performed     Fecal Occult Blood Diagn* 08/28/2024 Test not performed     WBC 08/29/2024 5.92     RBC 08/29/2024 5.02     Hemoglobin 08/29/2024 15.9     Hematocrit 08/29/2024 45.1     MCV 08/29/2024 90     MCH 08/29/2024 31.7     MCHC 08/29/2024 35.3     RDW 08/29/2024 12.0     MPV 08/29/2024 9.3     Platelets 08/29/2024 343     nRBC 08/29/2024 0     Segmented % 08/29/2024 40 (L)     Immature Grans % 08/29/2024 0     Lymphocytes % 08/29/2024 45 (H)     Monocytes % 08/29/2024 7     Eosinophils Relative 08/29/2024 7 (H)     Basophils Relative 08/29/2024 1     Absolute Neutrophils 08/29/2024 2.38     Absolute Immature Grans 08/29/2024 0.01     Absolute Lymphocytes 08/29/2024 2.64     Absolute Monocytes 08/29/2024 0.42      Eosinophils Absolute 08/29/2024 0.43     Basophils Absolute 08/29/2024 0.04     Sodium 08/29/2024 140     Potassium 08/29/2024 3.9     Chloride 08/29/2024 105     CO2 08/29/2024 31     ANION GAP 08/29/2024 4     BUN 08/29/2024 3 (L)     Creatinine 08/29/2024 0.93     Glucose 08/29/2024 81     Calcium 08/29/2024 9.4     AST 08/29/2024 12 (L)     ALT 08/29/2024 8     Alkaline Phosphatase 08/29/2024 61     Total Protein 08/29/2024 6.3 (L)     Albumin 08/29/2024 3.9     Total Bilirubin 08/29/2024 0.67     eGFR 08/29/2024 116        Imaging Studies: I have personally reviewed pertinent imaging studies.

## 2024-08-29 NOTE — PROGRESS NOTES
Progress Note    Reji Shi 24 y.o. male MRN: [id number]  Unit/Bed#: 7T [facility] 709-01 Encounter: [id number]  Admitting Physician: Selma Covarrubias MD  PCP: SHIRA Price  Date of Admission:  8/26/2024 11:20 AM    Assessment and Plan    * Abdominal Pain  Assessment & Plan  -In the setting of history of intussusception back in November 2023 confirmed by diagnostic laparoscopy  -Current pain regimen includes 15 mg scheduled ketorolac and 0.5 mg Dilaudid as needed every 4 hours  -Overnight received a one-time dose of 0.5 mg Dilaudid  -CT abd/pelvis 8/26.24 with moderate colonic stool burden  -States pain is similar to when he had his initial intussusception  -As intussusception can be intermittent it is possible this pain represents repeat intussusception  -Currently passing gas, last stool was yesterday prior to presenting to the ED  -Thus far has had to clear white liquid episodes of vomitus: one in the ED  yesterday and one this morning  -Admitted from 8/4-8/9 at [facility]: initial CT showed intussusception and then CT enterography showed resolution: Symptoms resolved with IV fluids and dicyclomine  - Patient reported a bowel mov form yesterday with presence of blood.   - Digital rectal exam performed today, no presence of hemorrhoids, fissures, masses, blood or hematochezia.   - GI consulted, Recommend go for colonoscopy  - Patient is currently bowel preparation for procedure.    PLAN  - Maintenance IV fluid  - maintain clear diet surgery until 8 pm today  - Bowel regimen of mirilax and colace  - NPO starting 8 pm today.   - Bowel preparation for colonoscopy tomorrow morning.  - Consider Abdominal CT scan w/o cont if symptoms worse.   - Continue 0.5 q4 PRN for pain.  - Continue scheduled ketorolac 3rd day PRN for pain   - Tylenol 975 mg q8 scheduled for pain.   - Colonoscopy tomorrow.     History of intussusception  Assessment & Plan  -Exploratory laparotomy on 11/27/2023 was performed and the bezoar  was removed, no bowel resection  -CT 23: Stomach is mildly distended with air and fluid. No intraluminal mass seen. There is a short segment of small bowel intussusception in the left upper abdomen involving proximal jejunal loops noted without focal lead point mass       PLAN  -surgery consult and see abdominal pain A/P.      Marijuana smoker, episodic  Assessment & Plan  -States smokes marijuana about once monthly, smoked prior to admission today    PLAN  -consider counseling upon discharge.        VTE Pharmacologic Prophylaxis: VTE Score: 0 Low Risk (Score 0-2) - Encourage Ambulation.    Patient Centered Rounds: I have performed bedside rounds with nursing staff today.    Discussions with Specialists or Other Care Team Provider: none     Education and Discussions with Family / Patient: Patient       Time Spent for Care: 30 minutes.  More than 50% of total time spent on counseling and coordination of care as described above.    Current Length of Stay: 1 day(s)    Current Patient Status: Inpatient   Certification Statement: The patient will continue to require additional inpatient hospital stay due to abdominal pain.     Discharge Plan: When the patient is stable.     Code Status: Prior      Subjective:    Patient was seen at bedside resting comfortable. Patient states the pain continues, however is less than yesterday. The pain rate is 4/10, no nauseas or vomiting. Patient is taking laxatives for the procedure tomorrow and is having diarrhea with normal characteristics. He is passing urine well and eating liquid diet until 8 pm. Denies SOB, chest tightness or headache.The plan was explained to the patient and all the questions were addressed.     Objective:     Vitals:   Temp (24hrs), Av.8 °F (36 °C), Min:96.3 °F (35.7 °C), Max:97.6 °F (36.4 °C)    Temp:  [96.3 °F (35.7 °C)-97.6 °F (36.4 °C)] 96.3 °F (35.7 °C)  HR:  [51-67] 67  Resp:  [18] 18  BP: (100-112)/(67-73) 100/73  SpO2:  [98 %-99 %] 98 %  Body  mass index is 20.8 kg/m².     Input and Output Summary (last 24 hours):       Intake/Output Summary (Last 24 hours) at 8/29/2024 1558  Last data filed at 8/29/2024 1001  Gross per 24 hour   Intake 2113.34 ml   Output --   Net 2113.34 ml       Physical Exam:     Physical Exam  Vitals reviewed.   Constitutional:       General: He is not in acute distress.     Appearance: He is normal weight. He is not ill-appearing, toxic-appearing or diaphoretic.   HENT:      Head: Normocephalic.      Nose: Nose normal. No congestion or rhinorrhea.      Mouth/Throat:      Mouth: Mucous membranes are moist.      Pharynx: Oropharynx is clear.   Eyes:      General: No scleral icterus.        Right eye: No discharge.         Left eye: No discharge.      Extraocular Movements: Extraocular movements intact.      Conjunctiva/sclera: Conjunctivae normal.      Pupils: Pupils are equal, round, and reactive to light.   Neck:      Vascular: No carotid bruit.   Cardiovascular:      Rate and Rhythm: Normal rate and regular rhythm.      Pulses: Normal pulses.      Heart sounds: Normal heart sounds. No murmur heard.     No friction rub. No gallop.   Pulmonary:      Effort: Pulmonary effort is normal. No respiratory distress.      Breath sounds: No wheezing, rhonchi or rales.   Chest:      Chest wall: No tenderness.   Abdominal:      General: Abdomen is flat. There is no distension.      Palpations: There is no hepatomegaly, splenomegaly or mass.      Tenderness: There is abdominal tenderness in the left lower quadrant. There is no right CVA tenderness, left CVA tenderness or guarding.      Hernia: No hernia is present.      Comments: Score 4/10   Musculoskeletal:         General: No swelling or tenderness. Normal range of motion.      Cervical back: Normal range of motion.      Right lower leg: No edema.      Left lower leg: No edema.   Skin:     General: Skin is warm.      Capillary Refill: Capillary refill takes less than 2 seconds.       Coloration: Skin is not pale.      Findings: No bruising, erythema, lesion or rash.   Neurological:      General: No focal deficit present.      Mental Status: He is alert.      Cranial Nerves: No cranial nerve deficit.      Coordination: Coordination normal.      Gait: Gait normal.      Deep Tendon Reflexes: Reflexes normal.   Psychiatric:         Mood and Affect: Mood normal.         Behavior: Behavior normal.         Thought Content: Thought content normal.         Judgment: Judgment normal.         Additional Data:     Labs:    Results from last 7 days   Lab Units 08/29/24  0500   WBC Thousand/uL 5.92   HEMOGLOBIN g/dL 15.9   HEMATOCRIT % 45.1   PLATELETS Thousands/uL 343   SEGS PCT % 40*   LYMPHO PCT % 45*   MONO PCT % 7   EOS PCT % 7*     Results from last 7 days   Lab Units 08/29/24  0500   POTASSIUM mmol/L 3.9   CHLORIDE mmol/L 105   CO2 mmol/L 31   BUN mg/dL 3*   CREATININE mg/dL 0.93   CALCIUM mg/dL 9.4   ALK PHOS U/L 61   ALT U/L 8   AST U/L 12*                     * I Have Reviewed All Lab Data Listed Above.  * Additional Pertinent Lab Tests Reviewed: All Labs For Current Hospital Admission Reviewed    Imaging:    Imaging Reports Reviewed Today Include: none   Imaging Personally Reviewed by Myself Includes:  none     Recent Cultures (last 7 days):           Last 24 Hours Medication List:   Current Facility-Administered Medications   Medication Dose Route Frequency Provider Last Rate    acetaminophen  975 mg Oral Q12H Rebecca Fay Kab-Perlman, MD      bisacodyl  20 mg Oral Once Mike Staples MD      dicyclomine  10 mg Oral 4x Daily (AC & HS) Rebecca Fay Kab-Perlman, MD      docusate sodium  100 mg Oral BID Rebecca Fay Kab-Perlman, MD      HYDROmorphone  0.5 mg Intravenous Q4H PRN Selma Covarrubias MD      iohexol  50 mL Oral Once in imaging Robert Brown MD      ketorolac  15 mg Intravenous Q6H PRN Kevin Kearney MD      ondansetron  4 mg Intravenous Q4H PRN Rebecca Fay Kab-Perlman, MD       polyethylene glycol  4,000 mL Oral Once Mike Staples MD      polyethylene glycol  17 g Oral Daily Rebecca Fay Kab-Perlman, MD      sodium chloride  100 mL/hr Intravenous Continuous Rebecca Fay Kab-Perlman,  mL/hr (08/29/24 0243)         Selma Covarrubias MD  08/29/24  3:58 PM

## 2024-08-30 VITALS
HEIGHT: 69 IN | HEART RATE: 64 BPM | WEIGHT: 140.87 LBS | RESPIRATION RATE: 18 BRPM | TEMPERATURE: 97.5 F | DIASTOLIC BLOOD PRESSURE: 65 MMHG | BODY MASS INDEX: 20.87 KG/M2 | OXYGEN SATURATION: 97 % | SYSTOLIC BLOOD PRESSURE: 103 MMHG

## 2024-08-30 PROCEDURE — NC001 PR NO CHARGE: Performed by: FAMILY MEDICINE

## 2024-08-30 NOTE — DISCHARGE SUMMARY
Discharge Summary - [facility]    Patient Information: Reji Shi 24 y.o. male MRN: [id number]  Unit/Bed#: 7T [facility] 709-01 Encounter: [id number]    Discharging Physician / Practitioner: ***  PCP: SHIRA Price  Admission Date:   Admission Orders (From admission, onward)       Ordered        08/28/24 1257  INPATIENT ADMISSION  Once            08/26/24 1624  Place in Observation  Once                          Discharge Date: ***    Reason for Admission: ***    Discharge Diagnoses:     Principal Problem:    Abdominal Pain  Active Problems:    Marijuana smoker, episodic    History of intussusception  Resolved Problems:    * No resolved hospital problems. *        History of intussusception  Assessment & Plan  -Exploratory laparotomy on 11/27/2023 was performed and the bezoar was removed, no bowel resection  -CT 11/26/23: Stomach is mildly distended with air and fluid. No intraluminal mass seen. There is a short segment of small bowel intussusception in the left upper abdomen involving proximal jejunal loops noted without focal lead point mass       PLAN  -surgery consult and see abdominal pain A/P.      Marijuana smoker, episodic  Assessment & Plan  -States smokes marijuana about once monthly, smoked prior to admission today    PLAN  -consider counseling upon discharge.    * Abdominal Pain  Assessment & Plan  -In the setting of history of intussusception back in November 2023 confirmed by diagnostic laparoscopy  -Current pain regimen includes 15 mg scheduled ketorolac and 0.5 mg Dilaudid as needed every 4 hours  -Overnight received a one-time dose of 0.5 mg Dilaudid  -CT abd/pelvis 8/26.24 with moderate colonic stool burden  -States pain is similar to when he had his initial intussusception  -As intussusception can be intermittent it is possible this pain represents repeat intussusception  -Currently passing gas, last stool was yesterday prior to presenting to the ED  -Thus far has had to  clear white liquid episodes of vomitus: one in the ED  yesterday and one this morning  -Admitted from 8/4-8/9 at [facility]: initial CT showed intussusception and then CT enterography showed resolution: Symptoms resolved with IV fluids and dicyclomine  - Patient reported a bowel mov form yesterday with presence of blood.   - Digital rectal exam performed today, no presence of hemorrhoids, fissures, masses, blood or hematochezia.   - GI consulted, Recommend go for colonoscopy  - Patient is currently bowel preparation for procedure.    PLAN  - Maintenance IV fluid  - maintain clear diet surgery until 8 pm today  - Bowel regimen of mirilax and colace  - NPO starting 8 pm today.   - Bowel preparation for colonoscopy tomorrow morning.  - Consider Abdominal CT scan w/o cont if symptoms worse.   - Continue 0.5 q4 PRN for pain.  - Continue scheduled ketorolac 3rd day PRN for pain   - Tylenol 975 mg q8 scheduled for pain.   - Colonoscopy tomorrow.          Consultations During Hospital Stay:  Gastroenterology  General Surgery    Procedures Performed:   ***    Significant Findings / Test Results:   ***    Incidental Findings:   ***     Test Results Pending at Discharge (will require follow up):   ***     Outpatient Tests Requested:  ***    Outpatient follow-up Requested:  ***    Complications: None    Hospital Course:     Reji Shi is a 24 y.o. male patient with PMH of intussusception who originally presented to the hospital on 8/26/2024 due to abdominal pain.  During ED course, vital signs, basic lab work, CT abdomen pelvis were all unremarkable.  Patient was given Dilaudid for pain management and was admitted for abdominal pain management.    During hospital course, patient's abdominal pain was managed with Dilaudid and ketorolac as needed.  IV hydration was initiate during course. Dicyclomine was started to help address patient's abdominal cramping.   General surgery was consulted and recommended  "gastroenterology to further assess patient as patient was not a surgical candidate.  Colonoscopy was recommended by gastroenterology in setting of patient's hematochezia and history of constipation.  Colonoscopy was completed on  ***.    Review of Systems    Condition at Discharge: {condition:92375}     Discharge Day Visit / Exam:     Vitals: Blood Pressure: 100/73 (08/29/24 1540)  Pulse: 67 (08/29/24 1540)  Temperature: (!) 96.3 °F (35.7 °C) (08/29/24 1540)  Temp Source: Temporal (08/29/24 1540)  Respirations: 18 (08/29/24 1540)  Height: 5' 9\" (175.3 cm) (08/26/24 1900)  Weight - Scale: 63.9 kg (140 lb 14 oz) (08/26/24 1900)  SpO2: 98 % (08/29/24 1540)  Exam:   Physical Exam  ( *** Be Sure to Include Physical Exam: Delete this entire line when you have entered your exam)    Discussion with Family: ***  Patient Information Sharing: With the consent of Reji Shi , their loved ones (insert name(s) here***) were notified today by inpatient team of the patient’s condition and current plan.  All questions answered. *** Reji Shi does not wish inpatient team to notify their loved ones of their condition and current plan. ***    Discharge instructions/Information to patient and family:   See after visit summary for information provided to patient and family.      Discharge Medications:  ***Copy discharge medications here     Provisions for Follow-Up Care:  See after visit summary for information related to follow-up care and any pertinent home health orders.      Disposition:     {Disposition at Discharge:23494}    For Discharges to [facility]:   {Affiliated SNF Contact List:[id number]} - {Affiliated SNF Phone Call:[phone]}    Planned Readmission: ***     Discharge Statement:  I spent *** minutes discharging the patient. This time was spent on the day of discharge. I had direct contact with the patient on the day of discharge. Greater than 50% of the total time was spent examining patient, answering " all patient questions, arranging and discussing plan of care with patient as well as directly providing post-discharge instructions.  Additional time then spent on discharge activities.    ** Please Note: This note has been constructed using a voice recognition system **    Jason Whalen MD  08/29/24  10:50 PM

## 2024-08-30 NOTE — NURSING NOTE
PT left AMA RN explained the risk of leaving AMA.  MD was made aware however, PT did not want to wait.

## 2024-08-30 NOTE — PLAN OF CARE
Problem: Nutrition/Hydration-ADULT  Goal: Nutrient/Hydration intake appropriate for improving, restoring or maintaining nutritional needs  Description: Monitor and assess patient's nutrition/hydration status for malnutrition. Collaborate with interdisciplinary team and initiate plan and interventions as ordered.  Monitor patient's weight and dietary intake as ordered or per policy. Utilize nutrition screening tool and intervene as necessary. Determine patient's food preferences and provide high-protein, high-caloric foods as appropriate.     INTERVENTIONS:  - Monitor oral intake, urinary output, labs, and treatment plans  - Assess nutrition and hydration status and recommend course of action  - Evaluate amount of meals eaten  - Assist patient with eating if necessary   - Allow adequate time for meals  - Recommend/ encourage appropriate diets, oral nutritional supplements, and vitamin/mineral supplements  - Order, calculate, and assess calorie counts as needed  - Recommend, monitor, and adjust tube feedings and TPN/PPN based on assessed needs  - Assess need for intravenous fluids  - Provide specific nutrition/hydration education as appropriate  - Include patient/family/caregiver in decisions related to nutrition  Outcome: Progressing     Problem: PAIN - ADULT  Goal: Verbalizes/displays adequate comfort level or baseline comfort level  Description: Interventions:  - Encourage patient to monitor pain and request assistance  - Assess pain using appropriate pain scale  - Administer analgesics based on type and severity of pain and evaluate response  - Implement non-pharmacological measures as appropriate and evaluate response  - Consider cultural and social influences on pain and pain management  - Notify physician/advanced practitioner if interventions unsuccessful or patient reports new pain  Outcome: Progressing     Problem: INFECTION - ADULT  Goal: Absence or prevention of progression during  hospitalization  Description: INTERVENTIONS:  - Assess and monitor for signs and symptoms of infection  - Monitor lab/diagnostic results  - Monitor all insertion sites, i.e. indwelling lines, tubes, and drains  - Monitor endotracheal if appropriate and nasal secretions for changes in amount and color  - [place] appropriate cooling/warming therapies per order  - Administer medications as ordered  - Instruct and encourage patient and family to use good hand hygiene technique  - Identify and instruct in appropriate isolation precautions for identified infection/condition  Outcome: Progressing

## 2024-08-30 NOTE — NURSING NOTE
Pt 's IV infiltrated at 0320. IVF placed on hold. This RN try to replaced IV Pt refused. Family Practice team made aware. No new orders at this time.

## 2024-08-30 NOTE — UTILIZATION REVIEW
NOTIFICATION OF ADMISSION DISCHARGE   This is a Notification of Discharge from [facility]. Please be advised that this patient has been discharge from our facility. Below you will find the admission and discharge date and time including the patient’s disposition.   UTILIZATION REVIEW CONTACT:  Soni Frank MA  Utilization   Network Utilization Review Department  Phone: [phone] x carefully listen to the prompts. All voicemails are confidential.  Email: [email or web address]     ADMISSION INFORMATION  PRESENTATION DATE: 8/26/2024 11:20 AM  OBERVATION ADMISSION DATE: 08/26/2024 1624  INPATIENT ADMISSION DATE: 8/28/24 12:57 PM   DISCHARGE DATE: 8/30/2024  9:58 AM   DISPOSITION:Left against medical advice or discontinued care    Network Utilization Review Department  ATTENTION: Please call with any questions or concerns to [phone] and carefully listen to the prompts so that you are directed to the right person. All voicemails are confidential.   For Discharge needs, contact Care Management DC Support Team at [phone] opt. 2  Send all requests for admission clinical reviews, approved or denied determinations and any other requests to dedicated fax number below belonging to the campus where the patient is receiving treatment. List of dedicated fax numbers for the Facilities:  FACILITY NAME UR FAX NUMBER   ADMISSION DENIALS (Administrative/Medical Necessity) [phone]   DISCHARGE SUPPORT TEAM ([facility]) [phone]   PARENT CHILD HEALTH (Maternity/NICU/Pediatrics) [phone]   [facility] [phone]   [facility] [phone]   [facility] [phone]   [facility] [phone]   [facility] [phone]   [facility] [phone]   [facility] [phone]   [facility]  Critical access hospital [phone]   [facility] [phone]   [facility] [phone]   [facility] [phone]   [facility] [phone]

## 2024-08-30 NOTE — QUICK NOTE
Patient was seen and assessed at bedside.   Vital signs within normal limits.   Patient was stable, pleasant, speaking comfortably, states overall improvement.   He stated that he did not want to drink any more of the bowel prep due to the taste. After conversation patient was amenable to attempting to finishing the drink and would reach out if he had any concerns.     Answered all questions.  Will continue to monitor overnight and revaluate in the morning.

## 2024-08-30 NOTE — PROGRESS NOTES
Progress Note    Reji Shi 24 y.o. male MRN: [id number]  Unit/Bed#: 7T [facility] 709-01 Encounter: [id number]  Admitting Physician: Selma Covarrubias MD  PCP: SHIRA Price  Date of Admission:  8/26/2024 11:20 AM    Assessment and Plan    * Abdominal Pain  Assessment & Plan  -In the setting of history of intussusception back in November 2023 confirmed by diagnostic laparoscopy  -Current pain regimen includes 15 mg scheduled ketorolac and 0.5 mg Dilaudid as needed every 4 hours  -Overnight received a one-time dose of 0.5 mg Dilaudid  -CT abd/pelvis 8/26.24 with moderate colonic stool burden  -States pain is similar to when he had his initial intussusception  -As intussusception can be intermittent it is possible this pain represents repeat intussusception  -Currently passing gas, last stool was yesterday prior to presenting to the ED  -Thus far has had to clear white liquid episodes of vomitus: one in the ED  yesterday and one this morning  -Admitted from 8/4-8/9 at [facility]: initial CT showed intussusception and then CT enterography showed resolution: Symptoms resolved with IV fluids and dicyclomine  - Patient reported a bowel mov form yesterday with presence of blood.   - Digital rectal exam performed today, no presence of hemorrhoids, fissures, masses, blood or hematochezia.   - GI consulted, Recommend go for colonoscopy  - Patient is currently bowel preparation for procedure.    PLAN  - Maintenance IV fluid  - maintain clear diet surgery until 8 pm today  - Bowel regimen of mirilax and colace  - NPO starting 8 pm today.   - Bowel preparation for colonoscopy tomorrow morning.  - Consider Abdominal CT scan w/o cont if symptoms worse.   - Continue 0.5 q4 PRN for pain.  - Continue scheduled ketorolac 3rd day PRN for pain   - Tylenol 975 mg q8 scheduled for pain.   - Colonoscopy tomorrow.     History of intussusception  Assessment & Plan  -Exploratory laparotomy on 11/27/2023 was performed and the bezoar  was removed, no bowel resection  -CT 23: Stomach is mildly distended with air and fluid. No intraluminal mass seen. There is a short segment of small bowel intussusception in the left upper abdomen involving proximal jejunal loops noted without focal lead point mass       PLAN  -surgery consult and see abdominal pain A/P.      Marijuana smoker, episodic  Assessment & Plan  -States smokes marijuana about once monthly, smoked prior to admission today    PLAN  -consider counseling upon discharge.        VTE Pharmacologic Prophylaxis: VTE Score: 0 {VTE Risk:63707}    Patient Centered Rounds: {Patient Centered Rounds:40795}    Discussions with Specialists or Other Care Team Provider: ***    Education and Discussions with Family / Patient: ***  Patient Information Sharing: With the consent of Reji Shi , their loved ones (insert name(s) here***) were notified today by inpatient team of the patient’s condition and current plan.  All questions answered. *** Reji Shi does not wish inpatient team to notify their loved ones of their condition and current plan. ***    Time Spent for Care: {Time; Time 15 min - 1 hour:98979}.  More than 50% of total time spent on counseling and coordination of care as described above.    Current Length of Stay: 2 day(s)    Current Patient Status: Inpatient   Certification Statement: {Certification Statement:14497}    Discharge Plan: ***    Code Status: Prior      Subjective:   ***    Objective:     Vitals:   Temp (24hrs), Av.8 °F (36 °C), Min:96.3 °F (35.7 °C), Max:97.6 °F (36.4 °C)    Temp:  [96.3 °F (35.7 °C)-97.6 °F (36.4 °C)] 97.6 °F (36.4 °C)  HR:  [51-67] 62  Resp:  [18] 18  BP: (100-112)/(67-73) 102/68  SpO2:  [98 %] 98 %  Body mass index is 20.8 kg/m².     Input and Output Summary (last 24 hours):       Intake/Output Summary (Last 24 hours) at 2024 0649  Last data filed at 2024 1900  Gross per 24 hour   Intake 1356.66 ml   Output --   Net 1356.66 ml        Physical Exam:     Physical Exam  ( *** Be Sure to Include Physical Exam: Delete this entire line when you have entered your exam)    Additional Data:     Labs:    Results from last 7 days   Lab Units 08/29/24  0500   WBC Thousand/uL 5.92   HEMOGLOBIN g/dL 15.9   HEMATOCRIT % 45.1   PLATELETS Thousands/uL 343   SEGS PCT % 40*   LYMPHO PCT % 45*   MONO PCT % 7   EOS PCT % 7*     Results from last 7 days   Lab Units 08/29/24  0500   POTASSIUM mmol/L 3.9   CHLORIDE mmol/L 105   CO2 mmol/L 31   BUN mg/dL 3*   CREATININE mg/dL 0.93   CALCIUM mg/dL 9.4   ALK PHOS U/L 61   ALT U/L 8   AST U/L 12*                     * I Have Reviewed All Lab Data Listed Above.  * Additional Pertinent Lab Tests Reviewed: {Labreview:76963}    Imaging:    Imaging Reports Reviewed Today Include: ***  Imaging Personally Reviewed by Myself Includes:  ***    Recent Cultures (last 7 days):           Last 24 Hours Medication List:   Current Facility-Administered Medications   Medication Dose Route Frequency Provider Last Rate    acetaminophen  975 mg Oral Q12H Rebecca Fay Kab-Perlman, MD      dicyclomine  10 mg Oral 4x Daily (AC & HS) Rebecca Fay Kab-Perlman, MD      docusate sodium  100 mg Oral BID Rebecca Fay Kab-Perlman, MD      HYDROmorphone  0.5 mg Intravenous Q4H PRN Selma Covarrubias MD      iohexol  50 mL Oral Once in imaging Robert Brown MD      ketorolac  15 mg Intravenous Q6H PRN Kevin Kearney MD      ondansetron  4 mg Intravenous Q4H PRN Rebecca Fay Kab-Perlman, MD      polyethylene glycol  17 g Oral Daily Rebecca Fay Kab-Perlman, MD      sodium chloride  100 mL/hr Intravenous Continuous Rebecca Fay Kab-Perlman, MD Stopped (08/30/24 0323)            Selma Covarrubias MD  08/30/24  6:49 AM

## 2024-09-04 ENCOUNTER — OFFICE VISIT (OUTPATIENT)
Dept: FAMILY MEDICINE CLINIC | Facility: CLINIC | Age: 24
End: 2024-09-04

## 2024-09-04 VITALS
RESPIRATION RATE: 16 BRPM | OXYGEN SATURATION: 97 % | HEART RATE: 67 BPM | WEIGHT: 145 LBS | DIASTOLIC BLOOD PRESSURE: 60 MMHG | TEMPERATURE: 97.5 F | BODY MASS INDEX: 21.48 KG/M2 | SYSTOLIC BLOOD PRESSURE: 90 MMHG | HEIGHT: 69 IN

## 2024-09-04 DIAGNOSIS — R10.9 INTRACTABLE ABDOMINAL PAIN: Primary | ICD-10-CM

## 2024-09-04 PROCEDURE — 99215 OFFICE O/P EST HI 40 MIN: CPT | Performed by: NURSE PRACTITIONER

## 2024-09-04 NOTE — PROGRESS NOTES
Ambulatory Visit  Name: Reji Shi      : 2000      MRN: [id number]  Encounter Provider: SHIRA Paredes  Encounter Date: 2024   Encounter department: [facility]    Assessment & Plan   1. Intractable abdominal pain  -     Transfer to other facility         History of Present Illness     Patient is a 23 YO male who  has a past medical history of No known health problems who presented for ED follow up. Patient was hospitalized from  -  due to intractable abdominal pain. He left AMA w/o dx. He presents today with worsening pain. Pain is constant. He has not been seen by GI. No treatments have been effective.     Abdominal Pain  This is a recurrent problem. The current episode started more than 1 month ago. The onset quality is sudden. The problem occurs constantly. The problem has been rapidly worsening since onset. The pain is located in the LLQ, RLQ and epigastric region. The pain is at a severity of 10/10. The pain is severe. The quality of the pain is described as sharp. Associated symptoms include anorexia, constipation and nausea. Pertinent negatives include no arthralgias, dysuria, fever, hematuria, rash, sore throat or vomiting. Nothing relieves the symptoms.     Review of Systems   Constitutional:  Negative for chills and fever.   HENT:  Negative for ear pain and sore throat.    Eyes:  Negative for pain and visual disturbance.   Respiratory:  Negative for cough and shortness of breath.    Cardiovascular:  Negative for chest pain and palpitations.   Gastrointestinal:  Positive for abdominal pain, anorexia, constipation and nausea. Negative for vomiting.   Genitourinary:  Negative for dysuria and hematuria.   Musculoskeletal:  Negative for arthralgias and back pain.   Skin:  Negative for color change and rash.   Neurological:  Negative for seizures and syncope.   All other systems reviewed and are negative.    Past Medical History:    Diagnosis Date    No known health problems      Past Surgical History:   Procedure Laterality Date    COLON SURGERY      [place] LAPS ABD PRTM&OMENTUM DX W/WO SPEC BR/WA SPX N/A 11/27/2023    Procedure: LAPAROSCOPY DIAGNOSTIC;  Surgeon: Em Yeboah MD;  Location: [facility];  Service: General     No family history on file.  Social History     Tobacco Use    Smoking status: Former     Types: Cigars    Smokeless tobacco: Never   Vaping Use    Vaping status: Former    Substances: Nicotine   Substance and Sexual Activity    Alcohol use: Not Currently    Drug use: Yes     Types: Marijuana     Comment: last use ~1month    Sexual activity: Not Currently     Current Outpatient Medications on File Prior to Visit   Medication Sig    acetaminophen (TYLENOL) 500 mg tablet Take 1 tablet (500 mg total) by mouth every 6 (six) hours as needed for mild pain    dicyclomine (BENTYL) 20 mg tablet Take 1 tablet (20 mg total) by mouth 4 (four) times a day (before meals and at bedtime) for 3 days, THEN 1 tablet (20 mg total) 4 (four) times a day as needed (abdominal pain or cramping).    docusate sodium (COLACE) 100 mg capsule Take 1 capsule (100 mg total) by mouth every 12 (twelve) hours    famotidine (PEPCID) 20 mg tablet Take 1 tablet (20 mg total) by mouth daily    ondansetron (ZOFRAN) 4 mg tablet Take 1 tablet (4 mg total) by mouth every 8 (eight) hours as needed for nausea or vomiting    polyethylene glycol (MIRALAX) 17 g packet Take 17 g by mouth daily    [DISCONTINUED] cetirizine (ZyrTEC) 10 mg tablet Take 1 tablet (10 mg total) by mouth daily     No Known Allergies  Immunization History   Administered Date(s) Administered    DTaP 2002, 09/12/2003, 03/30/2005, 10/04/2005, 08/17/2006    HPV Quadrivalent 07/03/2013, 09/04/2013, 01/06/2014    Hep B / HiB 2000, 09/12/2003    Hep B, Adolescent or Pediatric 2002, 2002, 09/12/2003    Hepatitis A 08/15/2006, 10/14/2009    INFLUENZA 10/15/2009, 10/23/2013    IPV  "2002, 09/12/2003, 03/30/2005, 08/17/2006    MMR 09/12/2003, 08/17/2006    Meningococcal Conjugate (MCV4O) 06/14/2011    Meningococcal MCV4P 06/14/2011, 01/08/2021    Pneumococcal Conjugate PCV 7 2002, 09/12/2003, 03/30/2005    Tdap 07/03/2013    Tuberculin Skin Test-PPD Intradermal 03/30/2005, 08/17/2006    Varicella 09/12/2003, 10/14/2009     Objective     BP 90/60 (BP Location: Right arm, Patient Position: Sitting, Cuff Size: Standard)   Pulse 67   Temp 97.5 °F (36.4 °C) (Temporal)   Resp 16   Ht 5' 9\" (1.753 m)   Wt 65.8 kg (145 lb)   SpO2 97%   BMI 21.41 kg/m²     Physical Exam  Vitals and nursing note reviewed.   Constitutional:       General: He is in acute distress.      Appearance: Normal appearance. He is ill-appearing. He is not diaphoretic.   HENT:      Head: Normocephalic and atraumatic.      Right Ear: External ear normal.      Left Ear: External ear normal.   Eyes:      General:         Right eye: No discharge.         Left eye: No discharge.      Conjunctiva/sclera: Conjunctivae normal.   Cardiovascular:      Rate and Rhythm: Normal rate and regular rhythm.   Pulmonary:      Effort: Pulmonary effort is normal. No respiratory distress.      Breath sounds: Normal breath sounds.   Abdominal:      General: Abdomen is flat. Bowel sounds are normal.      Tenderness: There is abdominal tenderness in the right lower quadrant, epigastric area, suprapubic area and left lower quadrant. There is guarding.   Musculoskeletal:         General: No swelling. Normal range of motion.      Cervical back: Normal range of motion and neck supple.      Right lower leg: No edema.      Left lower leg: No edema.   Skin:     General: Skin is warm and dry.   Neurological:      Mental Status: He is alert and oriented to person, place, and time.   Psychiatric:         Mood and Affect: Mood normal.         Behavior: Behavior normal.         "

## 2024-09-12 ENCOUNTER — TELEPHONE (OUTPATIENT)
Dept: GASTROENTEROLOGY | Facility: MEDICAL CENTER | Age: 24
End: 2024-09-12

## 2024-09-12 ENCOUNTER — HOSPITAL ENCOUNTER (EMERGENCY)
Facility: HOSPITAL | Age: 24
Discharge: HOME | End: 2024-09-12
Attending: EMERGENCY MEDICINE
Payer: COMMERCIAL

## 2024-09-12 ENCOUNTER — APPOINTMENT (EMERGENCY)
Dept: CT IMAGING | Facility: HOSPITAL | Age: 24
End: 2024-09-12
Payer: COMMERCIAL

## 2024-09-12 VITALS
SYSTOLIC BLOOD PRESSURE: 108 MMHG | BODY MASS INDEX: 21.32 KG/M2 | DIASTOLIC BLOOD PRESSURE: 56 MMHG | HEART RATE: 62 BPM | WEIGHT: 144.4 LBS | RESPIRATION RATE: 18 BRPM | OXYGEN SATURATION: 99 % | TEMPERATURE: 97.8 F

## 2024-09-12 DIAGNOSIS — R10.9 ABDOMINAL PAIN: Primary | ICD-10-CM

## 2024-09-12 LAB
ALBUMIN SERPL BCG-MCNC: 4.3 G/DL (ref 3.5–5)
ALP SERPL-CCNC: 74 U/L (ref 34–104)
ALT SERPL W P-5'-P-CCNC: 11 U/L (ref 7–52)
ANION GAP SERPL CALCULATED.3IONS-SCNC: 5 MMOL/L (ref 4–13)
AST SERPL W P-5'-P-CCNC: 13 U/L (ref 13–39)
BACTERIA UR QL AUTO: NORMAL /HPF
BASOPHILS # BLD AUTO: 0.04 THOUSANDS/ÂΜL (ref 0–0.1)
BASOPHILS NFR BLD AUTO: 1 % (ref 0–1)
BILIRUB DIRECT SERPL-MCNC: 0.1 MG/DL (ref 0–0.2)
BILIRUB SERPL-MCNC: 0.48 MG/DL (ref 0.2–1)
BILIRUB UR QL STRIP: NEGATIVE
BUN SERPL-MCNC: 4 MG/DL (ref 5–25)
CALCIUM SERPL-MCNC: 9.5 MG/DL (ref 8.4–10.2)
CHLORIDE SERPL-SCNC: 104 MMOL/L (ref 96–108)
CLARITY UR: CLEAR
CO2 SERPL-SCNC: 31 MMOL/L (ref 21–32)
COLOR UR: YELLOW
CREAT SERPL-MCNC: 0.99 MG/DL (ref 0.6–1.3)
EOSINOPHIL # BLD AUTO: 0.56 THOUSAND/ÂΜL (ref 0–0.61)
EOSINOPHIL NFR BLD AUTO: 10 % (ref 0–6)
ERYTHROCYTE [DISTWIDTH] IN BLOOD BY AUTOMATED COUNT: 11.9 % (ref 11.6–15.1)
GFR SERPL CREATININE-BSD FRML MDRD: 107 ML/MIN/1.73SQ M
GLUCOSE SERPL-MCNC: 88 MG/DL (ref 65–140)
GLUCOSE UR STRIP-MCNC: NEGATIVE MG/DL
HCT VFR BLD AUTO: 44.3 % (ref 36.5–49.3)
HGB BLD-MCNC: 15.3 G/DL (ref 12–17)
HGB UR QL STRIP.AUTO: ABNORMAL
IMM GRANULOCYTES # BLD AUTO: 0.01 THOUSAND/UL (ref 0–0.2)
IMM GRANULOCYTES NFR BLD AUTO: 0 % (ref 0–2)
KETONES UR STRIP-MCNC: NEGATIVE MG/DL
LEUKOCYTE ESTERASE UR QL STRIP: NEGATIVE
LIPASE SERPL-CCNC: 10 U/L (ref 11–82)
LYMPHOCYTES # BLD AUTO: 2.46 THOUSANDS/ÂΜL (ref 0.6–4.47)
LYMPHOCYTES NFR BLD AUTO: 42 % (ref 14–44)
MAGNESIUM SERPL-MCNC: 2 MG/DL (ref 1.9–2.7)
MCH RBC QN AUTO: 31.5 PG (ref 26.8–34.3)
MCHC RBC AUTO-ENTMCNC: 34.5 G/DL (ref 31.4–37.4)
MCV RBC AUTO: 91 FL (ref 82–98)
MONOCYTES # BLD AUTO: 0.35 THOUSAND/ÂΜL (ref 0.17–1.22)
MONOCYTES NFR BLD AUTO: 6 % (ref 4–12)
NEUTROPHILS # BLD AUTO: 2.49 THOUSANDS/ÂΜL (ref 1.85–7.62)
NEUTS SEG NFR BLD AUTO: 41 % (ref 43–75)
NITRITE UR QL STRIP: NEGATIVE
NON-SQ EPI CELLS URNS QL MICRO: NORMAL /HPF
NRBC BLD AUTO-RTO: 0 /100 WBCS
PH UR STRIP.AUTO: 8.5 [PH] (ref 4.5–8)
PLATELET # BLD AUTO: 367 THOUSANDS/UL (ref 149–390)
PMV BLD AUTO: 9.1 FL (ref 8.9–12.7)
POTASSIUM SERPL-SCNC: 3.6 MMOL/L (ref 3.5–5.3)
PROT SERPL-MCNC: 7.1 G/DL (ref 6.4–8.4)
PROT UR STRIP-MCNC: NEGATIVE MG/DL
RBC # BLD AUTO: 4.86 MILLION/UL (ref 3.88–5.62)
RBC #/AREA URNS AUTO: NORMAL /HPF
SODIUM SERPL-SCNC: 140 MMOL/L (ref 135–147)
SP GR UR STRIP.AUTO: 1.01 (ref 1–1.03)
UROBILINOGEN UR QL STRIP.AUTO: 0.2 E.U./DL
WBC # BLD AUTO: 5.91 THOUSAND/UL (ref 4.31–10.16)
WBC #/AREA URNS AUTO: NORMAL /HPF

## 2024-09-12 PROCEDURE — 96361 HYDRATE IV INFUSION ADD-ON: CPT

## 2024-09-12 PROCEDURE — 85025 COMPLETE CBC W/AUTO DIFF WBC: CPT | Performed by: EMERGENCY MEDICINE

## 2024-09-12 PROCEDURE — 83690 ASSAY OF LIPASE: CPT | Performed by: EMERGENCY MEDICINE

## 2024-09-12 PROCEDURE — 99285 EMERGENCY DEPT VISIT HI MDM: CPT | Performed by: EMERGENCY MEDICINE

## 2024-09-12 PROCEDURE — 96374 THER/PROPH/DIAG INJ IV PUSH: CPT

## 2024-09-12 PROCEDURE — 80048 BASIC METABOLIC PNL TOTAL CA: CPT | Performed by: EMERGENCY MEDICINE

## 2024-09-12 PROCEDURE — 99284 EMERGENCY DEPT VISIT MOD MDM: CPT

## 2024-09-12 PROCEDURE — 74177 CT ABD & PELVIS W/CONTRAST: CPT

## 2024-09-12 PROCEDURE — 83735 ASSAY OF MAGNESIUM: CPT | Performed by: EMERGENCY MEDICINE

## 2024-09-12 PROCEDURE — 36415 COLL VENOUS BLD VENIPUNCTURE: CPT | Performed by: EMERGENCY MEDICINE

## 2024-09-12 PROCEDURE — 80076 HEPATIC FUNCTION PANEL: CPT | Performed by: EMERGENCY MEDICINE

## 2024-09-12 PROCEDURE — 81001 URINALYSIS AUTO W/SCOPE: CPT

## 2024-09-12 RX ORDER — KETOROLAC TROMETHAMINE 30 MG/ML
15 INJECTION, SOLUTION INTRAMUSCULAR; INTRAVENOUS ONCE
Status: COMPLETED | OUTPATIENT
Start: 2024-09-12 | End: 2024-09-12

## 2024-09-12 RX ORDER — NAPROXEN 500 MG/1
500 TABLET ORAL 2 TIMES DAILY WITH MEALS
Qty: 20 TABLET | Refills: 0 | Status: SHIPPED | OUTPATIENT
Start: 2024-09-12 | End: 2024-09-22

## 2024-09-12 RX ORDER — HYOSCYAMINE SULFATE 0.125 MG
0.12 TABLET ORAL EVERY 4 HOURS PRN
Qty: 30 TABLET | Refills: 0 | Status: SHIPPED | OUTPATIENT
Start: 2024-09-12

## 2024-09-12 RX ADMIN — IOHEXOL 85 ML: 350 INJECTION, SOLUTION INTRAVENOUS at 08:49

## 2024-09-12 RX ADMIN — SODIUM CHLORIDE 1000 ML: 0.9 INJECTION, SOLUTION INTRAVENOUS at 07:41

## 2024-09-12 RX ADMIN — KETOROLAC TROMETHAMINE 15 MG: 30 INJECTION, SOLUTION INTRAMUSCULAR; INTRAVENOUS at 07:40

## 2024-09-12 NOTE — Clinical Note
Reji Shi was seen and treated in our emergency department on 9/12/2024.    No restrictions            Diagnosis:     Reji  may return to work on return date.    He may return on this date: 09/13/2024         If you have any questions or concerns, please don't hesitate to call.      Marty Barry MD    ______________________________           _______________          _______________  Hospital Representative                              Date                                Time

## 2024-09-12 NOTE — DISCHARGE INSTRUCTIONS
Take the Naprosyn twice daily for the next 5-10 days.    You can also try the Levsin for additional pain.    The number for GI is included in these discharge instructions, call to be seen in the office for further evaluation and management.

## 2024-09-12 NOTE — ED PROVIDER NOTES
1. Abdominal pain      ED Disposition       ED Disposition   Discharge    Condition   Stable    Date/Time   Thu Sep 12, 2024  9:22 AM    Comment   Reji Shi discharge to home/self care.                   Assessment & Plan       Medical Decision Making  1. Abdominal pain - Patient with similar in the past, states no specific changes but has not improved in months. Will check CBC for leukocytosis, anemia, metabolic panel for electrolyte abnormalities and dehydration,  LFT's to assess GB dysfunction, lipase for pancreatitis, CT abdomen and pelvis.    Problems Addressed:  Abdominal pain: chronic illness or injury with exacerbation, progression, or side effects of treatment    Amount and/or Complexity of Data Reviewed  Labs: ordered.  Radiology: ordered.    Risk  Prescription drug management.                       Medications   sodium chloride 0.9 % bolus 1,000 mL (0 mL Intravenous Stopped 9/12/24 7809)   ketorolac (TORADOL) injection 15 mg (15 mg Intravenous Given 9/12/24 2418)   iohexol (OMNIPAQUE) 350 MG/ML injection (MULTI-DOSE) 85 mL (85 mL Intravenous Given 9/12/24 6344)       History of Present Illness       25 YO male presents with LLQ abdominal pain. Patient states this has been an ongoing issue for months, he has been seen for this previously and was admitted last November for an intussusception, had an exploratory laparotomy at that time but did not require a resection. He was most recently admitted for this last month, CT imaging again showed intussusception but this did seem to resolve spontaneously. At that time it was recommended he undergo a colonoscopy but patient left from the hospital prior to this being performed. He denies changes in quality of location of pain but states it has been gradually worsening, seems worse when he is walking a lot. Pt denies CP/SOB/F/C/N/V/D/C, no dysuria, burning on urination or blood in urine.       History provided by:  Patient   used: No             Review of Systems   Constitutional:  Negative for fever.   HENT:  Negative for dental problem.    Eyes:  Negative for visual disturbance.   Respiratory:  Negative for shortness of breath.    Cardiovascular:  Negative for chest pain.   Gastrointestinal:  Positive for abdominal pain. Negative for nausea and vomiting.   Genitourinary:  Negative for dysuria and frequency.   Musculoskeletal:  Negative for neck pain and neck stiffness.   Skin:  Negative for rash.   Neurological:  Negative for dizziness, weakness and light-headedness.   Psychiatric/Behavioral:  Negative for agitation, behavioral problems and confusion.    All other systems reviewed and are negative.          Objective     ED Triage Vitals   Temperature Pulse Blood Pressure Respirations SpO2 Patient Position - Orthostatic VS   09/12/24 0720 09/12/24 0720 09/12/24 0720 09/12/24 0720 09/12/24 0720 09/12/24 0720   97.8 °F (36.6 °C) 74 123/67 18 99 % Lying      Temp src Heart Rate Source BP Location FiO2 (%) Pain Score    -- 09/12/24 0730 09/12/24 0720 -- 09/12/24 0740     Monitor Left arm  8        Physical Exam  Vitals and nursing note reviewed.   Constitutional:       Appearance: He is well-developed.   HENT:      Head: Normocephalic and atraumatic.   Eyes:      Extraocular Movements: Extraocular movements intact.   Cardiovascular:      Rate and Rhythm: Normal rate.   Pulmonary:      Effort: Pulmonary effort is normal.   Abdominal:      General: There is no distension.      Tenderness: There is abdominal tenderness in the left lower quadrant. There is no guarding or rebound.   Musculoskeletal:         General: Normal range of motion.      Cervical back: Normal range of motion.   Skin:     Findings: No rash.   Neurological:      Mental Status: He is alert and oriented to person, place, and time.   Psychiatric:         Behavior: Behavior normal.         Labs Reviewed   CBC AND DIFFERENTIAL - Abnormal       Result Value    WBC 5.91      RBC 4.86       Hemoglobin 15.3      Hematocrit 44.3      MCV 91      MCH 31.5      MCHC 34.5      RDW 11.9      MPV 9.1      Platelets 367      nRBC 0      Segmented % 41 (*)     Immature Grans % 0      Lymphocytes % 42      Monocytes % 6      Eosinophils Relative 10 (*)     Basophils Relative 1      Absolute Neutrophils 2.49      Absolute Immature Grans 0.01      Absolute Lymphocytes 2.46      Absolute Monocytes 0.35      Eosinophils Absolute 0.56      Basophils Absolute 0.04     BASIC METABOLIC PANEL - Abnormal    Sodium 140      Potassium 3.6      Chloride 104      CO2 31      ANION GAP 5      BUN 4 (*)     Creatinine 0.99      Glucose 88      Calcium 9.5      eGFR 107      Narrative:     National Kidney Disease Foundation guidelines for Chronic Kidney Disease (CKD):     Stage 1 with normal or high GFR (GFR > 90 mL/min/1.73 square meters)    Stage 2 Mild CKD (GFR = 60-89 mL/min/1.73 square meters)    Stage 3A Moderate CKD (GFR = 45-59 mL/min/1.73 square meters)    Stage 3B Moderate CKD (GFR = 30-44 mL/min/1.73 square meters)    Stage 4 Severe CKD (GFR = 15-29 mL/min/1.73 square meters)    Stage 5 End Stage CKD (GFR <15 mL/min/1.73 square meters)  Note: GFR calculation is accurate only with a steady state creatinine   LIPASE - Abnormal    Lipase 10 (*)    URINE MACROSCOPIC, POC - Abnormal    Color, UA Yellow      Clarity, UA Clear      pH, UA 8.5 (*)     Leukocytes, UA Negative      Nitrite, UA Negative      Protein, UA Negative      Glucose, UA Negative      Ketones, UA Negative      Urobilinogen, UA 0.2      Bilirubin, UA Negative      Occult Blood, UA Trace (*)     Specific [place], UA 1.010      Narrative:     CLINITEK RESULT   HEPATIC FUNCTION PANEL - Normal    Total Bilirubin 0.48      Bilirubin, Direct 0.10      Alkaline Phosphatase 74      AST 13      ALT 11      Total Protein 7.1      Albumin 4.3     MAGNESIUM - Normal    Magnesium 2.0     URINE MICROSCOPIC - Normal    RBC, UA 1-2      WBC, UA 1-2      Epithelial Cells  Occasional      Bacteria, UA None Seen       CT abdomen pelvis with contrast   Final Interpretation by Hernando Mason MD (09/12 0916)      No acute findings in the abdomen or pelvis.         Workstation performed: [id number]             Procedures       Marty Barry MD  09/12/24 5488

## 2024-09-22 ENCOUNTER — HOSPITAL ENCOUNTER (EMERGENCY)
Facility: HOSPITAL | Age: 24
Discharge: HOME | End: 2024-09-22
Attending: EMERGENCY MEDICINE | Admitting: EMERGENCY MEDICINE
Payer: COMMERCIAL

## 2024-09-22 VITALS
OXYGEN SATURATION: 100 % | RESPIRATION RATE: 18 BRPM | HEART RATE: 71 BPM | SYSTOLIC BLOOD PRESSURE: 124 MMHG | TEMPERATURE: 98.8 F | BODY MASS INDEX: 21.13 KG/M2 | WEIGHT: 143.08 LBS | DIASTOLIC BLOOD PRESSURE: 68 MMHG

## 2024-09-22 DIAGNOSIS — K62.5 RECTAL BLEEDING: Primary | ICD-10-CM

## 2024-09-22 PROCEDURE — 99284 EMERGENCY DEPT VISIT MOD MDM: CPT | Performed by: EMERGENCY MEDICINE

## 2024-09-22 PROCEDURE — 99284 EMERGENCY DEPT VISIT MOD MDM: CPT

## 2024-09-22 NOTE — ED PROVIDER NOTES
1. Rectal bleeding      ED Disposition       ED Disposition   Discharge    Condition   Stable    Date/Time   Sun Sep 22, 2024 11:20 AM    Comment   Reji Shi discharge to home/self care.                   Assessment & Plan       Medical Decision Making  24 year old male with rectal bleed and abdominal pain. Patient with recent evaluation with no acute findings. Patient refused rectal exam due to him receiving a rectal and anal exam during prior visit. No abdominal pain reproduced on examination. No rebound and no guarding. Patient witout any peritoneal signs. Unlikely to be appendicitis, pancreatitis, hepatitis, SBO, diverticulitis, cystitis, cholecystitis. Due to patient's refusal for rectal exam unable to assess for possibility of hemorrhoid, or fissure and also proctitis.  Patient informed of diagnostic futility of repeat CT scan and repeat labs. Patient refused pain medication when offered stating that his oxycodone isn't even helping his pain. Offered GI referral and consultation prior to discharge. Patient refused. Patient states that he would like to leave. Patient discharged with return precaution provided.                  Medications - No data to display    History of Present Illness       HPI  24 year old male with rectal bleed. Patient also complaints of abdominal pain that has remained the same since his last visit to the emergency department roughly a week ago where extensive workup was performed including CT AP. States that he also complained of rectal bleed at the time but was told that there was no external hemorrhoid or fissure on examination and that he needs to be f/u with GI. States that he made an appointment but the nearest appointment is in December and decided to come in for repeat evaluation. Patient notably admitted a month ago for intractable abdominal pain and unable to obtain colonoscopy due to poor prep and prior to completing colonoscopy by GI during his admission left AMA.  Reports no fever, chills, n/v. Denies any abdominal surgery. Reports no urinary symptoms.       Review of Systems   Constitutional:  Negative for chills, diaphoresis, fever and unexpected weight change.   HENT:  Negative for ear pain and sore throat.    Eyes:  Negative for visual disturbance.   Respiratory:  Negative for cough, chest tightness and shortness of breath.    Cardiovascular:  Negative for chest pain and leg swelling.   Gastrointestinal:  Positive for abdominal pain, anal bleeding, blood in stool and rectal pain. Negative for abdominal distention, constipation, diarrhea, nausea and vomiting.   Endocrine: Negative.    Genitourinary:  Negative for difficulty urinating and dysuria.   Musculoskeletal: Negative.    Skin: Negative.    Allergic/Immunologic: Negative.    Neurological: Negative.    Hematological: Negative.    Psychiatric/Behavioral: Negative.     All other systems reviewed and are negative.          Objective     ED Triage Vitals [09/22/24 1051]   Temperature Pulse Blood Pressure Respirations SpO2 Patient Position - Orthostatic VS   98.8 °F (37.1 °C) 71 124/68 18 100 % --      Temp Source Heart Rate Source BP Location FiO2 (%) Pain Score    Oral Monitor Left arm -- --        Physical Exam  Vitals and nursing note reviewed.   Constitutional:       General: He is not in acute distress.     Appearance: Normal appearance. He is not ill-appearing.   HENT:      Head: Normocephalic and atraumatic.      Right Ear: External ear normal.      Left Ear: External ear normal.      Nose: Nose normal.      Mouth/Throat:      Mouth: Mucous membranes are moist.      Pharynx: Oropharynx is clear.   Eyes:      General: No scleral icterus.        Right eye: No discharge.         Left eye: No discharge.      Extraocular Movements: Extraocular movements intact.      Conjunctiva/sclera: Conjunctivae normal.      Pupils: Pupils are equal, round, and reactive to light.   Cardiovascular:      Rate and Rhythm: Normal rate  and regular rhythm.      Pulses: Normal pulses.      Heart sounds: Normal heart sounds.   Pulmonary:      Effort: Pulmonary effort is normal.      Breath sounds: Normal breath sounds.   Abdominal:      General: Abdomen is flat. Bowel sounds are normal. There is no distension.      Palpations: Abdomen is soft.      Tenderness: There is no abdominal tenderness. There is no guarding or rebound.   Genitourinary:     Comments: Patient refused rectal exam   Musculoskeletal:         General: Normal range of motion.      Cervical back: Normal range of motion and neck supple.   Skin:     General: Skin is warm and dry.      Capillary Refill: Capillary refill takes less than 2 seconds.   Neurological:      General: No focal deficit present.      Mental Status: He is alert and oriented to person, place, and time. Mental status is at baseline.   Psychiatric:         Mood and Affect: Mood normal.         Behavior: Behavior normal.         Thought Content: Thought content normal.         Judgment: Judgment normal.         Labs Reviewed - No data to display  No orders to display       Procedures    ED Medication and Procedure Management   Prior to Admission Medications   Prescriptions Last Dose Informant Patient Reported? Taking?   acetaminophen (TYLENOL) 500 mg tablet   No No   Sig: Take 1 tablet (500 mg total) by mouth every 6 (six) hours as needed for mild pain   dicyclomine (BENTYL) 20 mg tablet   No No   Sig: Take 1 tablet (20 mg total) by mouth 4 (four) times a day (before meals and at bedtime) for 3 days, THEN 1 tablet (20 mg total) 4 (four) times a day as needed (abdominal pain or cramping).   docusate sodium (COLACE) 100 mg capsule  Self No No   Sig: Take 1 capsule (100 mg total) by mouth every 12 (twelve) hours   famotidine (PEPCID) 20 mg tablet   No No   Sig: Take 1 tablet (20 mg total) by mouth daily   hyoscyamine (ANASPAZ,LEVSIN) 0.125 MG tablet   No No   Sig: Take 1 tablet (0.125 mg total) by mouth every 4 (four)  hours as needed for cramping   naproxen (NAPROSYN) 500 mg tablet   No No   Sig: Take 1 tablet (500 mg total) by mouth 2 (two) times a day with meals for 10 days   ondansetron (ZOFRAN) 4 mg tablet   No No   Sig: Take 1 tablet (4 mg total) by mouth every 8 (eight) hours as needed for nausea or vomiting   polyethylene glycol (MIRALAX) 17 g packet   No No   Sig: Take 17 g by mouth daily      Facility-Administered Medications: None     Discharge Medication List as of 9/22/2024 11:20 AM        CONTINUE these medications which have NOT CHANGED    Details   acetaminophen (TYLENOL) 500 mg tablet Take 1 tablet (500 mg total) by mouth every 6 (six) hours as needed for mild pain, Starting u 7/25/2024, Normal      dicyclomine (BENTYL) 20 mg tablet Multiple Dosages:Starting Fri 8/9/2024, Until Sun 8/11/2024 at 2359, THEN Starting Mon 8/12/2024, Until Fri 5/7/2027 at 2359Take 1 tablet (20 mg total) by mouth 4 (four) times a day (before meals and at bedtime) for 3 days, THEN 1 tablet (20 mg total)  4 (four) times a day as needed (abdominal pain or cramping)., Normal      docusate sodium (COLACE) 100 mg capsule Take 1 capsule (100 mg total) by mouth every 12 (twelve) hours, Starting Sun 11/19/2023, Normal      famotidine (PEPCID) 20 mg tablet Take 1 tablet (20 mg total) by mouth daily, Starting Fri 8/9/2024, Normal      hyoscyamine (ANASPAZ,LEVSIN) 0.125 MG tablet Take 1 tablet (0.125 mg total) by mouth every 4 (four) hours as needed for cramping, Starting u 9/12/2024, Normal      naproxen (NAPROSYN) 500 mg tablet Take 1 tablet (500 mg total) by mouth 2 (two) times a day with meals for 10 days, Starting u 9/12/2024, Until Sun 9/22/2024, Normal      ondansetron (ZOFRAN) 4 mg tablet Take 1 tablet (4 mg total) by mouth every 8 (eight) hours as needed for nausea or vomiting, Starting Fri 8/9/2024, Normal      polyethylene glycol (MIRALAX) 17 g packet Take 17 g by mouth daily, Starting Thu 12/14/2023, Normal           No discharge  procedures on file.     Mik Blankenship MD  09/22/24 2287

## 2024-12-08 ENCOUNTER — HOSPITAL ENCOUNTER (EMERGENCY)
Facility: HOSPITAL | Age: 24
Discharge: HOME | End: 2024-12-08
Attending: EMERGENCY MEDICINE
Payer: COMMERCIAL

## 2024-12-08 VITALS
RESPIRATION RATE: 16 BRPM | HEART RATE: 91 BPM | SYSTOLIC BLOOD PRESSURE: 114 MMHG | DIASTOLIC BLOOD PRESSURE: 56 MMHG | OXYGEN SATURATION: 98 % | BODY MASS INDEX: 20.48 KG/M2 | WEIGHT: 138.67 LBS | TEMPERATURE: 97.5 F

## 2024-12-08 DIAGNOSIS — G89.29 CHRONIC ABDOMINAL PAIN: Primary | ICD-10-CM

## 2024-12-08 DIAGNOSIS — R10.9 ABDOMINAL PAIN: ICD-10-CM

## 2024-12-08 DIAGNOSIS — R10.9 CHRONIC ABDOMINAL PAIN: Primary | ICD-10-CM

## 2024-12-08 LAB
ALBUMIN SERPL BCG-MCNC: 4.1 G/DL (ref 3.5–5)
ALP SERPL-CCNC: 73 U/L (ref 34–104)
ALT SERPL W P-5'-P-CCNC: 10 U/L (ref 7–52)
ANION GAP SERPL CALCULATED.3IONS-SCNC: 4 MMOL/L (ref 4–13)
AST SERPL W P-5'-P-CCNC: 14 U/L (ref 13–39)
BACTERIA UR QL AUTO: NORMAL /HPF
BASOPHILS # BLD AUTO: 0.03 THOUSANDS/ÂΜL (ref 0–0.1)
BASOPHILS NFR BLD AUTO: 1 % (ref 0–1)
BILIRUB SERPL-MCNC: 0.32 MG/DL (ref 0.2–1)
BILIRUB UR QL STRIP: NEGATIVE
BUN SERPL-MCNC: 6 MG/DL (ref 5–25)
CALCIUM SERPL-MCNC: 9.2 MG/DL (ref 8.4–10.2)
CHLORIDE SERPL-SCNC: 106 MMOL/L (ref 96–108)
CLARITY UR: CLEAR
CO2 SERPL-SCNC: 30 MMOL/L (ref 21–32)
COLOR UR: YELLOW
CREAT SERPL-MCNC: 1.02 MG/DL (ref 0.6–1.3)
EOSINOPHIL # BLD AUTO: 0.23 THOUSAND/ÂΜL (ref 0–0.61)
EOSINOPHIL NFR BLD AUTO: 4 % (ref 0–6)
ERYTHROCYTE [DISTWIDTH] IN BLOOD BY AUTOMATED COUNT: 11.7 % (ref 11.6–15.1)
GFR SERPL CREATININE-BSD FRML MDRD: 103 ML/MIN/1.73SQ M
GLUCOSE SERPL-MCNC: 89 MG/DL (ref 65–140)
GLUCOSE UR STRIP-MCNC: NEGATIVE MG/DL
HCT VFR BLD AUTO: 40.9 % (ref 36.5–49.3)
HGB BLD-MCNC: 13.8 G/DL (ref 12–17)
HGB UR QL STRIP.AUTO: ABNORMAL
IMM GRANULOCYTES # BLD AUTO: 0.01 THOUSAND/UL (ref 0–0.2)
IMM GRANULOCYTES NFR BLD AUTO: 0 % (ref 0–2)
KETONES UR STRIP-MCNC: NEGATIVE MG/DL
LEUKOCYTE ESTERASE UR QL STRIP: NEGATIVE
LIPASE SERPL-CCNC: 12 U/L (ref 11–82)
LYMPHOCYTES # BLD AUTO: 2.54 THOUSANDS/ÂΜL (ref 0.6–4.47)
LYMPHOCYTES NFR BLD AUTO: 39 % (ref 14–44)
MCH RBC QN AUTO: 30.5 PG (ref 26.8–34.3)
MCHC RBC AUTO-ENTMCNC: 33.7 G/DL (ref 31.4–37.4)
MCV RBC AUTO: 90 FL (ref 82–98)
MONOCYTES # BLD AUTO: 0.25 THOUSAND/ÂΜL (ref 0.17–1.22)
MONOCYTES NFR BLD AUTO: 4 % (ref 4–12)
NEUTROPHILS # BLD AUTO: 3.42 THOUSANDS/ÂΜL (ref 1.85–7.62)
NEUTS SEG NFR BLD AUTO: 52 % (ref 43–75)
NITRITE UR QL STRIP: NEGATIVE
NON-SQ EPI CELLS URNS QL MICRO: NORMAL /HPF
NRBC BLD AUTO-RTO: 0 /100 WBCS
PH UR STRIP.AUTO: 7 [PH] (ref 4.5–8)
PLATELET # BLD AUTO: 330 THOUSANDS/UL (ref 149–390)
PMV BLD AUTO: 8.7 FL (ref 8.9–12.7)
POTASSIUM SERPL-SCNC: 3.6 MMOL/L (ref 3.5–5.3)
PROT SERPL-MCNC: 6.6 G/DL (ref 6.4–8.4)
PROT UR STRIP-MCNC: NEGATIVE MG/DL
RBC # BLD AUTO: 4.53 MILLION/UL (ref 3.88–5.62)
RBC #/AREA URNS AUTO: NORMAL /HPF
SODIUM SERPL-SCNC: 140 MMOL/L (ref 135–147)
SP GR UR STRIP.AUTO: 1.01 (ref 1–1.03)
UROBILINOGEN UR QL STRIP.AUTO: 0.2 E.U./DL
WBC # BLD AUTO: 6.48 THOUSAND/UL (ref 4.31–10.16)
WBC #/AREA URNS AUTO: NORMAL /HPF

## 2024-12-08 PROCEDURE — 36415 COLL VENOUS BLD VENIPUNCTURE: CPT | Performed by: EMERGENCY MEDICINE

## 2024-12-08 PROCEDURE — 96374 THER/PROPH/DIAG INJ IV PUSH: CPT

## 2024-12-08 PROCEDURE — 85025 COMPLETE CBC W/AUTO DIFF WBC: CPT | Performed by: EMERGENCY MEDICINE

## 2024-12-08 PROCEDURE — 99284 EMERGENCY DEPT VISIT MOD MDM: CPT

## 2024-12-08 PROCEDURE — 80053 COMPREHEN METABOLIC PANEL: CPT | Performed by: EMERGENCY MEDICINE

## 2024-12-08 PROCEDURE — 81001 URINALYSIS AUTO W/SCOPE: CPT

## 2024-12-08 PROCEDURE — 99284 EMERGENCY DEPT VISIT MOD MDM: CPT | Performed by: EMERGENCY MEDICINE

## 2024-12-08 PROCEDURE — 83690 ASSAY OF LIPASE: CPT | Performed by: EMERGENCY MEDICINE

## 2024-12-08 RX ORDER — KETOROLAC TROMETHAMINE 30 MG/ML
15 INJECTION, SOLUTION INTRAMUSCULAR; INTRAVENOUS ONCE
Status: COMPLETED | OUTPATIENT
Start: 2024-12-08 | End: 2024-12-08

## 2024-12-08 RX ORDER — HYOSCYAMINE SULFATE 0.125 MG
0.12 TABLET ORAL EVERY 4 HOURS PRN
Qty: 30 TABLET | Refills: 0 | Status: SHIPPED | OUTPATIENT
Start: 2024-12-08

## 2024-12-08 RX ORDER — DICYCLOMINE HYDROCHLORIDE 10 MG/1
10 CAPSULE ORAL ONCE
Status: COMPLETED | OUTPATIENT
Start: 2024-12-08 | End: 2024-12-08

## 2024-12-08 RX ADMIN — DICYCLOMINE HYDROCHLORIDE 10 MG: 10 CAPSULE ORAL at 16:35

## 2024-12-08 RX ADMIN — KETOROLAC TROMETHAMINE 15 MG: 30 INJECTION, SOLUTION INTRAMUSCULAR; INTRAVENOUS at 16:39

## 2024-12-08 NOTE — ED PROVIDER NOTES
Time reflects when diagnosis was documented in both MDM as applicable and the Disposition within this note       Time User Action Codes Description Comment    12/8/2024  5:25 PM Julian Wheeler Add [R10.9,  G89.29] Chronic abdominal pain     12/8/2024  5:25 PM Julian Wheeler Add [R10.9] Abdominal pain           ED Disposition       ED Disposition   Discharge    Condition   Good    Date/Time   Sun Dec 8, 2024  5:25 PM    Comment   Reji Shi discharge to home/self care.                   Assessment & Plan       Medical Decision Making  Amount and/or Complexity of Data Reviewed  Labs: ordered.    Risk  Prescription drug management.        ED Course as of 12/08/24 1953   [place] Dec 08, 2024   1724 Reviewed results with patient at bedside and updated on the plan.  ED workup is reassuring.  He is in no distress.  He can be discharged, follow up as planned.         Medications   ketorolac (TORADOL) injection 15 mg (15 mg Intravenous Given 12/8/24 1639)   dicyclomine (BENTYL) capsule 10 mg (10 mg Oral Given 12/8/24 1635)       ED Risk Strat Scores                                               History of Present Illness       Chief Complaint   Patient presents with    Abdominal Pain     Pt reports left lower abdominal pain x3 days. Pt reports diarrhea, denies N/V. Pt reports blood mixed in with stool.          Past Medical History:   Diagnosis Date    No known health problems       Past Surgical History:   Procedure Laterality Date    COLON SURGERY      MO LAPS ABD PRTM&OMENTUM DX W/WO SPEC BR/WA SPX N/A 11/27/2023    Procedure: LAPAROSCOPY DIAGNOSTIC;  Surgeon: Em Yeboah MD;  Location: [facility] OR;  Service: General      No family history on file.   Social History     Tobacco Use    Smoking status: Some Days     Types: Cigars    Smokeless tobacco: Never   Vaping Use    Vaping status: Former    Substances: Nicotine   Substance Use Topics    Alcohol use: Not Currently    Drug use: Yes     Types: Marijuana       E-Cigarette/Vaping    E-Cigarette Use Former User       E-Cigarette/Vaping Substances    Nicotine Yes     THC No     CBD No     Flavoring No     Other No     Unknown No       I have reviewed and agree with the history as documented.     23 yo M c/o LLQ pain, intermittent for 2 days, feels sharp without radiation, waxes and wanes, last for several minutes.  He denies fever, chills, N/V.  He reports some diarrhea.  Pain has been  recurrent over several months, and he has been admitted and evaluated for it periodically.  Intussusception was suspected, and he has had exploratory laparotomy previously, but did not require any additional procedures. He has followed up with GI, with the goal to get colonscopy, apparently his first prep was inadequate so he is rescheduled to next month.        History provided by:  Patient      Review of Systems        Objective       ED Triage Vitals [12/08/24 1600]   Temperature Pulse Blood Pressure Respirations SpO2 Patient Position - Orthostatic VS   97.5 °F (36.4 °C) 91 114/56 16 98 % Sitting      Temp Source Heart Rate Source BP Location FiO2 (%) Pain Score    Oral Monitor Right arm -- 8      Vitals      Date and Time Temp Pulse SpO2 Resp BP Pain Score FACES Pain Rating User   12/08/24 1600 97.5 °F (36.4 °C) 91 98 % 16 114/56 8 -- GG            Physical Exam  Vitals and nursing note reviewed.   Constitutional:       Appearance: He is well-developed.   HENT:      Head: Normocephalic and atraumatic.      Right Ear: External ear normal.      Left Ear: External ear normal.      Nose: Nose normal.      Mouth/Throat:      Mouth: Mucous membranes are moist.   Eyes:      Conjunctiva/sclera: Conjunctivae normal.      Pupils: Pupils are equal, round, and reactive to light.   Cardiovascular:      Rate and Rhythm: Normal rate and regular rhythm.   Pulmonary:      Effort: Pulmonary effort is normal.   Abdominal:      Tenderness: There is no abdominal tenderness.   Musculoskeletal:          General: Normal range of motion.      Cervical back: Normal range of motion and neck supple.   Skin:     General: Skin is warm and dry.      Capillary Refill: Capillary refill takes less than 2 seconds.   Neurological:      Mental Status: He is alert and oriented to person, place, and time.      Cranial Nerves: No cranial nerve deficit.      Coordination: Coordination normal.   Psychiatric:         Behavior: Behavior normal.         Thought Content: Thought content normal.         Judgment: Judgment normal.         Results Reviewed       Procedure Component Value Units Date/Time    Comprehensive metabolic panel [[id number]] Collected: 12/08/24 1639    Lab Status: Final result Specimen: Blood from Arm, Left Updated: 12/08/24 1657     Sodium 140 mmol/L      Potassium 3.6 mmol/L      Chloride 106 mmol/L      CO2 30 mmol/L      ANION GAP 4 mmol/L      BUN 6 mg/dL      Creatinine 1.02 mg/dL      Glucose 89 mg/dL      Calcium 9.2 mg/dL      AST 14 U/L      ALT 10 U/L      Alkaline Phosphatase 73 U/L      Total Protein 6.6 g/dL      Albumin 4.1 g/dL      Total Bilirubin 0.32 mg/dL      eGFR 103 ml/min/1.73sq m     Narrative:      [employer] guidelines for Chronic Kidney Disease (CKD):     Stage 1 with normal or high GFR (GFR > 90 mL/min/1.73 square meters)    Stage 2 Mild CKD (GFR = 60-89 mL/min/1.73 square meters)    Stage 3A Moderate CKD (GFR = 45-59 mL/min/1.73 square meters)    Stage 3B Moderate CKD (GFR = 30-44 mL/min/1.73 square meters)    Stage 4 Severe CKD (GFR = 15-29 mL/min/1.73 square meters)    Stage 5 End Stage CKD (GFR <15 mL/min/1.73 square meters)  Note: GFR calculation is accurate only with a steady state creatinine    Lipase [[id number]]  (Normal) Collected: 12/08/24 1639    Lab Status: Final result Specimen: Blood from Arm, Left Updated: 12/08/24 1657     Lipase 12 u/L     Urine Microscopic [[id number]]  (Normal) Collected: 12/08/24 1642    Lab Status: Final result Specimen: Urine,  Clean Catch Updated: 12/08/24 1654     RBC, UA None Seen /hpf      WBC, UA 1-2 /hpf      Epithelial Cells Occasional /hpf      Bacteria, UA None Seen /hpf     CBC and differential [[id number]]  (Abnormal) Collected: 12/08/24 1639    Lab Status: Final result Specimen: Blood from Arm, Left Updated: 12/08/24 1644     WBC 6.48 Thousand/uL      RBC 4.53 Million/uL      Hemoglobin 13.8 g/dL      Hematocrit 40.9 %      MCV 90 fL      MCH 30.5 pg      MCHC 33.7 g/dL      RDW 11.7 %      MPV 8.7 fL      Platelets 330 Thousands/uL      nRBC 0 /100 WBCs      Segmented % 52 %      Immature Grans % 0 %      Lymphocytes % 39 %      Monocytes % 4 %      Eosinophils Relative 4 %      Basophils Relative 1 %      Absolute Neutrophils 3.42 Thousands/µL      Absolute Immature Grans 0.01 Thousand/uL      Absolute Lymphocytes 2.54 Thousands/µL      Absolute Monocytes 0.25 Thousand/µL      Eosinophils Absolute 0.23 Thousand/µL      Basophils Absolute 0.03 Thousands/µL     Urine Macroscopic, POC [[id number]]  (Abnormal) Collected: 12/08/24 1642    Lab Status: Final result Specimen: Urine Updated: 12/08/24 1643     Color, UA Yellow     Clarity, UA Clear     pH, UA 7.0     Leukocytes, UA Negative     Nitrite, UA Negative     Protein, UA Negative mg/dl      Glucose, UA Negative mg/dl      Ketones, UA Negative mg/dl      Urobilinogen, UA 0.2 E.U./dl      Bilirubin, UA Negative     Occult Blood, UA Trace     Specific Gravity, UA 1.010    Narrative:      CLINITEK RESULT            No orders to display       Procedures    ED Medication and Procedure Management   Prior to Admission Medications   Prescriptions Last Dose Informant Patient Reported? Taking?   acetaminophen (TYLENOL) 500 mg tablet   No No   Sig: Take 1 tablet (500 mg total) by mouth every 6 (six) hours as needed for mild pain   dicyclomine (BENTYL) 20 mg tablet   No No   Sig: Take 1 tablet (20 mg total) by mouth 4 (four) times a day (before meals and at bedtime) for 3 days, THEN 1  tablet (20 mg total) 4 (four) times a day as needed (abdominal pain or cramping).   docusate sodium (COLACE) 100 mg capsule  Self No No   Sig: Take 1 capsule (100 mg total) by mouth every 12 (twelve) hours   famotidine (PEPCID) 20 mg tablet   No No   Sig: Take 1 tablet (20 mg total) by mouth daily   hyoscyamine (ANASPAZ,LEVSIN) 0.125 MG tablet   No No   Sig: Take 1 tablet (0.125 mg total) by mouth every 4 (four) hours as needed for cramping   hyoscyamine (ANASPAZ,LEVSIN) 0.125 MG tablet   No Yes   Sig: Take 1 tablet (0.125 mg total) by mouth every 4 (four) hours as needed for cramping   naproxen (NAPROSYN) 500 mg tablet   No No   Sig: Take 1 tablet (500 mg total) by mouth 2 (two) times a day with meals for 10 days   ondansetron (ZOFRAN) 4 mg tablet   No No   Sig: Take 1 tablet (4 mg total) by mouth every 8 (eight) hours as needed for nausea or vomiting   polyethylene glycol (MIRALAX) 17 g packet   No No   Sig: Take 17 g by mouth daily      Facility-Administered Medications: None     Discharge Medication List as of 12/8/2024  5:27 PM        CONTINUE these medications which have CHANGED    Details   hyoscyamine (ANASPAZ,LEVSIN) 0.125 MG tablet Take 1 tablet (0.125 mg total) by mouth every 4 (four) hours as needed for cramping, Starting Sun 12/8/2024, Normal           CONTINUE these medications which have NOT CHANGED    Details   acetaminophen (TYLENOL) 500 mg tablet Take 1 tablet (500 mg total) by mouth every 6 (six) hours as needed for mild pain, Starting u 7/25/2024, Normal      docusate sodium (COLACE) 100 mg capsule Take 1 capsule (100 mg total) by mouth every 12 (twelve) hours, Starting Sun 11/19/2023, Normal      famotidine (PEPCID) 20 mg tablet Take 1 tablet (20 mg total) by mouth daily, Starting Fri 8/9/2024, Normal      naproxen (NAPROSYN) 500 mg tablet Take 1 tablet (500 mg total) by mouth 2 (two) times a day with meals for 10 days, Starting u 9/12/2024, Until Sun 9/22/2024, Normal      ondansetron  (ZOFRAN) 4 mg tablet Take 1 tablet (4 mg total) by mouth every 8 (eight) hours as needed for nausea or vomiting, Starting Fri 8/9/2024, Normal      polyethylene glycol (MIRALAX) 17 g packet Take 17 g by mouth daily, Starting Thu 12/14/2023, Normal           STOP taking these medications       dicyclomine (BENTYL) 20 mg tablet Comments:   Reason for Stopping:             No discharge procedures on file.  ED SEPSIS DOCUMENTATION   Time reflects when diagnosis was documented in both MDM as applicable and the Disposition within this note       Time User Action Codes Description Comment    12/8/2024  5:25 PM Julian Wheeler [R10.9,  G89.29] Chronic abdominal pain     12/8/2024  5:25 PM Julian Wheeler [R10.9] Abdominal pain                  Julian Wheeler MD  12/08/24 1953

## 2024-12-08 NOTE — DISCHARGE INSTRUCTIONS
Follow up as planned with the gastroenterologist. Return to ED as needed for severe abdominal pain, uncontrolled vomiting, or diarrhea.

## 2024-12-17 ENCOUNTER — HOSPITAL ENCOUNTER (EMERGENCY)
Facility: HOSPITAL | Age: 24
Discharge: HOME | End: 2024-12-17
Attending: EMERGENCY MEDICINE
Payer: COMMERCIAL

## 2024-12-17 ENCOUNTER — APPOINTMENT (EMERGENCY)
Dept: CT IMAGING | Facility: HOSPITAL | Age: 24
End: 2024-12-17
Payer: COMMERCIAL

## 2024-12-17 ENCOUNTER — APPOINTMENT (EMERGENCY)
Dept: RADIOLOGY | Facility: HOSPITAL | Age: 24
End: 2024-12-17
Payer: COMMERCIAL

## 2024-12-17 VITALS
SYSTOLIC BLOOD PRESSURE: 150 MMHG | RESPIRATION RATE: 18 BRPM | DIASTOLIC BLOOD PRESSURE: 90 MMHG | TEMPERATURE: 98.1 F | OXYGEN SATURATION: 99 % | HEART RATE: 108 BPM

## 2024-12-17 DIAGNOSIS — R51.9 HEADACHE: ICD-10-CM

## 2024-12-17 DIAGNOSIS — S09.90XA CLOSED HEAD INJURY, INITIAL ENCOUNTER: ICD-10-CM

## 2024-12-17 DIAGNOSIS — I10 HIGH BLOOD PRESSURE: ICD-10-CM
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
## 2024-12-17 DIAGNOSIS — M79.642 LEFT HAND PAIN: ICD-10-CM

## 2024-12-17 PROCEDURE — 70450 CT HEAD/BRAIN W/O DYE: CPT

## 2024-12-17 PROCEDURE — 99284 EMERGENCY DEPT VISIT MOD MDM: CPT

## 2024-12-17 PROCEDURE — 99284 EMERGENCY DEPT VISIT MOD MDM: CPT | Performed by: EMERGENCY MEDICINE

## 2024-12-17 PROCEDURE — 73130 X-RAY EXAM OF HAND: CPT

## 2024-12-17 RX ORDER — NAPROXEN 250 MG/1
250 TABLET ORAL
Qty: 21 TABLET | Refills: 0 | Status: SHIPPED | OUTPATIENT
Start: 2024-12-17 | End: 2024-12-24

## 2024-12-17 RX ORDER — LIDOCAINE 40 MG/G
CREAM TOPICAL AS NEEDED
Qty: 30 G | Refills: 0 | Status: SHIPPED | OUTPATIENT
Start: 2024-12-17

## 2024-12-17 RX ORDER — ACETAMINOPHEN 325 MG/1
650 TABLET ORAL ONCE
Status: COMPLETED | OUTPATIENT
Start: 2024-12-17 | End: 2024-12-17

## 2024-12-17 RX ADMIN — ACETAMINOPHEN 650 MG: 325 TABLET, FILM COATED ORAL at 17:05
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
## 2024-12-17 NOTE — Clinical Note
Reji Shi was seen and treated in our emergency department on 12/17/2024.    No restrictions            Diagnosis:     Reji  may return to work on return date.    He may return on this date: 12/19/2024         If you have any questions or concerns, please don't hesitate to call.      Karthik Martin MD    ______________________________           _______________          _______________  Hospital Representative                              Date                                Time

## 2024-12-18 ENCOUNTER — APPOINTMENT (EMERGENCY)
Dept: CT IMAGING | Facility: HOSPITAL | Age: 24
End: 2024-12-18
Payer: COMMERCIAL

## 2024-12-18 ENCOUNTER — HOSPITAL ENCOUNTER (EMERGENCY)
Facility: HOSPITAL | Age: 24
Discharge: HOME | End: 2024-12-18
Attending: EMERGENCY MEDICINE
Payer: COMMERCIAL

## 2024-12-18 VITALS
RESPIRATION RATE: 16 BRPM | OXYGEN SATURATION: 100 % | TEMPERATURE: 97.6 F | SYSTOLIC BLOOD PRESSURE: 106 MMHG | WEIGHT: 138.23 LBS | DIASTOLIC BLOOD PRESSURE: 53 MMHG | BODY MASS INDEX: 20.41 KG/M2 | HEART RATE: 54 BPM

## 2024-12-18 DIAGNOSIS — R10.9 CHRONIC ABDOMINAL PAIN: ICD-10-CM

## 2024-12-18 DIAGNOSIS — R51.9 HEADACHE: Primary | ICD-10-CM

## 2024-12-18 DIAGNOSIS — G89.29 CHRONIC ABDOMINAL PAIN: ICD-10-CM

## 2024-12-18 LAB
ALBUMIN SERPL BCG-MCNC: 4.4 G/DL (ref 3.5–5)
ALP SERPL-CCNC: 65 U/L (ref 34–104)
ALT SERPL W P-5'-P-CCNC: 10 U/L (ref 7–52)
ANION GAP SERPL CALCULATED.3IONS-SCNC: 5 MMOL/L (ref 4–13)
AST SERPL W P-5'-P-CCNC: 24 U/L (ref 13–39)
BASOPHILS # BLD AUTO: 0.03 THOUSANDS/ÂΜL (ref 0–0.1)
BASOPHILS NFR BLD AUTO: 0 % (ref 0–1)
BILIRUB SERPL-MCNC: 0.64 MG/DL (ref 0.2–1)
BILIRUB UR QL STRIP: NEGATIVE
BUN SERPL-MCNC: 6 MG/DL (ref 5–25)
CALCIUM SERPL-MCNC: 9.3 MG/DL (ref 8.4–10.2)
CHLORIDE SERPL-SCNC: 105 MMOL/L (ref 96–108)
CLARITY UR: CLEAR
CO2 SERPL-SCNC: 29 MMOL/L (ref 21–32)
COLOR UR: YELLOW
CREAT SERPL-MCNC: 1.01 MG/DL (ref 0.6–1.3)
EOSINOPHIL # BLD AUTO: 0.33 THOUSAND/ÂΜL (ref 0–0.61)
EOSINOPHIL NFR BLD AUTO: 4 % (ref 0–6)
ERYTHROCYTE [DISTWIDTH] IN BLOOD BY AUTOMATED COUNT: 11.9 % (ref 11.6–15.1)
GFR SERPL CREATININE-BSD FRML MDRD: 105 ML/MIN/1.73SQ M
GLUCOSE SERPL-MCNC: 80 MG/DL (ref 65–140)
GLUCOSE UR STRIP-MCNC: NEGATIVE MG/DL
HCT VFR BLD AUTO: 41.8 % (ref 36.5–49.3)
HGB BLD-MCNC: 14.5 G/DL (ref 12–17)
HGB UR QL STRIP.AUTO: NEGATIVE
IMM GRANULOCYTES # BLD AUTO: 0.02 THOUSAND/UL (ref 0–0.2)
IMM GRANULOCYTES NFR BLD AUTO: 0 % (ref 0–2)
KETONES UR STRIP-MCNC: NEGATIVE MG/DL
LEUKOCYTE ESTERASE UR QL STRIP: NEGATIVE
LYMPHOCYTES # BLD AUTO: 2.17 THOUSANDS/ÂΜL (ref 0.6–4.47)
LYMPHOCYTES NFR BLD AUTO: 29 % (ref 14–44)
MCH RBC QN AUTO: 32 PG (ref 26.8–34.3)
MCHC RBC AUTO-ENTMCNC: 34.7 G/DL (ref 31.4–37.4)
MCV RBC AUTO: 92 FL (ref 82–98)
MONOCYTES # BLD AUTO: 0.49 THOUSAND/ÂΜL (ref 0.17–1.22)
MONOCYTES NFR BLD AUTO: 7 % (ref 4–12)
NEUTROPHILS # BLD AUTO: 4.43 THOUSANDS/ÂΜL (ref 1.85–7.62)
NEUTS SEG NFR BLD AUTO: 60 % (ref 43–75)
NITRITE UR QL STRIP: NEGATIVE
NRBC BLD AUTO-RTO: 0 /100 WBCS
PH UR STRIP.AUTO: 6 [PH] (ref 4.5–8)
PLATELET # BLD AUTO: 349 THOUSANDS/UL (ref 149–390)
PMV BLD AUTO: 9.5 FL (ref 8.9–12.7)
POTASSIUM SERPL-SCNC: 3.3 MMOL/L (ref 3.5–5.3)
PROT SERPL-MCNC: 7 G/DL (ref 6.4–8.4)
PROT UR STRIP-MCNC: NEGATIVE MG/DL
RBC # BLD AUTO: 4.53 MILLION/UL (ref 3.88–5.62)
SODIUM SERPL-SCNC: 139 MMOL/L (ref 135–147)
SP GR UR STRIP.AUTO: 1.01 (ref 1–1.03)
UROBILINOGEN UR QL STRIP.AUTO: 0.2 E.U./DL
WBC # BLD AUTO: 7.47 THOUSAND/UL (ref 4.31–10.16)

## 2024-12-18 PROCEDURE — 81003 URINALYSIS AUTO W/O SCOPE: CPT

## 2024-12-18 PROCEDURE — 80053 COMPREHEN METABOLIC PANEL: CPT | Performed by: PHYSICIAN ASSISTANT

## 2024-12-18 PROCEDURE — 99284 EMERGENCY DEPT VISIT MOD MDM: CPT | Performed by: PHYSICIAN ASSISTANT

## 2024-12-18 PROCEDURE — 96374 THER/PROPH/DIAG INJ IV PUSH: CPT

## 2024-12-18 PROCEDURE — 74177 CT ABD & PELVIS W/CONTRAST: CPT

## 2024-12-18 PROCEDURE — 96375 TX/PRO/DX INJ NEW DRUG ADDON: CPT

## 2024-12-18 PROCEDURE — 99285 EMERGENCY DEPT VISIT HI MDM: CPT

## 2024-12-18 PROCEDURE — 85025 COMPLETE CBC W/AUTO DIFF WBC: CPT | Performed by: PHYSICIAN ASSISTANT

## 2024-12-18 PROCEDURE — 96361 HYDRATE IV INFUSION ADD-ON: CPT

## 2024-12-18 PROCEDURE — 36415 COLL VENOUS BLD VENIPUNCTURE: CPT | Performed by: PHYSICIAN ASSISTANT

## 2024-12-18 RX ORDER — POTASSIUM CHLORIDE 1500 MG/1
20 TABLET, EXTENDED RELEASE ORAL ONCE
Status: COMPLETED | OUTPATIENT
Start: 2024-12-18 | End: 2024-12-18

## 2024-12-18 RX ORDER — METOCLOPRAMIDE HYDROCHLORIDE 5 MG/ML
10 INJECTION INTRAMUSCULAR; INTRAVENOUS ONCE
Status: COMPLETED | OUTPATIENT
Start: 2024-12-18 | End: 2024-12-18

## 2024-12-18 RX ORDER — DIPHENHYDRAMINE HYDROCHLORIDE 50 MG/ML
12.5 INJECTION INTRAMUSCULAR; INTRAVENOUS ONCE
Status: COMPLETED | OUTPATIENT
Start: 2024-12-18 | End: 2024-12-18

## 2024-12-18 RX ORDER — KETOROLAC TROMETHAMINE 30 MG/ML
15 INJECTION, SOLUTION INTRAMUSCULAR; INTRAVENOUS ONCE
Status: COMPLETED | OUTPATIENT
Start: 2024-12-18 | End: 2024-12-18

## 2024-12-18 RX ADMIN — SODIUM CHLORIDE 1000 ML: 0.9 INJECTION, SOLUTION INTRAVENOUS at 06:29

## 2024-12-18 RX ADMIN — POTASSIUM CHLORIDE 20 MEQ: 1500 TABLET, EXTENDED RELEASE ORAL at 08:26

## 2024-12-18 RX ADMIN — IOHEXOL 85 ML: 350 INJECTION, SOLUTION INTRAVENOUS at 07:41

## 2024-12-18 RX ADMIN — KETOROLAC TROMETHAMINE 15 MG: 30 INJECTION, SOLUTION INTRAMUSCULAR; INTRAVENOUS at 06:32

## 2024-12-18 RX ADMIN — DIPHENHYDRAMINE HYDROCHLORIDE 12.5 MG: 50 INJECTION INTRAMUSCULAR; INTRAVENOUS at 06:31

## 2024-12-18 RX ADMIN — METOCLOPRAMIDE 10 MG: 5 INJECTION, SOLUTION INTRAMUSCULAR; INTRAVENOUS at 06:33

## 2024-12-18 NOTE — Clinical Note
Reji Shi was seen and treated in our emergency department on 12/18/2024.                Diagnosis:     Ahmed  .    He may return on this date: 12/19/2024         If you have any questions or concerns, please don't hesitate to call.      Blanco Sheppard PA-C    ______________________________           _______________          _______________  Hospital Representative                              Date                                Time
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
## 2024-12-18 NOTE — DISCHARGE INSTRUCTIONS
Please refer to the attached information for strict return instructions. If symptoms worsen or new symptoms develop please return to the ER. Please follow up with gastroenterology as scheduled.

## 2024-12-29 ENCOUNTER — APPOINTMENT (EMERGENCY)
Dept: CT IMAGING | Facility: HOSPITAL | Age: 24
End: 2024-12-29
Payer: COMMERCIAL

## 2024-12-29 ENCOUNTER — HOSPITAL ENCOUNTER (EMERGENCY)
Facility: HOSPITAL | Age: 24
Discharge: HOME | End: 2024-12-29
Attending: EMERGENCY MEDICINE | Admitting: EMERGENCY MEDICINE
Payer: COMMERCIAL

## 2024-12-29 VITALS
RESPIRATION RATE: 18 BRPM | SYSTOLIC BLOOD PRESSURE: 127 MMHG | OXYGEN SATURATION: 98 % | BODY MASS INDEX: 20.28 KG/M2 | DIASTOLIC BLOOD PRESSURE: 80 MMHG | TEMPERATURE: 98.1 F | HEART RATE: 90 BPM | WEIGHT: 137.35 LBS

## 2024-12-29 DIAGNOSIS — R10.9 ABDOMINAL PAIN: Primary | ICD-10-CM

## 2024-12-29 LAB
ALBUMIN SERPL BCG-MCNC: 4.5 G/DL (ref 3.5–5)
ALP SERPL-CCNC: 97 U/L (ref 34–104)
ALT SERPL W P-5'-P-CCNC: 11 U/L (ref 7–52)
ANION GAP SERPL CALCULATED.3IONS-SCNC: 8 MMOL/L (ref 4–13)
AST SERPL W P-5'-P-CCNC: 14 U/L (ref 13–39)
BACTERIA UR QL AUTO: NORMAL /HPF
BASOPHILS # BLD AUTO: 0.05 THOUSANDS/ΜL (ref 0–0.1)
BASOPHILS NFR BLD AUTO: 0 % (ref 0–1)
BILIRUB SERPL-MCNC: 0.58 MG/DL (ref 0.2–1)
BILIRUB UR QL STRIP: NEGATIVE
BUN SERPL-MCNC: 7 MG/DL (ref 5–25)
CALCIUM SERPL-MCNC: 9.6 MG/DL (ref 8.4–10.2)
CHLORIDE SERPL-SCNC: 106 MMOL/L (ref 96–108)
CLARITY UR: CLEAR
CO2 SERPL-SCNC: 25 MMOL/L (ref 21–32)
COLOR UR: YELLOW
CREAT SERPL-MCNC: 0.96 MG/DL (ref 0.6–1.3)
EOSINOPHIL # BLD AUTO: 0.16 THOUSAND/ΜL (ref 0–0.61)
EOSINOPHIL NFR BLD AUTO: 1 % (ref 0–6)
ERYTHROCYTE [DISTWIDTH] IN BLOOD BY AUTOMATED COUNT: 11.9 % (ref 11.6–15.1)
GFR SERPL CREATININE-BSD FRML MDRD: 111 ML/MIN/1.73SQ M
GLUCOSE SERPL-MCNC: 88 MG/DL (ref 65–140)
GLUCOSE UR STRIP-MCNC: NEGATIVE MG/DL
HCT VFR BLD AUTO: 44.6 % (ref 36.5–49.3)
HGB BLD-MCNC: 15.6 G/DL (ref 12–17)
HGB UR QL STRIP.AUTO: ABNORMAL
IMM GRANULOCYTES # BLD AUTO: 0.05 THOUSAND/UL (ref 0–0.2)
IMM GRANULOCYTES NFR BLD AUTO: 0 % (ref 0–2)
KETONES UR STRIP-MCNC: NEGATIVE MG/DL
LEUKOCYTE ESTERASE UR QL STRIP: NEGATIVE
LIPASE SERPL-CCNC: 7 U/L (ref 11–82)
LYMPHOCYTES # BLD AUTO: 2.05 THOUSANDS/ΜL (ref 0.6–4.47)
LYMPHOCYTES NFR BLD AUTO: 13 % (ref 14–44)
MCH RBC QN AUTO: 31 PG (ref 26.8–34.3)
MCHC RBC AUTO-ENTMCNC: 35 G/DL (ref 31.4–37.4)
MCV RBC AUTO: 89 FL (ref 82–98)
MONOCYTES # BLD AUTO: 0.79 THOUSAND/ΜL (ref 0.17–1.22)
MONOCYTES NFR BLD AUTO: 5 % (ref 4–12)
NEUTROPHILS # BLD AUTO: 12.81 THOUSANDS/ΜL (ref 1.85–7.62)
NEUTS SEG NFR BLD AUTO: 81 % (ref 43–75)
NITRITE UR QL STRIP: NEGATIVE
NON-SQ EPI CELLS URNS QL MICRO: NORMAL /HPF
NRBC BLD AUTO-RTO: 0 /100 WBCS
PH UR STRIP.AUTO: 8.5 [PH] (ref 4.5–8)
PLATELET # BLD AUTO: 349 THOUSANDS/UL (ref 149–390)
PMV BLD AUTO: 8.9 FL (ref 8.9–12.7)
POTASSIUM SERPL-SCNC: 3.7 MMOL/L (ref 3.5–5.3)
PROT SERPL-MCNC: 7.8 G/DL (ref 6.4–8.4)
PROT UR STRIP-MCNC: NEGATIVE MG/DL
RBC # BLD AUTO: 5.03 MILLION/UL (ref 3.88–5.62)
RBC #/AREA URNS AUTO: NORMAL /HPF
SODIUM SERPL-SCNC: 139 MMOL/L (ref 135–147)
SP GR UR STRIP.AUTO: 1.01 (ref 1–1.03)
UROBILINOGEN UR QL STRIP.AUTO: 0.2 E.U./DL
WBC # BLD AUTO: 15.91 THOUSAND/UL (ref 4.31–10.16)
WBC #/AREA URNS AUTO: NORMAL /HPF

## 2024-12-29 PROCEDURE — 99284 EMERGENCY DEPT VISIT MOD MDM: CPT

## 2024-12-29 PROCEDURE — 81001 URINALYSIS AUTO W/SCOPE: CPT

## 2024-12-29 PROCEDURE — 85025 COMPLETE CBC W/AUTO DIFF WBC: CPT | Performed by: EMERGENCY MEDICINE

## 2024-12-29 PROCEDURE — 36415 COLL VENOUS BLD VENIPUNCTURE: CPT

## 2024-12-29 PROCEDURE — 80053 COMPREHEN METABOLIC PANEL: CPT | Performed by: EMERGENCY MEDICINE

## 2024-12-29 PROCEDURE — 96374 THER/PROPH/DIAG INJ IV PUSH: CPT

## 2024-12-29 PROCEDURE — 83690 ASSAY OF LIPASE: CPT | Performed by: EMERGENCY MEDICINE

## 2024-12-29 PROCEDURE — 96361 HYDRATE IV INFUSION ADD-ON: CPT

## 2024-12-29 PROCEDURE — 99285 EMERGENCY DEPT VISIT HI MDM: CPT

## 2024-12-29 PROCEDURE — 74177 CT ABD & PELVIS W/CONTRAST: CPT

## 2024-12-29 RX ORDER — KETOROLAC TROMETHAMINE 30 MG/ML
15 INJECTION, SOLUTION INTRAMUSCULAR; INTRAVENOUS ONCE
Status: COMPLETED | OUTPATIENT
Start: 2024-12-29 | End: 2024-12-29

## 2024-12-29 RX ADMIN — IOHEXOL 100 ML: 350 INJECTION, SOLUTION INTRAVENOUS at 21:30

## 2024-12-29 RX ADMIN — KETOROLAC TROMETHAMINE 15 MG: 30 INJECTION, SOLUTION INTRAMUSCULAR; INTRAVENOUS at 20:32

## 2024-12-29 RX ADMIN — SODIUM CHLORIDE 1000 ML: 0.9 INJECTION, SOLUTION INTRAVENOUS at 20:33

## 2024-12-30 NOTE — ED NOTES
Pt requesting IV be removed. RN explained to pt IV should be left in until discharged and CT results. Pt insistent IV be removed and understands new one will need to be placed if needed. IV removed at this time.     Joy Jordan RN  12/29/24 1840

## 2024-12-30 NOTE — ED PROVIDER NOTES
"Time reflects when diagnosis was documented in both MDM as applicable and the Disposition within this note       Time User Action Codes Description Comment    12/29/2024 11:27 PM Soni Maciel Add [R10.9] Abdominal pain           ED Disposition       ED Disposition   Left from Room after Provider Exam    Condition   --    Date/Time   Sun Dec 29, 2024 11:14 PM    Comment   --             Assessment & Plan       Medical Decision Making  DDx includes cholecystitis, cholelithiasis, biliary colic, gastritis, gastric ulcer, duodenal ulcer, pancreatitis, appendicitis, muscle spasm, diverticulitis, UTI, pyelonephritis, bowel obstruction, incarcerated hernia, viral syndrome, hyperglycemia    CBC ordered to rule out infection, anemia.  CMP ordered to rule out electrolyte abnormality, kidney injury, liver injury.  Lipase ordered to rule out pancreatitis.  Baseline WBC 5-8, today is 15.  Guarding the left lower quadrant on exam.  CT abdomen pelvis ordered.  No nitrites or leukocytes on UA.    I was notified by the nurse that patient requested the IV to be removed stating that it was causing him discomfort.  Then reported he was going to the bathroom and fled the ED. Had previously discussed lab results. CT read reviewed after patient fled ED. CT unremarkable.     Portions of the record may have been created with voice recognition software. Occasional use of the incorrect word or \"sound a like\" substitutions may have occurred due to the inherent limitations of voice recognition software. Read the chart carefully and recognize, using context, where substitutions have occurred.         Amount and/or Complexity of Data Reviewed  Labs: ordered. Decision-making details documented in ED Course.  Radiology: ordered.    Risk  Prescription drug management.        ED Course as of 12/30/24 0103   Sun Dec 29, 2024   2038 Leukocytes, UA: Negative   2038 Nitrite, UA: Negative   2038 GFR, Calculated: 111   2039 WBC(!): 15.91  Increased from " baseline of 8 2256 Informed by nurse that patient requested IV to be taken out and said he was going to the restroom and fled the ED       Medications   ketorolac (TORADOL) injection 15 mg (15 mg Intravenous Given 12/29/24 2032)   sodium chloride 0.9 % bolus 1,000 mL (0 mL Intravenous Stopped 12/29/24 2146)   iohexol (OMNIPAQUE) 350 MG/ML injection (MULTI-DOSE) 100 mL (100 mL Intravenous Given 12/29/24 2130)       ED Risk Strat Scores                          SBIRT 20yo+      Flowsheet Row Most Recent Value   Initial Alcohol Screen: US AUDIT-C     1. How often do you have a drink containing alcohol? 0 Filed at: 12/29/2024 1917   2. How many drinks containing alcohol do you have on a typical day you are drinking?  0 Filed at: 12/29/2024 1917   3a. Male UNDER 65: How often do you have five or more drinks on one occasion? 0 Filed at: 12/29/2024 1917   Audit-C Score 0 Filed at: 12/29/2024 1917   [place]: How many times in the past year have you...    Used an illegal drug or used a prescription medication for non-medical reasons? Never Filed at: 12/29/2024 1917                            History of Present Illness       Chief Complaint   Patient presents with    Abdominal Pain     Pt states abdominal pain for the past two days, denies n/v       Past Medical History:   Diagnosis Date    No known health problems       Past Surgical History:   Procedure Laterality Date    COLON SURGERY      SD LAPS ABD PRTM&OMENTUM DX W/WO SPEC BR/WA SPX N/A 11/27/2023    Procedure: LAPAROSCOPY DIAGNOSTIC;  Surgeon: Em Yeboah MD;  Location: [facility] OR;  Service: General      History reviewed. No pertinent family history.   Social History     Tobacco Use    Smoking status: Some Days     Types: Cigars    Smokeless tobacco: Never   Vaping Use    Vaping status: Former    Substances: Nicotine   Substance Use Topics    Alcohol use: Not Currently    Drug use: Yes     Types: Marijuana      E-Cigarette/Vaping    E-Cigarette Use Former User        E-Cigarette/Vaping Substances    Nicotine Yes     THC No     CBD No     Flavoring No     Other No     Unknown No       I have reviewed and agree with the history as documented.     24-year-old male presenting to the emergency department for periumbilical and left lower quadrant abdominal pain.  He has been diagnosed with chronic abdominal pain in the past but states that it has been worsening over the past few days.  He was recently in the ED, 12/18, and had a normal CT scan and unremarkable blood work.  Previously he had a history of exploratory laparoscopy which noted intussusception.  He denies fevers, chills, vomiting, diarrhea.  Denies urinary symptoms other than urinary frequency.  Denies penile pain, discharge, scrotal pain.  He states that the pain comes and waves.  Denies alcohol and drug use.  He is established to follow-up with GI 1/8/2025.      Abdominal Pain  Pain location:  LLQ and periumbilical  Pain quality: cramping    Pain radiates to:  Does not radiate  Pain severity:  Moderate  Progression:  Worsening  Chronicity:  Chronic  Context: previous surgery    Context: not alcohol use, not diet changes, not eating and not recent illness    Associated symptoms: anorexia    Associated symptoms: no belching, no chest pain, no chills, no constipation, no cough, no diarrhea, no dysuria, no fatigue, no fever, no flatus, no hematemesis, no hematochezia, no hematuria, no melena, no nausea, no shortness of breath, no sore throat and no vomiting    Risk factors: no alcohol abuse and has not had multiple surgeries        Review of Systems   Constitutional:  Negative for chills, fatigue and fever.   HENT:  Negative for sore throat.    Respiratory:  Negative for cough and shortness of breath.    Cardiovascular:  Negative for chest pain and palpitations.   Gastrointestinal:  Positive for abdominal pain and anorexia. Negative for abdominal distention, blood in stool, constipation, diarrhea, flatus, hematemesis,  hematochezia, melena, nausea and vomiting.   Genitourinary:  Positive for frequency. Negative for dysuria, hematuria and urgency.   Musculoskeletal:  Negative for arthralgias and back pain.   Neurological:  Negative for syncope and weakness.   All other systems reviewed and are negative.          Objective       ED Triage Vitals   Temperature Pulse Blood Pressure Respirations SpO2 Patient Position - Orthostatic VS   12/29/24 1917 12/29/24 1917 12/29/24 1917 12/29/24 1917 12/29/24 1917 12/29/24 1917   98.1 °F (36.7 °C) (!) 118 114/63 18 93 % Sitting      Temp Source Heart Rate Source BP Location FiO2 (%) Pain Score    12/29/24 1917 12/29/24 1917 12/29/24 1917 -- 12/29/24 2032    Oral Monitor Right arm  10 - Worst Possible Pain      Vitals      Date and Time Temp Pulse SpO2 Resp BP Pain Score FACES Pain Rating User   12/29/24 2244 -- 90 98 % 18 127/80 -- -- JR   12/29/24 2146 -- -- -- -- -- 2 -- KA   12/29/24 2043 -- 95 97 % 18 123/74 -- -- KA   12/29/24 2032 -- -- -- -- -- 10 - Worst Possible Pain -- KA   12/29/24 1917 98.1 °F (36.7 °C) 118 93 % 18 114/63 -- -- ES            Physical Exam  Vitals and nursing note reviewed.   Constitutional:       General: He is not in acute distress.     Appearance: He is well-developed. He is not ill-appearing.   HENT:      Head: Normocephalic and atraumatic.   Eyes:      Conjunctiva/sclera: Conjunctivae normal.   Cardiovascular:      Rate and Rhythm: Normal rate and regular rhythm.      Heart sounds: Normal heart sounds. No murmur heard.  Pulmonary:      Effort: Pulmonary effort is normal. No respiratory distress.   Abdominal:      General: There is no distension.      Palpations: Abdomen is soft.      Tenderness: There is abdominal tenderness in the periumbilical area and left lower quadrant. There is guarding. There is no right CVA tenderness or left CVA tenderness. Negative signs include Walker's sign and McBurney's sign.      Hernia: No hernia is present.   Musculoskeletal:          General: No swelling.      Cervical back: Neck supple.   Skin:     General: Skin is warm and dry.      Capillary Refill: Capillary refill takes less than 2 seconds.   Neurological:      Mental Status: He is alert.   Psychiatric:         Mood and Affect: Mood normal.         Results Reviewed       Procedure Component Value Units Date/Time    Urine Microscopic [[id number]]  (Normal) Collected: 12/29/24 2036    Lab Status: Final result Specimen: Urine, Clean Catch Updated: 12/29/24 2111     RBC, UA 1-2 /hpf      WBC, UA 1-2 /hpf      Epithelial Cells None Seen /hpf      Bacteria, UA Occasional /hpf     Urine Macroscopic, POC [[id number]]  (Abnormal) Collected: 12/29/24 2036    Lab Status: Final result Specimen: Urine Updated: 12/29/24 2038     Color, UA Yellow     Clarity, UA Clear     pH, UA 8.5     Leukocytes, UA Negative     Nitrite, UA Negative     Protein, UA Negative mg/dl      Glucose, UA Negative mg/dl      Ketones, UA Negative mg/dl      Urobilinogen, UA 0.2 E.U./dl      Bilirubin, UA Negative     Occult Blood, UA Trace     Specific Gravity, UA 1.015    Narrative:      CLINITEK RESULT    Comprehensive metabolic panel [[id number]] Collected: 12/29/24 1932    Lab Status: Final result Specimen: Blood from Arm, Left Updated: 12/29/24 1959     Sodium 139 mmol/L      Potassium 3.7 mmol/L      Chloride 106 mmol/L      CO2 25 mmol/L      ANION GAP 8 mmol/L      BUN 7 mg/dL      Creatinine 0.96 mg/dL      Glucose 88 mg/dL      Calcium 9.6 mg/dL      AST 14 U/L      ALT 11 U/L      Alkaline Phosphatase 97 U/L      Total Protein 7.8 g/dL      Albumin 4.5 g/dL      Total Bilirubin 0.58 mg/dL      eGFR 111 ml/min/1.73sq m     Narrative:      National Kid[employer] guidelines for Chronic Kidney Disease (CKD):     Stage 1 with normal or high GFR (GFR > 90 mL/min/1.73 square meters)    Stage 2 Mild CKD (GFR = 60-89 mL/min/1.73 square meters)    Stage 3A Moderate CKD (GFR = 45-59 mL/min/1.73 square meters)     Stage 3B Moderate CKD (GFR = 30-44 mL/min/1.73 square meters)    Stage 4 Severe CKD (GFR = 15-29 mL/min/1.73 square meters)    Stage 5 End Stage CKD (GFR <15 mL/min/1.73 square meters)  Note: GFR calculation is accurate only with a steady state creatinine    Lipase [[id number]]  (Abnormal) Collected: 12/29/24 1932    Lab Status: Final result Specimen: Blood from Arm, Left Updated: 12/29/24 1959     Lipase 7 u/L     CBC and differential [[id number]]  (Abnormal) Collected: 12/29/24 1932    Lab Status: Final result Specimen: Blood from Arm, Left Updated: 12/29/24 1944     WBC 15.91 Thousand/uL      RBC 5.03 Million/uL      Hemoglobin 15.6 g/dL      Hematocrit 44.6 %      MCV 89 fL      MCH 31.0 pg      MCHC 35.0 g/dL      RDW 11.9 %      MPV 8.9 fL      Platelets 349 Thousands/uL      nRBC 0 /100 WBCs      Segmented % 81 %      Immature Grans % 0 %      Lymphocytes % 13 %      Monocytes % 5 %      Eosinophils Relative 1 %      Basophils Relative 0 %      Absolute Neutrophils 12.81 Thousands/µL      Absolute Immature Grans 0.05 Thousand/uL      Absolute Lymphocytes 2.05 Thousands/µL      Absolute Monocytes 0.79 Thousand/µL      Eosinophils Absolute 0.16 Thousand/µL      Basophils Absolute 0.05 Thousands/µL             CT abdomen pelvis with contrast   Final Interpretation by Job Alvarez MD (12/29 2310)      No acute findings in the abdomen or pelvis.         Workstation performed: [id number]             Procedures    ED Medication and Procedure Management   Prior to Admission Medications   Prescriptions Last Dose Informant Patient Reported? Taking?   acetaminophen (TYLENOL) 500 mg tablet   No No   Sig: Take 1 tablet (500 mg total) by mouth every 6 (six) hours as needed for mild pain   docusate sodium (COLACE) 100 mg capsule  Self No No   Sig: Take 1 capsule (100 mg total) by mouth every 12 (twelve) hours   famotidine (PEPCID) 20 mg tablet   No No   Sig: Take 1 tablet (20 mg total) by mouth daily   hyoscyamine  (ANASPAZ,LEVSIN) 0.125 MG tablet   No No   Sig: Take 1 tablet (0.125 mg total) by mouth every 4 (four) hours as needed for cramping   lidocaine (LMX) 4 % cream   No No   Sig: Apply topically as needed for moderate pain   naproxen (NAPROSYN) 250 mg tablet   No No   Sig: Take 1 tablet (250 mg total) by mouth 3 (three) times a day with meals for 7 days   naproxen (NAPROSYN) 500 mg tablet   No No   Sig: Take 1 tablet (500 mg total) by mouth 2 (two) times a day with meals for 10 days   ondansetron (ZOFRAN) 4 mg tablet   No No   Sig: Take 1 tablet (4 mg total) by mouth every 8 (eight) hours as needed for nausea or vomiting   polyethylene glycol (MIRALAX) 17 g packet   No No   Sig: Take 17 g by mouth daily      Facility-Administered Medications: None     Discharge Medication List as of 12/29/2024 11:14 PM        CONTINUE these medications which have NOT CHANGED    Details   acetaminophen (TYLENOL) 500 mg tablet Take 1 tablet (500 mg total) by mouth every 6 (six) hours as needed for mild pain, Starting Thu 7/25/2024, Normal      docusate sodium (COLACE) 100 mg capsule Take 1 capsule (100 mg total) by mouth every 12 (twelve) hours, Starting Sun 11/19/2023, Normal      famotidine (PEPCID) 20 mg tablet Take 1 tablet (20 mg total) by mouth daily, Starting Fri 8/9/2024, Normal      hyoscyamine (ANASPAZ,LEVSIN) 0.125 MG tablet Take 1 tablet (0.125 mg total) by mouth every 4 (four) hours as needed for cramping, Starting Sun 12/8/2024, Normal      lidocaine (LMX) 4 % cream Apply topically as needed for moderate pain, Starting Tue 12/17/2024, Normal      naproxen (NAPROSYN) 250 mg tablet Take 1 tablet (250 mg total) by mouth 3 (three) times a day with meals for 7 days, Starting Tue 12/17/2024, Until Tue 12/24/2024, Normal      naproxen (NAPROSYN) 500 mg tablet Take 1 tablet (500 mg total) by mouth 2 (two) times a day with meals for 10 days, Starting u 9/12/2024, Until Sun 9/22/2024, Normal      ondansetron (ZOFRAN) 4 mg tablet  Take 1 tablet (4 mg total) by mouth every 8 (eight) hours as needed for nausea or vomiting, Starting Fri 8/9/2024, Normal      polyethylene glycol (MIRALAX) 17 g packet Take 17 g by mouth daily, Starting Thu 12/14/2023, Normal           No discharge procedures on file.  ED SEPSIS DOCUMENTATION   Time reflects when diagnosis was documented in both MDM as applicable and the Disposition within this note       Time User Action Codes Description Comment    12/29/2024 11:27 PM Soni Maciel Add [R10.9] Abdominal pain            Initial Sepsis Screening       Row Name 12/29/24 2025                Is the patient's history suggestive of a new or worsening infection? No  -EW                  User Key  (r) = Recorded By, (t) = Taken By, (c) = Cosigned By      Initials Name Provider Type    EW Soni Maciel PA-C Physician Assistant                       Soni Maciel PA-C  12/30/24 0103

## 2024-12-30 NOTE — ED NOTES
"Pt seen walking down hallway with coat on. RN asked pt if he was leaving. Pt states \"no I'm going to pee\" Pt had not returned to room after 15 minutes. RN checked bathroom and patient was not there. ED Tech informed RN pt had walked out of department. Provider notified.     Joy Jordan RN  12/29/24 3791    "

## 2025-01-08 ENCOUNTER — OFFICE VISIT (OUTPATIENT)
Dept: GASTROENTEROLOGY | Facility: MEDICAL CENTER | Age: 25
End: 2025-01-08
Payer: COMMERCIAL

## 2025-01-08 ENCOUNTER — TELEPHONE (OUTPATIENT)
Dept: GASTROENTEROLOGY | Facility: MEDICAL CENTER | Age: 25
End: 2025-01-08

## 2025-01-08 VITALS
HEART RATE: 106 BPM | SYSTOLIC BLOOD PRESSURE: 118 MMHG | BODY MASS INDEX: 20.11 KG/M2 | DIASTOLIC BLOOD PRESSURE: 78 MMHG | WEIGHT: 135.8 LBS | HEIGHT: 69 IN | OXYGEN SATURATION: 98 % | TEMPERATURE: 98.5 F

## 2025-01-08 DIAGNOSIS — R31.9 HEMATURIA, UNSPECIFIED TYPE: ICD-10-CM

## 2025-01-08 DIAGNOSIS — G89.29 CHRONIC LLQ PAIN: Primary | ICD-10-CM

## 2025-01-08 DIAGNOSIS — R10.32 CHRONIC LLQ PAIN: Primary | ICD-10-CM

## 2025-01-08 DIAGNOSIS — R63.4 UNINTENTIONAL WEIGHT LOSS: ICD-10-CM

## 2025-01-08 DIAGNOSIS — D72.829 LEUKOCYTOSIS, UNSPECIFIED TYPE: ICD-10-CM

## 2025-01-08 DIAGNOSIS — K62.5 BLOOD PER RECTUM: ICD-10-CM

## 2025-01-08 DIAGNOSIS — R19.8 ALTERNATING CONSTIPATION AND DIARRHEA: ICD-10-CM

## 2025-01-08 PROCEDURE — 99214 OFFICE O/P EST MOD 30 MIN: CPT | Performed by: INTERNAL MEDICINE

## 2025-01-08 RX ORDER — PANTOPRAZOLE SODIUM 40 MG/1
40 TABLET, DELAYED RELEASE ORAL
Qty: 30 TABLET | Refills: 2 | Status: CANCELLED | OUTPATIENT
Start: 2025-01-08 | End: 2025-04-08

## 2025-01-08 RX ORDER — SODIUM CHLORIDE, SODIUM LACTATE, POTASSIUM CHLORIDE, CALCIUM CHLORIDE 600; 310; 30; 20 MG/100ML; MG/100ML; MG/100ML; MG/100ML
125 INJECTION, SOLUTION INTRAVENOUS CONTINUOUS
Status: CANCELLED | OUTPATIENT
Start: 2025-01-08

## 2025-01-08 NOTE — TELEPHONE ENCOUNTER
Procedure: Colon/EGD  Date: 01/15/2025  Physician performing: Dr. Arthur  Location of procedure:  [facility]  Instructions given to patient: 2 Day Golytely   Diabetic: N/A  Clearances: N/A    Will call patient once an opening for follow up has opened

## 2025-01-08 NOTE — ASSESSMENT & PLAN NOTE
10 lbs weight loss within the past year. Will obtain EGD/colonoscopy as above  Orders:    Colonoscopy; Future    EGD; Future    TSH, 3rd generation with Free T4 reflex; Future    polyethylene glycol (GOLYTELY) 4000 mL solution; Take 4,000 mL by mouth once for 1 dose

## 2025-01-08 NOTE — H&P (VIEW-ONLY)
Name: Reji Shi      : 2000      MRN: [id number]  Encounter Provider: Madison Arthur MD  Encounter Date: 2025   Encounter department: [facility]  :  Assessment & Plan  Chronic LLQ pain  Chronic LLQ pain not related to food intake or defecation. Multiple CT scans in the past. Most recently on 2024 without acute findings. Physical exam notable for LLQ TTP which slightly worsens upon flexing his abdomen. Given ongoing LLQ pain, unintentional weight loss and intermittent rectal bleed, recommend EGD/ileocolonoscopy (segmental bx) for further evaluation.   - discussed the risks/benefits of the procedures and the importance of preparation/escort  - if H. Pylori stool Ag and/or celiac serology testing not completed, then will get HP and celiac bx at the time of the procedures   - continue Tylenol as needed for now  - if EGD/colonoscopy unremarkable, consider referral for trigger point injection   Orders:    H. pylori antigen, stool; Future    Colonoscopy; Future    EGD; Future    polyethylene glycol (GOLYTELY) 4000 mL solution; Take 4,000 mL by mouth once for 1 dose    Alternating constipation and diarrhea  Reports alternating bowel habits between constipation and diarrhea. Reports chronic constipation (since he was a child) but recently had diarrhea as well. CT A/P on 2024 without acute findings.  Given recent leukocytosis noted from his lab work in 2024, will r/o infection and also will check for celiac disease. Will obtain fecal calpro and CRP to assess for inflammation.   Orders:     GI BACTERIA, STOOL, PCR; Future    Clostridium difficile toxin by PCR with EIA; Future    Calprotectin,Fecal; Future    IgA; Future    Giardia antigen; Future    Tissue Transglutaminase, IgG,IgA; Future    C-reactive protein; Future    Colonoscopy; Future    polyethylene glycol (GOLYTELY) 4000 mL solution; Take 4,000 mL by mouth once for 1 dose    Leukocytosis,  unspecified type  Leukocytosis noted in December 2024 during ED visit. Will repeat to ensure improvement.   Orders:    CBC and differential; Future    Hematuria, unspecified type  UA with trace blood multiple times. Reports frequent urination for the past week. Denies recent sexual contact. Recommend urgent visit with his PCP.   Orders:    Comprehensive metabolic panel; Future    Unintentional weight loss  10 lbs weight loss within the past year. Will obtain EGD/colonoscopy as above  Orders:    Colonoscopy; Future    EGD; Future    TSH, 3rd generation with Free T4 reflex; Future    polyethylene glycol (GOLYTELY) 4000 mL solution; Take 4,000 mL by mouth once for 1 dose    Blood per rectum  Intermittent rectal bleed. Plan for colonoscopy as above.            History of Present Illness   HPI  Reji Shi is a 24 y.o. male PMH small bowel intussusception s/p dx lap (11/2023), chronic marijuana use (quit in 10/2024), chronic constipation, chronic abdominal pain, STD at the age of 18, who presents for follow up  History obtained from: patient    Last seen by inpatient GI team on 8/29/2024 re: abdominal pain. Colonoscopy was recommended but patient left AMA at that time.     Hx of ex lap (11/2023) which showed a 15 cm segment of small bowel intussusception with a mobile intraluminal soft bezoar which was milked distally with no bowel resection was performed.     Hx of colonoscopy in 2023 which was incomplete due to poor prep    Chronic LLQ pain for about a year since his surgery. Constant, sharp and stabbing pain. Pain not related to food intake or defecation.   Reports occasional nausea. Takes Zofran as needed.  Last vomiting episode was new years.   Reports heartburn every other day.   Tried Tylenol which helps for 6 hours. Took Motrin about 3 days ago without relief.   Tried Levsin for pain which didn't help.   Takes Miralax once every other day.  Has BM every other day. Reports bright and dark blood mixed within  the stool twice in the past. Most recently about a week ago. Reports diarrhea about 2 days ago. No fevers or chills. No sick contact.   Denies dysphagia, odynophagia, nausea, vomiting, abdominal pain, unintentional weight loss, melena.   Reports unintentional weight loss (about 10 lbs) within the past year. No food insecurity.   ROS positive for frequent urination. Last sexual contact was in August 2024.    Wt Readings from Last 20 Encounters:   01/08/25 61.6 kg (135 lb 12.8 oz)   12/29/24 62.3 kg (137 lb 5.6 oz)   12/18/24 62.7 kg (138 lb 3.7 oz)   12/08/24 62.9 kg (138 lb 10.7 oz)   09/22/24 64.9 kg (143 lb 1.3 oz)   09/12/24 65.5 kg (144 lb 6.4 oz)   09/04/24 65.8 kg (145 lb)   08/26/24 63.9 kg (140 lb 14 oz)   08/05/24 64 kg (141 lb)   07/27/24 64.1 kg (141 lb 5 oz)   07/25/24 63.8 kg (140 lb 9.6 oz)   07/23/24 64.7 kg (142 lb 10.2 oz)   12/14/23 64.9 kg (143 lb)   12/12/23 64.9 kg (143 lb)   12/06/23 64.9 kg (143 lb)   11/27/23 68.4 kg (150 lb 12.7 oz)   11/19/23 67.8 kg (149 lb 7.6 oz)   11/15/23 64.6 kg (142 lb 6.7 oz)   11/13/22 64.6 kg (142 lb 6.7 oz)   09/28/22 66.5 kg (146 lb 9.7 oz)       No cancer in the family.  Shx: 1 cig monthly, no EtOH, quit marijuana since 10/2024        Review of Systems  Current Outpatient Medications on File Prior to Visit   Medication Sig Dispense Refill    acetaminophen (TYLENOL) 500 mg tablet Take 1 tablet (500 mg total) by mouth every 6 (six) hours as needed for mild pain 30 tablet 0    docusate sodium (COLACE) 100 mg capsule Take 1 capsule (100 mg total) by mouth every 12 (twelve) hours 60 capsule 0    lidocaine (LMX) 4 % cream Apply topically as needed for moderate pain 30 g 0    ondansetron (ZOFRAN) 4 mg tablet Take 1 tablet (4 mg total) by mouth every 8 (eight) hours as needed for nausea or vomiting 20 tablet 0    polyethylene glycol (MIRALAX) 17 g packet Take 17 g by mouth daily 30 each 11    [DISCONTINUED] famotidine (PEPCID) 20 mg tablet Take 1 tablet (20 mg total)  "by mouth daily 90 tablet 0    [DISCONTINUED] hyoscyamine (ANASPAZ,LEVSIN) 0.125 MG tablet Take 1 tablet (0.125 mg total) by mouth every 4 (four) hours as needed for cramping 30 tablet 0    [DISCONTINUED] cetirizine (ZyrTEC) 10 mg tablet Take 1 tablet (10 mg total) by mouth daily 10 tablet 0    [DISCONTINUED] naproxen (NAPROSYN) 250 mg tablet Take 1 tablet (250 mg total) by mouth 3 (three) times a day with meals for 7 days 21 tablet 0    [DISCONTINUED] naproxen (NAPROSYN) 500 mg tablet Take 1 tablet (500 mg total) by mouth 2 (two) times a day with meals for 10 days 20 tablet 0     No current facility-administered medications on file prior to visit.         Objective   /78 (BP Location: Right arm, Patient Position: Sitting, Cuff Size: Standard)   Pulse (!) 106   Temp 98.5 °F (36.9 °C) (Tympanic)   Ht 5' 9\" (1.753 m)   Wt 61.6 kg (135 lb 12.8 oz)   SpO2 98%   BMI 20.05 kg/m²      Physical Exam  Vitals reviewed.   Constitutional:       General: He is not in acute distress.     Appearance: Normal appearance.   HENT:      Head: Normocephalic and atraumatic.   Eyes:      Extraocular Movements: Extraocular movements intact.   Cardiovascular:      Rate and Rhythm: Regular rhythm. Tachycardia present.   Abdominal:      General: There is no distension.      Palpations: Abdomen is soft.      Tenderness: There is abdominal tenderness (slightly worse upon flexing his abdomen) in the left lower quadrant. There is no guarding or rebound.   Musculoskeletal:         General: No swelling.      Cervical back: Normal range of motion.   Skin:     General: Skin is warm and dry.   Neurological:      General: No focal deficit present.      Mental Status: He is alert.           "

## 2025-01-08 NOTE — PROGRESS NOTES
Name: Reji Shi      : 2000      MRN: [id number]  Encounter Provider: Madison Arthur MD  Encounter Date: 2025   Encounter department: [facility]  :  Assessment & Plan  Chronic LLQ pain  Chronic LLQ pain not related to food intake or defecation. Multiple CT scans in the past. Most recently on 2024 without acute findings. Physical exam notable for LLQ TTP which slightly worsens upon flexing his abdomen. Given ongoing LLQ pain, unintentional weight loss and intermittent rectal bleed, recommend EGD/ileocolonoscopy (segmental bx) for further evaluation.   - discussed the risks/benefits of the procedures and the importance of preparation/escort  - if H. Pylori stool Ag and/or celiac serology testing not completed, then will get HP and celiac bx at the time of the procedures   - continue Tylenol as needed for now  - if EGD/colonoscopy unremarkable, consider referral for trigger point injection   Orders:    H. pylori antigen, stool; Future    Colonoscopy; Future    EGD; Future    polyethylene glycol (GOLYTELY) 4000 mL solution; Take 4,000 mL by mouth once for 1 dose    Alternating constipation and diarrhea  Reports alternating bowel habits between constipation and diarrhea. Reports chronic constipation (since he was a child) but recently had diarrhea as well. CT A/P on 2024 without acute findings.  Given recent leukocytosis noted from his lab work in 2024, will r/o infection and also will check for celiac disease. Will obtain fecal calpro and CRP to assess for inflammation.   Orders:     GI BACTERIA, STOOL, PCR; Future    Clostridium difficile toxin by PCR with EIA; Future    Calprotectin,Fecal; Future    IgA; Future    Giardia antigen; Future    Tissue Transglutaminase, IgG,IgA; Future    C-reactive protein; Future    Colonoscopy; Future    polyethylene glycol (GOLYTELY) 4000 mL solution; Take 4,000 mL by mouth once for 1 dose    Leukocytosis,  unspecified type  Leukocytosis noted in December 2024 during ED visit. Will repeat to ensure improvement.   Orders:    CBC and differential; Future    Hematuria, unspecified type  UA with trace blood multiple times. Reports frequent urination for the past week. Denies recent sexual contact. Recommend urgent visit with his PCP.   Orders:    Comprehensive metabolic panel; Future    Unintentional weight loss  10 lbs weight loss within the past year. Will obtain EGD/colonoscopy as above  Orders:    Colonoscopy; Future    EGD; Future    TSH, 3rd generation with Free T4 reflex; Future    polyethylene glycol (GOLYTELY) 4000 mL solution; Take 4,000 mL by mouth once for 1 dose    Blood per rectum  Intermittent rectal bleed. Plan for colonoscopy as above.            History of Present Illness   HPI  Reji Shi is a 24 y.o. male PMH small bowel intussusception s/p dx lap (11/2023), chronic marijuana use (quit in 10/2024), chronic constipation, chronic abdominal pain, STD at the age of 18, who presents for follow up  History obtained from: patient    Last seen by inpatient GI team on 8/29/2024 re: abdominal pain. Colonoscopy was recommended but patient left AMA at that time.     Hx of ex lap (11/2023) which showed a 15 cm segment of small bowel intussusception with a mobile intraluminal soft bezoar which was milked distally with no bowel resection was performed.     Hx of colonoscopy in 2023 which was incomplete due to poor prep    Chronic LLQ pain for about a year since his surgery. Constant, sharp and stabbing pain. Pain not related to food intake or defecation.   Reports occasional nausea. Takes Zofran as needed.  Last vomiting episode was new years.   Reports heartburn every other day.   Tried Tylenol which helps for 6 hours. Took Motrin about 3 days ago without relief.   Tried Levsin for pain which didn't help.   Takes Miralax once every other day.  Has BM every other day. Reports bright and dark blood mixed within  the stool twice in the past. Most recently about a week ago. Reports diarrhea about 2 days ago. No fevers or chills. No sick contact.   Denies dysphagia, odynophagia, nausea, vomiting, abdominal pain, unintentional weight loss, melena.   Reports unintentional weight loss (about 10 lbs) within the past year. No food insecurity.   ROS positive for frequent urination. Last sexual contact was in August 2024.    Wt Readings from Last 20 Encounters:   01/08/25 61.6 kg (135 lb 12.8 oz)   12/29/24 62.3 kg (137 lb 5.6 oz)   12/18/24 62.7 kg (138 lb 3.7 oz)   12/08/24 62.9 kg (138 lb 10.7 oz)   09/22/24 64.9 kg (143 lb 1.3 oz)   09/12/24 65.5 kg (144 lb 6.4 oz)   09/04/24 65.8 kg (145 lb)   08/26/24 63.9 kg (140 lb 14 oz)   08/05/24 64 kg (141 lb)   07/27/24 64.1 kg (141 lb 5 oz)   07/25/24 63.8 kg (140 lb 9.6 oz)   07/23/24 64.7 kg (142 lb 10.2 oz)   12/14/23 64.9 kg (143 lb)   12/12/23 64.9 kg (143 lb)   12/06/23 64.9 kg (143 lb)   11/27/23 68.4 kg (150 lb 12.7 oz)   11/19/23 67.8 kg (149 lb 7.6 oz)   11/15/23 64.6 kg (142 lb 6.7 oz)   11/13/22 64.6 kg (142 lb 6.7 oz)   09/28/22 66.5 kg (146 lb 9.7 oz)       No cancer in the family.  Shx: 1 cig monthly, no EtOH, quit marijuana since 10/2024        Review of Systems  Current Outpatient Medications on File Prior to Visit   Medication Sig Dispense Refill    acetaminophen (TYLENOL) 500 mg tablet Take 1 tablet (500 mg total) by mouth every 6 (six) hours as needed for mild pain 30 tablet 0    docusate sodium (COLACE) 100 mg capsule Take 1 capsule (100 mg total) by mouth every 12 (twelve) hours 60 capsule 0    lidocaine (LMX) 4 % cream Apply topically as needed for moderate pain 30 g 0    ondansetron (ZOFRAN) 4 mg tablet Take 1 tablet (4 mg total) by mouth every 8 (eight) hours as needed for nausea or vomiting 20 tablet 0    polyethylene glycol (MIRALAX) 17 g packet Take 17 g by mouth daily 30 each 11    [DISCONTINUED] famotidine (PEPCID) 20 mg tablet Take 1 tablet (20 mg total)  "by mouth daily 90 tablet 0    [DISCONTINUED] hyoscyamine (ANASPAZ,LEVSIN) 0.125 MG tablet Take 1 tablet (0.125 mg total) by mouth every 4 (four) hours as needed for cramping 30 tablet 0    [DISCONTINUED] cetirizine (ZyrTEC) 10 mg tablet Take 1 tablet (10 mg total) by mouth daily 10 tablet 0    [DISCONTINUED] naproxen (NAPROSYN) 250 mg tablet Take 1 tablet (250 mg total) by mouth 3 (three) times a day with meals for 7 days 21 tablet 0    [DISCONTINUED] naproxen (NAPROSYN) 500 mg tablet Take 1 tablet (500 mg total) by mouth 2 (two) times a day with meals for 10 days 20 tablet 0     No current facility-administered medications on file prior to visit.         Objective   /78 (BP Location: Right arm, Patient Position: Sitting, Cuff Size: Standard)   Pulse (!) 106   Temp 98.5 °F (36.9 °C) (Tympanic)   Ht 5' 9\" (1.753 m)   Wt 61.6 kg (135 lb 12.8 oz)   SpO2 98%   BMI 20.05 kg/m²      Physical Exam  Vitals reviewed.   Constitutional:       General: He is not in acute distress.     Appearance: Normal appearance.   HENT:      Head: Normocephalic and atraumatic.   Eyes:      Extraocular Movements: Extraocular movements intact.   Cardiovascular:      Rate and Rhythm: Regular rhythm. Tachycardia present.   Abdominal:      General: There is no distension.      Palpations: Abdomen is soft.      Tenderness: There is abdominal tenderness (slightly worse upon flexing his abdomen) in the left lower quadrant. There is no guarding or rebound.   Musculoskeletal:         General: No swelling.      Cervical back: Normal range of motion.   Skin:     General: Skin is warm and dry.   Neurological:      General: No focal deficit present.      Mental Status: He is alert.           "

## 2025-01-08 NOTE — H&P (VIEW-ONLY)
Name: Reji Shi      : 2000      MRN: [id number]  Encounter Provider: Madison Arthur MD  Encounter Date: 2025   Encounter department: [facility]  :  Assessment & Plan  Chronic LLQ pain  Chronic LLQ pain not related to food intake or defecation. Multiple CT scans in the past. Most recently on 2024 without acute findings. Physical exam notable for LLQ TTP which slightly worsens upon flexing his abdomen. Given ongoing LLQ pain, unintentional weight loss and intermittent rectal bleed, recommend EGD/ileocolonoscopy (segmental bx) for further evaluation.   - discussed the risks/benefits of the procedures and the importance of preparation/escort  - if H. Pylori stool Ag and/or celiac serology testing not completed, then will get HP and celiac bx at the time of the procedures   - continue Tylenol as needed for now  - if EGD/colonoscopy unremarkable, consider referral for trigger point injection   Orders:    H. pylori antigen, stool; Future    Colonoscopy; Future    EGD; Future    polyethylene glycol (GOLYTELY) 4000 mL solution; Take 4,000 mL by mouth once for 1 dose    Alternating constipation and diarrhea  Reports alternating bowel habits between constipation and diarrhea. Reports chronic constipation (since he was a child) but recently had diarrhea as well. CT A/P on 2024 without acute findings.  Given recent leukocytosis noted from his lab work in 2024, will r/o infection and also will check for celiac disease. Will obtain fecal calpro and CRP to assess for inflammation.   Orders:     GI BACTERIA, STOOL, PCR; Future    Clostridium difficile toxin by PCR with EIA; Future    Calprotectin,Fecal; Future    IgA; Future    Giardia antigen; Future    Tissue Transglutaminase, IgG,IgA; Future    C-reactive protein; Future    Colonoscopy; Future    polyethylene glycol (GOLYTELY) 4000 mL solution; Take 4,000 mL by mouth once for 1 dose    Leukocytosis,  unspecified type  Leukocytosis noted in December 2024 during ED visit. Will repeat to ensure improvement.   Orders:    CBC and differential; Future    Hematuria, unspecified type  UA with trace blood multiple times. Reports frequent urination for the past week. Denies recent sexual contact. Recommend urgent visit with his PCP.   Orders:    Comprehensive metabolic panel; Future    Unintentional weight loss  10 lbs weight loss within the past year. Will obtain EGD/colonoscopy as above  Orders:    Colonoscopy; Future    EGD; Future    TSH, 3rd generation with Free T4 reflex; Future    polyethylene glycol (GOLYTELY) 4000 mL solution; Take 4,000 mL by mouth once for 1 dose    Blood per rectum  Intermittent rectal bleed. Plan for colonoscopy as above.            History of Present Illness   HPI  Reji Shi is a 24 y.o. male PMH small bowel intussusception s/p dx lap (11/2023), chronic marijuana use (quit in 10/2024), chronic constipation, chronic abdominal pain, STD at the age of 18, who presents for follow up  History obtained from: patient    Last seen by inpatient GI team on 8/29/2024 re: abdominal pain. Colonoscopy was recommended but patient left AMA at that time.     Hx of ex lap (11/2023) which showed a 15 cm segment of small bowel intussusception with a mobile intraluminal soft bezoar which was milked distally with no bowel resection was performed.     Hx of colonoscopy in 2023 which was incomplete due to poor prep    Chronic LLQ pain for about a year since his surgery. Constant, sharp and stabbing pain. Pain not related to food intake or defecation.   Reports occasional nausea. Takes Zofran as needed.  Last vomiting episode was new years.   Reports heartburn every other day.   Tried Tylenol which helps for 6 hours. Took Motrin about 3 days ago without relief.   Tried Levsin for pain which didn't help.   Takes Miralax once every other day.  Has BM every other day. Reports bright and dark blood mixed within  the stool twice in the past. Most recently about a week ago. Reports diarrhea about 2 days ago. No fevers or chills. No sick contact.   Denies dysphagia, odynophagia, nausea, vomiting, abdominal pain, unintentional weight loss, melena.   Reports unintentional weight loss (about 10 lbs) within the past year. No food insecurity.   ROS positive for frequent urination. Last sexual contact was in August 2024.    Wt Readings from Last 20 Encounters:   01/08/25 61.6 kg (135 lb 12.8 oz)   12/29/24 62.3 kg (137 lb 5.6 oz)   12/18/24 62.7 kg (138 lb 3.7 oz)   12/08/24 62.9 kg (138 lb 10.7 oz)   09/22/24 64.9 kg (143 lb 1.3 oz)   09/12/24 65.5 kg (144 lb 6.4 oz)   09/04/24 65.8 kg (145 lb)   08/26/24 63.9 kg (140 lb 14 oz)   08/05/24 64 kg (141 lb)   07/27/24 64.1 kg (141 lb 5 oz)   07/25/24 63.8 kg (140 lb 9.6 oz)   07/23/24 64.7 kg (142 lb 10.2 oz)   12/14/23 64.9 kg (143 lb)   12/12/23 64.9 kg (143 lb)   12/06/23 64.9 kg (143 lb)   11/27/23 68.4 kg (150 lb 12.7 oz)   11/19/23 67.8 kg (149 lb 7.6 oz)   11/15/23 64.6 kg (142 lb 6.7 oz)   11/13/22 64.6 kg (142 lb 6.7 oz)   09/28/22 66.5 kg (146 lb 9.7 oz)       No cancer in the family.  Shx: 1 cig monthly, no EtOH, quit marijuana since 10/2024        Review of Systems  Current Outpatient Medications on File Prior to Visit   Medication Sig Dispense Refill    acetaminophen (TYLENOL) 500 mg tablet Take 1 tablet (500 mg total) by mouth every 6 (six) hours as needed for mild pain 30 tablet 0    docusate sodium (COLACE) 100 mg capsule Take 1 capsule (100 mg total) by mouth every 12 (twelve) hours 60 capsule 0    lidocaine (LMX) 4 % cream Apply topically as needed for moderate pain 30 g 0    ondansetron (ZOFRAN) 4 mg tablet Take 1 tablet (4 mg total) by mouth every 8 (eight) hours as needed for nausea or vomiting 20 tablet 0    polyethylene glycol (MIRALAX) 17 g packet Take 17 g by mouth daily 30 each 11    [DISCONTINUED] famotidine (PEPCID) 20 mg tablet Take 1 tablet (20 mg total)  "by mouth daily 90 tablet 0    [DISCONTINUED] hyoscyamine (ANASPAZ,LEVSIN) 0.125 MG tablet Take 1 tablet (0.125 mg total) by mouth every 4 (four) hours as needed for cramping 30 tablet 0    [DISCONTINUED] cetirizine (ZyrTEC) 10 mg tablet Take 1 tablet (10 mg total) by mouth daily 10 tablet 0    [DISCONTINUED] naproxen (NAPROSYN) 250 mg tablet Take 1 tablet (250 mg total) by mouth 3 (three) times a day with meals for 7 days 21 tablet 0    [DISCONTINUED] naproxen (NAPROSYN) 500 mg tablet Take 1 tablet (500 mg total) by mouth 2 (two) times a day with meals for 10 days 20 tablet 0     No current facility-administered medications on file prior to visit.         Objective   /78 (BP Location: Right arm, Patient Position: Sitting, Cuff Size: Standard)   Pulse (!) 106   Temp 98.5 °F (36.9 °C) (Tympanic)   Ht 5' 9\" (1.753 m)   Wt 61.6 kg (135 lb 12.8 oz)   SpO2 98%   BMI 20.05 kg/m²      Physical Exam  Vitals reviewed.   Constitutional:       General: He is not in acute distress.     Appearance: Normal appearance.   HENT:      Head: Normocephalic and atraumatic.   Eyes:      Extraocular Movements: Extraocular movements intact.   Cardiovascular:      Rate and Rhythm: Regular rhythm. Tachycardia present.   Abdominal:      General: There is no distension.      Palpations: Abdomen is soft.      Tenderness: There is abdominal tenderness (slightly worse upon flexing his abdomen) in the left lower quadrant. There is no guarding or rebound.   Musculoskeletal:         General: No swelling.      Cervical back: Normal range of motion.   Skin:     General: Skin is warm and dry.   Neurological:      General: No focal deficit present.      Mental Status: He is alert.           "

## 2025-01-14 RX ORDER — SODIUM CHLORIDE, SODIUM LACTATE, POTASSIUM CHLORIDE, CALCIUM CHLORIDE 600; 310; 30; 20 MG/100ML; MG/100ML; MG/100ML; MG/100ML
125 INJECTION, SOLUTION INTRAVENOUS CONTINUOUS
Status: CANCELLED | OUTPATIENT
Start: 2025-01-14

## 2025-01-15 ENCOUNTER — HOSPITAL ENCOUNTER (OUTPATIENT)
Dept: GASTROENTEROLOGY | Facility: HOSPITAL | Age: 25
Setting detail: OUTPATIENT SURGERY
Discharge: HOME | End: 2025-01-15
Attending: INTERNAL MEDICINE
Payer: COMMERCIAL

## 2025-01-15 ENCOUNTER — HOSPITAL ENCOUNTER (EMERGENCY)
Facility: HOSPITAL | Age: 25
Discharge: HOME | End: 2025-01-15
Attending: EMERGENCY MEDICINE | Admitting: INTERNAL MEDICINE
Payer: COMMERCIAL

## 2025-01-15 ENCOUNTER — ANESTHESIA (OUTPATIENT)
Dept: ANESTHESIOLOGY | Facility: HOSPITAL | Age: 25
End: 2025-01-15

## 2025-01-15 ENCOUNTER — ANESTHESIA (OUTPATIENT)
Dept: GASTROENTEROLOGY | Facility: HOSPITAL | Age: 25
End: 2025-01-15
Payer: COMMERCIAL

## 2025-01-15 ENCOUNTER — APPOINTMENT (EMERGENCY)
Dept: CT IMAGING | Facility: HOSPITAL | Age: 25
End: 2025-01-15
Payer: COMMERCIAL

## 2025-01-15 ENCOUNTER — ANESTHESIA EVENT (OUTPATIENT)
Dept: ANESTHESIOLOGY | Facility: HOSPITAL | Age: 25
End: 2025-01-15

## 2025-01-15 ENCOUNTER — ANESTHESIA EVENT (OUTPATIENT)
Dept: GASTROENTEROLOGY | Facility: HOSPITAL | Age: 25
End: 2025-01-15
Payer: COMMERCIAL

## 2025-01-15 VITALS
OXYGEN SATURATION: 99 % | BODY MASS INDEX: 18.33 KG/M2 | DIASTOLIC BLOOD PRESSURE: 80 MMHG | SYSTOLIC BLOOD PRESSURE: 132 MMHG | RESPIRATION RATE: 18 BRPM | WEIGHT: 124.12 LBS | HEART RATE: 81 BPM | TEMPERATURE: 97.5 F

## 2025-01-15 VITALS
RESPIRATION RATE: 17 BRPM | SYSTOLIC BLOOD PRESSURE: 127 MMHG | HEART RATE: 96 BPM | HEIGHT: 69 IN | OXYGEN SATURATION: 99 % | TEMPERATURE: 98 F | WEIGHT: 124 LBS | DIASTOLIC BLOOD PRESSURE: 68 MMHG | BODY MASS INDEX: 18.37 KG/M2

## 2025-01-15 DIAGNOSIS — K20.90 ESOPHAGITIS DETERMINED BY ENDOSCOPY: Primary | ICD-10-CM

## 2025-01-15 DIAGNOSIS — G89.29 CHRONIC LLQ PAIN: ICD-10-CM

## 2025-01-15 DIAGNOSIS — R10.9 ABDOMINAL PAIN, UNSPECIFIED ABDOMINAL LOCATION: Primary | ICD-10-CM

## 2025-01-15 DIAGNOSIS — R63.4 UNINTENTIONAL WEIGHT LOSS: ICD-10-CM

## 2025-01-15 DIAGNOSIS — R10.32 CHRONIC LLQ PAIN: ICD-10-CM

## 2025-01-15 DIAGNOSIS — K62.5 RECTAL BLEEDING: ICD-10-CM

## 2025-01-15 DIAGNOSIS — R19.8 ALTERNATING CONSTIPATION AND DIARRHEA: ICD-10-CM

## 2025-01-15 LAB
ALBUMIN SERPL BCG-MCNC: 4.3 G/DL (ref 3.5–5)
ALP SERPL-CCNC: 81 U/L (ref 34–104)
ALT SERPL W P-5'-P-CCNC: 13 U/L (ref 7–52)
ANION GAP SERPL CALCULATED.3IONS-SCNC: 5 MMOL/L (ref 4–13)
AST SERPL W P-5'-P-CCNC: 17 U/L (ref 13–39)
BACTERIA UR QL AUTO: NORMAL /HPF
BASOPHILS # BLD AUTO: 0.02 THOUSANDS/ΜL (ref 0–0.1)
BASOPHILS NFR BLD AUTO: 0 % (ref 0–1)
BILIRUB SERPL-MCNC: 0.41 MG/DL (ref 0.2–1)
BILIRUB UR QL STRIP: NEGATIVE
BUN SERPL-MCNC: 7 MG/DL (ref 5–25)
CALCIUM SERPL-MCNC: 9 MG/DL (ref 8.4–10.2)
CHLORIDE SERPL-SCNC: 103 MMOL/L (ref 96–108)
CLARITY UR: CLEAR
CO2 SERPL-SCNC: 30 MMOL/L (ref 21–32)
COLOR UR: YELLOW
CREAT SERPL-MCNC: 0.94 MG/DL (ref 0.6–1.3)
EOSINOPHIL # BLD AUTO: 0.41 THOUSAND/ΜL (ref 0–0.61)
EOSINOPHIL NFR BLD AUTO: 5 % (ref 0–6)
ERYTHROCYTE [DISTWIDTH] IN BLOOD BY AUTOMATED COUNT: 12 % (ref 11.6–15.1)
GFR SERPL CREATININE-BSD FRML MDRD: 114 ML/MIN/1.73SQ M
GLUCOSE SERPL-MCNC: 88 MG/DL (ref 65–140)
GLUCOSE UR STRIP-MCNC: NEGATIVE MG/DL
HCT VFR BLD AUTO: 45.4 % (ref 36.5–49.3)
HGB BLD-MCNC: 15.4 G/DL (ref 12–17)
HGB UR QL STRIP.AUTO: 10
IMM GRANULOCYTES # BLD AUTO: 0.03 THOUSAND/UL (ref 0–0.2)
IMM GRANULOCYTES NFR BLD AUTO: 0 % (ref 0–2)
KETONES UR STRIP-MCNC: NEGATIVE MG/DL
LEUKOCYTE ESTERASE UR QL STRIP: NEGATIVE
LIPASE SERPL-CCNC: 11 U/L (ref 11–82)
LYMPHOCYTES # BLD AUTO: 1.54 THOUSANDS/ΜL (ref 0.6–4.47)
LYMPHOCYTES NFR BLD AUTO: 19 % (ref 14–44)
MCH RBC QN AUTO: 31.2 PG (ref 26.8–34.3)
MCHC RBC AUTO-ENTMCNC: 33.9 G/DL (ref 31.4–37.4)
MCV RBC AUTO: 92 FL (ref 82–98)
MONOCYTES # BLD AUTO: 0.26 THOUSAND/ΜL (ref 0.17–1.22)
MONOCYTES NFR BLD AUTO: 3 % (ref 4–12)
NEUTROPHILS # BLD AUTO: 5.67 THOUSANDS/ΜL (ref 1.85–7.62)
NEUTS SEG NFR BLD AUTO: 73 % (ref 43–75)
NITRITE UR QL STRIP: NEGATIVE
NON-SQ EPI CELLS URNS QL MICRO: NORMAL /HPF
NRBC BLD AUTO-RTO: 0 /100 WBCS
PH UR STRIP.AUTO: 6 [PH]
PLATELET # BLD AUTO: 347 THOUSANDS/UL (ref 149–390)
PMV BLD AUTO: 9.1 FL (ref 8.9–12.7)
POTASSIUM SERPL-SCNC: 3.7 MMOL/L (ref 3.5–5.3)
PROT SERPL-MCNC: 7.2 G/DL (ref 6.4–8.4)
PROT UR STRIP-MCNC: NEGATIVE MG/DL
RBC # BLD AUTO: 4.94 MILLION/UL (ref 3.88–5.62)
RBC #/AREA URNS AUTO: NORMAL /HPF
SODIUM SERPL-SCNC: 138 MMOL/L (ref 135–147)
SP GR UR STRIP.AUTO: 1.01 (ref 1–1.04)
UROBILINOGEN UA: NEGATIVE MG/DL
WBC # BLD AUTO: 7.93 THOUSAND/UL (ref 4.31–10.16)
WBC #/AREA URNS AUTO: NORMAL /HPF

## 2025-01-15 PROCEDURE — 99285 EMERGENCY DEPT VISIT HI MDM: CPT | Performed by: PHYSICIAN ASSISTANT

## 2025-01-15 PROCEDURE — 99284 EMERGENCY DEPT VISIT MOD MDM: CPT

## 2025-01-15 PROCEDURE — 74177 CT ABD & PELVIS W/CONTRAST: CPT

## 2025-01-15 PROCEDURE — 80053 COMPREHEN METABOLIC PANEL: CPT | Performed by: PHYSICIAN ASSISTANT

## 2025-01-15 PROCEDURE — 88305 TISSUE EXAM BY PATHOLOGIST: CPT | Performed by: PATHOLOGY

## 2025-01-15 PROCEDURE — 83690 ASSAY OF LIPASE: CPT | Performed by: PHYSICIAN ASSISTANT

## 2025-01-15 PROCEDURE — 96375 TX/PRO/DX INJ NEW DRUG ADDON: CPT

## 2025-01-15 PROCEDURE — 85025 COMPLETE CBC W/AUTO DIFF WBC: CPT | Performed by: PHYSICIAN ASSISTANT

## 2025-01-15 PROCEDURE — 81001 URINALYSIS AUTO W/SCOPE: CPT | Performed by: PHYSICIAN ASSISTANT

## 2025-01-15 PROCEDURE — 96374 THER/PROPH/DIAG INJ IV PUSH: CPT

## 2025-01-15 PROCEDURE — 36415 COLL VENOUS BLD VENIPUNCTURE: CPT | Performed by: PHYSICIAN ASSISTANT

## 2025-01-15 RX ORDER — SODIUM CHLORIDE, SODIUM LACTATE, POTASSIUM CHLORIDE, CALCIUM CHLORIDE 600; 310; 30; 20 MG/100ML; MG/100ML; MG/100ML; MG/100ML
INJECTION, SOLUTION INTRAVENOUS CONTINUOUS PRN
Status: DISCONTINUED | OUTPATIENT
Start: 2025-01-15 | End: 2025-01-15

## 2025-01-15 RX ORDER — ONDANSETRON 2 MG/ML
4 INJECTION INTRAMUSCULAR; INTRAVENOUS ONCE
Status: COMPLETED | OUTPATIENT
Start: 2025-01-15 | End: 2025-01-15

## 2025-01-15 RX ORDER — KETOROLAC TROMETHAMINE 30 MG/ML
15 INJECTION, SOLUTION INTRAMUSCULAR; INTRAVENOUS ONCE
Status: COMPLETED | OUTPATIENT
Start: 2025-01-15 | End: 2025-01-15

## 2025-01-15 RX ORDER — PROPOFOL 10 MG/ML
INJECTION, EMULSION INTRAVENOUS AS NEEDED
Status: DISCONTINUED | OUTPATIENT
Start: 2025-01-15 | End: 2025-01-15

## 2025-01-15 RX ORDER — PANTOPRAZOLE SODIUM 40 MG/1
40 TABLET, DELAYED RELEASE ORAL
Qty: 30 TABLET | Refills: 2 | Status: SHIPPED | OUTPATIENT
Start: 2025-01-15

## 2025-01-15 RX ORDER — PROPOFOL 10 MG/ML
INJECTION, EMULSION INTRAVENOUS CONTINUOUS PRN
Status: DISCONTINUED | OUTPATIENT
Start: 2025-01-15 | End: 2025-01-15

## 2025-01-15 RX ORDER — LIDOCAINE HYDROCHLORIDE 20 MG/ML
INJECTION, SOLUTION EPIDURAL; INFILTRATION; INTRACAUDAL; PERINEURAL AS NEEDED
Status: DISCONTINUED | OUTPATIENT
Start: 2025-01-15 | End: 2025-01-15

## 2025-01-15 RX ORDER — EPHEDRINE SULFATE 50 MG/ML
INJECTION INTRAVENOUS AS NEEDED
Status: DISCONTINUED | OUTPATIENT
Start: 2025-01-15 | End: 2025-01-15

## 2025-01-15 RX ADMIN — PROPOFOL 200 MG: 10 INJECTION, EMULSION INTRAVENOUS at 13:34

## 2025-01-15 RX ADMIN — PROPOFOL 50 MG: 10 INJECTION, EMULSION INTRAVENOUS at 13:37

## 2025-01-15 RX ADMIN — EPHEDRINE SULFATE 5 MG: 50 INJECTION INTRAVENOUS at 13:45

## 2025-01-15 RX ADMIN — IOHEXOL 100 ML: 350 INJECTION, SOLUTION INTRAVENOUS at 08:13

## 2025-01-15 RX ADMIN — LIDOCAINE HYDROCHLORIDE 100 MG: 20 INJECTION, SOLUTION EPIDURAL; INFILTRATION; INTRACAUDAL at 13:34

## 2025-01-15 RX ADMIN — PROPOFOL 50 MG: 10 INJECTION, EMULSION INTRAVENOUS at 13:51

## 2025-01-15 RX ADMIN — PROPOFOL 50 MG: 10 INJECTION, EMULSION INTRAVENOUS at 13:39

## 2025-01-15 RX ADMIN — ONDANSETRON 4 MG: 2 INJECTION INTRAMUSCULAR; INTRAVENOUS at 07:12

## 2025-01-15 RX ADMIN — SODIUM CHLORIDE, SODIUM LACTATE, POTASSIUM CHLORIDE, AND CALCIUM CHLORIDE: .6; .31; .03; .02 INJECTION, SOLUTION INTRAVENOUS at 13:40

## 2025-01-15 RX ADMIN — KETOROLAC TROMETHAMINE 15 MG: 30 INJECTION, SOLUTION INTRAMUSCULAR; INTRAVENOUS at 07:12

## 2025-01-15 RX ADMIN — PROPOFOL 100 MG: 10 INJECTION, EMULSION INTRAVENOUS at 13:35

## 2025-01-15 RX ADMIN — SODIUM CHLORIDE, SODIUM LACTATE, POTASSIUM CHLORIDE, AND CALCIUM CHLORIDE: .6; .31; .03; .02 INJECTION, SOLUTION INTRAVENOUS at 12:58

## 2025-01-15 RX ADMIN — EPHEDRINE SULFATE 5 MG: 50 INJECTION INTRAVENOUS at 13:42

## 2025-01-15 RX ADMIN — PROPOFOL 50 MG: 10 INJECTION, EMULSION INTRAVENOUS at 13:38

## 2025-01-15 RX ADMIN — PROPOFOL 50 MG: 10 INJECTION, EMULSION INTRAVENOUS at 13:44

## 2025-01-15 RX ADMIN — EPHEDRINE SULFATE 5 MG: 50 INJECTION INTRAVENOUS at 13:39

## 2025-01-15 RX ADMIN — PROPOFOL 100 MG: 10 INJECTION, EMULSION INTRAVENOUS at 13:36

## 2025-01-15 RX ADMIN — EPHEDRINE SULFATE 5 MG: 50 INJECTION INTRAVENOUS at 13:49

## 2025-01-15 RX ADMIN — PROPOFOL 100 MCG/KG/MIN: 10 INJECTION, EMULSION INTRAVENOUS at 13:48

## 2025-01-15 RX ADMIN — PROPOFOL 50 MG: 10 INJECTION, EMULSION INTRAVENOUS at 13:48

## 2025-01-15 NOTE — ANESTHESIA POSTPROCEDURE EVALUATION
Post-Op Assessment Note    CV Status:  Stable  Pain Score: 0    Pain management: adequate       Mental Status:  Arousable   Hydration Status:  Stable   PONV Controlled:  None   Airway Patency:  Patent     Post Op Vitals Reviewed: Yes    No anethesia notable event occurred.    Staff: CRNA           Last Filed PACU Vitals:  Vitals Value Taken Time   Temp 98 °F (36.7 °C) 01/15/25 1411   Pulse 96 01/15/25 1413   /68 01/15/25 1413   Resp 17 01/15/25 1413   SpO2 99 % 01/15/25 1413       Modified Shelbi:     Vitals Value Taken Time   Activity 2 01/15/25 1413   Respiration 2 01/15/25 1413   Circulation 2 01/15/25 1413   Consciousness 2 01/15/25 1413   Oxygen Saturation 2 01/15/25 1413     Modified Shelbi Score: 10

## 2025-01-15 NOTE — ANESTHESIA POSTPROCEDURE EVALUATION
Post-Op Assessment Note    CV Status:  Stable    Pain management: adequate       Mental Status:  Alert and awake   Hydration Status:  Euvolemic   PONV Controlled:  Controlled   Airway Patency:  Patent     Post Op Vitals Reviewed: Yes    No anethesia notable event occurred.    Staff: Anesthesiologist           Last Filed PACU Vitals:  Vitals Value Taken Time   Temp 98 °F (36.7 °C) 01/15/25 1411   Pulse 99 01/15/25 1411   /67 01/15/25 1411   Resp 15 01/15/25 1411   SpO2 98 % 01/15/25 1411       Modified Shelbi:     Vitals Value Taken Time   Activity 1 01/15/25 1412   Respiration 2 01/15/25 1412   Circulation 2 01/15/25 1412   Consciousness 1 01/15/25 1412   Oxygen Saturation 2 01/15/25 1412     Modified Shelbi Score: 8

## 2025-01-15 NOTE — INTERVAL H&P NOTE
H&P reviewed. After examining the patient I find no changes in the patients condition since the H&P had been written.    Vitals:    01/15/25 1213   BP: 132/70   Pulse: 63   Resp: 15   Temp: 97.6 °F (36.4 °C)   SpO2: 99%

## 2025-01-15 NOTE — DISCHARGE INSTRUCTIONS
Please go to your colonoscopy today     Follow-up with GI regarding her abdominal pain     If your pain significantly worsens or you develop new symptoms, please return to the ED for evaluation     CT abdomen pelvis with contrast  Result Date: 1/15/2025  Impression: No acute findings in the abdomen or pelvis. Workstation performed: [id number]

## 2025-01-15 NOTE — QUICK NOTE
Spoke with the patient after the procedures. Patient only drank 1x Golytely though the instruction was 2-day preparation because he was only given one gallon of Golytely. Offered to repeat colonoscopy tomorrow at [facility] after additional preparation today with Golytely. Instructions for colonoscopy were given and the patient agreed to proceed with repeat colonoscopy tomorrow at [facility] with additional Golytely preparation. Rx sent. Advised to take extra dose of Miralax (34 g) tomorrow at 10 AM if he is not having clear liquid per rectum. Patient was added to the schedule tomorrow at [facility].

## 2025-01-15 NOTE — ANESTHESIA PREPROCEDURE EVALUATION
Procedure:  PRE-OP ONLY  LLQ - colonoscopy  Weight loss - future EGD    ECG: NSR  Relevant Problems   GI/HEPATIC   (+) Rectal bleeding      NEURO/PSYCH   (+) Depression      Other   (+) Inguinal lymphadenopathy             Anesthesia Plan  ASA Score- 1     Anesthesia Type- IV sedation with anesthesia with ASA Monitors.         Additional Monitors:     Airway Plan:            Plan Factors-    Chart reviewed.   Existing labs reviewed. Patient summary reviewed.                  Induction-     Postoperative Plan-         Informed Consent-       NPO Status:  Vitals Value Taken Time   Date of last liquid 01/15/25 01/15/25 1210   Time of last liquid 0700 01/15/25 1210   Date of last solid 01/13/25 01/15/25 1210   Time of last solid 1900 01/15/25 1210

## 2025-01-15 NOTE — Clinical Note
Reji Shi was seen and treated in our emergency department on 1/15/2025.                Diagnosis:     Ahmed  .    He may return on this date: 01/17/2025         If you have any questions or concerns, please don't hesitate to call.      Fei Caruso, DO    ______________________________           _______________          _______________  Hospital Representative                              Date                                Time

## 2025-01-15 NOTE — ED PROVIDER NOTES
Time reflects when diagnosis was documented in both MDM as applicable and the Disposition within this note       Time User Action Codes Description Comment    1/15/2025  6:42 AM Rachel Colbert Add [R10.9] Abdominal pain, unspecified abdominal location           ED Disposition       None          Assessment & Plan       Medical Decision Making  Patient presenting complaining of left lower quadrant pain.  Patient has history of similar but states that today's pain is worse than usual.  Patient also has history of intussusception.  Patient is scheduled for colonoscopy today and completed bowel prep however stated that pain was too bad to wait.  Patient was initially seen and evaluated by myself.  Case discussed with attending.  Initial laboratory evaluation and CAT scan were ordered and care was signed out to oncoming attending at change of shift pending continued evaluation and disposition.    Amount and/or Complexity of Data Reviewed  Labs: ordered.  Radiology: ordered.    Risk  Prescription drug management.             Medications   ketorolac (TORADOL) injection 15 mg (has no administration in time range)   ondansetron (ZOFRAN) injection 4 mg (has no administration in time range)       ED Risk Strat Scores                          SBIRT 20yo+      Flowsheet Row Most Recent Value   Initial Alcohol Screen: US AUDIT-C     1. How often do you have a drink containing alcohol? 0 Filed at: 01/15/2025 0622   2. How many drinks containing alcohol do you have on a typical day you are drinking?  0 Filed at: 01/15/2025 0622   3a. Male UNDER 65: How often do you have five or more drinks on one occasion? 0 Filed at: 01/15/2025 0622   3b. FEMALE Any Age, or MALE 65+: How often do you have 4 or more drinks on one occassion? 0 Filed at: 01/15/2025 0622   Audit-C Score 0 Filed at: 01/15/2025 0622   [place]: How many times in the past year have you...    Used an illegal drug or used a prescription medication for non-medical reasons?  Never Filed at: 01/15/2025 0622                            History of Present Illness       Chief Complaint   Patient presents with    Abdominal Pain     Reports abdominal pain around his umbilicus and lower left side for the past 2 weeks. Pain has been constant. No nausea, vomiting or diarrhea. Drank half of the go lightly container in preparation for a biopsy today with GI @ 1230 in the afternoon.       Past Medical History:   Diagnosis Date    No known health problems       Past Surgical History:   Procedure Laterality Date    COLON SURGERY      [place] LAPS ABD PRTM&OMENTUM DX W/WO SPEC BR/WA SPX N/A 11/27/2023    Procedure: LAPAROSCOPY DIAGNOSTIC;  Surgeon: Em Yeboah MD;  Location: [facility];  Service: General      Family History   Problem Relation Age of Onset    Colon cancer Neg Hx     Stomach cancer Neg Hx       Social History     Tobacco Use    Smoking status: Some Days     Types: Cigars    Smokeless tobacco: Never   Vaping Use    Vaping status: Former    Substances: Nicotine   Substance Use Topics    Alcohol use: Not Currently    Drug use: Not Currently     Types: Marijuana      E-Cigarette/Vaping    E-Cigarette Use Former User       E-Cigarette/Vaping Substances    Nicotine Yes     THC No     CBD No     Flavoring No     Other No     Unknown No       I have reviewed and agree with the history as documented.     24-year-old male with history of chronic abdominal pain presents complaining of left lower quadrant abdominal pain.  Patient has a history of intussusception and states that his pain is worse than usual today.  Patient was scheduled for a colonoscopy today at 1230 however states that his pain became too bad.  Patient completed bowel prep and has not canceled his colonoscopy but states that he wants to be evaluated for is pain first. Denies an changes to bowel or bladder habits. Denies any other complaints       History provided by:  Patient   used: No        Review of  Systems   Constitutional: Negative.  Negative for chills and fatigue.   HENT:  Negative for ear pain and sore throat.    Eyes:  Negative for photophobia and redness.   Respiratory:  Negative for apnea, cough and shortness of breath.    Cardiovascular:  Negative for chest pain.   Gastrointestinal:  Positive for abdominal pain. Negative for nausea and vomiting.   Genitourinary:  Negative for dysuria.   Musculoskeletal:  Negative for arthralgias, neck pain and neck stiffness.   Skin:  Negative for rash.   Neurological:  Negative for dizziness, tremors, syncope and weakness.   Psychiatric/Behavioral:  Negative for suicidal ideas.            Objective       ED Triage Vitals [01/15/25 0620]   Temperature Pulse Blood Pressure Respirations SpO2 Patient Position - Orthostatic VS   97.5 °F (36.4 °C) 81 132/80 18 99 % Sitting      Temp Source Heart Rate Source BP Location FiO2 (%) Pain Score    Oral Monitor Left arm -- --      Vitals      Date and Time Temp Pulse SpO2 Resp BP Pain Score FACES Pain Rating User   01/15/25 0620 97.5 °F (36.4 °C) 81 99 % 18 132/80 -- -- KA            Physical Exam  Constitutional:       General: He is not in acute distress.     Appearance: He is well-developed. He is not diaphoretic.      Comments: Strong smell of marijuana present in room    Eyes:      Pupils: Pupils are equal, round, and reactive to light.   Cardiovascular:      Rate and Rhythm: Normal rate and regular rhythm.   Pulmonary:      Effort: Pulmonary effort is normal. No respiratory distress.      Breath sounds: Normal breath sounds.   Abdominal:      General: Bowel sounds are normal. There is no distension.      Palpations: Abdomen is soft.      Tenderness: There is abdominal tenderness. There is guarding. There is no right CVA tenderness or left CVA tenderness.      Comments: TTP LLQ with mild guarding    Musculoskeletal:         General: Normal range of motion.      Cervical back: Normal range of motion and neck supple.   Skin:      General: Skin is warm and dry.   Neurological:      Mental Status: He is alert and oriented to person, place, and time.         Results Reviewed       Procedure Component Value Units Date/Time    CBC and differential [[id number]]     Lab Status: No result Specimen: Blood     Comprehensive metabolic panel [[id number]]     Lab Status: No result Specimen: Blood     Lipase [[id number]]     Lab Status: No result Specimen: Blood     UA (URINE) with reflex to Scope [[id number]]     Lab Status: No result Specimen: Urine             CT abdomen pelvis with contrast    (Results Pending)       Procedures    ED Medication and Procedure Management   Prior to Admission Medications   Prescriptions Last Dose Informant Patient Reported? Taking?   acetaminophen (TYLENOL) 500 mg tablet   No No   Sig: Take 1 tablet (500 mg total) by mouth every 6 (six) hours as needed for mild pain   docusate sodium (COLACE) 100 mg capsule  Self No No   Sig: Take 1 capsule (100 mg total) by mouth every 12 (twelve) hours   lidocaine (LMX) 4 % cream Not Taking  No No   Sig: Apply topically as needed for moderate pain   Patient not taking: Reported on 1/15/2025   ondansetron (ZOFRAN) 4 mg tablet Not Taking  No No   Sig: Take 1 tablet (4 mg total) by mouth every 8 (eight) hours as needed for nausea or vomiting   Patient not taking: Reported on 1/15/2025   polyethylene glycol (GOLYTELY) 4000 mL solution   No No   Sig: Take 4,000 mL by mouth once for 1 dose   polyethylene glycol (MIRALAX) 17 g packet   No No   Sig: Take 17 g by mouth daily      Facility-Administered Medications: None     Patient's Medications   Discharge Prescriptions    No medications on file     No discharge procedures on file.  ED SEPSIS DOCUMENTATION   Time reflects when diagnosis was documented in both MDM as applicable and the Disposition within this note       Time User Action Codes Description Comment    1/15/2025  6:42 AM Rachel Colbert Add [R10.9] Abdominal pain, unspecified  abdominal location                  Rachel Colbert PA-C  01/15/25 0676

## 2025-01-15 NOTE — ANESTHESIA PREPROCEDURE EVALUATION
Procedure:  COLONOSCOPY  EGD    Relevant Problems   GI/HEPATIC   (+) Rectal bleeding      NEURO/PSYCH   (+) Depression      Other   (+) Inguinal lymphadenopathy        Physical Exam    Airway    Mallampati score: II  TM Distance: >3 FB  Neck ROM: full     Dental   No notable dental hx     Cardiovascular      Pulmonary      Other Findings        Anesthesia Plan  ASA Score- 2     Anesthesia Type- IV sedation with anesthesia with ASA Monitors.         Additional Monitors:     Airway Plan:            Plan Factors-Exercise tolerance (METS): >4 METS.    Chart reviewed.    Patient summary reviewed.    Patient is not a current smoker.  Patient did not smoke on day of surgery.            Induction- intravenous.    Postoperative Plan-     Perioperative Resuscitation Plan - Level 1 - Full Code.       Informed Consent- Anesthetic plan and risks discussed with patient.  I personally reviewed this patient with the CRNA. Discussed and agreed on the Anesthesia Plan with the CRNA..      NPO Status:  No vitals data found for the desired time range.

## 2025-01-16 ENCOUNTER — ANESTHESIA EVENT (OUTPATIENT)
Dept: GASTROENTEROLOGY | Facility: MEDICAL CENTER | Age: 25
End: 2025-01-16
Payer: COMMERCIAL

## 2025-01-16 ENCOUNTER — ANESTHESIA (OUTPATIENT)
Dept: GASTROENTEROLOGY | Facility: MEDICAL CENTER | Age: 25
End: 2025-01-16
Payer: COMMERCIAL

## 2025-01-16 ENCOUNTER — HOSPITAL ENCOUNTER (OUTPATIENT)
Dept: GASTROENTEROLOGY | Facility: MEDICAL CENTER | Age: 25
Setting detail: OUTPATIENT SURGERY
End: 2025-01-16
Payer: COMMERCIAL

## 2025-01-16 VITALS
HEART RATE: 71 BPM | DIASTOLIC BLOOD PRESSURE: 70 MMHG | OXYGEN SATURATION: 100 % | TEMPERATURE: 98 F | BODY MASS INDEX: 19.99 KG/M2 | HEIGHT: 69 IN | WEIGHT: 135 LBS | RESPIRATION RATE: 16 BRPM | SYSTOLIC BLOOD PRESSURE: 133 MMHG

## 2025-01-16 DIAGNOSIS — R63.4 UNINTENTIONAL WEIGHT LOSS: ICD-10-CM

## 2025-01-16 DIAGNOSIS — R10.32 CHRONIC LLQ PAIN: ICD-10-CM

## 2025-01-16 DIAGNOSIS — R19.8 ALTERNATING CONSTIPATION AND DIARRHEA: ICD-10-CM

## 2025-01-16 DIAGNOSIS — G89.29 CHRONIC LLQ PAIN: ICD-10-CM

## 2025-01-16 DIAGNOSIS — K62.5 RECTAL BLEEDING: ICD-10-CM

## 2025-01-16 PROCEDURE — 88305 TISSUE EXAM BY PATHOLOGIST: CPT | Performed by: STUDENT IN AN ORGANIZED HEALTH CARE EDUCATION/TRAINING PROGRAM

## 2025-01-16 PROCEDURE — 88342 IMHCHEM/IMCYTCHM 1ST ANTB: CPT | Performed by: STUDENT IN AN ORGANIZED HEALTH CARE EDUCATION/TRAINING PROGRAM

## 2025-01-16 PROCEDURE — 88341 IMHCHEM/IMCYTCHM EA ADD ANTB: CPT | Performed by: STUDENT IN AN ORGANIZED HEALTH CARE EDUCATION/TRAINING PROGRAM

## 2025-01-16 RX ORDER — SODIUM CHLORIDE 9 MG/ML
125 INJECTION, SOLUTION INTRAVENOUS CONTINUOUS
Status: CANCELLED | OUTPATIENT
Start: 2025-01-16

## 2025-01-16 RX ORDER — ONDANSETRON 2 MG/ML
4 INJECTION INTRAMUSCULAR; INTRAVENOUS ONCE AS NEEDED
Status: CANCELLED | OUTPATIENT
Start: 2025-01-16

## 2025-01-16 RX ORDER — ALBUTEROL SULFATE 0.83 MG/ML
2.5 SOLUTION RESPIRATORY (INHALATION) ONCE AS NEEDED
Status: CANCELLED | OUTPATIENT
Start: 2025-01-16

## 2025-01-16 RX ORDER — FENTANYL CITRATE/PF 50 MCG/ML
25 SYRINGE (ML) INJECTION
Refills: 0 | Status: CANCELLED | OUTPATIENT
Start: 2025-01-16

## 2025-01-16 RX ORDER — SODIUM CHLORIDE, SODIUM LACTATE, POTASSIUM CHLORIDE, CALCIUM CHLORIDE 600; 310; 30; 20 MG/100ML; MG/100ML; MG/100ML; MG/100ML
125 INJECTION, SOLUTION INTRAVENOUS CONTINUOUS
Status: CANCELLED | OUTPATIENT
Start: 2025-01-16

## 2025-01-16 RX ORDER — LIDOCAINE HYDROCHLORIDE 20 MG/ML
INJECTION, SOLUTION EPIDURAL; INFILTRATION; INTRACAUDAL; PERINEURAL AS NEEDED
Status: DISCONTINUED | OUTPATIENT
Start: 2025-01-16 | End: 2025-01-16

## 2025-01-16 RX ORDER — PROPOFOL 10 MG/ML
INJECTION, EMULSION INTRAVENOUS AS NEEDED
Status: DISCONTINUED | OUTPATIENT
Start: 2025-01-16 | End: 2025-01-16

## 2025-01-16 RX ORDER — SODIUM CHLORIDE, SODIUM LACTATE, POTASSIUM CHLORIDE, CALCIUM CHLORIDE 600; 310; 30; 20 MG/100ML; MG/100ML; MG/100ML; MG/100ML
INJECTION, SOLUTION INTRAVENOUS CONTINUOUS PRN
Status: DISCONTINUED | OUTPATIENT
Start: 2025-01-16 | End: 2025-01-16

## 2025-01-16 RX ADMIN — PROPOFOL 150 MG: 10 INJECTION, EMULSION INTRAVENOUS at 15:05

## 2025-01-16 RX ADMIN — PROPOFOL 40 MG: 10 INJECTION, EMULSION INTRAVENOUS at 15:18

## 2025-01-16 RX ADMIN — PROPOFOL 40 MG: 10 INJECTION, EMULSION INTRAVENOUS at 15:15

## 2025-01-16 RX ADMIN — PROPOFOL 30 MG: 10 INJECTION, EMULSION INTRAVENOUS at 15:13

## 2025-01-16 RX ADMIN — SODIUM CHLORIDE, SODIUM LACTATE, POTASSIUM CHLORIDE, AND CALCIUM CHLORIDE: .6; .31; .03; .02 INJECTION, SOLUTION INTRAVENOUS at 15:03

## 2025-01-16 RX ADMIN — PROPOFOL 20 MG: 10 INJECTION, EMULSION INTRAVENOUS at 15:11

## 2025-01-16 RX ADMIN — PROPOFOL 20 MG: 10 INJECTION, EMULSION INTRAVENOUS at 15:07

## 2025-01-16 RX ADMIN — LIDOCAINE HYDROCHLORIDE 100 MG: 20 INJECTION, SOLUTION EPIDURAL; INFILTRATION; INTRACAUDAL at 15:05

## 2025-01-16 RX ADMIN — PROPOFOL 20 MG: 10 INJECTION, EMULSION INTRAVENOUS at 15:09

## 2025-01-16 NOTE — ANESTHESIA PREPROCEDURE EVALUATION
Procedure:  COLONOSCOPY  LLQ - colonoscopy  Weight loss - future EGD     ECG: NSR    Denies the following: CP/SOB with exertion, asthma, COPD, SUKI, stroke/TIA, seizure    Relevant Problems   GI/HEPATIC   (+) Rectal bleeding      NEURO/PSYCH   (+) Depression      Other   (+) Inguinal lymphadenopathy        Physical Exam    Airway    Mallampati score: I  TM Distance: >3 FB  Neck ROM: full     Dental   No notable dental hx     Cardiovascular      Pulmonary      Other Findings        Anesthesia Plan  ASA Score- 1     Anesthesia Type- IV sedation with anesthesia with ASA Monitors.         Additional Monitors:     Airway Plan:            Plan Factors-Exercise tolerance (METS): >4 METS.    Chart reviewed.   Existing labs reviewed. Patient summary reviewed.    Patient is not a current smoker.      Obstructive sleep apnea risk education given perioperatively.        Induction-     Postoperative Plan-         Informed Consent- Anesthetic plan and risks discussed with patient.  I personally reviewed this patient with the CRNA. Discussed and agreed on the Anesthesia Plan with the CRNA..      NPO Status:  No vitals data found for the desired time range.

## 2025-01-16 NOTE — ANESTHESIA POSTPROCEDURE EVALUATION
Post-Op Assessment Note    CV Status:  Stable    Pain management: satisfactory to patient       Mental Status:  Alert and awake   Hydration Status:  Stable   PONV Controlled:  None   Airway Patency:  Patent     Post Op Vitals Reviewed: Yes    No anethesia notable event occurred.    Staff: CRNA           Last Filed PACU Vitals:  Vitals Value Taken Time   Temp     Pulse 86    /67    Resp 14    SpO2 98

## 2025-01-17 ENCOUNTER — RESULTS FOLLOW-UP (OUTPATIENT)
Dept: GASTROENTEROLOGY | Facility: MEDICAL CENTER | Age: 25
End: 2025-01-17

## 2025-01-17 PROCEDURE — 88305 TISSUE EXAM BY PATHOLOGIST: CPT | Performed by: PATHOLOGY

## 2025-01-21 PROCEDURE — 88342 IMHCHEM/IMCYTCHM 1ST ANTB: CPT | Performed by: STUDENT IN AN ORGANIZED HEALTH CARE EDUCATION/TRAINING PROGRAM

## 2025-01-21 PROCEDURE — 88305 TISSUE EXAM BY PATHOLOGIST: CPT | Performed by: STUDENT IN AN ORGANIZED HEALTH CARE EDUCATION/TRAINING PROGRAM

## 2025-01-21 PROCEDURE — 88341 IMHCHEM/IMCYTCHM EA ADD ANTB: CPT | Performed by: STUDENT IN AN ORGANIZED HEALTH CARE EDUCATION/TRAINING PROGRAM

## 2025-01-22 ENCOUNTER — RESULTS FOLLOW-UP (OUTPATIENT)
Age: 25
End: 2025-01-22

## 2025-01-22 NOTE — TELEPHONE ENCOUNTER
Patient called in for the results he is aware of message from PROVIDER of results  he would like to speak with Provider offered OV but no openings at the [facility]

## 2025-01-23 NOTE — TELEPHONE ENCOUNTER
----- Message from Maria M Arthur MD sent at 1/23/2025 12:22 PM EST -----  Called the patient to no avail.    Could we offer him telehealth visit (if he is okay with it) with me at [place] then please? Is that okay to do so? Thanks!  ----- Message -----  From: Delmis Vazquez MA  Sent: 1/23/2025  11:35 AM EST  To: Maria M Arthur MD    Please advise  ----- Message -----  From: Zora Rodríguez  Sent: 1/22/2025   3:48 PM EST  To: [facility] [place] [facility]

## 2025-02-24 ENCOUNTER — TELEMEDICINE (OUTPATIENT)
Age: 25
End: 2025-02-24

## 2025-02-24 ENCOUNTER — TELEPHONE (OUTPATIENT)
Age: 25
End: 2025-02-24

## 2025-02-24 DIAGNOSIS — G89.29 CHRONIC LLQ PAIN: Primary | ICD-10-CM

## 2025-02-24 DIAGNOSIS — R31.9 HEMATURIA, UNSPECIFIED TYPE: ICD-10-CM

## 2025-02-24 DIAGNOSIS — K20.90 ESOPHAGITIS DETERMINED BY ENDOSCOPY: ICD-10-CM

## 2025-02-24 DIAGNOSIS — R10.32 CHRONIC LLQ PAIN: Primary | ICD-10-CM

## 2025-02-24 NOTE — TELEPHONE ENCOUNTER
Pt. Called in to confirm appointment for today, reviewed the virtual appointment instructions and updated pt. Phone number in his chart

## 2025-02-24 NOTE — PROGRESS NOTES
Name: Reji Shi      : 2000      MRN: [id number]  Encounter Provider: Madison Arthur MD  Encounter Date: 2025   Encounter department: [facility]  :    This was a telehealth visit.   Assessment & Plan  Chronic LLQ pain  Chronic LLQ pain which was worse with defecation yesterday but not today. Reports his pain is better with NSAID. Recent EGD/colonoscopy showed LA grade B esophagitis and mild erythematous mucosa in the distal rectum with nonspecific path finding of a single focus of cryptitis without chronic mucosal injury. Segmental biopsies except for the rectum were normal. Path negative for H. Pylori and celiac disease. Physical exam during our last visit was positive for Carnett's sign which point towards MSK pain. Given ongoing LLQ pain and hx of unintentional weight loss, will obtain MRE to assess for Crohn's disease. Ddx also includes constipation, gas pain, adhesions, etc.   - obtain MRI enterography  - trial of NSAID with meals for the next 2 weeks  - pending his clinical course, consider flex sig in about 1 year for luminal eval given recent colonoscopy with mild inflammation in his rectum    Orders:    MRI enterography w wo; Future    Esophagitis determined by endoscopy  LA grade B esophagitis on EGD 2025. Complaint with pantoprazole 40 mg daily. No further heartburn. His LLQ pain was not better with PPI.   - titrate off PPI; take it every other day for a week and stop taking it       Hematuria, unspecified type  - follow up with PCP, messaged his PCP to inform his UA results from his previous ED visits         RTC in 2-3 months (in person visit)    History of Present Illness   HPI  Reji Shi is a 24 y.o. male PMH small bowel intussusception s/p dx lap (2023), chronic marijuana use (quit in 10/2024), chronic constipation, chronic abdominal pain, STD at the age of 18, who presents for follow up  History obtained from: patient    Last  seen on 1/8/2025 s/p EGD/colonoscopy as below    Hx of ex lap (11/2023) which showed a 15 cm segment of small bowel intussusception with a mobile intraluminal soft bezoar which was milked distally with no bowel resection was performed.     Hx of colonoscopy in 2023 which was incomplete due to poor prep     EGD/colonoscopy 1/15/2025  FINDINGS:  Localized salmon-colored mucosa (19 cm from the incisors); performed cold forceps biopsy  Grade B esophagitis with multiple mucosal breaks measuring 5 mm or more not continuous between folds, covering less than 75% of the circumference in the lower third of the esophagus  Regular Z-line 39 cm from the incisors  Mild, localized erythematous mucosa in the antrum and prepyloric region; performed cold forceps biopsy to rule out H. pylori  The duodenum appeared normal. Performed random biopsy using biopsy forceps to rule out celiac disease.  Otherwise normal exam.  Colonoscopy - poor prep    Final Diagnosis   A. Duodenum, bx, r/o celiac:  Duodenal mucosa with preserved villous architecture  No histomorphologic features of celiac disease identified  No dysplasia, malignancy or parasites identified      B. Stomach, bx, r/o h pylori:  Mild chronic inactive gastritis and reactive gastropathy  No H. pylori identified on H&E stained slide       C. Esophagus, 19cm salmon mucosa bx:  Gastroesophageal junction mucosa with mild chronic inflammation  No intestinal metaplasia, dysplasia or malignancy identified      D. Rectum, erythematous mucosa bx, r/o IBD & infection:  Fragments of colon mucosa with focal hyperplastic change and lymphoid aggregates  No active or microscopic colitis, dysplasia or malignancy identified   No granulomas, viral inclusions, or organisms are identified.        Colonoscopy 1/16/2025  FINDINGS:  The terminal ileum appeared normal. Performed random biopsy using biopsy forceps. R/o IBD  Mild, generalized erythematous mucosa in the distal rectum; performed cold forceps  biopsy to rule out IBD  Internal small hemorrhoids observed during retroflexion  Semi-liquid/solid stool throughout his entire colon, leading to difficulty in visualization.  The examined mucosa of the colon appeared normal except for the rectal finding as above.  Performed random forceps biopsies in the cecum, ascending colon, transverse colon, descending colon and sigmoid colon to rule out IBD  Final Diagnosis   A. Terminal Ileum, Biopsy:  - Benign small intestinal mucosa within normal limits.  - No intraepithelial lymphocytosis and no villous blunting.  - Negative for dysplasia and malignancy.        B. Large Intestine, Cecum, Biopsy:  - Colonic mucosa within normal limits.  - No active or chronic colitis.  - Negative for granulomas or dysplasia.       C. Large Intestine, Right/Ascending Colon, Biopsy:  - Colonic mucosa within normal limits.  - No active or chronic colitis.  - Negative for granulomas or dysplasia.       D. Large Intestine, Transverse Colon, Biopsy:  - Colonic mucosa within normal limits.  - No active or chronic colitis.  - Negative for granulomas or dysplasia.       E. Large Intestine, Left/Descending Colon, Biopsy:  - Colonic mucosa within normal limits.  - No active or chronic colitis.  - Negative for granulomas or dysplasia.       F. Large Intestine, Sigmoid Colon, Biopsy:  - Colonic mucosa within normal limits.  - No active or chronic colitis.  - Negative for granulomas or dysplasia.       G. Rectum, Biopsy:  - Colonic mucosa with lymphoid follicles and a single focus of cryptitis. See note.  - No chronic colitis.  - HSV and CMV immunostains are negative.  - Negative for granulomas or dysplasia.   - Deeper levels examined.     Note: Part G shows a single focus of cryptitis without features of chronic mucosal injury. The findings are non-specific and elicit a wide differential diagnosis including bowel preparation effect, infection, medication effect, and inflammatory bowel disease. Clinical  correlation with follow-up is recommended.     Reports chronic LLQ pain. Not better with defecation. Had BM today. Last time he had his LLQ pain was yesterday after defecation. Reports taking Advil with improvement in his symptom. Reports taking Bentyl in the past with improvement of his symptoms as well.  No heartburn.   Compliant with pantoprazole 40 mg daily.       Review of Systems  Current Outpatient Medications on File Prior to Visit   Medication Sig Dispense Refill    acetaminophen (TYLENOL) 500 mg tablet Take 1 tablet (500 mg total) by mouth every 6 (six) hours as needed for mild pain 30 tablet 0    docusate sodium (COLACE) 100 mg capsule Take 1 capsule (100 mg total) by mouth every 12 (twelve) hours 60 capsule 0    lidocaine (LMX) 4 % cream Apply topically as needed for moderate pain (Patient not taking: Reported on 1/15/2025) 30 g 0    ondansetron (ZOFRAN) 4 mg tablet Take 1 tablet (4 mg total) by mouth every 8 (eight) hours as needed for nausea or vomiting (Patient not taking: Reported on 1/15/2025) 20 tablet 0    pantoprazole (PROTONIX) 40 mg tablet Take 1 tablet (40 mg total) by mouth daily before breakfast 30 tablet 2    polyethylene glycol (MIRALAX) 17 g packet Take 17 g by mouth daily 30 each 11    [DISCONTINUED] cetirizine (ZyrTEC) 10 mg tablet Take 1 tablet (10 mg total) by mouth daily 10 tablet 0     No current facility-administered medications on file prior to visit.         Objective   There were no vitals taken for this visit.     Physical Exam  Alert and not in distress over the video

## 2025-03-03 ENCOUNTER — TELEPHONE (OUTPATIENT)
Dept: GASTROENTEROLOGY | Facility: MEDICAL CENTER | Age: 25
End: 2025-03-03

## 2025-03-03 NOTE — TELEPHONE ENCOUNTER
----- Message from Maria M Arthur MD sent at 2/24/2025 11:03 AM EST -----  Regarding: appt with me in about 2-3 months  Hi team,  Could you bring him to our [place] office with me in 2-3 months please? Okay for urgent spot. Thanks!

## 2025-03-03 NOTE — TELEPHONE ENCOUNTER
Attempted to contact pt, call would not go through stating call cannot be made at this time.Tried to schedule pt for urgent slot appt per .

## 2025-03-04 NOTE — TELEPHONE ENCOUNTER
Left pt VM and requested call back to schedule 2-3 month follow up ok to use urgent per  and place pt on cancellation list.    MD Jesse Marie MA  I was snooping around to see when I can sneak him in for a visit but I was not able to find a spot either. Let's keep him in July and a waitlist if possible. Thank you, Jesse!

## 2025-03-04 NOTE — TELEPHONE ENCOUNTER
Spoke to pt to reschedule October appt that was made today. Pt should have been scheduled for next urgent slot. Pt is now scheduled for 7/16/25 at 3:30pm and placed on cancellation list.

## 2025-03-06 ENCOUNTER — HOSPITAL ENCOUNTER (EMERGENCY)
Facility: HOSPITAL | Age: 25
Discharge: HOME | End: 2025-03-06
Attending: EMERGENCY MEDICINE
Payer: COMMERCIAL

## 2025-03-06 VITALS
BODY MASS INDEX: 20.94 KG/M2 | SYSTOLIC BLOOD PRESSURE: 108 MMHG | RESPIRATION RATE: 20 BRPM | TEMPERATURE: 98.1 F | HEART RATE: 83 BPM | WEIGHT: 141.8 LBS | OXYGEN SATURATION: 99 % | DIASTOLIC BLOOD PRESSURE: 73 MMHG
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
## 2025-03-06 PROCEDURE — 99282 EMERGENCY DEPT VISIT SF MDM: CPT

## 2025-03-06 PROCEDURE — 99283 EMERGENCY DEPT VISIT LOW MDM: CPT | Performed by: PHYSICIAN ASSISTANT

## 2025-03-06 RX ORDER — ACETAMINOPHEN 500 MG
500 TABLET ORAL EVERY 6 HOURS PRN
Qty: 30 TABLET | Refills: 0 | Status: SHIPPED | OUTPATIENT
Start: 2025-03-06

## 2025-03-06 RX ORDER — IBUPROFEN 400 MG/1
400 TABLET, FILM COATED ORAL EVERY 6 HOURS PRN
Qty: 12 TABLET | Refills: 0 | Status: SHIPPED | OUTPATIENT
Start: 2025-03-06

## 2025-03-06 NOTE — ED NOTES
Pt hand placed in iced water to promote reduction of swelling. Ring cutter at bedside.      Zora Cedeno RN  03/06/25 0996

## 2025-03-06 NOTE — ED PROVIDER NOTES
"Time reflects when diagnosis was documented in both MDM as applicable and the Disposition within this note       Time User Action Codes Description Comment    3/6/2025  9:03 AM Faby Toro Add [W49.04XA] Ring or other jewelry causing external constriction, initial encounter           ED Disposition       ED Disposition   Discharge    Condition   Stable    Date/Time   Thu Mar 6, 2025  9:03 AM    Comment   Reji Sih discharge to home/self care.                   Assessment & Plan       Medical Decision Making  24-year-old male presents for evaluation of a constricting ring to his right fourth digit.  Patient requesting ring to be cut off.  Initially lubrication and ice applied to help with ring removal, patient declined this removal technique and preferred ring cutting.  Manual ring cutter utilized to make 1 cut through the ring, ring was then bent to open and removed easily from finger.  Full range of motion, full sensation and brisk capillary refill noted to the finger after removal.  Patient vies on Tylenol and Motrin supportive therapy.  No violation of the skin indication for tetanus update or antibiotics    Strict return to ED precautions discussed. Patient and/or family members verbalizes understanding and agrees with plan. Patient is stable for discharge     Portions of the record may have been created with voice recognition software. Occasional wrong word or \"sound a like\" substitutions may have occurred due to the inherent limitations of voice recognition software. Read the chart carefully and recognize, using context, where substitutions have occurred.    Risk  OTC drugs.  Prescription drug management.             Medications - No data to display    ED Risk Strat Scores                                                History of Present Illness       Chief Complaint   Patient presents with    Finger Swelling     States his ring has been stuck on right ring finger for about 1 week       Past " Medical History:   Diagnosis Date    No known health problems       Past Surgical History:   Procedure Laterality Date    COLON SURGERY      COLONOSCOPY      UT LAPS ABD PRTM&OMENTUM DX W/WO SPEC BR/WA SPX N/A 11/27/2023    Procedure: LAPAROSCOPY DIAGNOSTIC;  Surgeon: Em Yeboah MD;  Location: [facility];  Service: General      Family History   Problem Relation Age of Onset    Colon cancer Neg Hx     Stomach cancer Neg Hx       Social History     Tobacco Use    Smoking status: Some Days     Types: Cigars    Smokeless tobacco: Never   Vaping Use    Vaping status: Former    Substances: Nicotine   Substance Use Topics    Alcohol use: Not Currently    Drug use: Not Currently     Types: Marijuana      E-Cigarette/Vaping    E-Cigarette Use Former User       E-Cigarette/Vaping Substances    Nicotine Yes     THC No     CBD No     Flavoring No     Other No     Unknown No       I have reviewed and agree with the history as documented.     24-year-old right-handed male presents requesting a ring removal.  He reports he has a constricting ring causing pain to his right fourth digit.  He denies numbness tingling weakness paresthesias paralysis.  He reports no wounds.  He reports attempting to pull it off did not work at home.          Review of Systems   Constitutional:  Negative for chills, fatigue and fever.   HENT:  Negative for congestion, ear pain, rhinorrhea and sore throat.    Eyes:  Negative for redness.   Respiratory:  Negative for chest tightness and shortness of breath.    Cardiovascular:  Negative for chest pain and palpitations.   Gastrointestinal:  Negative for abdominal pain, nausea and vomiting.   Genitourinary:  Negative for dysuria and hematuria.   Musculoskeletal:  Positive for joint swelling.   Skin:  Negative for rash.   Neurological:  Negative for dizziness, syncope, light-headedness and numbness.           Objective       ED Triage Vitals [03/06/25 0823]   Temperature Pulse Blood Pressure  Respirations SpO2 Patient Position - Orthostatic VS   98.1 °F (36.7 °C) 83 108/73 20 99 % Sitting      Temp Source Heart Rate Source BP Location FiO2 (%) Pain Score    Oral Monitor Left arm -- --      Vitals      Date and Time Temp Pulse SpO2 Resp BP Pain Score FACES Pain Rating User   03/06/25 0823 98.1 °F (36.7 °C) 83 99 % 20 108/73 -- -- JW            Physical Exam  Vitals and nursing note reviewed.   Constitutional:       Appearance: Normal appearance. He is well-developed.   HENT:      Head: Normocephalic and atraumatic.   Eyes:      General: No scleral icterus.     Pupils: Pupils are equal, round, and reactive to light.   Cardiovascular:      Rate and Rhythm: Normal rate and regular rhythm.      Pulses: Normal pulses.   Pulmonary:      Effort: Pulmonary effort is normal. No respiratory distress.      Breath sounds: No stridor.   Abdominal:      General: There is no distension.      Palpations: There is no mass.   Musculoskeletal:        Hands:       Cervical back: Normal range of motion.      Comments: After removal of the ring patient with full range of motion of the PIP and DIP and MCP including flexion and extension.  Brisk capillary refill distal to the constricted area.  Normal sensation.  No open wounds.  No bleeding.   Skin:     General: Skin is warm and dry.      Capillary Refill: Capillary refill takes less than 2 seconds.      Coloration: Skin is not jaundiced.   Neurological:      Mental Status: He is alert and oriented to person, place, and time.      Gait: Gait normal.   Psychiatric:         Mood and Affect: Mood normal.         Results Reviewed       None            No orders to display       Foreign Body - Embedded    Date/Time: 3/6/2025 9:11 AM    Performed by: Faby Toro PA-C  Authorized by: Faby Toro PA-C    Patient location:  ED  Other Assisting Provider: Hilaria (Salome RN at bedside assisting)    Consent:     Consent obtained:  Verbal    Consent given  by:  Patient    Risks discussed:  Bleeding, pain, worsening of condition, incomplete removal, poor cosmetic result and nerve damage    Alternatives discussed:  No treatment and alternative treatment  Universal protocol:     Procedure explained and questions answered to patient or proxy's satisfaction: yes      Patient identity confirmed:  Verbally with patient  Location:     Location:  Finger    Finger location:  R ring finger    Depth: Superficial, constricting the finger.    Tendon involvement:  None  Pre-procedure details:     Imaging:  None    Neurovascular status: intact    Anesthesia (see MAR for exact dosages):     Anesthesia method:  None  Procedure details:     Scalpel size: Manual ring cutter utilized.  No scalpel no violation of the skin.    Dissection of underlying tissues: no      Procedure complexity:  Simple    Foreign bodies recovered:  1    Description:  1 ring, 1 full cut through ring and bending of ring required 2 separate and removed from finger    Intact foreign body removal: yes    Post-procedure details:     Confirmation:  No additional foreign bodies on visualization    Skin closure:  None    Dressing:  Open (no dressing)    Patient tolerance of procedure:  Tolerated well, no immediate complications      ED Medication and Procedure Management   Prior to Admission Medications   Prescriptions Last Dose Informant Patient Reported? Taking?   acetaminophen (TYLENOL) 500 mg tablet   No No   Sig: Take 1 tablet (500 mg total) by mouth every 6 (six) hours as needed for mild pain   docusate sodium (COLACE) 100 mg capsule  Self No No   Sig: Take 1 capsule (100 mg total) by mouth every 12 (twelve) hours   lidocaine (LMX) 4 % cream   No No   Sig: Apply topically as needed for moderate pain   Patient not taking: Reported on 1/15/2025   ondansetron (ZOFRAN) 4 mg tablet   No No   Sig: Take 1 tablet (4 mg total) by mouth every 8 (eight) hours as needed for nausea or vomiting   Patient not taking: Reported on  1/15/2025   pantoprazole (PROTONIX) 40 mg tablet   No No   Sig: Take 1 tablet (40 mg total) by mouth daily before breakfast   polyethylene glycol (MIRALAX) 17 g packet   No No   Sig: Take 17 g by mouth daily      Facility-Administered Medications: None     Discharge Medication List as of 3/6/2025  9:04 AM        START taking these medications    Details   !! acetaminophen (TYLENOL) 500 mg tablet Take 1 tablet (500 mg total) by mouth every 6 (six) hours as needed for mild pain, Starting Thu 3/6/2025, Normal      ibuprofen (MOTRIN) 400 mg tablet Take 1 tablet (400 mg total) by mouth every 6 (six) hours as needed for mild pain, Starting Thu 3/6/2025, Normal       !! - Potential duplicate medications found. Please discuss with provider.        CONTINUE these medications which have NOT CHANGED    Details   !! acetaminophen (TYLENOL) 500 mg tablet Take 1 tablet (500 mg total) by mouth every 6 (six) hours as needed for mild pain, Starting Thu 7/25/2024, Normal      docusate sodium (COLACE) 100 mg capsule Take 1 capsule (100 mg total) by mouth every 12 (twelve) hours, Starting Sun 11/19/2023, Normal      lidocaine (LMX) 4 % cream Apply topically as needed for moderate pain, Starting Tue 12/17/2024, Normal      ondansetron (ZOFRAN) 4 mg tablet Take 1 tablet (4 mg total) by mouth every 8 (eight) hours as needed for nausea or vomiting, Starting Fri 8/9/2024, Normal      pantoprazole (PROTONIX) 40 mg tablet Take 1 tablet (40 mg total) by mouth daily before breakfast, Starting Wed 1/15/2025, Normal      polyethylene glycol (MIRALAX) 17 g packet Take 17 g by mouth daily, Starting Thu 12/14/2023, Normal       !! - Potential duplicate medications found. Please discuss with provider.        No discharge procedures on file.  ED SEPSIS DOCUMENTATION   Time reflects when diagnosis was documented in both MDM as applicable and the Disposition within this note       Time User Action Codes Description Comment    3/6/2025  9:03 AM [place]  Faby Add [W49.04XA] Ring or other jewelry causing external constriction, initial encounter                  Faby Toro PA-C  03/06/25 0914

## 2025-03-06 NOTE — Clinical Note
Reji Shi was seen and treated in our emergency department on 3/6/2025.                Diagnosis:     Reji  may return to work on return date.    He may return on this date: 03/08/2025         If you have any questions or concerns, please don't hesitate to call.      Faby Toro PA-C    ______________________________           _______________          _______________  Hospital Representative                              Date                                Time

## 2025-03-20 ENCOUNTER — OFFICE VISIT (OUTPATIENT)
Dept: FAMILY MEDICINE CLINIC | Facility: CLINIC | Age: 25
End: 2025-03-20

## 2025-03-20 VITALS
WEIGHT: 140 LBS | RESPIRATION RATE: 18 BRPM | HEIGHT: 69 IN | BODY MASS INDEX: 20.73 KG/M2 | DIASTOLIC BLOOD PRESSURE: 72 MMHG | HEART RATE: 79 BPM | TEMPERATURE: 98 F | OXYGEN SATURATION: 98 % | SYSTOLIC BLOOD PRESSURE: 118 MMHG

## 2025-03-20 DIAGNOSIS — R10.32 LEFT LOWER QUADRANT ABDOMINAL PAIN: ICD-10-CM

## 2025-03-20 DIAGNOSIS — R31.9 HEMATURIA, UNSPECIFIED TYPE: Primary | ICD-10-CM

## 2025-03-20 DIAGNOSIS — K92.1 SYMPTOM OF BLOOD IN STOOL: ICD-10-CM

## 2025-03-20 PROCEDURE — 3078F DIAST BP <80 MM HG: CPT

## 2025-03-20 PROCEDURE — 99214 OFFICE O/P EST MOD 30 MIN: CPT

## 2025-03-20 PROCEDURE — 3074F SYST BP LT 130 MM HG: CPT

## 2025-03-20 RX ORDER — SUCRALFATE ORAL 1 G/10ML
1 SUSPENSION ORAL 4 TIMES DAILY
Qty: 3600 ML | Refills: 1 | Status: SHIPPED | OUTPATIENT
Start: 2025-03-20

## 2025-03-20 RX ORDER — DICYCLOMINE HYDROCHLORIDE 10 MG/1
10 CAPSULE ORAL
Qty: 30 CAPSULE | Refills: 0 | Status: SHIPPED | OUTPATIENT
Start: 2025-03-20

## 2025-03-20 NOTE — PROGRESS NOTES
Name: Reji Shi      : 2000      MRN: [id number]  Encounter Provider: SHIRA Matias  Encounter Date: 3/20/2025   Encounter department: [facility]  :  Assessment & Plan  Hematuria, unspecified type  - Trace blood with UA in January, reviewed  - Denies any visible blood in urine   - Denies any  sx  - Will repeat UA  Orders:    CBC and differential; Future    Comprehensive metabolic panel; Future    Lipid panel; Future    TSH, 3rd generation with Free T4 reflex; Future    Hemoglobin A1C; Future    UA w Reflex to Microscopic w Reflex to Culture; Future    Left lower quadrant abdominal pain  - Chronic, has been evaluated by GI and continue to follow  - Reviewed last GI encounter noted, pending MRI enterography    - Next appointment in July   - Continue with Protonix  - Will add bentyl and Sucralfate  - ED precaution discussed   Orders:    dicyclomine (BENTYL) 10 mg capsule; Take 1 capsule (10 mg total) by mouth 4 (four) times a day (before meals and at bedtime)    sucralfate (CARAFATE) 1 g/10 mL suspension; Take 10 mL (1 g total) by mouth 4 (four) times a day    Symptom of blood in stool  - Will repeat CBC  - Next GI appointment in July    - Advice Pt to stop Ibuprofen and other NSAID   - ED Precaution discussed  - Pt verbalized understanding with plan   Orders:    Occult blood 1-3, stool; Future         History of Present Illness   Patient is a 24 y.o. male whom  has a PMH of GI complaints. who is seen today in office for complaint of blood in stool for about a week. Pt notes in January his GI provider told him that he has blood in his urine to come to PCP. Pt notes blood in stood for few weeks. Pt also report chronic ABD pain for which he has been evaluated by GI. Pt note LLQ pain has been worse over the past few days. He reports complaint with Protonix, but also report taking Ibuprofen daily for pain.        Blood in Urine  This is a chronic problem. Associated  "symptoms include abdominal pain. Pertinent negatives include no nausea or vomiting.     Review of Systems   Constitutional: Negative.    HENT: Negative.     Respiratory: Negative.     Cardiovascular:  Negative for chest pain and palpitations.   Gastrointestinal:  Positive for abdominal pain and blood in stool. Negative for abdominal distention, anal bleeding, constipation, diarrhea, nausea, rectal pain and vomiting.   Genitourinary:  Positive for hematuria.   Musculoskeletal: Negative.    Skin: Negative.    Neurological: Negative.    Psychiatric/Behavioral: Negative.         Objective   /72 (BP Location: Left arm, Patient Position: Sitting, Cuff Size: Standard)   Pulse 79   Temp 98 °F (36.7 °C) (Temporal)   Resp 18   Ht 5' 9\" (1.753 m)   Wt 63.5 kg (140 lb)   SpO2 98%   BMI 20.67 kg/m²      Physical Exam  Vitals reviewed.   Constitutional:       Appearance: Normal appearance. He is normal weight.   HENT:      Head: Normocephalic and atraumatic.      Right Ear: Tympanic membrane normal.      Left Ear: Tympanic membrane normal.      Mouth/Throat:      Mouth: Mucous membranes are moist.      Pharynx: Oropharynx is clear.   Eyes:      Extraocular Movements: Extraocular movements intact.      Conjunctiva/sclera: Conjunctivae normal.      Pupils: Pupils are equal, round, and reactive to light.   Cardiovascular:      Rate and Rhythm: Normal rate.      Pulses: Normal pulses.      Heart sounds: Normal heart sounds.   Pulmonary:      Effort: Pulmonary effort is normal.      Breath sounds: Normal breath sounds.   Abdominal:      General: There is no distension.      Palpations: Abdomen is soft.      Tenderness: There is abdominal tenderness.      Comments: LLQ   Musculoskeletal:         General: Normal range of motion.      Cervical back: Normal range of motion.   Skin:     Capillary Refill: Capillary refill takes less than 2 seconds.   Neurological:      General: No focal deficit present.      Mental Status: He " is alert and oriented to person, place, and time. Mental status is at baseline.   Psychiatric:         Mood and Affect: Mood normal.         Behavior: Behavior normal.         Thought Content: Thought content normal.         Judgment: Judgment normal.

## 2025-03-26 ENCOUNTER — HOSPITAL ENCOUNTER (OUTPATIENT)
Dept: MRI IMAGING | Facility: HOSPITAL | Age: 25
Discharge: HOME | End: 2025-03-26
Attending: INTERNAL MEDICINE

## 2025-04-28 ENCOUNTER — HOSPITAL ENCOUNTER (EMERGENCY)
Facility: HOSPITAL | Age: 25
Discharge: HOME | End: 2025-04-28
Attending: EMERGENCY MEDICINE
Payer: COMMERCIAL

## 2025-04-28 ENCOUNTER — NURSE TRIAGE (OUTPATIENT)
Age: 25
End: 2025-04-28

## 2025-04-28 VITALS
SYSTOLIC BLOOD PRESSURE: 96 MMHG | TEMPERATURE: 97.6 F | DIASTOLIC BLOOD PRESSURE: 83 MMHG | BODY MASS INDEX: 21.39 KG/M2 | HEART RATE: 94 BPM | WEIGHT: 144.84 LBS | RESPIRATION RATE: 20 BRPM | OXYGEN SATURATION: 99 %

## 2025-04-28 DIAGNOSIS — K62.5 RECTAL BLEEDING: Primary | ICD-10-CM

## 2025-04-28 LAB
BASOPHILS # BLD AUTO: 0.03 THOUSANDS/ÂΜL (ref 0–0.1)
BASOPHILS NFR BLD AUTO: 1 % (ref 0–1)
EOSINOPHIL # BLD AUTO: 0.61 THOUSAND/ÂΜL (ref 0–0.61)
EOSINOPHIL NFR BLD AUTO: 12 % (ref 0–6)
ERYTHROCYTE [DISTWIDTH] IN BLOOD BY AUTOMATED COUNT: 12 % (ref 11.6–15.1)
HCT VFR BLD AUTO: 44.3 % (ref 36.5–49.3)
HGB BLD-MCNC: 14.6 G/DL (ref 12–17)
IMM GRANULOCYTES # BLD AUTO: 0.02 THOUSAND/UL (ref 0–0.2)
IMM GRANULOCYTES NFR BLD AUTO: 0 % (ref 0–2)
LYMPHOCYTES # BLD AUTO: 1.37 THOUSANDS/ÂΜL (ref 0.6–4.47)
LYMPHOCYTES NFR BLD AUTO: 27 % (ref 14–44)
MCH RBC QN AUTO: 30.5 PG (ref 26.8–34.3)
MCHC RBC AUTO-ENTMCNC: 33 G/DL (ref 31.4–37.4)
MCV RBC AUTO: 93 FL (ref 82–98)
MONOCYTES # BLD AUTO: 0.47 THOUSAND/ÂΜL (ref 0.17–1.22)
MONOCYTES NFR BLD AUTO: 9 % (ref 4–12)
NEUTROPHILS # BLD AUTO: 2.62 THOUSANDS/ÂΜL (ref 1.85–7.62)
NEUTS SEG NFR BLD AUTO: 51 % (ref 43–75)
NRBC BLD AUTO-RTO: 0 /100 WBCS
PLATELET # BLD AUTO: 272 THOUSANDS/UL (ref 149–390)
PMV BLD AUTO: 8.9 FL (ref 8.9–12.7)
RBC # BLD AUTO: 4.79 MILLION/UL (ref 3.88–5.62)
WBC # BLD AUTO: 5.12 THOUSAND/UL (ref 4.31–10.16)

## 2025-04-28 PROCEDURE — 85025 COMPLETE CBC W/AUTO DIFF WBC: CPT | Performed by: EMERGENCY MEDICINE

## 2025-04-28 PROCEDURE — 99284 EMERGENCY DEPT VISIT MOD MDM: CPT

## 2025-04-28 PROCEDURE — 99283 EMERGENCY DEPT VISIT LOW MDM: CPT | Performed by: EMERGENCY MEDICINE

## 2025-04-28 PROCEDURE — 36415 COLL VENOUS BLD VENIPUNCTURE: CPT | Performed by: EMERGENCY MEDICINE

## 2025-04-28 NOTE — DISCHARGE INSTRUCTIONS
Patient Education     Bloody Stools, Adult ED   General Information   You came to the Emergency Department (ED) because you had bloody stools. Bowel movement is another name for stool. Many things can cause blood in the stools. Some are serious things like bleeding from the intestine, cancer, or a serious bacterial infection. Often the cause is not serious. It may be irritation of a hemorrhoid or an anal fissure. Sometimes the doctors cannot find a specific cause. Bleeding in the stool is more concerning when it occurs often, becomes constant, or comes with other symptoms like fever.  What care is needed at home?   Call your regular doctor to let them know you were in the ED. Make a follow-up appointment if you were told to.  Take all your medicines as ordered.  Try to avoid straining when you have a bowel movement.  Doing the following can help you avoid constipation:  Eat high fiber foods. These include whole grains, fruits, and vegetables.  Drink plenty of water and other fluids each day.  Be active. Walk, garden, or do something active for 30 minutes or more on most days of the week.  Sitting in a warm bath a few times a day for about 15 minutes each time. This can help you relax and feel like you can have a bowel movement.  Keep a written diary of how often you have bloody stools.  When do I need to get emergency help?   Call an ambulance right away if:   You have heavy bleeding from your rectum.  You have severe belly pain.  You have chest discomfort or become short of breath along with your bloody stool.  Return to the ED if:   Your bleeding increases a lot.  You throw up blood or something that looks like coffee grounds.  You become very pale or your heart is racing all the time.  You feel very weak or become light-headed when you stand up.  When do I need to call the doctor?   You have a fever of 100.4°F (38°C) or higher.  You have a headache together with your bloody stool.  You start to throw up a  lot.  You have new or worsening symptoms.  Last Reviewed Date   2021-06-04  Consumer Information Use and Disclaimer   This generalized information is a limited summary of diagnosis, treatment, and/or medication information. It is not meant to be comprehensive and should be used as a tool to help the user understand and/or assess potential diagnostic and treatment options. It does NOT include all information about conditions, treatments, medications, side effects, or risks that may apply to a specific patient. It is not intended to be medical advice or a substitute for the medical advice, diagnosis, or treatment of a health care provider based on the health care provider's examination and assessment of a patient’s specific and unique circumstances. Patients must speak with a health care provider for complete information about their health, medical questions, and treatment options, including any risks or benefits regarding use of medications. This information does not endorse any treatments or medications as safe, effective, or approved for treating a specific patient. [employer] and its affiliates disclaim any warranty or liability relating to this information or the use thereof. The use of this information is governed by the Terms of Use, available at https://www.woltersVirtualtwouwer.com/en/know/clinical-effectiveness-terms   Copyright   Copyright © 2024 [employer] and its affiliates and/or licensors. All rights reserved.

## 2025-04-28 NOTE — TELEPHONE ENCOUNTER
"FOLLOW UP: FYI    REASON FOR CONVERSATION: Rectal Bleeding    SYMPTOMS: rectal bleeding > 1 week, < 5 episodes/day, BRB in toilet turning bowel red, normal BM yesterday, LLQ pain 10/10 especially with ambulation    OTHER: Please see Care Advice for provided recommendations.  Next OV: 7/16/25    DISPOSITION: Go to ED/UCC Now (Or to Office with PCP Approval)      Reason for Disposition   SEVERE abdominal pain (e.g., excruciating)    Answer Assessment - Initial Assessment Questions  1. APPEARANCE of BLOOD: \"What color is it?\" \"Is it passed separately, on the surface of the stool, or mixed in with the stool?\"       Bright red or dark red blood, not mixed with stool  2. AMOUNT: \"How much blood was passed?\"       Turns toilet bowel red  3. FREQUENCY: \"How many times has blood been passed with the stools?\"       < 5/day  4. ONSET: \"When was the blood first seen in the stools?\" (Days or weeks)       Over 1 week ago  5. DIARRHEA: \"Is there also some diarrhea?\" If Yes, ask: \"How many diarrhea stools in the past 24 hours?\"       denies  6. CONSTIPATION: \"Do you have constipation?\" If Yes, ask: \"How bad is it?\"      denies  7. RECURRENT SYMPTOMS: \"Have you had blood in your stools before?\" If Yes, ask: \"When was the last time?\" and \"What happened that time?\"       2022   8. BLOOD THINNERS: \"Do you take any blood thinners?\" (e.g., Coumadin/warfarin, Pradaxa/dabigatran, aspirin)      denies  9. OTHER SYMPTOMS: \"Do you have any other symptoms?\"  (e.g., abdomen pain, vomiting, dizziness, fever)      Abdominal pain - LLQ    Protocols used: Rectal Bleeding-Adult-[place]    "

## 2025-04-28 NOTE — ED PROVIDER NOTES
Time reflects when diagnosis was documented in both MDM as applicable and the Disposition within this note       Time User Action Codes Description Comment    4/28/2025 12:26 PM Benji Wagner Add [K62.5] Rectal bleeding           ED Disposition       ED Disposition   Discharge    Condition   Stable    Date/Time   Mon Apr 28, 2025 12:26 PM    Comment   Reji Morgan Shi discharge to home/self care.                   Assessment & Plan       Medical Decision Making  24-year-old male, presents with rectal bleeding.  Differential diagnosis includes hemorrhoids, colitis, anemia among other diagnoses.  Patient looks well in no distress.  Abdomen soft and nontender on exam.  Rectal exam shows external hemorrhoid, no gross blood noted.  Previous medical records reviewed, patient has been seen numerous times in ED over the past several years for abdominal pain, has had multiple CT scans with no acute findings.  Follows with GI, had colonoscopy in January.  Labs ordered.    Amount and/or Complexity of Data Reviewed  External Data Reviewed: radiology.     Details: Abdominal CT scans 1/15/25, 12/29/24, 12/18/24 with no acute findings.  Labs: ordered. Decision-making details documented in ED Course.        ED Course as of 04/28/25 1230   Mon Apr 28, 2025   1229 On repeat exam, patient comfortable, looks well and in no distress, ambulating in ED without difficulty.  CBC results reviewed and discussed with patient, no acute abnormality.  Patient instructed to follow-up with his primary doctor as well as his GI doctor for further evaluation, return precautions given.       Medications - No data to display    ED Risk Strat Scores                    No data recorded        SBIRT 20yo+      Flowsheet Row Most Recent Value   Initial Alcohol Screen: US AUDIT-C     1. How often do you have a drink containing alcohol? 0 Filed at: 04/28/2025 1200   2. How many drinks containing alcohol do you have on a typical day you are drinking?  0 Filed  at: 04/28/2025 1200   3a. Male UNDER 65: How often do you have five or more drinks on one occasion? 0 Filed at: 04/28/2025 1200   3b. FEMALE Any Age, or MALE 65+: How often do you have 4 or more drinks on one occassion? 0 Filed at: 04/28/2025 1200   Audit-C Score 0 Filed at: 04/28/2025 1200   [place]: How many times in the past year have you...    Used an illegal drug or used a prescription medication for non-medical reasons? Never Filed at: 04/28/2025 1200                            History of Present Illness       Chief Complaint   Patient presents with    Medical Problem     Pt reports blood in stool x1 week, called PCP and was told to go to ED. Reports left sided abd x2 yrs sometimes takes ibuprofen, denies N/V/D.        Past Medical History:   Diagnosis Date    No known health problems       Past Surgical History:   Procedure Laterality Date    COLON SURGERY      COLONOSCOPY      ME LAPS ABD PRTM&OMENTUM DX W/WO SPEC BR/WA SPX N/A 11/27/2023    Procedure: LAPAROSCOPY DIAGNOSTIC;  Surgeon: Em Yeboah MD;  Location: [facility] OR;  Service: General      Family History   Problem Relation Age of Onset    Colon cancer Neg Hx     Stomach cancer Neg Hx       Social History     Tobacco Use    Smoking status: Some Days     Types: Cigars     Passive exposure: Current    Smokeless tobacco: Never   Vaping Use    Vaping status: Former    Substances: Nicotine   Substance Use Topics    Alcohol use: Not Currently    Drug use: Not Currently     Types: Marijuana      E-Cigarette/Vaping    E-Cigarette Use Former User       E-Cigarette/Vaping Substances    Nicotine Yes     THC No     CBD No     Flavoring No     Other No     Unknown No       I have reviewed and agree with the history as documented.     24-year-old male, presents with rectal bleeding.  Patient states he has noted red blood with bowel movements for the past week.  Patient reports pain in the left lower abdomen, this has been ongoing for over a year.  Denies any  fevers, vomiting, or rectal pain.  Patient has had colonoscopies, most recent to this January.      History provided by:  Patient   used: No        Review of Systems   Constitutional: Negative.  Negative for fever.   Neurological: Negative.            Objective       ED Triage Vitals [04/28/25 1200]   Temperature Pulse Blood Pressure Respirations SpO2 Patient Position - Orthostatic VS   97.6 °F (36.4 °C) 94 96/83 20 99 % Sitting      Temp Source Heart Rate Source BP Location FiO2 (%) Pain Score    Oral Monitor Left arm -- --      Vitals      Date and Time Temp Pulse SpO2 Resp BP Pain Score FACES Pain Rating User   04/28/25 1200 97.6 °F (36.4 °C) 94 99 % 20 96/83 -- -- JW            Physical Exam  Vitals and nursing note reviewed. Exam conducted with a chaperone present (NATHALIA Avila).   Constitutional:       General: He is not in acute distress.  HENT:      Mouth/Throat:      Mouth: Mucous membranes are moist.      Pharynx: Oropharynx is clear.   Cardiovascular:      Rate and Rhythm: Normal rate.   Pulmonary:      Effort: Pulmonary effort is normal.   Abdominal:      General: There is no distension.      Palpations: Abdomen is soft.      Tenderness: There is no abdominal tenderness.   Genitourinary:     Comments: External hemorrhoid noted, no active bleeding, nontender  Rectal exam no masses or tenderness, brown stool noted, no gross blood  Skin:     General: Skin is warm and dry.   Neurological:      General: No focal deficit present.      Mental Status: He is alert and oriented to person, place, and time.      Motor: No weakness.      Gait: Gait normal.         Results Reviewed       Procedure Component Value Units Date/Time    CBC and differential [[id number]]  (Abnormal) Collected: 04/28/25 1216    Lab Status: Final result Specimen: Blood from Arm, Left Updated: 04/28/25 1222     WBC 5.12 Thousand/uL      RBC 4.79 Million/uL      Hemoglobin 14.6 g/dL      Hematocrit 44.3 %      MCV 93 fL       MCH 30.5 pg      MCHC 33.0 g/dL      RDW 12.0 %      MPV 8.9 fL      Platelets 272 Thousands/uL      nRBC 0 /100 WBCs      Segmented % 51 %      Immature Grans % 0 %      Lymphocytes % 27 %      Monocytes % 9 %      Eosinophils Relative 12 %      Basophils Relative 1 %      Absolute Neutrophils 2.62 Thousands/µL      Absolute Immature Grans 0.02 Thousand/uL      Absolute Lymphocytes 1.37 Thousands/µL      Absolute Monocytes 0.47 Thousand/µL      Eosinophils Absolute 0.61 Thousand/µL      Basophils Absolute 0.03 Thousands/µL             No orders to display       Procedures    ED Medication and Procedure Management   Prior to Admission Medications   Prescriptions Last Dose Informant Patient Reported? Taking?   acetaminophen (TYLENOL) 500 mg tablet   No No   Sig: Take 1 tablet (500 mg total) by mouth every 6 (six) hours as needed for mild pain   Patient not taking: Reported on 3/20/2025   acetaminophen (TYLENOL) 500 mg tablet   No No   Sig: Take 1 tablet (500 mg total) by mouth every 6 (six) hours as needed for mild pain   dicyclomine (BENTYL) 10 mg capsule   No No   Sig: Take 1 capsule (10 mg total) by mouth 4 (four) times a day (before meals and at bedtime)   docusate sodium (COLACE) 100 mg capsule  Self No No   Sig: Take 1 capsule (100 mg total) by mouth every 12 (twelve) hours   Patient not taking: Reported on 3/20/2025   ibuprofen (MOTRIN) 400 mg tablet   No No   Sig: Take 1 tablet (400 mg total) by mouth every 6 (six) hours as needed for mild pain   lidocaine (LMX) 4 % cream   No No   Sig: Apply topically as needed for moderate pain   Patient not taking: Reported on 3/20/2025   ondansetron (ZOFRAN) 4 mg tablet   No No   Sig: Take 1 tablet (4 mg total) by mouth every 8 (eight) hours as needed for nausea or vomiting   Patient not taking: Reported on 3/20/2025   pantoprazole (PROTONIX) 40 mg tablet   No No   Sig: Take 1 tablet (40 mg total) by mouth daily before breakfast   polyethylene glycol (MIRALAX) 17 g packet    No No   Sig: Take 17 g by mouth daily   Patient not taking: Reported on 3/20/2025   sucralfate (CARAFATE) 1 g/10 mL suspension   No No   Sig: Take 10 mL (1 g total) by mouth 4 (four) times a day      Facility-Administered Medications: None     Patient's Medications   Discharge Prescriptions    No medications on file     No discharge procedures on file.  ED SEPSIS DOCUMENTATION   Time reflects when diagnosis was documented in both MDM as applicable and the Disposition within this note       Time User Action Codes Description Comment    4/28/2025 12:26 PM Benji Wagner Add [K62.5] Rectal bleeding                  Benji Wagner MD  04/28/25 1955

## 2025-05-08 ENCOUNTER — TELEPHONE (OUTPATIENT)
Dept: GASTROENTEROLOGY | Facility: MEDICAL CENTER | Age: 25
End: 2025-05-08

## 2025-05-08 NOTE — TELEPHONE ENCOUNTER
Patients GI provider:  Dr. Arthur    Number to return call: [phone]    Reason for call: Pt returning call. Please reach out to patient to schedule a sooner appt. There is nothing available until Nov in [place].    Scheduled procedure/appointment date if applicable: Apt/procedure 7/16/2025

## 2025-05-08 NOTE — TELEPHONE ENCOUNTER
LVM to reschedule patients appt with  for 7/16 at 3:30pm due to a change in her schedule an urgent slot was used for this appt. Ok to use urgent slot per  in notes with her. Pt seen in ED on 4/28 for Rectal bleeding.

## 2025-05-09 NOTE — TELEPHONE ENCOUNTER
Pt calling to verify if he has a weight restriction for lifting items, I could not find any notes regarding that please confirm with the pt.

## 2025-05-13 ENCOUNTER — HOSPITAL ENCOUNTER (OUTPATIENT)
Dept: MRI IMAGING | Facility: HOSPITAL | Age: 25
Discharge: HOME | End: 2025-05-13
Attending: INTERNAL MEDICINE

## 2025-05-20 ENCOUNTER — TELEPHONE (OUTPATIENT)
Dept: GASTROENTEROLOGY | Facility: MEDICAL CENTER | Age: 25
End: 2025-05-20

## 2025-05-20 NOTE — TELEPHONE ENCOUNTER
Called pt to reschedule follow up due to no show for today 05/20 at 3:30 PM, but mailbox is full. Will send a Giving Assistant message for pt to call to reschedule appointment

## 2025-05-25 ENCOUNTER — APPOINTMENT (EMERGENCY)
Dept: CT IMAGING | Facility: HOSPITAL | Age: 25
End: 2025-05-25
Payer: COMMERCIAL

## 2025-05-25 ENCOUNTER — HOSPITAL ENCOUNTER (EMERGENCY)
Facility: HOSPITAL | Age: 25
Discharge: HOME | End: 2025-05-25
Attending: EMERGENCY MEDICINE | Admitting: EMERGENCY MEDICINE
Payer: COMMERCIAL

## 2025-05-25 VITALS
WEIGHT: 136.9 LBS | RESPIRATION RATE: 20 BRPM | DIASTOLIC BLOOD PRESSURE: 72 MMHG | BODY MASS INDEX: 20.22 KG/M2 | TEMPERATURE: 98.3 F | HEART RATE: 89 BPM | SYSTOLIC BLOOD PRESSURE: 118 MMHG | OXYGEN SATURATION: 99 %

## 2025-05-25 DIAGNOSIS — R10.9 LEFT SIDED ABDOMINAL PAIN: Primary | ICD-10-CM

## 2025-05-25 LAB
ALBUMIN SERPL BCG-MCNC: 4.6 G/DL (ref 3.5–5)
ALP SERPL-CCNC: 100 U/L (ref 34–104)
ALT SERPL W P-5'-P-CCNC: 10 U/L (ref 7–52)
ANION GAP SERPL CALCULATED.3IONS-SCNC: 8 MMOL/L (ref 4–13)
AST SERPL W P-5'-P-CCNC: 17 U/L (ref 13–39)
BASOPHILS # BLD AUTO: 0.03 THOUSANDS/ÂΜL (ref 0–0.1)
BASOPHILS NFR BLD AUTO: 0 % (ref 0–1)
BILIRUB SERPL-MCNC: 0.39 MG/DL (ref 0.2–1)
BUN SERPL-MCNC: 8 MG/DL (ref 5–25)
CALCIUM SERPL-MCNC: 9.8 MG/DL (ref 8.4–10.2)
CHLORIDE SERPL-SCNC: 106 MMOL/L (ref 96–108)
CO2 SERPL-SCNC: 28 MMOL/L (ref 21–32)
CREAT SERPL-MCNC: 1.11 MG/DL (ref 0.6–1.3)
EOSINOPHIL # BLD AUTO: 0.2 THOUSAND/ÂΜL (ref 0–0.61)
EOSINOPHIL NFR BLD AUTO: 3 % (ref 0–6)
ERYTHROCYTE [DISTWIDTH] IN BLOOD BY AUTOMATED COUNT: 11.9 % (ref 11.6–15.1)
GFR SERPL CREATININE-BSD FRML MDRD: 93 ML/MIN/1.73SQ M
GLUCOSE SERPL-MCNC: 90 MG/DL (ref 65–140)
HCT VFR BLD AUTO: 44.7 % (ref 36.5–49.3)
HGB BLD-MCNC: 15.2 G/DL (ref 12–17)
IMM GRANULOCYTES # BLD AUTO: 0.03 THOUSAND/UL (ref 0–0.2)
IMM GRANULOCYTES NFR BLD AUTO: 0 % (ref 0–2)
LIPASE SERPL-CCNC: <6 U/L (ref 11–82)
LYMPHOCYTES # BLD AUTO: 1.85 THOUSANDS/ÂΜL (ref 0.6–4.47)
LYMPHOCYTES NFR BLD AUTO: 27 % (ref 14–44)
MCH RBC QN AUTO: 30.5 PG (ref 26.8–34.3)
MCHC RBC AUTO-ENTMCNC: 34 G/DL (ref 31.4–37.4)
MCV RBC AUTO: 90 FL (ref 82–98)
MONOCYTES # BLD AUTO: 0.29 THOUSAND/ÂΜL (ref 0.17–1.22)
MONOCYTES NFR BLD AUTO: 4 % (ref 4–12)
NEUTROPHILS # BLD AUTO: 4.44 THOUSANDS/ÂΜL (ref 1.85–7.62)
NEUTS SEG NFR BLD AUTO: 66 % (ref 43–75)
NRBC BLD AUTO-RTO: 0 /100 WBCS
PLATELET # BLD AUTO: 412 THOUSANDS/UL (ref 149–390)
PMV BLD AUTO: 8.5 FL (ref 8.9–12.7)
POTASSIUM SERPL-SCNC: 3.8 MMOL/L (ref 3.5–5.3)
PROT SERPL-MCNC: 7.9 G/DL (ref 6.4–8.4)
RBC # BLD AUTO: 4.98 MILLION/UL (ref 3.88–5.62)
SODIUM SERPL-SCNC: 142 MMOL/L (ref 135–147)
WBC # BLD AUTO: 6.84 THOUSAND/UL (ref 4.31–10.16)

## 2025-05-25 PROCEDURE — 36415 COLL VENOUS BLD VENIPUNCTURE: CPT

## 2025-05-25 PROCEDURE — 83690 ASSAY OF LIPASE: CPT

## 2025-05-25 PROCEDURE — 74177 CT ABD & PELVIS W/CONTRAST: CPT

## 2025-05-25 PROCEDURE — 96374 THER/PROPH/DIAG INJ IV PUSH: CPT

## 2025-05-25 PROCEDURE — 85025 COMPLETE CBC W/AUTO DIFF WBC: CPT

## 2025-05-25 PROCEDURE — 99284 EMERGENCY DEPT VISIT MOD MDM: CPT

## 2025-05-25 PROCEDURE — 99285 EMERGENCY DEPT VISIT HI MDM: CPT

## 2025-05-25 PROCEDURE — 80053 COMPREHEN METABOLIC PANEL: CPT

## 2025-05-25 RX ORDER — KETOROLAC TROMETHAMINE 30 MG/ML
15 INJECTION, SOLUTION INTRAMUSCULAR; INTRAVENOUS ONCE
Status: COMPLETED | OUTPATIENT
Start: 2025-05-25 | End: 2025-05-25

## 2025-05-25 RX ORDER — ACETAMINOPHEN 325 MG/1
650 TABLET ORAL ONCE
Status: COMPLETED | OUTPATIENT
Start: 2025-05-25 | End: 2025-05-25

## 2025-05-25 RX ADMIN — ACETAMINOPHEN 650 MG: 325 TABLET, FILM COATED ORAL at 16:34

## 2025-05-25 RX ADMIN — IOHEXOL 100 ML: 350 INJECTION, SOLUTION INTRAVENOUS at 15:43

## 2025-05-25 RX ADMIN — KETOROLAC TROMETHAMINE 15 MG: 30 INJECTION, SOLUTION INTRAMUSCULAR; INTRAVENOUS at 15:30

## 2025-05-25 NOTE — ED PROVIDER NOTES
Time reflects when diagnosis was documented in both MDM as applicable and the Disposition within this note       Time User Action Codes Description Comment    5/25/2025  4:18 PM Marin, Lolis Add [R10.9] Left sided abdominal pain           ED Disposition       ED Disposition   Discharge    Condition   Stable    Date/Time   Sun May 25, 2025  4:19 PM    Comment   Reji Shi discharge to home/self care.                   Assessment & Plan       Medical Decision Making  Differential diagnosis includes but not limited to:  Appendicitis, viral syndrome, constipation, gerd, gastritis,  mesenteric ischemia, mesenteric adenitis, pancreatitis, cholecystitis, choledocholithiasis, bowel obstruction, ileus, gastroenteritis, colitis, malignancy, perforation, nephrolithiasis, muscular strain, intra-abdominal hematoma, hernia,    Work-up to include blood work, CBC as marker of infectivity, hemoconcentration for hydration status, CMP to check for electrolytes, renal function, liver function, Lipase to check for pancreatitis, CT scan to evaluate for potential intra-abdominal surgical pathology.  No leukocytosis.  No lipase elevation.  CMP within normal limits.  CT abdomen pelvis without acute findings.  Tolerating p.o. without difficulty.  Soft nondistended abdomen without peritoneal signs.  Patient encouraged to take medications prescribed by gastroenterologist and follow closely with them.    All imaging and/or lab testing discussed with patient, strict return to ED precautions discussed. Patient recommended to follow up promptly with appropriate outpatient provider and risk of morbidity/mortality if patient does not follow up as recommended was discussed. Patient and/or family members verbalizes understanding and agrees with plan. Patient and/or family members were given opportunity to ask questions, all questions were answered at this time. Patient is stable for discharge.     Portions of the record may have been created  "with voice recognition software. Occasional wrong word or \"sound a like\" substitutions may have occurred due to the inherent limitations of voice recognition software. Read the chart carefully and recognize, using context, where substitutions have occurred.       Amount and/or Complexity of Data Reviewed  Labs: ordered.  Radiology: ordered.    Risk  OTC drugs.  Prescription drug management.        ED Course as of 05/25/25 2021   Sun May 25, 2025   1506 Left flank/side pain for 20+ days.  Has been worsening.  Severe pain today as well as vomiting which is why he came in.  No medications taken.  Denies nausea at this time.   1506 Does have a past intra-abdominal surgeries unsure what they were.   1507 Denies diarrhea constipation or hematemesis or he hematochezia or melena.  Last bowel movement yesterday, normal.       Medications   ketorolac (TORADOL) injection 15 mg (15 mg Intravenous Given 5/25/25 1530)   iohexol (OMNIPAQUE) 350 MG/ML injection (MULTI-DOSE) 100 mL (100 mL Intravenous Given 5/25/25 1543)   acetaminophen (TYLENOL) tablet 650 mg (650 mg Oral Given 5/25/25 1634)       ED Risk Strat Scores                    No data recorded        SBIRT 20yo+      Flowsheet Row Most Recent Value   Initial Alcohol Screen: US AUDIT-C     1. How often do you have a drink containing alcohol? 0 Filed at: 05/25/2025 1536   2. How many drinks containing alcohol do you have on a typical day you are drinking?  0 Filed at: 05/25/2025 1536   3a. Male UNDER 65: How often do you have five or more drinks on one occasion? 0 Filed at: 05/25/2025 1536   3b. FEMALE Any Age, or MALE 65+: How often do you have 4 or more drinks on one occassion? 0 Filed at: 05/25/2025 1536   Audit-C Score 0 Filed at: 05/25/2025 1536   [place]: How many times in the past year have you...    Used an illegal drug or used a prescription medication for non-medical reasons? Never Filed at: 05/25/2025 1536                            History of Present Illness "       Chief Complaint   Patient presents with    Abdominal Pain     X1 month. Pt states he is supposed to have an EGD next month. No meds pta.        Past Medical History[1]   Past Surgical History[2]   Family History[3]   Social History[4]   E-Cigarette/Vaping    E-Cigarette Use Former User       E-Cigarette/Vaping Substances    Nicotine Yes     THC No     CBD No     Flavoring No     Other No     Unknown No       I have reviewed and agree with the history as documented.     24-year-old male past medical history of intussusception presents to emergency department complaining of left sided abdominal pain.  Reports he has been having the pain since beginning of the month but that it was very mild, worsened today, had episode of vomiting today, mom told him to come to emergency room.  Follows with gastroenterology is not taking any of the prescribed medications however. Last bowel movement yesterday normal.      Abdominal Pain  Associated symptoms: vomiting    Associated symptoms: no anorexia, no chest pain, no chills, no constipation, no cough, no diarrhea, no dysuria, no fever, no hematemesis, no hematochezia, no hematuria, no melena, no nausea, no shortness of breath and no sore throat        Review of Systems   Constitutional:  Negative for chills and fever.   HENT:  Negative for sore throat.    Respiratory:  Negative for cough and shortness of breath.    Cardiovascular:  Negative for chest pain.   Gastrointestinal:  Positive for abdominal pain and vomiting. Negative for abdominal distention, anal bleeding, anorexia, blood in stool, constipation, diarrhea, hematemesis, hematochezia, melena and nausea.   Genitourinary:  Negative for decreased urine volume, difficulty urinating, dysuria, frequency, hematuria, penile discharge, scrotal swelling, testicular pain and urgency.   Musculoskeletal:  Negative for back pain.   Skin:  Negative for color change and rash.   Neurological:  Negative for weakness and numbness.    All other systems reviewed and are negative.          Objective       ED Triage Vitals   Temperature Pulse Blood Pressure Respirations SpO2 Patient Position - Orthostatic VS   05/25/25 1439 05/25/25 1439 05/25/25 1439 05/25/25 1439 05/25/25 1439 05/25/25 1439   98.3 °F (36.8 °C) 89 118/72 20 99 % Sitting      Temp Source Heart Rate Source BP Location FiO2 (%) Pain Score    05/25/25 1439 05/25/25 1439 05/25/25 1439 -- 05/25/25 1530    Oral Monitor Left arm  10 - Worst Possible Pain      Vitals      Date and Time Temp Pulse SpO2 Resp BP Pain Score FACES Pain Rating User   05/25/25 1634 -- -- -- -- -- 7 --    05/25/25 1622 -- -- -- -- -- 7 --    05/25/25 1530 -- -- -- -- -- 10 - Worst Possible Pain --    05/25/25 1439 98.3 °F (36.8 °C) 89 99 % 20 118/72 -- -- JW            Physical Exam  Vitals and nursing note reviewed.   Constitutional:       General: He is awake. He is not in acute distress.     Appearance: Normal appearance.   HENT:      Head: Normocephalic.      Mouth/Throat:      Lips: Pink.      Mouth: Mucous membranes are moist.     Eyes:      General: Vision grossly intact.       Cardiovascular:      Rate and Rhythm: Normal rate and regular rhythm.      Heart sounds: Normal heart sounds.   Pulmonary:      Effort: Pulmonary effort is normal. No respiratory distress.      Breath sounds: Normal breath sounds.   Abdominal:      General: There is no distension.      Palpations: Abdomen is soft.      Tenderness: There is abdominal tenderness in the left upper quadrant and left lower quadrant. There is no right CVA tenderness, left CVA tenderness, guarding or rebound.     Skin:     General: Skin is warm and dry.     Neurological:      Mental Status: He is alert.         Results Reviewed       Procedure Component Value Units Date/Time    Comprehensive metabolic panel [[id number]] Collected: 05/25/25 1526    Lab Status: Final result Specimen: Blood from Arm, Left Updated: 05/25/25 1551     Sodium 142 mmol/L       Potassium 3.8 mmol/L      Chloride 106 mmol/L      CO2 28 mmol/L      ANION GAP 8 mmol/L      BUN 8 mg/dL      Creatinine 1.11 mg/dL      Glucose 90 mg/dL      Calcium 9.8 mg/dL      AST 17 U/L      ALT 10 U/L      Alkaline Phosphatase 100 U/L      Total Protein 7.9 g/dL      Albumin 4.6 g/dL      Total Bilirubin 0.39 mg/dL      eGFR 93 ml/min/1.73sq m     Narrative:      National Kid[employer] Foundation guidelines for Chronic Kidney Disease (CKD):     Stage 1 with normal or high GFR (GFR > 90 mL/min/1.73 square meters)    Stage 2 Mild CKD (GFR = 60-89 mL/min/1.73 square meters)    Stage 3A Moderate CKD (GFR = 45-59 mL/min/1.73 square meters)    Stage 3B Moderate CKD (GFR = 30-44 mL/min/1.73 square meters)    Stage 4 Severe CKD (GFR = 15-29 mL/min/1.73 square meters)    Stage 5 End Stage CKD (GFR <15 mL/min/1.73 square meters)  Note: GFR calculation is accurate only with a steady state creatinine    Lipase [[id number]]  (Abnormal) Collected: 05/25/25 1526    Lab Status: Final result Specimen: Blood from Arm, Left Updated: 05/25/25 1551     Lipase <6 u/L     CBC and differential [[id number]]  (Abnormal) Collected: 05/25/25 1526    Lab Status: Final result Specimen: Blood from Arm, Left Updated: 05/25/25 1534     WBC 6.84 Thousand/uL      RBC 4.98 Million/uL      Hemoglobin 15.2 g/dL      Hematocrit 44.7 %      MCV 90 fL      MCH 30.5 pg      MCHC 34.0 g/dL      RDW 11.9 %      MPV 8.5 fL      Platelets 412 Thousands/uL      nRBC 0 /100 WBCs      Segmented % 66 %      Immature Grans % 0 %      Lymphocytes % 27 %      Monocytes % 4 %      Eosinophils Relative 3 %      Basophils Relative 0 %      Absolute Neutrophils 4.44 Thousands/µL      Absolute Immature Grans 0.03 Thousand/uL      Absolute Lymphocytes 1.85 Thousands/µL      Absolute Monocytes 0.29 Thousand/µL      Eosinophils Absolute 0.20 Thousand/µL      Basophils Absolute 0.03 Thousands/µL             CT abdomen pelvis with contrast   Final Interpretation by  Hernando Block MD (05/25 1616)      No acute inflammatory process identified.         Workstation performed: [id number]             Procedures    ED Medication and Procedure Management   Prior to Admission Medications   Prescriptions Last Dose Informant Patient Reported? Taking?   acetaminophen (TYLENOL) 500 mg tablet   No No   Sig: Take 1 tablet (500 mg total) by mouth every 6 (six) hours as needed for mild pain   dicyclomine (BENTYL) 10 mg capsule   No No   Sig: Take 1 capsule (10 mg total) by mouth 4 (four) times a day (before meals and at bedtime)   ibuprofen (MOTRIN) 400 mg tablet   No No   Sig: Take 1 tablet (400 mg total) by mouth every 6 (six) hours as needed for mild pain   pantoprazole (PROTONIX) 40 mg tablet   No No   Sig: Take 1 tablet (40 mg total) by mouth daily before breakfast   sucralfate (CARAFATE) 1 g/10 mL suspension   No No   Sig: Take 10 mL (1 g total) by mouth 4 (four) times a day      Facility-Administered Medications: None     Discharge Medication List as of 5/25/2025  4:20 PM        CONTINUE these medications which have NOT CHANGED    Details   acetaminophen (TYLENOL) 500 mg tablet Take 1 tablet (500 mg total) by mouth every 6 (six) hours as needed for mild pain, Starting Thu 3/6/2025, Normal      dicyclomine (BENTYL) 10 mg capsule Take 1 capsule (10 mg total) by mouth 4 (four) times a day (before meals and at bedtime), Starting Thu 3/20/2025, Normal      ibuprofen (MOTRIN) 400 mg tablet Take 1 tablet (400 mg total) by mouth every 6 (six) hours as needed for mild pain, Starting Thu 3/6/2025, Normal      pantoprazole (PROTONIX) 40 mg tablet Take 1 tablet (40 mg total) by mouth daily before breakfast, Starting Wed 1/15/2025, Normal      sucralfate (CARAFATE) 1 g/10 mL suspension Take 10 mL (1 g total) by mouth 4 (four) times a day, Starting Thu 3/20/2025, Normal           No discharge procedures on file.  ED SEPSIS DOCUMENTATION   Time reflects when diagnosis was documented in both MDM as  applicable and the Disposition within this note       Time User Action Codes Description Comment    5/25/2025  4:18 PM Lolis Marin Add [R10.9] Left sided abdominal pain                      [1]   Past Medical History:  Diagnosis Date    No known health problems    [2]   Past Surgical History:  Procedure Laterality Date    COLON SURGERY      COLONOSCOPY      UT LAPS ABD PRTM&OMENTUM DX W/WO SPEC BR/WA SPX N/A 11/27/2023    Procedure: LAPAROSCOPY DIAGNOSTIC;  Surgeon: Em Yeboah MD;  Location: [facility];  Service: General   [3]   Family History  Problem Relation Name Age of Onset    Colon cancer Neg Hx      Stomach cancer Neg Hx     [4]   Social History  Tobacco Use    Smoking status: Some Days     Types: Cigars     Passive exposure: Current    Smokeless tobacco: Never   Vaping Use    Vaping status: Former    Substances: Nicotine   Substance Use Topics    Alcohol use: Not Currently    Drug use: Not Currently     Types: Marijuana        Lolis Marin PA-C  05/25/25 2022

## 2025-05-25 NOTE — DISCHARGE INSTRUCTIONS
Follow-up with your gastroenterologist.  Stay hydrated.  Continue taking your home medications prescribed by Dr. Arthur return to ED for any worsening symptoms as discussed.

## 2025-05-30 ENCOUNTER — HOSPITAL ENCOUNTER (EMERGENCY)
Facility: HOSPITAL | Age: 25
Discharge: HOME | End: 2025-05-30
Attending: EMERGENCY MEDICINE | Admitting: EMERGENCY MEDICINE
Payer: COMMERCIAL

## 2025-05-30 ENCOUNTER — TELEPHONE (OUTPATIENT)
Age: 25
End: 2025-05-30

## 2025-05-30 VITALS
DIASTOLIC BLOOD PRESSURE: 71 MMHG | HEART RATE: 78 BPM | TEMPERATURE: 98.7 F | WEIGHT: 138.89 LBS | BODY MASS INDEX: 20.51 KG/M2 | RESPIRATION RATE: 18 BRPM | OXYGEN SATURATION: 100 % | SYSTOLIC BLOOD PRESSURE: 113 MMHG

## 2025-05-30 DIAGNOSIS — G89.29 CHRONIC ABDOMINAL PAIN: Primary | ICD-10-CM

## 2025-05-30 DIAGNOSIS — R10.9 CHRONIC ABDOMINAL PAIN: Primary | ICD-10-CM
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
## 2025-05-30 DIAGNOSIS — R10.32 LEFT LOWER QUADRANT ABDOMINAL PAIN: ICD-10-CM

## 2025-05-30 DIAGNOSIS — K20.90 ESOPHAGITIS DETERMINED BY ENDOSCOPY: ICD-10-CM

## 2025-05-30 LAB
ALBUMIN SERPL BCG-MCNC: 4.5 G/DL (ref 3.5–5)
ALP SERPL-CCNC: 84 U/L (ref 34–104)
ALT SERPL W P-5'-P-CCNC: 10 U/L (ref 7–52)
ANION GAP SERPL CALCULATED.3IONS-SCNC: 7 MMOL/L (ref 4–13)
AST SERPL W P-5'-P-CCNC: 15 U/L (ref 13–39)
BASOPHILS # BLD AUTO: 0.03 THOUSANDS/ÂΜL (ref 0–0.1)
BASOPHILS NFR BLD AUTO: 1 % (ref 0–1)
BILIRUB SERPL-MCNC: 0.51 MG/DL (ref 0.2–1)
BUN SERPL-MCNC: 6 MG/DL (ref 5–25)
CALCIUM SERPL-MCNC: 9.5 MG/DL (ref 8.4–10.2)
CHLORIDE SERPL-SCNC: 105 MMOL/L (ref 96–108)
CO2 SERPL-SCNC: 29 MMOL/L (ref 21–32)
CREAT SERPL-MCNC: 1.07 MG/DL (ref 0.6–1.3)
EOSINOPHIL # BLD AUTO: 0.37 THOUSAND/ÂΜL (ref 0–0.61)
EOSINOPHIL NFR BLD AUTO: 7 % (ref 0–6)
ERYTHROCYTE [DISTWIDTH] IN BLOOD BY AUTOMATED COUNT: 12.3 % (ref 11.6–15.1)
GFR SERPL CREATININE-BSD FRML MDRD: 98 ML/MIN/1.73SQ M
GLUCOSE SERPL-MCNC: 87 MG/DL (ref 65–140)
HCT VFR BLD AUTO: 43 % (ref 36.5–49.3)
HGB BLD-MCNC: 15 G/DL (ref 12–17)
IMM GRANULOCYTES # BLD AUTO: 0.01 THOUSAND/UL (ref 0–0.2)
IMM GRANULOCYTES NFR BLD AUTO: 0 % (ref 0–2)
LIPASE SERPL-CCNC: 9 U/L (ref 11–82)
LYMPHOCYTES # BLD AUTO: 1.71 THOUSANDS/ÂΜL (ref 0.6–4.47)
LYMPHOCYTES NFR BLD AUTO: 32 % (ref 14–44)
MCH RBC QN AUTO: 31.5 PG (ref 26.8–34.3)
MCHC RBC AUTO-ENTMCNC: 34.9 G/DL (ref 31.4–37.4)
MCV RBC AUTO: 90 FL (ref 82–98)
MONOCYTES # BLD AUTO: 0.19 THOUSAND/ÂΜL (ref 0.17–1.22)
MONOCYTES NFR BLD AUTO: 4 % (ref 4–12)
NEUTROPHILS # BLD AUTO: 3.1 THOUSANDS/ÂΜL (ref 1.85–7.62)
NEUTS SEG NFR BLD AUTO: 56 % (ref 43–75)
NRBC BLD AUTO-RTO: 0 /100 WBCS
PLATELET # BLD AUTO: 357 THOUSANDS/UL (ref 149–390)
PMV BLD AUTO: 8.8 FL (ref 8.9–12.7)
POTASSIUM SERPL-SCNC: 3.7 MMOL/L (ref 3.5–5.3)
PROT SERPL-MCNC: 7.5 G/DL (ref 6.4–8.4)
RBC # BLD AUTO: 4.76 MILLION/UL (ref 3.88–5.62)
SODIUM SERPL-SCNC: 141 MMOL/L (ref 135–147)
WBC # BLD AUTO: 5.41 THOUSAND/UL (ref 4.31–10.16)

## 2025-05-30 PROCEDURE — 99284 EMERGENCY DEPT VISIT MOD MDM: CPT

## 2025-05-30 PROCEDURE — 36415 COLL VENOUS BLD VENIPUNCTURE: CPT | Performed by: EMERGENCY MEDICINE

## 2025-05-30 PROCEDURE — 96374 THER/PROPH/DIAG INJ IV PUSH: CPT

## 2025-05-30 PROCEDURE — 96375 TX/PRO/DX INJ NEW DRUG ADDON: CPT

## 2025-05-30 PROCEDURE — 85025 COMPLETE CBC W/AUTO DIFF WBC: CPT | Performed by: EMERGENCY MEDICINE

## 2025-05-30 PROCEDURE — 99285 EMERGENCY DEPT VISIT HI MDM: CPT | Performed by: EMERGENCY MEDICINE

## 2025-05-30 PROCEDURE — 80053 COMPREHEN METABOLIC PANEL: CPT | Performed by: EMERGENCY MEDICINE

## 2025-05-30 PROCEDURE — 83690 ASSAY OF LIPASE: CPT | Performed by: EMERGENCY MEDICINE

## 2025-05-30 RX ORDER — ACETAMINOPHEN 500 MG
500 TABLET ORAL EVERY 6 HOURS PRN
Qty: 30 TABLET | Refills: 0 | Status: SHIPPED | OUTPATIENT
Start: 2025-05-30

## 2025-05-30 RX ORDER — SUCRALFATE 1 G/1
TABLET ORAL
Qty: 56 TABLET | Refills: 0 | Status: SHIPPED | OUTPATIENT
Start: 2025-05-30

## 2025-05-30 RX ORDER — FAMOTIDINE 10 MG/ML
20 INJECTION, SOLUTION INTRAVENOUS ONCE
Status: COMPLETED | OUTPATIENT
Start: 2025-05-30 | End: 2025-05-30

## 2025-05-30 RX ORDER — ONDANSETRON 4 MG/1
4 TABLET, FILM COATED ORAL EVERY 6 HOURS
Qty: 12 TABLET | Refills: 0 | Status: SHIPPED | OUTPATIENT
Start: 2025-05-30

## 2025-05-30 RX ORDER — HYOSCYAMINE SULFATE 0.12 MG/1
0.12 TABLET SUBLINGUAL EVERY 6 HOURS PRN
Qty: 20 TABLET | Refills: 0 | Status: SHIPPED | OUTPATIENT
Start: 2025-05-30

## 2025-05-30 RX ORDER — HYOSCYAMINE SULFATE 0.12 MG/1
0.12 TABLET SUBLINGUAL ONCE
Status: COMPLETED | OUTPATIENT
Start: 2025-05-30 | End: 2025-05-30

## 2025-05-30 RX ORDER — PANTOPRAZOLE SODIUM 40 MG/10ML
40 INJECTION, POWDER, LYOPHILIZED, FOR SOLUTION INTRAVENOUS ONCE
Status: COMPLETED | OUTPATIENT
Start: 2025-05-30 | End: 2025-05-30

## 2025-05-30 RX ORDER — PANTOPRAZOLE SODIUM 40 MG/1
40 TABLET, DELAYED RELEASE ORAL
Qty: 30 TABLET | Refills: 0 | Status: SHIPPED | OUTPATIENT
Start: 2025-05-30

## 2025-05-30 RX ORDER — ONDANSETRON 2 MG/ML
4 INJECTION INTRAMUSCULAR; INTRAVENOUS ONCE
Status: COMPLETED | OUTPATIENT
Start: 2025-05-30 | End: 2025-05-30

## 2025-05-30 RX ADMIN — FAMOTIDINE 20 MG: 10 INJECTION, SOLUTION INTRAVENOUS at 10:16

## 2025-05-30 RX ADMIN — PANTOPRAZOLE SODIUM 40 MG: 40 INJECTION, POWDER, FOR SOLUTION INTRAVENOUS at 10:16

## 2025-05-30 RX ADMIN — ONDANSETRON 4 MG: 2 INJECTION INTRAMUSCULAR; INTRAVENOUS at 10:16

## 2025-05-30 RX ADMIN — HYOSCYAMINE SULFATE 0.12 MG: 0.12 TABLET SUBLINGUAL at 10:17

## 2025-05-30 NOTE — ED PROVIDER NOTES
Time reflects when diagnosis was documented in both MDM as applicable and the Disposition within this note       Time User Action Codes Description Comment    5/30/2025 11:14 AM Soraida Cedeno Add [R10.9,  G89.29] Chronic abdominal pain     5/30/2025 11:15 AM Soraida Cedeno Add [K20.90] Esophagitis determined by endoscopy     5/30/2025 11:15 AM Soraida Cedeno Add [W49.04XA] Ring or other jewelry causing external constriction, initial encounter     5/30/2025 11:16 AM Soraida Cedeno Add [R10.32] Left lower quadrant abdominal pain           ED Disposition       ED Disposition   Discharge    Condition   Stable    Date/Time   Fri May 30, 2025 11:14 AM    Comment   Reji Shi discharge to home/self care.                   Assessment & Plan       Medical Decision Making  24y M w/ chronic, recurrent abd pain for approx 2y w/ no change in baseline symptoms, not currently being treated. Follows w/ GI.  Will get labs to r/o acute life threatening metabolic abnl, pancreatitis, cholecystitis, significant leukocytosis or anemia.  Will hold off imaging given pt w/ multiple negative CT A/P scans in the past.   Will d/w GI.  H2/PPI/antiemetic IV and dose of hyoscyamine while in the ED    Problems Addressed:  Chronic abdominal pain: chronic illness or injury with exacerbation, progression, or side effects of treatment    Amount and/or Complexity of Data Reviewed  External Data Reviewed: notes.     Details: Outpt GI notes reviewed  Labs: ordered.  Discussion of management or test interpretation with external provider(s): D/w GI    Risk  OTC drugs.  Prescription drug management.        ED Course as of 05/30/25 1220   Fri May 30, 2025   1038 Message sent to GI to discuss   1057 D/w Dr. Cam, GI  Agrees w/ no repeat imaging at this time  She will contact the office about getting him seen sooner    Has MRI enteropathy scheduled 6/10    Missed GI appt 5/20   1113 Pt sleeping when I entered the room but when I woke him he  said his pain hadn't changed very much.      D/w pt d/w GI  6/10 MRI appt  GI will reach out to try and get him earlier    Will give rx for levsin and protonix as well as zofran       Medications   Famotidine (PF) (PEPCID) injection 20 mg (20 mg Intravenous Given 5/30/25 1016)   ondansetron (ZOFRAN) injection 4 mg (4 mg Intravenous Given 5/30/25 1016)   pantoprazole (PROTONIX) injection 40 mg (40 mg Intravenous Given 5/30/25 1016)   hyoscyamine (LEVSIN/SL) SL tablet 0.125 mg (0.125 mg Sublingual Given 5/30/25 1017)       ED Risk Strat Scores                    No data recorded        SBIRT 22yo+      Flowsheet Row Most Recent Value   Initial Alcohol Screen: US AUDIT-C     1. How often do you have a drink containing alcohol? 0 Filed at: 05/30/2025 1002   2. How many drinks containing alcohol do you have on a typical day you are drinking?  0 Filed at: 05/30/2025 1002   3a. Male UNDER 65: How often do you have five or more drinks on one occasion? 0 Filed at: 05/30/2025 1002   Audit-C Score 0 Filed at: 05/30/2025 1002   [place]: How many times in the past year have you...    Used an illegal drug or used a prescription medication for non-medical reasons? Never Filed at: 05/30/2025 1002                            History of Present Illness       Chief Complaint   Patient presents with    Abdominal Pain     C/o L sided abd pain and nausea  x 3 days        Past Medical History[1]   Past Surgical History[2]   Family History[3]   Social History[4]   E-Cigarette/Vaping    E-Cigarette Use Former User       E-Cigarette/Vaping Substances    Nicotine Yes     THC No     CBD No     Flavoring No     Other No     Unknown No       I have reviewed and agree with the history as documented.     Patient presents with:  Abdominal Pain: C/o L sided abd pain and nausea  x 3 days     24y M here for evaluation of abd pain. Triage note indicates pt w/ 3d of pain, but on record review, pt w/ at least 2y of chronic, recurrent abd pain, follows w/ GI.  " Last ED visit was 5/25.  Pt had colonoscopy / EGD in Jan 2025 w/ \"LA Grade B Esophagitis, chronic inactive gastritis and reactive gastropathy.  Colonoscopy: Fragments of colon mucosa with focal hyperplastic change and lymphoid aggregates No active or microscopic colitis, dysplasia or malignancy.  Pt recommended to have MRI enterography w wo which is scheduled for June 10th.  Pt reports he had his GI visit moved up to 6/11.     Pt reports not on any medication for his symptoms. Previously chart indicates he had improvement w/ NSAIDs but now reporting increased pain w/ NSAIDs.  Hasn't tried any other medications.  Reports ongoing brant/luq/llq pain, sharp/crampy, assoc w/ nausea / dry heaves. Denies f/c/s.  Denies any new symptoms at this time.    Pt has had 13 CT A/P in the last two years.  Nov 2023 had scans positive for small bowel intussusception which did require intervention (OR: Approximately 15 cm segment of small bowel small bowel intussusception present in the mid jejunum. Lead point was a mobile intra-luminal soft bezoar. This was able to be milked distally. No fixed mass was present. No lymphadenopathy, or Meckles diverticulum were found. No resection was performed.)  pt has had multiple negative CT scans since that time.              History provided by:  Patient and medical records   used: No    Abdominal Pain      Review of Systems   Gastrointestinal:  Positive for abdominal pain.   All other systems reviewed and are negative.          Objective       ED Triage Vitals   Temperature Pulse Blood Pressure Respirations SpO2 Patient Position - Orthostatic VS   05/30/25 0952 05/30/25 0952 05/30/25 0953 05/30/25 0952 05/30/25 0952 05/30/25 0952   98.7 °F (37.1 °C) 85 136/63 18 100 % Sitting      Temp src Heart Rate Source BP Location FiO2 (%) Pain Score    -- 05/30/25 0952 05/30/25 0952 -- 05/30/25 0952     Monitor Right arm  10 - Worst Possible Pain      Vitals      Date and Time Temp Pulse " SpO2 Resp BP Pain Score FACES Pain Rating User   05/30/25 1127 -- 78 100 % 18 113/71 4 -- JS   05/30/25 0953 -- -- -- -- 136/63 -- -- CO   05/30/25 0952 98.7 °F (37.1 °C) 85 100 % 18 -- 10 - Worst Possible Pain -- CO            Physical Exam  Vitals and nursing note reviewed.   Constitutional:       General: He is not in acute distress.     Appearance: Normal appearance. He is not ill-appearing, toxic-appearing or diaphoretic.   HENT:      Mouth/Throat:      Mouth: Mucous membranes are moist.     Eyes:      Conjunctiva/sclera: Conjunctivae normal.       Cardiovascular:      Rate and Rhythm: Normal rate.   Pulmonary:      Effort: Pulmonary effort is normal.   Abdominal:      General: Abdomen is flat.      Palpations: Abdomen is soft.      Tenderness: There is abdominal tenderness (vague) in the epigastric area, left upper quadrant and left lower quadrant. There is no guarding or rebound.     Musculoskeletal:      Cervical back: Normal range of motion.     Skin:     General: Skin is warm.      Capillary Refill: Capillary refill takes less than 2 seconds.     Neurological:      General: No focal deficit present.      Mental Status: He is alert.     Psychiatric:         Mood and Affect: Mood is anxious.         Results Reviewed       Procedure Component Value Units Date/Time    Comprehensive metabolic panel [[id number]] Collected: 05/30/25 1016    Lab Status: Final result Specimen: Blood from Arm, Left Updated: 05/30/25 1049     Sodium 141 mmol/L      Potassium 3.7 mmol/L      Chloride 105 mmol/L      CO2 29 mmol/L      ANION GAP 7 mmol/L      BUN 6 mg/dL      Creatinine 1.07 mg/dL      Glucose 87 mg/dL      Calcium 9.5 mg/dL      AST 15 U/L      ALT 10 U/L      Alkaline Phosphatase 84 U/L      Total Protein 7.5 g/dL      Albumin 4.5 g/dL      Total Bilirubin 0.51 mg/dL      eGFR 98 ml/min/1.73sq m     Narrative:      National Kidney Disease Foundation guidelines for Chronic Kidney Disease (CKD):     Stage 1 with normal  or high GFR (GFR > 90 mL/min/1.73 square meters)    Stage 2 Mild CKD (GFR = 60-89 mL/min/1.73 square meters)    Stage 3A Moderate CKD (GFR = 45-59 mL/min/1.73 square meters)    Stage 3B Moderate CKD (GFR = 30-44 mL/min/1.73 square meters)    Stage 4 Severe CKD (GFR = 15-29 mL/min/1.73 square meters)    Stage 5 End Stage CKD (GFR <15 mL/min/1.73 square meters)  Note: GFR calculation is accurate only with a steady state creatinine    Lipase [[id number]]  (Abnormal) Collected: 05/30/25 1016    Lab Status: Final result Specimen: Blood from Arm, Left Updated: 05/30/25 1049     Lipase 9 u/L     CBC and differential [[id number]]  (Abnormal) Collected: 05/30/25 1016    Lab Status: Final result Specimen: Blood from Arm, Left Updated: 05/30/25 1027     WBC 5.41 Thousand/uL      RBC 4.76 Million/uL      Hemoglobin 15.0 g/dL      Hematocrit 43.0 %      MCV 90 fL      MCH 31.5 pg      MCHC 34.9 g/dL      RDW 12.3 %      MPV 8.8 fL      Platelets 357 Thousands/uL      nRBC 0 /100 WBCs      Segmented % 56 %      Immature Grans % 0 %      Lymphocytes % 32 %      Monocytes % 4 %      Eosinophils Relative 7 %      Basophils Relative 1 %      Absolute Neutrophils 3.10 Thousands/µL      Absolute Immature Grans 0.01 Thousand/uL      Absolute Lymphocytes 1.71 Thousands/µL      Absolute Monocytes 0.19 Thousand/µL      Eosinophils Absolute 0.37 Thousand/µL      Basophils Absolute 0.03 Thousands/µL             No orders to display       Procedures    ED Medication and Procedure Management   Prior to Admission Medications   Prescriptions Last Dose Informant Patient Reported? Taking?   acetaminophen (TYLENOL) 500 mg tablet Not Taking  No No   Sig: Take 1 tablet (500 mg total) by mouth every 6 (six) hours as needed for mild pain   Patient not taking: Reported on 5/30/2025   acetaminophen (TYLENOL) 500 mg tablet   No Yes   Sig: Take 1 tablet (500 mg total) by mouth every 6 (six) hours as needed for mild pain   dicyclomine (BENTYL) 10 mg capsule  Not Taking  No No   Sig: Take 1 capsule (10 mg total) by mouth 4 (four) times a day (before meals and at bedtime)   Patient not taking: Reported on 5/30/2025   ibuprofen (MOTRIN) 400 mg tablet Not Taking  No No   Sig: Take 1 tablet (400 mg total) by mouth every 6 (six) hours as needed for mild pain   Patient not taking: Reported on 5/30/2025   pantoprazole (PROTONIX) 40 mg tablet Not Taking  No No   Sig: Take 1 tablet (40 mg total) by mouth daily before breakfast   Patient not taking: Reported on 5/30/2025   pantoprazole (PROTONIX) 40 mg tablet   No Yes   Sig: Take 1 tablet (40 mg total) by mouth daily before breakfast   sucralfate (CARAFATE) 1 g/10 mL suspension Not Taking  No No   Sig: Take 10 mL (1 g total) by mouth 4 (four) times a day   Patient not taking: Reported on 5/30/2025      Facility-Administered Medications: None     Discharge Medication List as of 5/30/2025 11:18 AM        START taking these medications    Details   hyoscyamine (LEVSIN/SL) 0.125 mg SL tablet Take 1 tablet (0.125 mg total) by mouth every 6 (six) hours as needed for cramping, Starting Fri 5/30/2025, Normal      ondansetron (ZOFRAN) 4 mg tablet Take 1 tablet (4 mg total) by mouth every 6 (six) hours, Starting Fri 5/30/2025, Normal      sucralfate (CARAFATE) 1 g tablet Take 1 tab po 30 minutes prior to meals and before bed (four times a day) for 14 days, Normal           CONTINUE these medications which have CHANGED    Details   acetaminophen (TYLENOL) 500 mg tablet Take 1 tablet (500 mg total) by mouth every 6 (six) hours as needed for mild pain, Starting Fri 5/30/2025, Normal      pantoprazole (PROTONIX) 40 mg tablet Take 1 tablet (40 mg total) by mouth daily before breakfast, Starting Fri 5/30/2025, Normal           STOP taking these medications       dicyclomine (BENTYL) 10 mg capsule Comments:   Reason for Stopping:         ibuprofen (MOTRIN) 400 mg tablet Comments:   Reason for Stopping:         sucralfate (CARAFATE) 1 g/10 mL  suspension Comments:   Reason for Stopping:             No discharge procedures on file.  ED SEPSIS DOCUMENTATION   Time reflects when diagnosis was documented in both MDM as applicable and the Disposition within this note       Time User Action Codes Description Comment    5/30/2025 11:14 AM Soraida Cedeno [R10.9,  G89.29] Chronic abdominal pain     5/30/2025 11:15 AM Soraida Cedeno [K20.90] Esophagitis determined by endoscopy     5/30/2025 11:15 AM Soraida Cedeno [W49.04XA] Ring or other jewelry causing external constriction, initial encounter     5/30/2025 11:16 AM Soraida Cedeno [R10.32] Left lower quadrant abdominal pain                      [1]   Past Medical History:  Diagnosis Date    No known health problems    [2]   Past Surgical History:  Procedure Laterality Date    COLON SURGERY      COLONOSCOPY      [place] LAPS ABD PRTM&OMENTUM DX W/WO SPEC BR/WA SPX N/A 11/27/2023    Procedure: LAPAROSCOPY DIAGNOSTIC;  Surgeon: Em Yeboah MD;  Location: [facility];  Service: General   [3]   Family History  Problem Relation Name Age of Onset    Colon cancer Neg Hx      Stomach cancer Neg Hx     [4]   Social History  Tobacco Use    Smoking status: Some Days     Types: Cigars     Passive exposure: Current    Smokeless tobacco: Never   Vaping Use    Vaping status: Former    Substances: Nicotine   Substance Use Topics    Alcohol use: Not Currently    Drug use: Not Currently     Types: Marijuana        Soraida Cedeno DO  05/30/25 1220

## 2025-05-30 NOTE — DISCHARGE INSTRUCTIONS
Keep your appointment 6/10/25 for the MRI    Continue following up with Gastroenterology   Problem: Dysphagia (Adult)  Goal: *Acute Goals and Plan of Care (Insert Text)  Patient will:  1. Tolerate PO trials with 0 s/s overt distress in 4/5 trials  2. Utilize compensatory swallow strategies/maneuvers (decrease bite/sip, size/rate, alt. liq/sol) with min cues in 4/5 trials  3. Perform oral-motor/laryngeal exercises to increase oropharyngeal swallow function with min cues  4. Complete an objective swallow study (i.e., MBSS) to assess swallow integrity, r/o aspiration, and determine of safest LRD, min A    Rec:   Soft solid diet with thin liquids  Aspiration precautions  HOB >45 during po intake, remain >30 for 30-45 minutes after po   Small bites/sips; alternate liquid/solid with slow feeding rate   Oral care TID  Meds per pt preference  MBS to further assess oropharyngeal swallow fxn  SPEECH LANGUAGE PATHOLOGY BEDSIDE SWALLOW EVALUATION     Patient: Samir Looney (82 y.o. female)  Date: 2/16/2017  Primary Diagnosis: Chest pain        Precautions: aspiration         ASSESSMENT :  Based on the objective data described below, the patient presents with mild pharyngeal dysphagia. Pt with dry cough both at baseline and intermittently throughout PO trials. She tolerated reg solid, puree, and thin liquids without any overt s/sx of aspiration. Laryngeal elevation appeared weak to palpation. Given current CXR results of \"moderate to large left pleural effusion with underlying atelectasis or infiltrate,\" and current bedside presentation, rec MBS to r/o silent aspiration. Pt's daughter-in-law visibly upset by SLP recommendations stating, Bentley Nailer is not what is happening. \" Pt and daughter-in-law educated on s/sx of aspiration, implication for silent aspiration, and role of SLP. Daughter-in-law stated, Bentley Nailer is not what is happening, but we want the gold standard of care. \" Pt reported that she would like the test done. D/w RN. ST will continue to follow.       Patient will benefit from skilled intervention to address the above impairments. Patients rehabilitation potential is considered to be Fair  Factors which may influence rehabilitation potential include:   [X]            Medical condition       PLAN :  Recommendations and Planned Interventions: See above  Frequency/Duration: Patient will be followed by speech-language pathology 1-2 times per day/4-7 days per week to address goals. Discharge Recommendations: [address] and To Be Determined       SUBJECTIVE:   Patient stated I want it done. OBJECTIVE:       Past Medical History   Diagnosis Date    Breast cancer ([facility] [address] 1/17/14       right breast    Carotid duplex 11/19/2014       Severe 70% or greater bilateral ICA stenosis. LICA dissection suggested.  Chronic renal insufficiency      CVA (cerebral vascular accident) ([facility] [address] 2003       with slight Right sided weakness    Echocardiogram 02/22/2016       EF 55-60%. No WMA. Mild-mod LVH. Gr 2 DDfx. No significant valvular heart disease.  Edema      Glaucoma      Gout flare      Hyperlipidemia      Hypertension      Lower extremity arterial testing 12/19/2014       Mod tibio-peroneal arterial disease bilaterally. R YANCY 1.24.  L YANCY 0.74. Bilateral sm vessel disease.  Lump of right breast      URI (upper respiratory infection)       Past Surgical History   Procedure Laterality Date    Hx gyn           JUSTIN/BSO    Hx appendectomy        Hx cyst incision and drainage           PT DOES NOT REMEMBER EXACT DATES, TUCKER BREAST DONE MORE THAN 20 YEARS AGO. BENIGN FINDINGS PER PATIENT.     Hx mastectomy   1/17/2014       RIGHT PARTIAL MASTECTOMY performed by Gricel Cisneros MD at [address] [place] [address] Hx hysterectomy         Prior Level of Function/Home Situation: private residence  [address]: Private residence  [address] [place] [address] St: One story  Living Alone: Yes  Support Systems: Family member(s), Friends \ neighbors  Patient Expects to be Discharged to[employer] residence  Current DME Used/Available at Home: None  Diet prior to admission: per pt, reg with thin  Current Diet:  Soft solid with thin   Cognitive and Communication Status:  Neurologic State: Alert  Orientation Level: Oriented to person  Cognition: Follows commands           Oral Assessment:  Oral Assessment  Labial: No impairment  Dentition: Intact  Oral Hygiene: Adequate  Lingual: No impairment  Velum: No impairment  Mandible: No impairment  P.O. Trials:  Patient Position: [facility]  Vocal quality prior to P.O.: No impairment  Consistency Presented: Thin liquid; Solid;Puree  How Presented: Self-fed/presented;Cup/sip;Straw;Spoon     Bolus Acceptance: No impairment  Bolus Formation/Control: No impairment     Propulsion: No impairment  Oral Residue: None  Initiation of Swallow: No impairment  Laryngeal Elevation: Weak  Aspiration Signs/Symptoms: Infiltrate on chest xray;Strong cough  Pharyngeal Phase Characteristics: Suspected pharyngeal residue;Poor endurance  Effective Modifications: Small sips and bites  Cues for Modifications: Minimal        Oral Phase Severity: No impairment  Pharyngeal Phase Severity : Mild     GCODESwallowing:  Swallow Current Status CI= 1-19%   Swallow Goal Status CH= 0%     The severity rating is based on the following outcomes:             Clinical Judgment     Pain:  Pt c/o 0/10 pain prior to evaluation. Pt c/o 0/10 pain post evaluation.      After treatment:   [ ]            Patient left in no apparent distress sitting up in chair  [X]            Patient left in no apparent distress in bed  [X]            Call bell left within reach  [X]            Nursing notified  [ ]            Caregiver present  [ ]            Bed alarm activated      COMMUNICATION/EDUCATION:   [X]            SLP educated pt with regard to compensatory swallow strategies and                  aspiration/reflux precautions including: small bites/sips,                  alternate liquids/solids, decrease feeding rate, HOB > 45 with all po, and                             upright in bed at 30 degrees after po for at least 45 minutes. [X]            Patient/family have participated as able in goal setting and plan of care.      Thank you for this referral.     Gulshan Giraldo M.S. CCC-SLP/L  Speech-Language Pathologist

## 2025-05-30 NOTE — TELEPHONE ENCOUNTER
Patients GI provider:  Dr. Arthur    Number to return call: [phone]    Reason for call: Pt called for an appt today as he is having severe abdominal pain. Pt informed no appts for today and pt will go to the ER.    Scheduled procedure/appointment date if applicable: Apt/procedure N/A

## 2025-06-10 ENCOUNTER — HOSPITAL ENCOUNTER (OUTPATIENT)
Dept: MRI IMAGING | Facility: HOSPITAL | Age: 25
Discharge: HOME | End: 2025-06-10
Attending: INTERNAL MEDICINE
Payer: COMMERCIAL

## 2025-06-10 DIAGNOSIS — G89.29 CHRONIC LLQ PAIN: ICD-10-CM

## 2025-06-10 DIAGNOSIS — R10.32 CHRONIC LLQ PAIN: ICD-10-CM

## 2025-06-10 PROCEDURE — 72197 MRI PELVIS W/O & W/DYE: CPT

## 2025-06-10 PROCEDURE — A9585 GADOBUTROL INJECTION: HCPCS | Performed by: INTERNAL MEDICINE

## 2025-06-10 PROCEDURE — 74183 MRI ABD W/O CNTR FLWD CNTR: CPT

## 2025-06-10 RX ORDER — GADOBUTROL 604.72 MG/ML
6 INJECTION INTRAVENOUS
Status: COMPLETED | OUTPATIENT
Start: 2025-06-10 | End: 2025-06-10

## 2025-06-10 RX ADMIN — GADOBUTROL 6 ML: 604.72 INJECTION INTRAVENOUS at 15:46

## 2025-06-11 ENCOUNTER — RESULTS FOLLOW-UP (OUTPATIENT)
Age: 25
End: 2025-06-11

## 2025-08-01 ENCOUNTER — OFFICE VISIT (OUTPATIENT)
Dept: DENTISTRY | Facility: CLINIC | Age: 25
End: 2025-08-01

## 2025-08-01 VITALS — DIASTOLIC BLOOD PRESSURE: 78 MMHG | HEART RATE: 76 BPM | SYSTOLIC BLOOD PRESSURE: 110 MMHG

## 2025-08-01 DIAGNOSIS — Z01.20 DENTAL EXAMINATION: ICD-10-CM

## 2025-08-01 DIAGNOSIS — K01.1 TOOTH IMPACTION: Primary | ICD-10-CM

## 2025-08-01 PROCEDURE — D0330 PANORAMIC RADIOGRAPHIC IMAGE: HCPCS

## 2025-08-01 PROCEDURE — D1330 ORAL HYGIENE INSTRUCTIONS: HCPCS

## 2025-08-01 PROCEDURE — D0210 INTRAORAL - COMPLETE SERIES OF RADIOGRAPHIC IMAGES: HCPCS

## 2025-08-01 PROCEDURE — D0150 COMPREHENSIVE ORAL EVALUATION - NEW OR ESTABLISHED PATIENT: HCPCS

## 2025-08-06 ENCOUNTER — TELEPHONE (OUTPATIENT)
Dept: DENTISTRY | Facility: CLINIC | Age: 25
End: 2025-08-06

## (undated) DEVICE — SUT VICRYL 0 UR-6 27 IN J603H

## (undated) DEVICE — SUT MONOCRYL 4-0 PS-2 27 IN Y426H

## (undated) DEVICE — CHLORAPREP HI-LITE 26ML ORANGE

## (undated) DEVICE — GLOVE SRG BIOGEL 6.5

## (undated) DEVICE — PMI DISPOSABLE PUNCTURE CLOSURE DEVICE / SUTURE GRASPER: Brand: PMI

## (undated) DEVICE — TROCAR: Brand: KII FIOS FIRST ENTRY

## (undated) DEVICE — ENDOPATH PNEUMONEEDLE INSUFFLATION NEEDLES WITH LUER LOCK CONNECTORS 120MM: Brand: ENDOPATH

## (undated) DEVICE — GLOVE INDICATOR PI UNDERGLOVE SZ 6.5 BLUE

## (undated) DEVICE — [HIGH FLOW INSUFFLATOR,  DO NOT USE IF PACKAGE IS DAMAGED,  KEEP DRY,  KEEP AWAY FROM SUNLIGHT,  PROTECT FROM HEAT AND RADIOACTIVE SOURCES.]: Brand: PNEUMOSURE

## (undated) DEVICE — ALLENTOWN LAP CHOLE APP PACK: Brand: CARDINAL HEALTH

## (undated) DEVICE — INTENDED FOR TISSUE SEPARATION, AND OTHER PROCEDURES THAT REQUIRE A SHARP SURGICAL BLADE TO PUNCTURE OR CUT.: Brand: BARD-PARKER SAFETY BLADES SIZE 11, STERILE

## (undated) DEVICE — BLUE HEAT SCOPE WARMER

## (undated) DEVICE — PLUMEPEN PRO 10FT

## (undated) DEVICE — SCD SEQUENTIAL COMPRESSION COMFORT SLEEVE MEDIUM KNEE LENGTH: Brand: KENDALL SCD

## (undated) DEVICE — ADHESIVE SKIN HIGH VISCOSITY EXOFIN 1ML

## (undated) DEVICE — TROCAR: Brand: KII® SLEEVE

## (undated) DEVICE — TUBING SMOKE EVAC W/FILTRATION DEVICE PLUMEPORT ACTIV